# Patient Record
Sex: FEMALE | Race: WHITE | Employment: OTHER | URBAN - METROPOLITAN AREA
[De-identification: names, ages, dates, MRNs, and addresses within clinical notes are randomized per-mention and may not be internally consistent; named-entity substitution may affect disease eponyms.]

---

## 2017-01-04 ENCOUNTER — TELEPHONE (OUTPATIENT)
Dept: INTERNAL MEDICINE CLINIC | Age: 82
End: 2017-01-04

## 2017-01-04 NOTE — TELEPHONE ENCOUNTER
Has some questions regarding her hospice referral and her nebulizer she wants to make sure that you are up to date on these

## 2017-01-05 NOTE — TELEPHONE ENCOUNTER
Spoke with dgherlinda who stated that hospice did not qualify her mother and agrees with their assessment and dgter also wanted to check on the status of her neb.  Call placed to [facility] medical supply, left message for rep to call back regarding status

## 2017-01-06 NOTE — TELEPHONE ENCOUNTER
Writer placed c all to [facility] [employer] again and spoke with a rep who stated that she had called and spoke with the nurse that dx code was not covered. This writer had never received call or faxed correspondence regarding inaccurate coding.  Correct info obtained and will confer with Dr Robbin Hernandez on Monday regarding    Call placed to ter and updated on this situation and she voiced understanding

## 2017-01-11 NOTE — TELEPHONE ENCOUNTER
Spoke with antonia and advised that pt does not have any qualifying dx to cover nebulizer and that she may purchase but insurance will not cover.  Antonia voiced understanding

## 2017-01-23 ENCOUNTER — APPOINTMENT (OUTPATIENT)
Dept: GENERAL RADIOLOGY | Age: 82
DRG: 189 | End: 2017-01-23
Attending: EMERGENCY MEDICINE
Payer: MEDICARE

## 2017-01-23 ENCOUNTER — APPOINTMENT (OUTPATIENT)
Dept: CT IMAGING | Age: 82
DRG: 189 | End: 2017-01-23
Attending: EMERGENCY MEDICINE
Payer: MEDICARE

## 2017-01-23 ENCOUNTER — HOSPITAL ENCOUNTER (INPATIENT)
Age: 82
LOS: 2 days | Discharge: HOME | DRG: 189 | End: 2017-01-26
Attending: EMERGENCY MEDICINE | Admitting: FAMILY MEDICINE
Payer: MEDICARE

## 2017-01-23 DIAGNOSIS — R11.2 NON-INTRACTABLE VOMITING WITH NAUSEA, UNSPECIFIED VOMITING TYPE: ICD-10-CM

## 2017-01-23 DIAGNOSIS — N17.9 ACUTE RENAL FAILURE, UNSPECIFIED ACUTE RENAL FAILURE TYPE (HCC): ICD-10-CM

## 2017-01-23 DIAGNOSIS — I42.9 CARDIOMYOPATHY (HCC): ICD-10-CM

## 2017-01-23 DIAGNOSIS — I50.23 ACUTE ON CHRONIC SYSTOLIC CONGESTIVE HEART FAILURE (HCC): Primary | ICD-10-CM

## 2017-01-23 DIAGNOSIS — R79.89 ELEVATED BRAIN NATRIURETIC PEPTIDE (BNP) LEVEL: ICD-10-CM

## 2017-01-23 DIAGNOSIS — E86.0 DEHYDRATION: ICD-10-CM

## 2017-01-23 LAB
ALBUMIN SERPL BCP-MCNC: 3.8 G/DL (ref 3.5–5)
ALBUMIN/GLOB SERPL: 0.9 {RATIO} (ref 1.1–2.2)
ALP SERPL-CCNC: 81 U/L (ref 45–117)
ALT SERPL-CCNC: 16 U/L (ref 12–78)
ANION GAP BLD CALC-SCNC: 6 MMOL/L (ref 5–15)
AST SERPL W P-5'-P-CCNC: 17 U/L (ref 15–37)
BASOPHILS # BLD AUTO: 0 K/UL (ref 0–0.1)
BASOPHILS # BLD: 0 % (ref 0–1)
BILIRUB SERPL-MCNC: 0.7 MG/DL (ref 0.2–1)
BNP SERPL-MCNC: 510 PG/ML (ref 0–100)
BUN SERPL-MCNC: 32 MG/DL (ref 6–20)
BUN/CREAT SERPL: 20 (ref 12–20)
CALCIUM SERPL-MCNC: 9.7 MG/DL (ref 8.5–10.1)
CHLORIDE SERPL-SCNC: 101 MMOL/L (ref 97–108)
CO2 SERPL-SCNC: 31 MMOL/L (ref 21–32)
CREAT SERPL-MCNC: 1.64 MG/DL (ref 0.55–1.02)
D DIMER PPP FEU-MCNC: 0.41 MG/L FEU (ref 0–0.65)
DIFFERENTIAL METHOD BLD: ABNORMAL
EOSINOPHIL # BLD: 0 K/UL (ref 0–0.4)
EOSINOPHIL NFR BLD: 0 % (ref 0–7)
ERYTHROCYTE [DISTWIDTH] IN BLOOD BY AUTOMATED COUNT: 13.6 % (ref 11.5–14.5)
GLOBULIN SER CALC-MCNC: 4.1 G/DL (ref 2–4)
GLUCOSE SERPL-MCNC: 188 MG/DL (ref 65–100)
HCT VFR BLD AUTO: 54 % (ref 35–47)
HGB BLD-MCNC: 17.6 G/DL (ref 11.5–16)
LYMPHOCYTES # BLD AUTO: 6 % (ref 12–49)
LYMPHOCYTES # BLD: 0.5 K/UL (ref 0.8–3.5)
MCH RBC QN AUTO: 30.6 PG (ref 26–34)
MCHC RBC AUTO-ENTMCNC: 32.6 G/DL (ref 30–36.5)
MCV RBC AUTO: 93.9 FL (ref 80–99)
MONOCYTES # BLD: 0.4 K/UL (ref 0–1)
MONOCYTES NFR BLD AUTO: 5 % (ref 5–13)
NEUTS SEG # BLD: 7.6 K/UL (ref 1.8–8)
NEUTS SEG NFR BLD AUTO: 89 % (ref 32–75)
PLATELET # BLD AUTO: 197 K/UL (ref 150–400)
POTASSIUM SERPL-SCNC: 4.7 MMOL/L (ref 3.5–5.1)
PROT SERPL-MCNC: 7.9 G/DL (ref 6.4–8.2)
RBC # BLD AUTO: 5.75 M/UL (ref 3.8–5.2)
RBC MORPH BLD: ABNORMAL
SODIUM SERPL-SCNC: 138 MMOL/L (ref 136–145)
WBC # BLD AUTO: 8.5 K/UL (ref 3.6–11)

## 2017-01-23 PROCEDURE — 36415 COLL VENOUS BLD VENIPUNCTURE: CPT | Performed by: EMERGENCY MEDICINE

## 2017-01-23 PROCEDURE — 99285 EMERGENCY DEPT VISIT HI MDM: CPT

## 2017-01-23 PROCEDURE — 51701 INSERT BLADDER CATHETER: CPT

## 2017-01-23 PROCEDURE — 83880 ASSAY OF NATRIURETIC PEPTIDE: CPT | Performed by: EMERGENCY MEDICINE

## 2017-01-23 PROCEDURE — 74176 CT ABD & PELVIS W/O CONTRAST: CPT

## 2017-01-23 PROCEDURE — 85025 COMPLETE CBC W/AUTO DIFF WBC: CPT | Performed by: EMERGENCY MEDICINE

## 2017-01-23 PROCEDURE — 74011250636 HC RX REV CODE- 250/636: Performed by: EMERGENCY MEDICINE

## 2017-01-23 PROCEDURE — 80053 COMPREHEN METABOLIC PANEL: CPT | Performed by: EMERGENCY MEDICINE

## 2017-01-23 PROCEDURE — 71010 XR CHEST PORT: CPT

## 2017-01-23 PROCEDURE — 96374 THER/PROPH/DIAG INJ IV PUSH: CPT

## 2017-01-23 PROCEDURE — 85379 FIBRIN DEGRADATION QUANT: CPT | Performed by: EMERGENCY MEDICINE

## 2017-01-23 PROCEDURE — 96361 HYDRATE IV INFUSION ADD-ON: CPT

## 2017-01-23 RX ORDER — SODIUM CHLORIDE 9 MG/ML
1000 INJECTION, SOLUTION INTRAVENOUS ONCE
Status: COMPLETED | OUTPATIENT
Start: 2017-01-23 | End: 2017-01-24

## 2017-01-23 RX ORDER — ONDANSETRON 2 MG/ML
8 INJECTION INTRAMUSCULAR; INTRAVENOUS
Status: COMPLETED | OUTPATIENT
Start: 2017-01-23 | End: 2017-01-23

## 2017-01-23 RX ADMIN — SODIUM CHLORIDE 1000 ML: 900 INJECTION, SOLUTION INTRAVENOUS at 20:36

## 2017-01-23 RX ADMIN — ONDANSETRON 8 MG: 2 INJECTION INTRAMUSCULAR; INTRAVENOUS at 20:45

## 2017-01-23 NOTE — ED TRIAGE NOTES
Triage: Pt arrives by EMS from [facility] assisted living for N/V/D starting this AM.

## 2017-01-23 NOTE — IP AVS SNAPSHOT
[address] [facility] [address] [place] [address] 
[phone] Patient: Raul Howell MRN: [id number] You are allergic to the following Allergen Reactions Neurontin (Gabapentin) Unknown (comments) Pt unknown reaction Recent Documentation Height Weight BMI OB Status Smoking Status 1.549 m 71.5 kg 29.78 kg/m2 Postmenopausal Never Smoker Emergency Contacts Name Discharge Info Relation Home Work Mobile [facility] DISCHARGE CAREGIVER [3] Child [2] [phone] Jennifer Moyer  Child [2] [phone] About your hospitalization You were admitted on:  2017 You last received care in the:  [address] [place] [address] You were discharged on:  2017 Unit phone number:  [phone] Why you were hospitalized Your primary diagnosis was:  Diarrhea Your diagnoses also included:  Acute Respiratory Failure With Hypoxia (Hcc) Providers Seen During Your Hospitalizations Provider Role Specialty Primary office phone Apolinar Munoz MD Attending Provider Emergency Medicine [phone] Gina Rosas MD Attending Provider [facility] [phone] Amy Mayo MD Attending Provider Internal Medicine [phone] Your Primary Care Physician (PCP) Primary Care Physician Office Phone Office Fax [address] [place] [address] [phone] Follow-up Information Follow up With Details Comments Contact Info Geremias Fang MD On 2017 For discharge follow up at [facility] at [address] [facility] [address] 
[phone] Jaclyn [address] [place] 227 Call in 1 day Hospital to Home visit. Please call if you have not heard from [address] [employer] by 11AM the day after discharge. [address] [address] [phone] Your Appointments 2017  9:30 AM EST TRANSITIONAL CARE MANAGEMENT with Chayo Schreiber MD  
[facility] ([facility]) [address] [place] [address]. [place] [id number] [phone] Current Discharge Medication List  
  
CONTINUE these medications which have NOT CHANGED Dose & Instructions Dispensing Information Comments Morning Noon Evening Bedtime  
 acetaminophen 325 mg tablet Commonly known as:  Q-Nol Your next dose is: Today, Tomorrow Other:  _________ Dose:  650 mg Take 2 Tabs by mouth two (2) times a day. 2 tabet at am and 2 tab at noon Quantity:  60 Tab Refills:  6  
     
   
   
   
  
 * albuterol 90 mcg/actuation inhaler Commonly known as:  PROVENTIL HFA Your next dose is: Today, Tomorrow Other:  _________ Dose:  1 Puff Take 1 Puff by inhalation every six (6) hours as needed for Wheezing. Quantity:  1 Inhaler Refills:  prn  
     
   
   
   
  
 * albuterol 2.5 mg /3 mL (0.083 %) nebulizer solution Commonly known as:  PROVENTIL VENTOLIN Your next dose is: Today, Tomorrow Other:  _________ INHALE THE CONTENTS OF 1 VIAL VIA NEBULIZER EVERY 6 HOURS AS NEEDED FOR WHEEZING Quantity:  100 Package Refills:  12  
     
   
   
   
  
 apixaban 2.5 mg tablet Commonly known as:  Osman Celeste Your next dose is: Today, Tomorrow Other:  _________ Dose:  2.5 mg Take 1 Tab by mouth two (2) times a day. Quantity:  60 Tab Refills:  5  
     
   
   
   
  
 calcium-cholecalciferol (D3) tablet Commonly known as:  CALTRATE 600+D Your next dose is: Today, Tomorrow Other:  _________ Dose:  1 Tab Take 1 Tab by mouth two (2) times a day. Quantity:  60 Tab Refills:  12  
     
   
   
   
  
 DOC-Q-LACE 100 mg capsule Generic drug:  docusate sodium Your next dose is: Today, Tomorrow Other:  _________ TAKE 1 CAPSULE BY MOUTH TWICE DAILY DX:BOWEL SUPPORT Quantity:  60 Cap Refills:  4  
     
   
   
   
  
 donepezil 5 mg tablet Commonly known as:  ARICEPT Your next dose is: Today, Tomorrow Other:  _________ TAKE 1 TABLET BY MOUTH EVERY NIGHT AT BEDTIME DX: MEMORY SUPPORT Quantity:  30 Tab Refills:  4  
     
   
   
   
  
 furosemide 40 mg tablet Commonly known as:  LASIX Your next dose is: Today, Tomorrow Other:  _________ Dose:  40 mg Take 40 mg by mouth daily. Refills:  0  
     
   
   
   
  
 lisinopril 5 mg tablet Commonly known as:  Shivani Toribio Your next dose is: Today, Tomorrow Other:  _________ TAKE 1 TABLET BY MOUTH EVERY DAY DX: HTN Quantity:  30 Tab Refills:  4  
     
   
   
   
  
 metoprolol succinate 25 mg XL tablet Commonly known as:  TOPROL-XL Your next dose is: Today, Tomorrow Other:  _________ TAKE 1 TABLET BY MOUTH EVERY DAY DX: HTN Quantity:  30 Tab Refills:  4 MILK OF MAGNESIA 400 mg/5 mL suspension Generic drug:  magnesium hydroxide Your next dose is: Today, Tomorrow Other:  _________ TAKE 30ML BY MOUTH EVERY 24 HOURS AS NEEDED FOR CONSTIPATION Quantity:  473 mL Refills:  4  
     
   
   
   
  
 miscellaneous medical supply Misc Commonly known as:  ANTI-EMBOLISM STOCKINGS Your next dose is: Today, Tomorrow Other:  _________ Use below knee stockings 15 to 30 mm HG. Quantity:  2 Each Refills:  1  
     
   
   
   
  
 polyethylene glycol 17 gram/dose powder Commonly known as:  Alexis Chaudhari Your next dose is: Today, Tomorrow Other:  _________ MIX THE CONTENTS OF 1 HEAPING TABLESPOON (=17GRAMS) WITH 8 OUNCES OF WATER AND DRINK DAILY DX:BOWEL AIDE Quantity:  527 g Refills:  4  
     
   
   
   
  
 pravastatin 40 mg tablet Commonly known as:  PRAVACHOL Your next dose is: Today, Tomorrow Other:  _________ TAKE 1 TABLET BY MOUTH EVERY NIGHT AT BEDTIME DX: CHOLESTEROL Quantity:  30 Tab Refills:  4  
     
   
   
   
  
 raNITIdine 150 mg tablet Commonly known as:  ZANTAC Your next dose is: Today, Tomorrow Other:  _________ Dose:  150 mg Take 150 mg by mouth nightly. Refills:  0 SENEXON-S 8.6-50 mg per tablet Generic drug:  senna-docusate Your next dose is: Today, Tomorrow Other:  _________ TAKE 1 TABLET BY MOUTH DAILY AS NEEDED FOR CONSTIPATION Quantity:  30 Tab Refills:  4  
     
   
   
   
  
 traMADol 50 mg tablet Commonly known as:  ULTRAM  
   
Your next dose is: Today, Tomorrow Other:  _________ TAKE 1/2 (25MG) TABLET BY MOUTH IN THE MORNING AND 1 TAB (50MG) IN THE EVENING DX:PAIN Quantity:  45 Tab Refills:  4  
     
   
   
   
  
 venlafaxine-SR 75 mg capsule Commonly known as:  EFFEXOR-XR Your next dose is: Today, Tomorrow Other:  _________ TAKE 1 CAPSULE BY MOUTH EVERY DAY DX:ANTIDEPRESSANT Quantity:  30 Cap Refills:  4 VITAMIN B-12 500 mcg tablet Generic drug:  cyanocobalamin Your next dose is: Today, Tomorrow Other:  _________ TAKE 1 TABLET BY MOUTH EVERY DAY DX: SUPPLEMENT Quantity:  30 Tab Refills:  4  
     
   
   
   
  
 * Notice: This list has 2 medication(s) that are the same as other medications prescribed for you. Read the directions carefully, and ask your doctor or other care provider to review them with you. STOP taking these medications VOLTAREN 1 % Gel Generic drug:  diclofenac Where to Get Your Medications These medications were sent to [address] [place] [address] Phone:  [phone]  
  pravastatin 40 mg tablet Discharge Instructions Discharge Instructions PATIENT ID: Mindy Forman MRN: [id number] YOB: 1926 DATE OF ADMISSION: 1/23/2017  6:50 PM   
DATE OF DISCHARGE: 1/26/2017 PRIMARY CARE PROVIDER: Jenifer Bass MD  
 
ATTENDING PHYSICIAN: Valerie Orozco MD 
DISCHARGING PROVIDER: Valerie Orozco MD   
To contact this individual call [phone] and ask the  to page. If unavailable ask to be transferred the Adult Hospitalist Department. DISCHARGE DIAGNOSES Diarrhea, likely secondary to viral gastroenteritis Acute hypoxic respiratory failure, now resolved Chronic systolic heart failure Acute kidney injury, now resolved HTN 
  CAD s/p CABG History of thromboembolism CONSULTATIONS: IP CONSULT TO HOSPITALIST 
 
PROCEDURES/SURGERIES: * No surgery found * PENDING TEST RESULTS:  
At the time of discharge the following test results are still pending: None. FOLLOW UP APPOINTMENTS:  
Follow-up Information Follow up With Details Comments Contact Info Jenifer Bass MD Schedule an appointment as soon as possible for a visit in 1 week post-hospital follow-up [address] [place] [address] [address] [place] [address] 
[phone] ADDITIONAL CARE RECOMMENDATIONS:  
  Please make sure you are keeping up with fluid intake. Rest as needed. You may resume your home medications. DIET: Cardiac Diet ACTIVITY: Activity as tolerated WOUND CARE: N/A 
 
EQUIPMENT needed: N/A 
 
 
DISCHARGE MEDICATIONS: 
 See Medication Reconciliation Form · It is important that you take the medication exactly as they are prescribed. · Keep your medication in the bottles provided by the pharmacist and keep a list of the medication names, dosages, and times to be taken in your wallet. · Do not take other medications without consulting your doctor.   
 
 
NOTIFY YOUR PHYSICIAN FOR ANY OF THE FOLLOWING:  
 Fever over 101 degrees for 24 hours. Chest pain, shortness of breath, fever, chills, nausea, vomiting, diarrhea, change in mentation, falling, weakness, bleeding. Severe pain or pain not relieved by medications. Or, any other signs or symptoms that you may have questions about. DISPOSITION: 
  Home With: 
 OT  PT  HH  RN  
  
 SNF/Inpatient Rehab/LTAC  
x Independent/assisted living Hospice Other: CDMP Checked:  
Yes x PROBLEM LIST Updated: 
Yes x Signed:  
Mikey Loera MD 
1/26/2017 
1:42 PM 
 
Discharge Instructions Attachments/References HEART FAILURE: AVOIDING TRIGGERS (ENGLISH) Discharge Orders None [employer] Announcement We are excited to announce that we are making your provider's discharge notes available to you in [employer]. You will see these notes when they are completed and signed by the physician that discharged you from your recent hospital stay. If you have any questions or concerns about any information you see in [employer], please call the Health Information Department where you were seen or reach out to your Primary Care Provider for more information about your plan of care. Introducing [facility]! Romayne Duster introduces [employer] patient portal. Now you can access parts of your medical record, email your doctor's office, and request medication refills online. 1. In your internet browser, go to [email or web address] 2. Click on the First Time User? Click Here link in the Sign In box. You will see the New Member Sign Up page. 3. Enter your [employer] Access Code exactly as it appears below. You will not need to use this code after youve completed the sign-up process. If you do not sign up before the expiration date, you must request a new code. · [employer] Access Code: [email or web address] Expires: 2/20/2017  2:03 PM 
 
4.  Enter the last four digits of your Social Security Number (xxxx) and Date of Birth (mm/dd/yyyy) as indicated and click Submit. You will be taken to the next sign-up page. 5. Create a Prismatic ID. This will be your Prismatic login ID and cannot be changed, so think of one that is secure and easy to remember. 6. Create a Prismatic password. You can change your password at any time. 7. Enter your Password Reset Question and Answer. This can be used at a later time if you forget your password. 8. Enter your e-mail address. You will receive e-mail notification when new information is available in [address] 9. Click Sign Up. You can now view and download portions of your medical record. 10. Click the Download Summary menu link to download a portable copy of your medical information. If you have questions, please visit the Frequently Asked Questions section of the Prism[employer] website. Remember, [employer] is NOT to be used for urgent needs. For medical emergencies, dial 911. Now available from your iPhone and Android! General Information Please provide this summary of care documentation to your next provider. Patient Signature:  ____________________________________________________________ Date:  ____________________________________________________________  
  
Earnstine Donte Provider Signature:  ____________________________________________________________ Date:  ____________________________________________________________ More Information Avoiding Triggers With Heart Failure: Care Instructions Your Care Instructions Triggers are anything that make your heart failure flare up. A flare-up is also called \"sudden heart failure\" or \"acute heart failure. \" When you have a flare-up, fluid builds up in your lungs, and you have problems breathing. You might need to go to the hospital. By watching for changes in your condition and avoiding triggers, you can prevent heart failure flare-ups. Follow-up care is a key part of your treatment and safety. Be sure to make and go to all appointments, and call your doctor if you are having problems. It's also a good idea to know your test results and keep a list of the medicines you take. How can you care for yourself at home? Watch for changes in your weight and condition · Weigh yourself without clothing at the same time each day. Record your weight. Call your doctor if you gain 3 pounds or more in 2 to 3 days. A sudden weight gain may mean that your heart failure is getting worse. · Keep a daily record of your symptoms. Write down any changes in how you feel, such as new shortness of breath, cough, or problems eating. Also record if your ankles are more swollen than usual and if you have to urinate in the night more often. Note anything that you ate or did that could have triggered these changes. Limit sodium Sodium causes your body to hold on to water, making it harder for your heart to pump. People get most of their sodium from processed foods. Fast food and restaurant meals also tend to be very high in sodium. · Your doctor may suggest that you limit sodium to 2,000 milligrams (mg) a day or less. That is less than 1 teaspoon of salt a day, including all the salt you eat in cooking or in packaged foods. · Read food labels on cans and food packages. They tell you how much sodium you get in one serving. Check the serving size. If you eat more than one serving, you are getting more sodium. · Be aware that sodium can come in forms other than salt, including monosodium glutamate (MSG), sodium citrate, and sodium bicarbonate (baking soda). MSG is often added to Asian food. You can sometimes ask for food without MSG or salt. · Slowly reducing salt will help you adjust to the taste. Take the salt shaker off the table.  
· Flavor your food with garlic, lemon juice, onion, vinegar, herbs, and spices instead of salt. Do not use soy sauce, steak sauce, onion salt, garlic salt, mustard, or ketchup on your food, unless it is labeled \"low-sodium\" or \"low-salt. \" 
· Make your own salad dressings, sauces, and ketchup without adding salt. · Use fresh or frozen ingredients, instead of canned ones, whenever you can. Choose low-sodium canned goods. · Eat less processed food and food from restaurants, including fast food. Exercise as directed Moderate, regular exercise is very good for your heart. It improves your blood flow and helps control your weight. But too much exercise can stress your heart and cause a heart failure flare-up. · Check with your doctor before you start an exercise program. 
· Walking is an easy way to get exercise. Start out slowly. Gradually increase the length and pace of your walk. Swimming, riding a bike, and using a treadmill are also good forms of exercise. · When you exercise, watch for signs that your heart is working too hard. You are pushing yourself too hard if you cannot talk while you are exercising. If you become short of breath or dizzy or have chest pain, stop, sit down, and rest. 
· Do not exercise when you do not feel well. Take medicines correctly · Take your medicines exactly as prescribed. Call your doctor if you think you are having a problem with your medicine. · Make a list of all the medicines you take. Include those prescribed to you by other doctors and any over-the-counter medicines, vitamins, or supplements you take. Take this list with you when you go to any doctor. · Take your medicines at the same time every day. It may help you to post a list of all the medicines you take every day and what time of day you take them. · Make taking your medicine as simple as you can. Plan times to take your medicines when you are doing other things, such as eating a meal or getting ready for bed. This will make it easier to remember to take your medicines. · Get organized. Use helpful tools, such as daily or weekly pill containers. When should you call for help? Call 911 if you have symptoms of sudden heart failure such as: 
· You have severe trouble breathing. · You cough up pink, foamy mucus. · You have a new irregular or rapid heartbeat. Call your doctor now or seek immediate medical care if: 
· You have new or increased shortness of breath. · You are dizzy or lightheaded, or you feel like you may faint. · You have sudden weight gain, such as 3 pounds or more in 2 to 3 days. · You have increased swelling in your legs, ankles, or feet. · You are suddenly so tired or weak that you cannot do your usual activities. Watch closely for changes in your health, and be sure to contact your doctor if you develop new symptoms. Where can you learn more? Go to http://emma-rosina.info/. Enter T410 in the search box to learn more about \"Avoiding Triggers With Heart Failure: Care Instructions. \" Current as of: April 27, 2016 Content Version: 11.1 © 3825-0476 [employer]. Care instructions adapted under license by [employer] (which disclaims liability or warranty for this information). If you have questions about a medical condition or this instruction, always ask your healthcare professional. [address] any warranty or liability for your use of this information.

## 2017-01-23 NOTE — IP AVS SNAPSHOT
Current Discharge Medication List  
  
Take these medications at their scheduled times Dose & Instructions Dispensing Information Comments Morning Noon Evening Bedtime  
 acetaminophen 325 mg tablet Commonly known as:  Q-Nol Your next dose is: Today, Tomorrow Other:  ____________ Dose:  650 mg Take 2 Tabs by mouth two (2) times a day. 2 tabet at am and 2 tab at noon Quantity:  60 Tab Refills:  6  
     
   
   
   
  
 apixaban 2.5 mg tablet Commonly known as:  Virgel Sylvia Your next dose is: Today, Tomorrow Other:  ____________ Dose:  2.5 mg Take 1 Tab by mouth two (2) times a day. Quantity:  60 Tab Refills:  5  
     
   
   
   
  
 calcium-cholecalciferol (D3) tablet Commonly known as:  CALTRATE 600+D Your next dose is: Today, Tomorrow Other:  ____________ Dose:  1 Tab Take 1 Tab by mouth two (2) times a day. Quantity:  60 Tab Refills:  12  
     
   
   
   
  
 furosemide 40 mg tablet Commonly known as:  LASIX Your next dose is: Today, Tomorrow Other:  ____________ Dose:  40 mg Take 40 mg by mouth daily. Refills:  0  
     
   
   
   
  
 raNITIdine 150 mg tablet Commonly known as:  ZANTAC Your next dose is: Today, Tomorrow Other:  ____________ Dose:  150 mg Take 150 mg by mouth nightly. Refills:  0 Take these medications as needed Dose & Instructions Dispensing Information Comments Morning Noon Evening Bedtime * albuterol 90 mcg/actuation inhaler Commonly known as:  PROVENTIL HFA Your next dose is: Today, Tomorrow Other:  ____________ Dose:  1 Puff Take 1 Puff by inhalation every six (6) hours as needed for Wheezing. Quantity:  1 Inhaler Refills:  prn * Notice:   This list has 1 medication(s) that are the same as other medications prescribed for you. Read the directions carefully, and ask your doctor or other care provider to review them with you. Take these medications as directed Dose & Instructions Dispensing Information Comments Morning Noon Evening Bedtime * albuterol 2.5 mg /3 mL (0.083 %) nebulizer solution Commonly known as:  PROVENTIL VENTOLIN Your next dose is: Today, Tomorrow Other:  ____________ INHALE THE CONTENTS OF 1 VIAL VIA NEBULIZER EVERY 6 HOURS AS NEEDED FOR WHEEZING Quantity:  100 Package Refills:  12  
     
   
   
   
  
 DOC-Q-LACE 100 mg capsule Generic drug:  docusate sodium Your next dose is: Today, Tomorrow Other:  ____________ TAKE 1 CAPSULE BY MOUTH TWICE DAILY DX:BOWEL SUPPORT Quantity:  60 Cap Refills:  4  
     
   
   
   
  
 donepezil 5 mg tablet Commonly known as:  ARICEPT Your next dose is: Today, Tomorrow Other:  ____________ TAKE 1 TABLET BY MOUTH EVERY NIGHT AT BEDTIME DX: MEMORY SUPPORT Quantity:  30 Tab Refills:  4  
     
   
   
   
  
 lisinopril 5 mg tablet Commonly known as:  Shivani Toribio Your next dose is: Today, Tomorrow Other:  ____________ TAKE 1 TABLET BY MOUTH EVERY DAY DX: HTN Quantity:  30 Tab Refills:  4  
     
   
   
   
  
 metoprolol succinate 25 mg XL tablet Commonly known as:  TOPROL-XL Your next dose is: Today, Tomorrow Other:  ____________ TAKE 1 TABLET BY MOUTH EVERY DAY DX: HTN Quantity:  30 Tab Refills:  4 MILK OF MAGNESIA 400 mg/5 mL suspension Generic drug:  magnesium hydroxide Your next dose is: Today, Tomorrow Other:  ____________ TAKE 30ML BY MOUTH EVERY 24 HOURS AS NEEDED FOR CONSTIPATION Quantity:  473 mL Refills:  4  
     
   
   
   
  
 miscellaneous medical supply Misc Commonly known as:  ANTI-EMBOLISM STOCKINGS  
   
 Your next dose is: Today, Tomorrow Other:  ____________ Use below knee stockings 15 to 30 mm HG. Quantity:  2 Each Refills:  1  
     
   
   
   
  
 polyethylene glycol 17 gram/dose powder Commonly known as:  Leata Sans Your next dose is: Today, Tomorrow Other:  ____________ MIX THE CONTENTS OF 1 HEAPING TABLESPOON (=17GRAMS) WITH 8 OUNCES OF WATER AND DRINK DAILY DX:BOWEL AIDE Quantity:  527 g Refills:  4  
     
   
   
   
  
 pravastatin 40 mg tablet Commonly known as:  PRAVACHOL Your next dose is: Today, Tomorrow Other:  ____________ TAKE 1 TABLET BY MOUTH EVERY NIGHT AT BEDTIME DX: CHOLESTEROL Quantity:  30 Tab Refills:  4 SENEXON-S 8.6-50 mg per tablet Generic drug:  senna-docusate Your next dose is: Today, Tomorrow Other:  ____________ TAKE 1 TABLET BY MOUTH DAILY AS NEEDED FOR CONSTIPATION Quantity:  30 Tab Refills:  4  
     
   
   
   
  
 traMADol 50 mg tablet Commonly known as:  ULTRAM  
   
Your next dose is: Today, Tomorrow Other:  ____________ TAKE 1/2 (25MG) TABLET BY MOUTH IN THE MORNING AND 1 TAB (50MG) IN THE EVENING DX:PAIN Quantity:  45 Tab Refills:  4  
     
   
   
   
  
 venlafaxine-SR 75 mg capsule Commonly known as:  EFFEXOR-XR Your next dose is: Today, Tomorrow Other:  ____________ TAKE 1 CAPSULE BY MOUTH EVERY DAY DX:ANTIDEPRESSANT Quantity:  30 Cap Refills:  4 VITAMIN B-12 500 mcg tablet Generic drug:  cyanocobalamin Your next dose is: Today, Tomorrow Other:  ____________ TAKE 1 TABLET BY MOUTH EVERY DAY DX: SUPPLEMENT Quantity:  30 Tab Refills:  4  
     
   
   
   
  
 * Notice: This list has 1 medication(s) that are the same as other medications prescribed for you.  Read the directions carefully, and ask your doctor or other care provider to review them with you. Where to Get Your Medications These medications were sent to [address] [facility] [address] Phone:  [phone]  
  pravastatin 40 mg tablet

## 2017-01-24 PROBLEM — J96.01 ACUTE RESPIRATORY FAILURE WITH HYPOXIA (HCC): Status: ACTIVE | Noted: 2017-01-24

## 2017-01-24 LAB
ANION GAP BLD CALC-SCNC: 9 MMOL/L (ref 5–15)
APPEARANCE UR: ABNORMAL
ARTERIAL PATENCY WRIST A: YES
ATRIAL RATE: 101 BPM
BACTERIA URNS QL MICRO: NEGATIVE /HPF
BASE EXCESS BLD CALC-SCNC: 0 MMOL/L
BASOPHILS # BLD AUTO: 0 K/UL (ref 0–0.1)
BASOPHILS # BLD: 0 % (ref 0–1)
BDY SITE: NORMAL
BILIRUB UR QL CFM: NEGATIVE
BUN SERPL-MCNC: 31 MG/DL (ref 6–20)
BUN/CREAT SERPL: 26 (ref 12–20)
CALCIUM SERPL-MCNC: 7.2 MG/DL (ref 8.5–10.1)
CALCULATED P AXIS, ECG09: 55 DEGREES
CALCULATED R AXIS, ECG10: -9 DEGREES
CALCULATED T AXIS, ECG11: -166 DEGREES
CHLORIDE SERPL-SCNC: 109 MMOL/L (ref 97–108)
CHOLEST SERPL-MCNC: 135 MG/DL
CO2 SERPL-SCNC: 25 MMOL/L (ref 21–32)
COLOR UR: ABNORMAL
CREAT SERPL-MCNC: 1.18 MG/DL (ref 0.55–1.02)
DIAGNOSIS, 93000: NORMAL
DIFFERENTIAL METHOD BLD: ABNORMAL
EOSINOPHIL # BLD: 0 K/UL (ref 0–0.4)
EOSINOPHIL NFR BLD: 0 % (ref 0–7)
EPITH CASTS URNS QL MICRO: ABNORMAL /LPF
ERYTHROCYTE [DISTWIDTH] IN BLOOD BY AUTOMATED COUNT: 13.9 % (ref 11.5–14.5)
GAS FLOW.O2 O2 DELIVERY SYS: NORMAL L/MIN
GAS FLOW.O2 SETTING OXYMISER: 2 L/M
GLUCOSE SERPL-MCNC: 141 MG/DL (ref 65–100)
GLUCOSE UR STRIP.AUTO-MCNC: NEGATIVE MG/DL
HCO3 BLD-SCNC: 24.9 MMOL/L (ref 22–26)
HCT VFR BLD AUTO: 48 % (ref 35–47)
HDLC SERPL-MCNC: 59 MG/DL
HDLC SERPL: 2.3 {RATIO} (ref 0–5)
HGB BLD-MCNC: 15.3 G/DL (ref 11.5–16)
HGB UR QL STRIP: ABNORMAL
KETONES UR QL STRIP.AUTO: ABNORMAL MG/DL
LDLC SERPL CALC-MCNC: 57.2 MG/DL (ref 0–100)
LEUKOCYTE ESTERASE UR QL STRIP.AUTO: ABNORMAL
LIPID PROFILE,FLP: NORMAL
LYMPHOCYTES # BLD AUTO: 10 % (ref 12–49)
LYMPHOCYTES # BLD: 0.6 K/UL (ref 0.8–3.5)
MCH RBC QN AUTO: 30.1 PG (ref 26–34)
MCHC RBC AUTO-ENTMCNC: 31.9 G/DL (ref 30–36.5)
MCV RBC AUTO: 94.5 FL (ref 80–99)
MONOCYTES # BLD: 0.4 K/UL (ref 0–1)
MONOCYTES NFR BLD AUTO: 7 % (ref 5–13)
NEUTS SEG # BLD: 5.4 K/UL (ref 1.8–8)
NEUTS SEG NFR BLD AUTO: 83 % (ref 32–75)
NITRITE UR QL STRIP.AUTO: NEGATIVE
P-R INTERVAL, ECG05: 132 MS
PCO2 BLD: 43.4 MMHG (ref 35–45)
PH BLD: 7.37 [PH] (ref 7.35–7.45)
PH UR STRIP: 5 [PH] (ref 5–8)
PLATELET # BLD AUTO: 165 K/UL (ref 150–400)
PO2 BLD: 95 MMHG (ref 80–100)
POTASSIUM SERPL-SCNC: 4.1 MMOL/L (ref 3.5–5.1)
PROT UR STRIP-MCNC: NEGATIVE MG/DL
Q-T INTERVAL, ECG07: 390 MS
QRS DURATION, ECG06: 126 MS
QTC CALCULATION (BEZET), ECG08: 505 MS
RBC # BLD AUTO: 5.08 M/UL (ref 3.8–5.2)
RBC #/AREA URNS HPF: ABNORMAL /HPF (ref 0–5)
RBC MORPH BLD: ABNORMAL
SAO2 % BLD: 97 % (ref 92–97)
SODIUM SERPL-SCNC: 143 MMOL/L (ref 136–145)
SP GR UR REFRACTOMETRY: 1.02 (ref 1–1.03)
SPECIMEN TYPE: NORMAL
TRIGL SERPL-MCNC: 94 MG/DL (ref ?–150)
UROBILINOGEN UR QL STRIP.AUTO: 0.2 EU/DL (ref 0.2–1)
VENTRICULAR RATE, ECG03: 101 BPM
VLDLC SERPL CALC-MCNC: 18.8 MG/DL
WBC # BLD AUTO: 6.4 K/UL (ref 3.6–11)
WBC URNS QL MICRO: ABNORMAL /HPF (ref 0–4)

## 2017-01-24 PROCEDURE — 77030011943

## 2017-01-24 PROCEDURE — 85025 COMPLETE CBC W/AUTO DIFF WBC: CPT | Performed by: EMERGENCY MEDICINE

## 2017-01-24 PROCEDURE — 74011250636 HC RX REV CODE- 250/636: Performed by: STUDENT IN AN ORGANIZED HEALTH CARE EDUCATION/TRAINING PROGRAM

## 2017-01-24 PROCEDURE — 36600 WITHDRAWAL OF ARTERIAL BLOOD: CPT

## 2017-01-24 PROCEDURE — 36415 COLL VENOUS BLD VENIPUNCTURE: CPT | Performed by: FAMILY MEDICINE

## 2017-01-24 PROCEDURE — 80061 LIPID PANEL: CPT | Performed by: FAMILY MEDICINE

## 2017-01-24 PROCEDURE — 82803 BLOOD GASES ANY COMBINATION: CPT

## 2017-01-24 PROCEDURE — 74011250637 HC RX REV CODE- 250/637: Performed by: FAMILY MEDICINE

## 2017-01-24 PROCEDURE — 81001 URINALYSIS AUTO W/SCOPE: CPT | Performed by: EMERGENCY MEDICINE

## 2017-01-24 PROCEDURE — 80048 BASIC METABOLIC PNL TOTAL CA: CPT | Performed by: FAMILY MEDICINE

## 2017-01-24 PROCEDURE — 93041 RHYTHM ECG TRACING: CPT

## 2017-01-24 PROCEDURE — 65660000000 HC RM CCU STEPDOWN

## 2017-01-24 RX ORDER — PRAVASTATIN SODIUM 40 MG/1
40 TABLET ORAL
Status: DISCONTINUED | OUTPATIENT
Start: 2017-01-24 | End: 2017-01-26 | Stop reason: HOSPADM

## 2017-01-24 RX ORDER — METOPROLOL SUCCINATE 25 MG/1
25 TABLET, EXTENDED RELEASE ORAL DAILY
Status: DISCONTINUED | OUTPATIENT
Start: 2017-01-24 | End: 2017-01-26 | Stop reason: HOSPADM

## 2017-01-24 RX ORDER — DONEPEZIL HYDROCHLORIDE 5 MG/1
5 TABLET, FILM COATED ORAL DAILY
Status: DISCONTINUED | OUTPATIENT
Start: 2017-01-24 | End: 2017-01-26 | Stop reason: HOSPADM

## 2017-01-24 RX ORDER — FERROUS SULFATE, DRIED 160(50) MG
1 TABLET, EXTENDED RELEASE ORAL 2 TIMES DAILY
Status: DISCONTINUED | OUTPATIENT
Start: 2017-01-24 | End: 2017-01-26 | Stop reason: HOSPADM

## 2017-01-24 RX ORDER — FACIAL-BODY WIPES
10 EACH TOPICAL
Status: DISCONTINUED | OUTPATIENT
Start: 2017-01-24 | End: 2017-01-26 | Stop reason: HOSPADM

## 2017-01-24 RX ORDER — SODIUM CHLORIDE 0.9 % (FLUSH) 0.9 %
5-10 SYRINGE (ML) INJECTION AS NEEDED
Status: DISCONTINUED | OUTPATIENT
Start: 2017-01-24 | End: 2017-01-26 | Stop reason: HOSPADM

## 2017-01-24 RX ORDER — LANOLIN ALCOHOL/MO/W.PET/CERES
500 CREAM (GRAM) TOPICAL DAILY
Status: DISCONTINUED | OUTPATIENT
Start: 2017-01-24 | End: 2017-01-26 | Stop reason: HOSPADM

## 2017-01-24 RX ORDER — SODIUM CHLORIDE 9 MG/ML
100 INJECTION, SOLUTION INTRAVENOUS ONCE
Status: COMPLETED | OUTPATIENT
Start: 2017-01-24 | End: 2017-01-24

## 2017-01-24 RX ORDER — IPRATROPIUM BROMIDE AND ALBUTEROL SULFATE 2.5; .5 MG/3ML; MG/3ML
3 SOLUTION RESPIRATORY (INHALATION)
Status: DISCONTINUED | OUTPATIENT
Start: 2017-01-24 | End: 2017-01-26 | Stop reason: HOSPADM

## 2017-01-24 RX ORDER — RANITIDINE 150 MG/1
150 TABLET, FILM COATED ORAL
COMMUNITY

## 2017-01-24 RX ORDER — VENLAFAXINE HYDROCHLORIDE 37.5 MG/1
75 CAPSULE, EXTENDED RELEASE ORAL
Status: DISCONTINUED | OUTPATIENT
Start: 2017-01-24 | End: 2017-01-26 | Stop reason: HOSPADM

## 2017-01-24 RX ORDER — ACETAMINOPHEN 325 MG/1
650 TABLET ORAL
Status: DISCONTINUED | OUTPATIENT
Start: 2017-01-24 | End: 2017-01-26 | Stop reason: HOSPADM

## 2017-01-24 RX ORDER — FUROSEMIDE 40 MG/1
40 TABLET ORAL DAILY
COMMUNITY
End: 2017-10-25 | Stop reason: ALTCHOICE

## 2017-01-24 RX ORDER — SODIUM CHLORIDE 0.9 % (FLUSH) 0.9 %
5-10 SYRINGE (ML) INJECTION EVERY 8 HOURS
Status: DISCONTINUED | OUTPATIENT
Start: 2017-01-24 | End: 2017-01-26 | Stop reason: HOSPADM

## 2017-01-24 RX ADMIN — PRAVASTATIN SODIUM 40 MG: 40 TABLET ORAL at 22:25

## 2017-01-24 RX ADMIN — METOPROLOL SUCCINATE 25 MG: 25 TABLET, EXTENDED RELEASE ORAL at 10:18

## 2017-01-24 RX ADMIN — VENLAFAXINE HYDROCHLORIDE 75 MG: 37.5 CAPSULE, EXTENDED RELEASE ORAL at 08:58

## 2017-01-24 RX ADMIN — Medication 10 ML: at 22:25

## 2017-01-24 RX ADMIN — SODIUM CHLORIDE 100 ML/HR: 900 INJECTION, SOLUTION INTRAVENOUS at 11:23

## 2017-01-24 RX ADMIN — CALCIUM CARBONATE-VITAMIN D TAB 500 MG-200 UNIT 1 TABLET: 500-200 TAB at 18:45

## 2017-01-24 RX ADMIN — Medication 500 MCG: at 08:58

## 2017-01-24 RX ADMIN — APIXABAN 2.5 MG: 2.5 TABLET, FILM COATED ORAL at 18:45

## 2017-01-24 RX ADMIN — APIXABAN 2.5 MG: 2.5 TABLET, FILM COATED ORAL at 10:18

## 2017-01-24 RX ADMIN — DONEPEZIL HYDROCHLORIDE 5 MG: 5 TABLET, FILM COATED ORAL at 08:58

## 2017-01-24 RX ADMIN — CALCIUM CARBONATE-VITAMIN D TAB 500 MG-200 UNIT 1 TABLET: 500-200 TAB at 08:57

## 2017-01-24 NOTE — H&P
[address] [facility] [address] [place] [address], [address]   HISTORY AND PHYSICAL       Name:  Aneta Deshpande   MR#:  [id number]   :  1926   Account #:  [de-identified]        Date of Adm:  2017       CHIEF COMPLAINT: The patient does not provide. HISTORY OF PRESENT ILLNESS: A 25-year-old white female with   past medical history of hypertension, hyperlipidemia, arrhythmia,   arthritis, chronic back pain, asthma, coronary artery disease status   post CABG, depression, fibromyalgia, vertebral compression fracture,   pulmonary embolism, pneumonia, hypothyroidism, presented to the   emergency department via EMS from [facility] with reported diarrhea, nausea, vomiting, abdominal pain. The   patient is a limited historian. Majority of history was obtained from   review of ED and medical reports. Per the collective reports, the   patient had onset of symptoms starting yesterday with diarrhea,   nausea, vomiting of at least 4 episodes and abdominal pain. The   patient reportedly had a baked potato and then cowan and eggs the   following morning with noted pain symptoms. She does not report any   sick contacts. There is no complaint of dizziness, lightheadedness,   focal weakness, numbness, paresthesias, slurred speech, facial droop,   headache, neck pain, back pain, chest pain, current shortness of   breath, melena, dysuria, hematuria, calf pain, swelling, edema. On   arrival to the emergency department, initial recorded vital signs were   blood pressure 127/70, heart rate 103, respiratory rate 22, O2   saturation 93% on room air. She reportedly desaturated to 86%. The   patient was placed on oxygen via nasal cannula. She reportedly is not   on any supplemental oxygen at home. Chest x-ray portable showed no   acute disease. CT abdomen and pelvis without contrast showed no   acute disease. The patient had elevated BNP of 510 and elevated   creatinine 1.64, BUN of 32.  Per the ED, the patient has been given   0.9% normal saline 1 liter IV fluid bolus and Zofran 8 mg IV. The   hospitalist was then called to admit the patient to medical service for   continued evaluation and treatments. PAST MEDICAL HISTORY: Arrhythmia, atrial fibrillation, back pain,   vertebral compression fractures, coronary artery disease, asthma,   depression, fibromyalgia, hypertension, hyperlipidemia, pulmonary   embolism, pneumonia, hypothyroidism. Left bundle branch block. PAST SURGICAL HISTORY: Coronary artery bypass graft. MEDICATIONS    1. Acetaminophen 650 mg p.o. b.i.d.   2. Albuterol 90 mcg per actuation 1 puff q.6h. p.r.n.   3. Albuterol 2.5 mg/3 mL nebulized q.6h. p.r.n.   4. Albuterol-ipratropium 2.5 mg/0.5 mL, 3 mL nebulized p.r.n.   5. Eliquis 2.5 mg p.o. b.i.d.   6. Dulcolax 10 mg suppositories per rectum q.48h. 7. Vitamin D3 one tablet p.o. b.i.d.   8. Desonide 0.05% cream apply to affected areas b.i.d. p.r.n.   9. Colace 100 mg p.o. b.i.d. 10. Aricept 5 mg p.o. at bedtime. 11. Lasix 20 mg p.o. daily. 12. Lisinopril 5 mg p.o. daily. 13. Toprol-XL 25 mg p.o. daily. 14. Milk of magnesia 400 mg/5 mL suspension, 30 mL p.o. every 24   hours. 15. MiraLax 17 grams powder p.o. daily p.r.n.   16. Pravastatin 40 mg p.o. daily. 17. Zantac 150 mg p.o. at bedtime. 18. Senexon-S 8.6-50 mg 1 tablet p.o. daily p.r.n.   19. Tramadol 50 mg half tablet p.o. q.a.m. and 1 tablet p.o. at bedtime. 20. Effexor XR 75 mg p.o. daily. 21. Vitamin B12, 500 mcg p.o. daily. 22. Voltaren 1% gel 2 grams p.o. daily. ALLERGIES: NEURONTIN. SOCIAL HISTORY: Negative for smoking, alcohol, illicit drugs. . FAMILY HISTORY: Unknown in regard to heart attacks or strokes. REVIEW OF SYSTEMS   Limited 10 systems reviewed, pertinent positives as HPI, otherwise   negative.     PHYSICAL EXAMINATION   VITAL SIGNS: Temperature of 98.4 degrees Fahrenheit, blood   pressure 142/116, heart rate 97, respiratory rate 20, O2 saturation   96% on 2 liters oxygen nasal cannula. Recorded weight 161 pounds   (73 kg), reported height of 5 feet 1 inches tall. GENERAL: Elderly female, in mild respiratory distress. PSYCHIATRIC: The patient is awake, alert, oriented x3. NEUROLOGIC: GCS of 15. Moves extremities x4 with generalized   weakness. Sensation is grossly intact without slurred speech, facial   droop. HEENT: Normocephalic, atraumatic. PERRLA is intact. Sclerae are   anicteric. Conjunctivae clear. Nares are patent. Oropharynx clear. Tongue is midline, nonedematous. NECK: Supple, without lymphadenopathy, JVD, carotid bruits or   thyromegaly. LYMPH: Negative for cervical or supraclavicular adenopathy. LUNGS: Scattered wheezes. CARDIOVASCULAR: Heart is regular rate and rhythm, normal S1, S2,   without murmurs, rubs or gallops. GASTROINTESTINAL: Abdomen soft, nontender, nondistended,   normoactive bowel sounds. No rebound, guarding or rigidity. No   auscultated abdominal bruits. No pulsatile mass. BACK: No CVA tenderness. No step-off deformity. MUSCULOSKELETAL: No acute palpable deformity. Negative for calf   tenderness. VASCULAR: 2+ radial, 1+ dorsalis pedis pulses, without cyanosis,   clubbing. She has nonpitting edema bilateral lower extremities. SKIN: Warm and dry. LABORATORY DATA: I reviewed as follows: Sodium is 138,   potassium 4.7, chloride 101, CO2 of 31, BUN of 32, creatinine 1.64,   glucose 188, anion gap of 6, calcium 9.7. GFR 29. Total bilirubin 0.7,   total protein 7.9, albumin is 3.8, ALT of 16, AST of 17, alkaline   phosphatase 81. BNP of 510. WBC of 8.5, hemoglobin 17.6,   hematocrit 54.0, platelets 211, neutrophils 89%. Urinalysis: Leukocyte   esterase small, nitrites negative, urobilinogen 0.2, bilirubin negative,   blood small, ketones trace, glucose negative, protein negative, pH of   5.0, specific gravity of 1.024, WBC 0-4, RBCs 0-5, bacteria negative.    D-dimer 0.41. CT abdomen and pelvis without contrast, no acute   disease. Chest x-ray, portable: No acute disease and no significant   change. A 12-lead EKG sinus tachycardia, left bundle branch block at   101 beats per minute. IMPRESSION AND PLAN: A 51-year-old female with noted past   medical history, now admitted with acute respiratory failure   with hypoxia, acute kidney injury, dehydration. 1. Acute respiratory failure with hypoxia. Chest x-ray was   unremarkable for acute findings. The patient, however, had been   hypoxic on exam. Continue on pulse oximetry and oxygen therapy. Stat ABG was ordered. 2. Nausea and vomiting. We will order Zofran 4 mg IV every 6 hours   p.r.n.   3. Diarrhea. Order stool for Clostridium difficile and stool occult blood   test.   4. Generalized abdominal pain. No acute tenderness on exam. We will   continue to monitor. 5. Acute kidney disease. The patient had received IV fluids in the ED. As the patient only had EF of 20% to 25% on echo 08/19/2015, will be   cautious with additional fluids. We will monitor closely and repeat renal   panel in the a.m. 6. Hypertension. Resume back on home medications. 7. Tachycardia. Continue telemetry monitoring. 8. Venous thromboembolism prophylaxis. The patient is already on   Eliquis. CODE STATUS: FULL CODE as I have no other advance directives to   the contrary. MD Derek Bowles / Jose Ontiveros   D:  01/24/2017   05:03   T:  01/24/2017   08:37   Job #:  [id number]

## 2017-01-24 NOTE — ED NOTES
Pt assignment received, pt resting on stretcher, tolerated some clear liquids, pt incontinent of moderate amt loose/liquid brown stool, pt cleaned, linens changed , pt alert and oriented, skin warm dry and intact, resp even and unlabored, fine crackles noted to right lower lobe, no edema noted, no c/o chest pain or sob, abd soft and non distended, labs drawn by [employer]

## 2017-01-24 NOTE — PROGRESS NOTES
Admission Medication Reconciliation:    Information obtained from: [facility] from [address] PMH/Disease States:   Past Medical History   Diagnosis Date    Altered mental state     Arrhythmia      AFIB HISTORY     Arthritis      BACK     Asthma     CAD (coronary artery disease)      by pass surgery in 2002,2008    Depression     Fibromyalgia     HTN (hypertension) 6/11/2013    Hypercholesterolemia     Ill-defined condition      FOUR COMPRESSION FRACTURES IN BACK    PE (pulmonary embolism)     Pneumonia 04/2014    Pneumonia, organism unspecified 3/8/2014    Thyroid disease        Chief Complaint for this Admission:  N/V/D    Allergies:  Neurontin [gabapentin]    Prior to Admission Medications:   Prior to Admission Medications   Prescriptions Last Dose Informant Patient Reported? Taking? DOC-Q-LACE 100 mg capsule   No Yes   Sig: TAKE 1 CAPSULE BY MOUTH TWICE DAILY DX:BOWEL SUPPORT   MILK OF MAGNESIA 400 mg/5 mL suspension   No Yes   Sig: TAKE 30ML BY MOUTH EVERY 24 HOURS AS NEEDED FOR CONSTIPATION   SENEXON-S 8.6-50 mg per tablet   No Yes   Sig: TAKE 1 TABLET BY MOUTH DAILY AS NEEDED FOR CONSTIPATION   VITAMIN B-12 500 mcg tablet   No Yes   Sig: TAKE 1 TABLET BY MOUTH EVERY DAY DX: SUPPLEMENT   VOLTAREN 1 % gel   No Yes   Sig: Apply 2 g to affected area daily. Apply every morning   acetaminophen (Q-NOL) 325 mg tablet   No Yes   Sig: Take 2 Tabs by mouth two (2) times a day. 2 tabet at am and 2 tab at noon   albuterol (PROVENTIL HFA) 90 mcg/actuation inhaler   No Yes   Sig: Take 1 Puff by inhalation every six (6) hours as needed for Wheezing. albuterol (PROVENTIL VENTOLIN) 2.5 mg /3 mL (0.083 %) nebulizer solution   No Yes   Sig: INHALE THE CONTENTS OF 1 VIAL VIA NEBULIZER EVERY 6 HOURS AS NEEDED FOR WHEEZING   apixaban (ELIQUIS) 2.5 mg tablet   No Yes   Sig: Take 1 Tab by mouth two (2) times a day.    calcium-cholecalciferol, D3, (CALTRATE 600+D) tablet   No Yes   Sig: Take 1 Tab by mouth two (2) times a day. donepezil (ARICEPT) 5 mg tablet   No Yes   Sig: TAKE 1 TABLET BY MOUTH EVERY NIGHT AT BEDTIME DX: MEMORY SUPPORT   furosemide (LASIX) 40 mg tablet   Yes Yes   Sig: Take 40 mg by mouth daily. lisinopril (PRINIVIL, ZESTRIL) 5 mg tablet   No Yes   Sig: TAKE 1 TABLET BY MOUTH EVERY DAY DX: HTN   metoprolol succinate (TOPROL-XL) 25 mg XL tablet   No Yes   Sig: TAKE 1 TABLET BY MOUTH EVERY DAY DX: HTN   miscellaneous medical supply (ANTI-EMBOLISM STOCKINGS) misc   No Yes   Sig: Use below knee stockings 15 to 30 mm HG.   polyethylene glycol (MIRALAX) 17 gram/dose powder   No Yes   Sig: MIX THE CONTENTS OF 1 HEAPING TABLESPOON (=17GRAMS) WITH 8 OUNCES OF WATER AND DRINK DAILY DX:BOWEL AIDE   pravastatin (PRAVACHOL) 40 mg tablet   No Yes   Sig: TAKE 1 TABLET BY MOUTH EVERY NIGHT AT BEDTIME DX: CHOLESTEROL   raNITIdine (ZANTAC) 150 mg tablet   Yes Yes   Sig: Take 150 mg by mouth nightly. traMADol (ULTRAM) 50 mg tablet   No Yes   Sig: TAKE 1/2 (25MG) TABLET BY MOUTH IN THE MORNING AND 1 TAB (50MG) IN THE EVENING DX:PAIN   venlafaxine-SR (EFFEXOR-XR) 75 mg capsule   No Yes   Sig: TAKE 1 CAPSULE BY MOUTH EVERY DAY DX:ANTIDEPRESSANT      Facility-Administered Medications: None         Comments/Recommendations: PTA list updated from [facility] received from [facility]. Patient is listed as allergic to gabapentin on MAR with no reaction noted.

## 2017-01-24 NOTE — PROGRESS NOTES
Patient seen and examined at bedside in ED. She is awaiting bed and was admitted earlier this morning by Dr. Karol López for acute hypoxic respiratory failure and diarrhea. She was also found to have ERIKA. She reports other people in her senior care facility had seemingly been isolated because of a viral illness, but she had \"caught the bug\". She is feeling improved with IV fluid hydration. Visit Vitals    /56 (BP 1 Location: Right arm, BP Patient Position: At rest)    Pulse 69    Temp 97.8 °F (36.6 °C)    Resp 28    Ht 5' 1\" (1.549 m)    Wt 73 kg (161 lb)    SpO2 99%    BMI 30.42 kg/m2     General: elderly female, no apparent distress  HEENT: NCAT, PERRL, EOMI, sclera anicteric  Lungs: occasional wheeze in all lung fields  Heart: S1S2 physiologic, soft systolic murmur, regular rhythm  Abd: soft, non-tender, non-distended, normoactive bowel sounds  Ext: non-pitting edema of bilateral lower extremities    Assess: 80year old female with diarrhea, possibly viral gastroenteritis; acute hypoxic respiratory failure; she appears more volume deplete than volume overloaded based on history and examination. I agree with Dr. Gela Simmons outlined plan in his H&P with the following additions. 9.  Prior history of DVT    - patient is on Eliquis    10.   Chronic systolic heart failure   - unclear if she is in acute exacerbation of this diagnosis, she appears more volume deplete than volume overloaded on examination, BNP is elevated to 510, which is lower than previous   - Echo 8/2015 - EF 20-25%   - repeat Echocardiogram   - daily weights   - check I&Os   - holding home diuretic therapy for now, holding home ACE inhibitor due to ERIKA   - continue metoprolol succinate    Roseline Baez MD  1/24/2017  19:19

## 2017-01-24 NOTE — PROGRESS NOTES
TRANSFER - IN REPORT:    Verbal report received from [facility] (name) on Paty Rodrigues  being received from ED (unit) for routine progression of care      Report consisted of patients Situation, Background, Assessment and   Recommendations(SBAR). Information from the following report(s) SBAR, Kardex, ED Summary, Procedure Summary, Intake/Output, MAR, Recent Results and Cardiac Rhythm Chidi Miller was reviewed with the receiving nurse. Opportunity for questions and clarification was provided. Assessment completed upon patients arrival to unit and care assumed. Primary Nurse Bess Palma RN and Denae Up RN performed a dual skin assessment on this patient impairment noted-- buttocks excoriated, sensicare ointment applied  Osmin score is 18    1830 Paged hospitalist, EF 20-25% from previous echo pt arrived to unit with NS @ 100, orders received to d/c fluids, will continue to monitor. Hourly rounds performed throughout shift. Bedside and Verbal shift change report given to [place] (oncoming nurse) by Elaina Talley RN (offgoing nurse). Report included the following information SBAR, Kardex, Procedure Summary, Intake/Output, MAR, Recent Results and Cardiac Rhythm NSR.

## 2017-01-24 NOTE — ED NOTES
Assumed care of patient from [facility]. AT [place]. Patient resting on stretcher, NAD noted at this time; Denies complaints. Bed low and locked, call bell in reach. Will continue to monitor.

## 2017-01-24 NOTE — ED PROVIDER NOTES
HPI Comments: 80 y.o. female with past medical history significant for hypercholesterolemia, asthma, thyroid disease, hypertension, pneumonia, altered mental status, coronary artery disease, arrhythmia, pulmonary embolism, fibromyalgia, arthritis, and depression who presents from [facility] via EMS with chief complaint of diarrhea. Patient states onset of diarrhea since early this morning. Patient notes she had approximately 4 episodes of diarrhea since this morning. Patient reports accompanying nausea and 4 episodes of vomiting. Patient also complains of mild abdominal pain on the left side that is aggravated upon palpation. Patient notes she ate a baked potato yesterday and had cowan and egg this morning for breakfast. Patient denies any recent antibiotics or hx of diverticulitis. Patient denies being around any sick contacts or people with gastroenteritis. Patient denies headache, cough, wheezing, chest pain, dysuria, and urinary sx. There are no other acute medical concerns at this time. Old Chart Review: Per note, patient had an echo performed on 12/16/16 with a 20-25% ejection fracture. Social hx: Tobacco Use: No, Alcohol Use: No    PCP: Adriel Salas MD    Note written by Braden Garza, as dictated by Crys Hair MD 7:30 PM      The history is provided by the patient.         Past Medical History:   Diagnosis Date    Altered mental state     Arrhythmia      AFIB HISTORY     Arthritis      BACK     Asthma     CAD (coronary artery disease)      by pass surgery in 2002,2008    Depression     Fibromyalgia     HTN (hypertension) 6/11/2013    Hypercholesterolemia     Ill-defined condition      FOUR COMPRESSION FRACTURES IN BACK    PE (pulmonary embolism)     Pneumonia 04/2014    Pneumonia, organism unspecified 3/8/2014    Thyroid disease        Past Surgical History:   Procedure Laterality Date    Pr cardiac surg procedure unlist       heart attack,bypass sx         Family History:   Problem Relation Age of Onset    Anesth Problems Neg Hx        Social History     Social History    Marital status:      Spouse name: N/A    Number of children: N/A    Years of education: N/A     Occupational History    Not on file. Social History Main Topics    Smoking status: Never Smoker    Smokeless tobacco: Never Used    Alcohol use No    Drug use: No    Sexual activity: No      Comment: ,4 children,[facility] [place] [facility]      Other Topics Concern    Not on file     Social History Narrative         ALLERGIES: Neurontin [gabapentin]    Review of Systems   Constitutional: Negative for appetite change, chills and fever. HENT: Negative for congestion. Eyes: Negative for visual disturbance. Respiratory: Negative for cough, chest tightness, shortness of breath and wheezing. Cardiovascular: Negative for chest pain. Gastrointestinal: Positive for abdominal pain, diarrhea, nausea and vomiting. Genitourinary: Negative for difficulty urinating, dysuria and frequency. Musculoskeletal: Negative for joint swelling. Skin: Negative for rash. Neurological: Negative for speech difficulty and headaches. All other systems reviewed and are negative. Vitals:    01/23/17 1858   BP: 127/70   Pulse: (!) 103   Resp: 22   Temp: 98.4 °F (36.9 °C)   SpO2: 93%   Weight: 73 kg (161 lb)   Height: 5' 1\" (1.549 m)            Physical Exam   Constitutional: She is oriented to person, place, and time. She appears well-developed and well-nourished. No distress. HENT:   Head: Normocephalic and atraumatic. Nose: Nose normal.   Eyes: Conjunctivae are normal. Pupils are equal, round, and reactive to light. No scleral icterus. Neck: Normal range of motion. Neck supple. No JVD present. No tracheal deviation present. No thyromegaly present. Cardiovascular: Regular rhythm and normal heart sounds. Tachycardia present. No murmur heard.   Patient is tachycardic   Pulmonary/Chest: Effort normal and breath sounds normal. No respiratory distress. She has no wheezes. She has no rales. Abdominal: Soft. Bowel sounds are normal. She exhibits no mass. There is tenderness. There is no rebound and no guarding. Tenderness in the left belly   Musculoskeletal: Normal range of motion. She exhibits edema. Patient has edema of bilateral ankles with accompanying pain (both are old)   Neurological: She is alert and oriented to person, place, and time. No cranial nerve deficit. Coordination normal.   Skin: Skin is warm and dry. No rash noted. She is not diaphoretic. No erythema. Psychiatric: She has a normal mood and affect. Her behavior is normal.   Nursing note and vitals reviewed.      Note written by Braden Packer, as dictated by Umesh Blake MD 7:31 PM    MDM  Number of Diagnoses or Management Options  Acute on chronic systolic congestive heart failure [facility]): established and worsening  Acute renal failure, unspecified acute renal failure type [facility]): new and requires workup  Cardiomyopathy [facility]): established and worsening  Dehydration: new and requires workup  Elevated brain natriuretic peptide (BNP) level: established and worsening  Non-intractable vomiting with nausea, unspecified vomiting type: new and requires workup     Amount and/or Complexity of Data Reviewed  Clinical lab tests: ordered and reviewed  Tests in the radiology section of CPT®: ordered and reviewed  Tests in the medicine section of CPT®: ordered and reviewed  Discussion of test results with the performing providers: yes  Decide to obtain previous medical records or to obtain history from someone other than the patient: yes  Review and summarize past medical records: yes  Discuss the patient with other providers: yes  Independent visualization of images, tracings, or specimens: yes    Risk of Complications, Morbidity, and/or Mortality  Presenting problems: high  Diagnostic procedures: high  Management options: high  General comments: Total critical care time was 60 min    Critical Care  Total time providing critical care: 30-74 minutes    ED Course       Procedures    PROGRESS NOTE:  8:04 PM  Will give the patient chest x-ray and admit the patient to the hospitalist service for further evaluation and management. PROGRESS NOTE:  11:02 PM  Patient reports she does not use oxygen at home at this time; however, patient is 98 percent o2 sats on oxygen now. CONSULT NOTE:  11:03 PM Umesh Blake MD spoke with Dr. Zuleika Schmidt, Consult for Hospitalist.  Discussed available diagnostic tests and clinical findings. Provider is in agreement with care plans as outlined. Provider will evaluate the patient for admission to the hospital. Patient is being admitted for further hydration and a hemoglobin increase from 13.7 to 17.6 Patient's BUN and creatinine is also elevated.

## 2017-01-25 LAB
ANION GAP BLD CALC-SCNC: 5 MMOL/L (ref 5–15)
BASOPHILS # BLD AUTO: 0 K/UL (ref 0–0.1)
BASOPHILS # BLD: 0 % (ref 0–1)
BUN SERPL-MCNC: 25 MG/DL (ref 6–20)
BUN/CREAT SERPL: 23 (ref 12–20)
CALCIUM SERPL-MCNC: 8.8 MG/DL (ref 8.5–10.1)
CHLORIDE SERPL-SCNC: 109 MMOL/L (ref 97–108)
CO2 SERPL-SCNC: 27 MMOL/L (ref 21–32)
CREAT SERPL-MCNC: 1.09 MG/DL (ref 0.55–1.02)
EOSINOPHIL # BLD: 0.2 K/UL (ref 0–0.4)
EOSINOPHIL NFR BLD: 3 % (ref 0–7)
ERYTHROCYTE [DISTWIDTH] IN BLOOD BY AUTOMATED COUNT: 14 % (ref 11.5–14.5)
GLUCOSE SERPL-MCNC: 103 MG/DL (ref 65–100)
HCT VFR BLD AUTO: 44 % (ref 35–47)
HGB BLD-MCNC: 14 G/DL (ref 11.5–16)
LYMPHOCYTES # BLD AUTO: 20 % (ref 12–49)
LYMPHOCYTES # BLD: 1.3 K/UL (ref 0.8–3.5)
MAGNESIUM SERPL-MCNC: 1.9 MG/DL (ref 1.6–2.4)
MCH RBC QN AUTO: 30.2 PG (ref 26–34)
MCHC RBC AUTO-ENTMCNC: 31.8 G/DL (ref 30–36.5)
MCV RBC AUTO: 95 FL (ref 80–99)
MONOCYTES # BLD: 0.7 K/UL (ref 0–1)
MONOCYTES NFR BLD AUTO: 11 % (ref 5–13)
NEUTS SEG # BLD: 4.3 K/UL (ref 1.8–8)
NEUTS SEG NFR BLD AUTO: 66 % (ref 32–75)
PLATELET # BLD AUTO: 146 K/UL (ref 150–400)
POTASSIUM SERPL-SCNC: 4 MMOL/L (ref 3.5–5.1)
RBC # BLD AUTO: 4.63 M/UL (ref 3.8–5.2)
SODIUM SERPL-SCNC: 141 MMOL/L (ref 136–145)
WBC # BLD AUTO: 6.5 K/UL (ref 3.6–11)

## 2017-01-25 PROCEDURE — 83735 ASSAY OF MAGNESIUM: CPT | Performed by: STUDENT IN AN ORGANIZED HEALTH CARE EDUCATION/TRAINING PROGRAM

## 2017-01-25 PROCEDURE — 80048 BASIC METABOLIC PNL TOTAL CA: CPT | Performed by: STUDENT IN AN ORGANIZED HEALTH CARE EDUCATION/TRAINING PROGRAM

## 2017-01-25 PROCEDURE — 65660000000 HC RM CCU STEPDOWN

## 2017-01-25 PROCEDURE — 77010033678 HC OXYGEN DAILY

## 2017-01-25 PROCEDURE — 74011250637 HC RX REV CODE- 250/637: Performed by: FAMILY MEDICINE

## 2017-01-25 PROCEDURE — 85025 COMPLETE CBC W/AUTO DIFF WBC: CPT | Performed by: STUDENT IN AN ORGANIZED HEALTH CARE EDUCATION/TRAINING PROGRAM

## 2017-01-25 PROCEDURE — 36415 COLL VENOUS BLD VENIPUNCTURE: CPT | Performed by: STUDENT IN AN ORGANIZED HEALTH CARE EDUCATION/TRAINING PROGRAM

## 2017-01-25 RX ORDER — PRAVASTATIN SODIUM 40 MG/1
TABLET ORAL
Qty: 30 TAB | Refills: 4 | Status: SHIPPED | OUTPATIENT
Start: 2017-01-25 | End: 2017-04-26 | Stop reason: SDUPTHER

## 2017-01-25 RX ORDER — LISINOPRIL 5 MG/1
5 TABLET ORAL DAILY
Status: DISCONTINUED | OUTPATIENT
Start: 2017-01-26 | End: 2017-01-26 | Stop reason: HOSPADM

## 2017-01-25 RX ADMIN — Medication 10 ML: at 06:15

## 2017-01-25 RX ADMIN — APIXABAN 2.5 MG: 2.5 TABLET, FILM COATED ORAL at 08:52

## 2017-01-25 RX ADMIN — DONEPEZIL HYDROCHLORIDE 5 MG: 5 TABLET, FILM COATED ORAL at 08:51

## 2017-01-25 RX ADMIN — VENLAFAXINE HYDROCHLORIDE 75 MG: 37.5 CAPSULE, EXTENDED RELEASE ORAL at 08:51

## 2017-01-25 RX ADMIN — PRAVASTATIN SODIUM 40 MG: 40 TABLET ORAL at 22:07

## 2017-01-25 RX ADMIN — Medication 10 ML: at 19:00

## 2017-01-25 RX ADMIN — Medication 500 MCG: at 08:51

## 2017-01-25 RX ADMIN — CALCIUM CARBONATE-VITAMIN D TAB 500 MG-200 UNIT 1 TABLET: 500-200 TAB at 18:59

## 2017-01-25 RX ADMIN — APIXABAN 2.5 MG: 2.5 TABLET, FILM COATED ORAL at 18:59

## 2017-01-25 RX ADMIN — CALCIUM CARBONATE-VITAMIN D TAB 500 MG-200 UNIT 1 TABLET: 500-200 TAB at 08:51

## 2017-01-25 RX ADMIN — METOPROLOL SUCCINATE 25 MG: 25 TABLET, EXTENDED RELEASE ORAL at 08:52

## 2017-01-25 NOTE — PROGRESS NOTES
Bedside and Verbal shift change report given to Jaja Whatley (oncoming nurse) by Casper Alas (offgoing nurse). Report included the following information SBAR, Kardex, Intake/Output, MAR, Recent Results and Cardiac Rhythm NSR w/ BBB.

## 2017-01-25 NOTE — NURSE NAVIGATOR
Chart reviewed by Heart Failure Nurse Navigator. Heart Failure database completed. EF 20-25%. ACEi/ARB: Lisinopril 5mg every day-currently on hold d/t ERIKA. BB: Toprol XL 25mg qd. CRT not indicated at this time. NYHA Functional Class not assigned. Documentation requested for NYHA Functional Class via Provider message on the [address] [place] [address] Failure Teach Back in Patient Education. Heart Failure Avoiding Triggers on Discharge Instructions. Cardiology not consulted.     Nuzhat Neff (PCP) notified of admission

## 2017-01-25 NOTE — PROGRESS NOTES
Care Management Interventions  PCP Verified by CM: Yes Wai Pinto MD)  Transition of Care Consult (CM Consult):  (None)  MyChart Signup: No  Discharge Durable Medical Equipment: No  Physical Therapy Consult: No  Occupational Therapy Consult: No  Speech Therapy Consult: No  Current Support Network: Assisted Living  Confirm Follow Up Transport: Family  Plan discussed with Pt/Family/Caregiver: Yes  Freedom of Choice Offered:  (N/A)  Discharge Location  Discharge Placement: Assisted Living (Return to [facility]    CM met with patient. Patient lives [facility] at [facility]. Patient is a . Patient has 4 adult daughters: one local [place] Blood, others are in [place], [facility], and [place]. Patient reports good family /social support. Patient expects return to semi-independent functioning. Patient confirmed PCP, health insurance, and prescription coverage. Transport to home will be provided by daughter. No discharge planning needs presented at this time.

## 2017-01-25 NOTE — PROGRESS NOTES
Hospitalist Progress Note  Breanna Cole MD  Office: [phone]  Cell: [phone]      Date of Service:  2017  NAME:  Summer Robins  :  1926  MRN:  [id number]      Admission Summary:    80year old female with history of HTN, DLD, chronic systolic heart failure, CAD s/p CABG, depression, fibromyalgia, prior PE, hypothyroidism presented on 17 with diarrhea, nausea, vomiting, abdominal pain. She was additionally found to have acute hypoxic respiratory failure. Interval history / Subjective:     She feels much improved after receiving fluids, more energetic, does not want liquids for breakfast, wants to eat and then get out of the hospital.  Abdominal pain has resolved. Has not had a bowel movement since admission. No fevers. Assessment & Plan:     1. Acute hypoxic respiratory failure   - etiology unclear, possibly secondary to atelectasis, she had required supplemental oxygen upon admission, but this had been weaned during her hospital course    2. Abdominal pain / nausea / vomiting / diarrhea   - suspect this is secondary to viral gastroenteritis   - CT Abd/Pelvis  - no acute disease   - she has not had a bowel movement to perform stool studies   - continue supportive care with anti-emetics, advance diet as tolerated    3. ERIKA   - suspect secondary to diarrhea as above; Cr elevated to 1.64 upon admission, now ~1.1   - treated with IV fluids, now back to baseline renal function    4. Chronic systolic heart failure   - does not appear in acute exacerbation of this diagnosis, likely NYHA class III prior to admission   - continue home metoprolol succinate, Cr improving, likely will resume lisinopril tomorrow (held due to ERIKA)    5. HTN   - resumed home metoprolol, resume lisinopril in AM    6. CAD s/p CABG   - no current chest complaints   - continue home metoprolol succinate, pravastatin    7.   History of PE/DVT   - on Eliquis    Code status: FULL  DVT prophylaxis: on Eliquis    Care Plan discussed with: Patient/Family  Disposition: TBD     Hospital Problems  Date Reviewed: 1/24/2017          Codes Class Noted POA    * (Principal)Acute respiratory failure with hypoxia [facility]) ICD-10-CM: J96.01  ICD-9-CM: 518.81  1/24/2017 Unknown                Review of Systems:   A comprehensive review of systems was negative except for that written in the HPI. Vital Signs:    Last 24hrs VS reviewed since prior progress note. Most recent are:  Visit Vitals    /72 (BP 1 Location: Right arm, BP Patient Position: At rest)    Pulse 76    Temp 98.5 °F (36.9 °C)    Resp 18    Ht 5' 1\" (1.549 m)    Wt 71.8 kg (158 lb 4.6 oz)    SpO2 98%    BMI 29.91 kg/m2         Intake/Output Summary (Last 24 hours) at 01/25/17 1716  Last data filed at 01/24/17 2229   Gross per 24 hour   Intake              360 ml   Output              175 ml   Net              185 ml        Physical Examination:             Constitutional:  No acute distress, cooperative, pleasant    ENT:  Oral mucous moist, oropharynx benign. Neck supple   Resp:  Diminished in bilateral bases, otherwise CTA bilaterally. No wheezing/rhonchi/rales. No accessory muscle use   CV:  Regular rhythm, normal rate, no murmurs, gallops, rubs    GI:  Soft, non distended, non tender. normoactive bowel sounds, no hepatosplenomegaly     Musculoskeletal:  No edema, warm, 2+ pulses throughout    Neurologic:  Moves all extremities.   AAOx3, CN II-XII reviewed       Data Review:    Review and/or order of clinical lab test      Labs:     Recent Labs      01/25/17   0436  01/24/17   0840   WBC  6.5  6.4   HGB  14.0  15.3   HCT  44.0  48.0*   PLT  146*  165     Recent Labs      01/25/17   0436  01/24/17   0427  01/23/17   2029   NA  141  143  138   K  4.0  4.1  4.7   CL  109*  109*  101   CO2  27  25  31   BUN  25*  31*  32*   CREA  1.09*  1.18*  1.64*   GLU  103*  141*  188*   CA  8.8  7.2*  9.7   MG 1.9   --    --      Recent Labs      01/23/17 2029   SGOT  17   ALT  16   AP  81   TBILI  0.7   TP  7.9   ALB  3.8   GLOB  4.1*     No results for input(s): INR, PTP, APTT in the last 72 hours. No lab exists for component: INREXT   No results for input(s): FE, TIBC, PSAT, FERR in the last 72 hours. Lab Results   Component Value Date/Time    Folate 6.3 03/11/2014 04:07 AM      No results for input(s): PH, PCO2, PO2 in the last 72 hours. No results for input(s): CPK, CKNDX, TROIQ in the last 72 hours.     No lab exists for component: CPKMB  Lab Results   Component Value Date/Time    Cholesterol, total 135 01/24/2017 04:27 AM    HDL Cholesterol 59 01/24/2017 04:27 AM    LDL, calculated 57.2 01/24/2017 04:27 AM    Triglyceride 94 01/24/2017 04:27 AM    CHOL/HDL Ratio 2.3 01/24/2017 04:27 AM     Lab Results   Component Value Date/Time    Glucose (POC) 133 08/18/2015 01:50 AM     Lab Results   Component Value Date/Time    Color YELLOW/STRAW 01/24/2017 12:30 AM    Appearance CLOUDY 01/24/2017 12:30 AM    Specific gravity 1.024 01/24/2017 12:30 AM    pH (UA) 5.0 01/24/2017 12:30 AM    Protein NEGATIVE  01/24/2017 12:30 AM    Glucose NEGATIVE  01/24/2017 12:30 AM    Ketone TRACE 01/24/2017 12:30 AM    Bilirubin NEGATIVE  08/14/2015 04:42 PM    Urobilinogen 0.2 01/24/2017 12:30 AM    Nitrites NEGATIVE  01/24/2017 12:30 AM    Leukocyte Esterase SMALL 01/24/2017 12:30 AM    Epithelial cells FEW 01/24/2017 12:30 AM    Bacteria NEGATIVE  01/24/2017 12:30 AM    WBC 0-4 01/24/2017 12:30 AM    RBC 0-5 01/24/2017 12:30 AM         Medications Reviewed:     Current Facility-Administered Medications   Medication Dose Route Frequency    acetaminophen (TYLENOL) tablet 650 mg  650 mg Oral Q6H PRN    albuterol-ipratropium (DUO-NEB) 2.5 MG-0.5 MG/3 ML  3 mL Nebulization Q4H PRN    apixaban (ELIQUIS) tablet 2.5 mg  2.5 mg Oral BID    bisacodyl (DULCOLAX) suppository 10 mg  10 mg Rectal Q48H    calcium-vitamin D (OS-BAUTISTA) 500 mg-200 unit tablet  1 Tab Oral BID    donepezil (ARICEPT) tablet 5 mg  5 mg Oral DAILY    metoprolol succinate (TOPROL-XL) XL tablet 25 mg  25 mg Oral DAILY    pravastatin (PRAVACHOL) tablet 40 mg  40 mg Oral QHS    venlafaxine-SR (EFFEXOR-XR) capsule 75 mg  75 mg Oral DAILY WITH BREAKFAST    cyanocobalamin (VITAMIN B12) tablet 500 mcg  500 mcg Oral DAILY    sodium chloride (NS) flush 5-10 mL  5-10 mL IntraVENous Q8H    sodium chloride (NS) flush 5-10 mL  5-10 mL IntraVENous PRN     ______________________________________________________________________  EXPECTED LENGTH OF STAY: 4d 12h  ACTUAL LENGTH OF STAY:          1                 Nilda Hernandez MD

## 2017-01-26 ENCOUNTER — HOME HEALTH ADMISSION (OUTPATIENT)
Dept: HOME HEALTH SERVICES | Facility: HOME HEALTH | Age: 82
End: 2017-01-26

## 2017-01-26 VITALS
OXYGEN SATURATION: 95 % | HEART RATE: 76 BPM | WEIGHT: 157.6 LBS | BODY MASS INDEX: 29.76 KG/M2 | SYSTOLIC BLOOD PRESSURE: 139 MMHG | HEIGHT: 61 IN | RESPIRATION RATE: 16 BRPM | TEMPERATURE: 97.7 F | DIASTOLIC BLOOD PRESSURE: 75 MMHG

## 2017-01-26 PROBLEM — R19.7 DIARRHEA: Status: ACTIVE | Noted: 2017-01-26

## 2017-01-26 PROBLEM — R19.7 DIARRHEA: Status: RESOLVED | Noted: 2017-01-26 | Resolved: 2017-01-26

## 2017-01-26 PROBLEM — J96.01 ACUTE RESPIRATORY FAILURE WITH HYPOXIA (HCC): Status: RESOLVED | Noted: 2017-01-24 | Resolved: 2017-01-26

## 2017-01-26 LAB
ANION GAP BLD CALC-SCNC: 6 MMOL/L (ref 5–15)
BASOPHILS # BLD AUTO: 0 K/UL (ref 0–0.1)
BASOPHILS # BLD: 0 % (ref 0–1)
BUN SERPL-MCNC: 17 MG/DL (ref 6–20)
BUN/CREAT SERPL: 18 (ref 12–20)
CALCIUM SERPL-MCNC: 8.8 MG/DL (ref 8.5–10.1)
CHLORIDE SERPL-SCNC: 108 MMOL/L (ref 97–108)
CO2 SERPL-SCNC: 26 MMOL/L (ref 21–32)
CREAT SERPL-MCNC: 0.93 MG/DL (ref 0.55–1.02)
EOSINOPHIL # BLD: 0.3 K/UL (ref 0–0.4)
EOSINOPHIL NFR BLD: 4 % (ref 0–7)
ERYTHROCYTE [DISTWIDTH] IN BLOOD BY AUTOMATED COUNT: 13.8 % (ref 11.5–14.5)
GLUCOSE SERPL-MCNC: 115 MG/DL (ref 65–100)
HCT VFR BLD AUTO: 44.2 % (ref 35–47)
HGB BLD-MCNC: 14.1 G/DL (ref 11.5–16)
LYMPHOCYTES # BLD AUTO: 27 % (ref 12–49)
LYMPHOCYTES # BLD: 2 K/UL (ref 0.8–3.5)
MCH RBC QN AUTO: 30.4 PG (ref 26–34)
MCHC RBC AUTO-ENTMCNC: 31.9 G/DL (ref 30–36.5)
MCV RBC AUTO: 95.3 FL (ref 80–99)
MONOCYTES # BLD: 0.9 K/UL (ref 0–1)
MONOCYTES NFR BLD AUTO: 12 % (ref 5–13)
NEUTS SEG # BLD: 4.4 K/UL (ref 1.8–8)
NEUTS SEG NFR BLD AUTO: 57 % (ref 32–75)
PLATELET # BLD AUTO: 144 K/UL (ref 150–400)
POTASSIUM SERPL-SCNC: 4.1 MMOL/L (ref 3.5–5.1)
RBC # BLD AUTO: 4.64 M/UL (ref 3.8–5.2)
SODIUM SERPL-SCNC: 140 MMOL/L (ref 136–145)
WBC # BLD AUTO: 7.6 K/UL (ref 3.6–11)

## 2017-01-26 PROCEDURE — 85025 COMPLETE CBC W/AUTO DIFF WBC: CPT | Performed by: STUDENT IN AN ORGANIZED HEALTH CARE EDUCATION/TRAINING PROGRAM

## 2017-01-26 PROCEDURE — 74011250637 HC RX REV CODE- 250/637: Performed by: STUDENT IN AN ORGANIZED HEALTH CARE EDUCATION/TRAINING PROGRAM

## 2017-01-26 PROCEDURE — 97116 GAIT TRAINING THERAPY: CPT

## 2017-01-26 PROCEDURE — G8980 MOBILITY D/C STATUS: HCPCS

## 2017-01-26 PROCEDURE — 80048 BASIC METABOLIC PNL TOTAL CA: CPT | Performed by: STUDENT IN AN ORGANIZED HEALTH CARE EDUCATION/TRAINING PROGRAM

## 2017-01-26 PROCEDURE — G8979 MOBILITY GOAL STATUS: HCPCS

## 2017-01-26 PROCEDURE — 36415 COLL VENOUS BLD VENIPUNCTURE: CPT | Performed by: STUDENT IN AN ORGANIZED HEALTH CARE EDUCATION/TRAINING PROGRAM

## 2017-01-26 PROCEDURE — 97161 PT EVAL LOW COMPLEX 20 MIN: CPT

## 2017-01-26 PROCEDURE — 74011250637 HC RX REV CODE- 250/637: Performed by: FAMILY MEDICINE

## 2017-01-26 PROCEDURE — G8978 MOBILITY CURRENT STATUS: HCPCS

## 2017-01-26 RX ADMIN — VENLAFAXINE HYDROCHLORIDE 75 MG: 37.5 CAPSULE, EXTENDED RELEASE ORAL at 08:37

## 2017-01-26 RX ADMIN — Medication 500 MCG: at 08:37

## 2017-01-26 RX ADMIN — METOPROLOL SUCCINATE 25 MG: 25 TABLET, EXTENDED RELEASE ORAL at 08:37

## 2017-01-26 RX ADMIN — APIXABAN 2.5 MG: 2.5 TABLET, FILM COATED ORAL at 17:20

## 2017-01-26 RX ADMIN — LISINOPRIL 5 MG: 5 TABLET ORAL at 08:37

## 2017-01-26 RX ADMIN — CALCIUM CARBONATE-VITAMIN D TAB 500 MG-200 UNIT 1 TABLET: 500-200 TAB at 08:37

## 2017-01-26 RX ADMIN — DONEPEZIL HYDROCHLORIDE 5 MG: 5 TABLET, FILM COATED ORAL at 08:37

## 2017-01-26 RX ADMIN — APIXABAN 2.5 MG: 2.5 TABLET, FILM COATED ORAL at 08:37

## 2017-01-26 RX ADMIN — CALCIUM CARBONATE-VITAMIN D TAB 500 MG-200 UNIT 1 TABLET: 500-200 TAB at 17:20

## 2017-01-26 NOTE — ROUTINE PROCESS
Primary Nurse Oliver Whyte RN and Bernadette Agustin RN performed a dual skin assessment on this patient No impairment noted  Osmin score is 21

## 2017-01-26 NOTE — PROGRESS NOTES
Problem: Falls - Risk of  Goal: *Absence of falls  Fall risk assessment completed. Pt A&Ox4. Pt advised to call RN with needs. Call bell and belongings within reach.

## 2017-01-26 NOTE — PROGRESS NOTES
physical Therapy EVALUATION/DISCHARGE  Patient: Tamara Novak (45 y.o. female)  Date: 1/26/2017  Primary Diagnosis: Acute respiratory failure with hypoxia (HCC)        Precautions: falls     ASSESSMENT :  Based on the objective data described below, the patient presents with near baseline functional mobility, demonstrating safe transfers independently with supervision, contact guarding to ambulate 150' with slow, stable gait using a rolling walker. Pt without complaints of any pain or fatigue, no shortness of breathe, commenting that she is feeling much better than when first admitted and that she is ready to return to her assisted living facility today. A Tinetti Balance Assessment showed that pt is at moderate risks for falls, and it was recommended that she continue to use her rolling walker or rollator when up ambulating. She does not require any DME or further services at this time. She is safe to return home today. Further skilled physical therapy is not indicated at this time.      PLAN :  Discharge Recommendations: none  Further Equipment Recommendations for Discharge: none     SUBJECTIVE:   Patient stated I am feeling so much better than when I first came to the hosptial.    OBJECTIVE DATA SUMMARY:   HISTORY:    Past Medical History   Diagnosis Date    Altered mental state     Arrhythmia      AFIB HISTORY     Arthritis      BACK     Asthma     CAD (coronary artery disease)      by pass surgery in 2002,2008    Depression     Fibromyalgia     HTN (hypertension) 6/11/2013    Hypercholesterolemia     Ill-defined condition      FOUR COMPRESSION FRACTURES IN BACK    PE (pulmonary embolism)     Pneumonia 04/2014    Pneumonia, organism unspecified 3/8/2014    Thyroid disease      Past Surgical History   Procedure Laterality Date    Pr cardiac surg procedure unlist       heart attack,bypass sx     Prior Level of Function/Home Situation: independent with use of a rolling walker or rollator Home Situation  Home Environment: [address] [place] [address] Road Name: Asaf Badillo  Living Alone: Yes  Support Systems: Assisted living, Child(rosanna)  Patient Expects to be Discharged to[de-identified] Assisted living  Current DME Used/Available at Home: Paulina Hercules, rollator, Walker, rolling    EXAMINATION/PRESENTATION/DECISION MAKING:   Critical Behavior:  Neurologic State: Alert, Appropriate for age  Orientation Level: Appropriate for age, Oriented X4  Cognition: Appropriate decision making, Appropriate for age attention/concentration, Appropriate safety awareness, Follows commands  Safety/Judgement: Awareness of environment    Strength:    Strength: Generally decreased, functional                    Tone & Sensation:   Tone: Normal              Sensation: Intact              Range Of Motion:  AROM: Within functional limits                              Functional Mobility:  Bed Mobility:     Supine to Sit: Supervision  Sit to Supine:  (sat up in chair)  Scooting: Supervision  Transfers:  Sit to Stand: Contact guard assistance;Assist x1  Stand to Sit: Contact guard assistance;Assist x1                       Balance:   Sitting: Intact  Standing: Intact; With support  Ambulation/Gait Training:  Distance (ft): 150 Feet (ft)  Assistive Device: Gait belt;Walker, rolling  Ambulation - Level of Assistance: Contact guard assistance;Assist x1        Gait Abnormalities: Decreased step clearance  Right Side Weight Bearing: Full  Left Side Weight Bearing: Full  Base of Support: Narrowed     Speed/Yaneli: Slow  Step Length: Right shortened;Left shortened                                              Functional Measure:  Tinetti test:    Sitting Balance: 1  Arises: 1  Attempts to Rise: 2  Immediate Standing Balance: 1  Standing Balance: 1  Nudged: 2  Eyes Closed: 1  Turn 360 Degrees - Continuous/Discontinuous: 1  Turn 360 Degrees - Steady/Unsteady: 1  Sitting Down: 1  Balance Score: 12  Indication of Gait: 1  R Step Length/Height: 1  L Step Length/Height: 1  R Foot Clearance: 1  L Foot Clearance: 1  Step Symmetry: 1  Step Continuity: 1  Path: 1  Trunk: 0  Walking Time: 1  Gait Score: 9  Total Score: 21       Tinetti Test and G-code impairment scale:  Percentage of Impairment CH    0%   CI    1-19% CJ    20-39% CK    40-59% CL    60-79% CM    80-99% CN     100%   Tinetti  Score 0-28 28 23-27 17-22 12-16 6-11 1-5 0       Tinetti Tool Score Risk of Falls  <19 = High Fall Risk  19-24 = Moderate Fall Risk  25-28 = Low Fall Risk  Tinetti ME. Performance-Oriented Assessment of Mobility Problems in Elderly Patients. [facility] 66; [id number]. (Scoring Description: PT Bulletin Feb. 10, 1993)    Older adults: Moy Markham et al, 2009; n = 1000 [facility] elderly evaluated with ABC, DIANE, ADL, and IADL)  · Mean DIANE score for males aged 69-68 years = 26.21(3.40)  · Mean DIANE score for females age 69-68 years = 25.16(4.30)  · Mean DIANE score for males over 80 years = 23.29(6.02)  · Mean DIANE score for females over 80 years = 17.20(8.32)       G codes: In compliance with CMSs Claims Based Outcome Reporting, the following G-code set was chosen for this patient based on their primary functional limitation being treated: The outcome measure chosen to determine the severity of the functional limitation was the Tinetti Balance Assessment Tool with a score of 21/28 which was correlated with the impairment scale. ? Mobility - Walking and Moving Around:     - CURRENT STATUS: CJ - 20%-39% impaired, limited or restricted    - GOAL STATUS: CJ - 20%-39% impaired, limited or restricted    - D/C STATUS:  CJ - 20%-39% impaired, limited or restricted       Pain:  Pain Scale 1: Numeric (0 - 10)  Pain Intensity 1: 0     Activity Tolerance:   Good- no complaints of pain, fatigue, SOB while up walking in hallway with a rolling walker, 150', with light contact guarding x 1  Please refer to the flowsheet for vital signs taken during this treatment.   After treatment: [x]   Patient left in no apparent distress sitting up in chair  []   Patient left in no apparent distress in bed  [x]   Call bell left within reach  [x]   Nursing notified  []   Caregiver present  []   Bed alarm activated    COMMUNICATION/EDUCATION:   Communication/Collaboration:  [x]   Fall prevention education was provided and the patient/caregiver indicated understanding. [x]   Patient/family have participated as able and agree with findings and recommendations. []   Patient is unable to participate in plan of care at this time.   Findings and recommendations were discussed with: Registered Nurse    Thank you for this referral.  Marley Webber, PT   Time Calculation: 20 mins

## 2017-01-26 NOTE — NURSE NAVIGATOR
[facility] attempted to make post discharge followup appt for pt. Contacted office and was advised that only the patient or a family member could make a post discharge followup appt. NN contacted Tonja Henson, [address] [place] [address] [facility] for assistance with appt scheduling. Appt made for 1/31 17 @ 930AM and AVS updated.     Yvette Lin RN-CHFN/HUI

## 2017-01-26 NOTE — PROGRESS NOTES
Pt discharge home with daughter. Went over discharge instructions. All questions were answered. Went over follow up information. IV was removed with tip intact.

## 2017-01-26 NOTE — DISCHARGE SUMMARY
Discharge Summary       PATIENT ID: Humberto Davila  MRN: [id number]   YOB: 1926    DATE OF ADMISSION: 1/23/2017  6:50 PM    DATE OF DISCHARGE: 1/26/17   PRIMARY CARE PROVIDER: Jen Perez MD     ATTENDING PHYSICIAN: Segun Holden MD  DISCHARGING PROVIDER: Gabbi Islas MD    To contact this individual call [phone] and ask the  to page. If unavailable ask to be transferred the Adult Hospitalist Department. CONSULTATIONS: IP CONSULT TO HOSPITALIST    PROCEDURES/SURGERIES: * No surgery found *    ADMITTING DIAGNOSES & HOSPITAL COURSE:   80year old female with history of HTN, DLD, chronic systolic heart failure, CAD s/p CABG, depression, fibromyalgia, prior PE, hypothyroidism presented on 1/24/17 with diarrhea, nausea, vomiting, abdominal pain. She was additionally found to have acute hypoxic respiratory failure. 1. Acute hypoxic respiratory failure   - etiology unclear, possibly secondary to atelectasis, she had required supplemental oxygen upon admission, but this had been weaned during her hospital course     2. Abdominal pain / nausea / vomiting / diarrhea  - suspect this is secondary to viral gastroenteritis, diarrhea resolved  - CT Abd/Pelvis 1/23 - no acute disease  - continue supportive care with anti-emetics, her diet was advanced to cardiac without issue     3. ERIKA  - suspect secondary to diarrhea as above; Cr elevated to 1.64 upon admission, now ~1.1  - treated with IV fluids, now back to baseline renal function     4. Chronic systolic heart failure  - did not appear in acute exacerbation of this diagnosis, likely NYHA class III prior to admission and is NYHA class II-III upon discharge  - continue home metoprolol succinate, resumed lisinopril prior to discharge  - she may resume her Lasix as outpatient     5. HTN  - resumed home metoprolol, resumed lisinopril on 1/26 AM     6.  CAD s/p CABG  - no chest complaints during hospitalization  - continued home metoprolol succinate, pravastatin     7. History of PE/DVT  - she was continued on her home Eliquis        DISCHARGE DIAGNOSES / PLAN:      1. See above. PENDING TEST RESULTS:   At the time of discharge the following test results are still pending: None. FOLLOW UP APPOINTMENTS:    Follow-up Information     Follow up With Details Comments Contact Info    Scot Houston MD Schedule an appointment as soon as possible for a visit in 1 week post-hospital follow-up [address] [place] [address] [address]  [address]  [phone]             ADDITIONAL CARE RECOMMENDATIONS:     Please make sure you are keeping up with fluid intake. Rest as needed. You may resume your home medications. DIET: Cardiac Diet    ACTIVITY: Activity as tolerated    WOUND CARE: N/A    EQUIPMENT needed: N/A      DISCHARGE MEDICATIONS:  Current Discharge Medication List      CONTINUE these medications which have NOT CHANGED    Details   furosemide (LASIX) 40 mg tablet Take 40 mg by mouth daily. raNITIdine (ZANTAC) 150 mg tablet Take 150 mg by mouth nightly.       donepezil (ARICEPT) 5 mg tablet TAKE 1 TABLET BY MOUTH EVERY NIGHT AT BEDTIME DX: MEMORY SUPPORT  Qty: 30 Tab, Refills: 4      DOC-Q-LACE 100 mg capsule TAKE 1 CAPSULE BY MOUTH TWICE DAILY DX:BOWEL SUPPORT  Qty: 60 Cap, Refills: 4      venlafaxine-SR (EFFEXOR-XR) 75 mg capsule TAKE 1 CAPSULE BY MOUTH EVERY DAY DX:ANTIDEPRESSANT  Qty: 30 Cap, Refills: 4      metoprolol succinate (TOPROL-XL) 25 mg XL tablet TAKE 1 TABLET BY MOUTH EVERY DAY DX: HTN  Qty: 30 Tab, Refills: 4      lisinopril (PRINIVIL, ZESTRIL) 5 mg tablet TAKE 1 TABLET BY MOUTH EVERY DAY DX: HTN  Qty: 30 Tab, Refills: 4      VITAMIN B-12 500 mcg tablet TAKE 1 TABLET BY MOUTH EVERY DAY DX: SUPPLEMENT  Qty: 30 Tab, Refills: 4      MILK OF MAGNESIA 400 mg/5 mL suspension TAKE 30ML BY MOUTH EVERY 24 HOURS AS NEEDED FOR CONSTIPATION  Qty: 473 mL, Refills: 4      polyethylene glycol (MIRALAX) 17 gram/dose powder MIX THE CONTENTS OF 1 HEAPING TABLESPOON (=17GRAMS) WITH 8 OUNCES OF WATER AND DRINK DAILY DX:BOWEL AIDE  Qty: 527 g, Refills: 4      acetaminophen (Q-NOL) 325 mg tablet Take 2 Tabs by mouth two (2) times a day. 2 tabet at am and 2 tab at noon  Qty: 60 Tab, Refills: 6    Associated Diagnoses: Chronic bilateral low back pain without sciatica; Compression fracture      albuterol (PROVENTIL VENTOLIN) 2.5 mg /3 mL (0.083 %) nebulizer solution INHALE THE CONTENTS OF 1 VIAL VIA NEBULIZER EVERY 6 HOURS AS NEEDED FOR WHEEZING  Qty: 100 Package, Refills: 12      SENEXON-S 8.6-50 mg per tablet TAKE 1 TABLET BY MOUTH DAILY AS NEEDED FOR CONSTIPATION  Qty: 30 Tab, Refills: 4    Associated Diagnoses: Constipation, unspecified constipation type      traMADol (ULTRAM) 50 mg tablet TAKE 1/2 (25MG) TABLET BY MOUTH IN THE MORNING AND 1 TAB (50MG) IN THE EVENING DX:PAIN  Qty: 45 Tab, Refills: 4    Associated Diagnoses: Compression fracture      apixaban (ELIQUIS) 2.5 mg tablet Take 1 Tab by mouth two (2) times a day. Qty: 60 Tab, Refills: 5    Associated Diagnoses: Chronic systolic congestive heart failure (HCC)      albuterol (PROVENTIL HFA) 90 mcg/actuation inhaler Take 1 Puff by inhalation every six (6) hours as needed for Wheezing. Qty: 1 Inhaler, Refills: prn      calcium-cholecalciferol, D3, (CALTRATE 600+D) tablet Take 1 Tab by mouth two (2) times a day. Qty: 60 Tab, Refills: 12    Associated Diagnoses: Osteopenia      miscellaneous medical supply (ANTI-EMBOLISM STOCKINGS) misc Use below knee stockings 15 to 30 mm HG.   Qty: 2 Each, Refills: 1    Associated Diagnoses: Bilateral leg edema      pravastatin (PRAVACHOL) 40 mg tablet TAKE 1 TABLET BY MOUTH EVERY NIGHT AT BEDTIME DX: CHOLESTEROL  Qty: 30 Tab, Refills: 4         STOP taking these medications       VOLTAREN 1 % gel Comments:   Reason for Stopping:         albuterol-ipratropium (DUONEB) 2.5 mg-0.5 mg/3 ml nebulizer solution Comments:   Reason for Stopping:         bisacodyl (DULCOLAX) 10 mg suppository Comments:   Reason for Stopping:                 NOTIFY YOUR PHYSICIAN FOR ANY OF THE FOLLOWING:   Fever over 101 degrees for 24 hours. Chest pain, shortness of breath, fever, chills, nausea, vomiting, diarrhea, change in mentation, falling, weakness, bleeding. Severe pain or pain not relieved by medications. Or, any other signs or symptoms that you may have questions about. DISPOSITION:    Home With:   OT  PT  HH  RN       Long term SNF/Inpatient Rehab   x Independent/assisted living    Hospice    Other:       PATIENT CONDITION AT DISCHARGE:     Functional status    Poor     Deconditioned    x Independent      Cognition   x  Lucid     Forgetful     Dementia      Catheters/lines (plus indication)    Hicks     PICC     PEG    x None      Code status    x Full code     DNR      PHYSICAL EXAMINATION AT DISCHARGE:  Visit Vitals    /73 (BP 1 Location: Left arm, BP Patient Position: At rest)    Pulse 69    Temp 98 °F (36.7 °C)    Resp 16    Ht 5' 1\" (1.549 m)    Wt 71.5 kg (157 lb 9.6 oz)    SpO2 95%    BMI 29.78 kg/m2          Constitutional: No acute distress, cooperative, pleasant    ENT: Oral mucous moist, oropharynx benign. Neck supple   Resp: Diminished in bilateral bases, otherwise CTA bilaterally. No wheezing/rhonchi/rales. No accessory muscle use   CV: Regular rhythm, normal rate, no murmurs, gallops, rubs   GI: Soft, non distended, non tender. normoactive bowel sounds, no hepatosplenomegaly    Musculoskeletal: No edema, warm, 2+ pulses throughout   Neurologic: Moves all extremities.  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 ICD-10-CM: D04.11  ICD-9-CM: 232.1  8/29/2016 - Present        Squamous cell carcinoma in situ of skin of right cheek ICD-10-CM: D04.39  ICD-9-CM: 232.3  8/29/2016 - Present        Cardiac LV ejection fraction 21-40% ICD-10-CM: R94.30  ICD-9-CM: 785.9  7/21/2016 - Present        Osteopenia ICD-10-CM: M85.80  ICD-9-CM: 733.90  4/12/2016 - Present        Advance care planning ICD-10-CM: Z71.89  ICD-9-CM: V65.49  2/9/2016 - Present        Intractable back pain ICD-10-CM: M54.9  ICD-9-CM: 724.5  8/8/2015 - Present        Rotator cuff tear arthropathy of right shoulder ICD-10-CM: M12.811  ICD-9-CM: 716.81  5/15/2015 - Present        Acute chest pain ICD-10-CM: R07.9  ICD-9-CM: 786.50  5/14/2015 - Present        Dementia without behavioral disturbance ICD-10-CM: F03.90  ICD-9-CM: 294.20  4/27/2015 - Present        Depressive disorder, not elsewhere classified ICD-10-CM: F32.9  ICD-9-CM: 464  4/27/2015 - Present        Anxiety state, unspecified ICD-10-CM: F41.1  ICD-9-CM: 300.00  4/27/2015 - Present        Pain Disorder Associated w/ Both Psychological & Medical Factors ICD-10-CM: F45.42  ICD-9-CM: 307.89  4/27/2015 - Present        CHF (congestive heart failure) (HCC) ICD-10-CM: I50.9  ICD-9-CM: 428.0  5/1/2014 - Present        Acute on chronic systolic heart failure (HCC) ICD-10-CM: I50.23  ICD-9-CM: 428.23  3/14/2014 - Present        Memory impairment ICD-10-CM: R41.3  ICD-9-CM: 780.93  8/15/2013 - Present        Depression ICD-10-CM: F32.9  ICD-9-CM: 093  8/15/2013 - Present        S/P CABG (coronary artery bypass graft) ICD-10-CM: Z95.1  ICD-9-CM: V45.81  8/15/2013 - Present        MI, old ICD-10-CM: I25.2  ICD-9-CM: 273  8/15/2013 - Present        Mixed hyperlipidemia ICD-10-CM: E78.2  ICD-9-CM: 272.2  6/11/2013 - Present        CAD (coronary artery disease) ICD-10-CM: I25.10  ICD-9-CM: 414.00  6/11/2013 - Present        HTN (hypertension) ICD-10-CM: I10  ICD-9-CM: 401.9  6/11/2013 - Present        * (Principal)RESOLVED: Diarrhea ICD-10-CM: R19.7  ICD-9-CM: 787.91  1/26/2017 - 1/26/2017        RESOLVED: Acute respiratory failure with hypoxia ([facility] ICD-10-CM: J96.01  ICD-9-CM: 518.81  1/24/2017 - 1/26/2017        RESOLVED: Acute kidney injury ([facility] ICD-10-CM: N17.9  ICD-9-CM: 584.9  8/14/2015 - 8/20/2015        RESOLVED: Encephalopathy ICD-10-CM: G93.40  ICD-9-CM: 348.30  8/14/2015 - 8/20/2015        RESOLVED: Pneumonia, organism unspecified ICD-10-CM: J18.9  ICD-9-CM: 854  3/8/2014 - 11/4/2014              Greater than 30 minutes were spent with the patient on counseling and coordination of care    Signed:   Vanessa Ruiz MD  1/26/2017  1:49 PM

## 2017-01-26 NOTE — PROGRESS NOTES
TRANSFER - OUT REPORT:    Verbal report given to Brodie RN (name) on Inga Trent  being transferred to 5W(unit) for routine progression of care       Report consisted of patients Situation, Background, Assessment and   Recommendations(SBAR). Information from the following report(s) SBAR, ED Summary, Procedure Summary, Intake/Output, MAR, Recent Results, Med Rec Status and Cardiac Rhythm NSR was reviewed with the receiving nurse. Opportunity for questions and clarification was provided. q1h rounds completed during shift.     Patient transported with:   Monitor

## 2017-01-26 NOTE — DISCHARGE INSTRUCTIONS
Discharge Instructions       PATIENT ID: Nicholas Gould  MRN: [id number]   YOB: 1926    DATE OF ADMISSION: 1/23/2017  6:50 PM    DATE OF DISCHARGE: 1/26/2017    PRIMARY CARE PROVIDER: Brandy Rice MD     ATTENDING PHYSICIAN: Avni Amador MD  DISCHARGING PROVIDER: Avni Amador MD    To contact this individual call [phone] and ask the  to page. If unavailable ask to be transferred the Adult Hospitalist Department. DISCHARGE DIAGNOSES     Diarrhea, likely secondary to viral gastroenteritis    Acute hypoxic respiratory failure, now resolved    Chronic systolic heart failure    Acute kidney injury, now resolved    HTN    CAD s/p CABG    History of thromboembolism    CONSULTATIONS: IP CONSULT TO HOSPITALIST    PROCEDURES/SURGERIES: * No surgery found *    PENDING TEST RESULTS:   At the time of discharge the following test results are still pending: None. FOLLOW UP APPOINTMENTS:   Follow-up Information     Follow up With Details Comments Contact Info    Brandy Rice MD Schedule an appointment as soon as possible for a visit in 1 week post-hospital follow-up [address] [place] [address]  [phone]             ADDITIONAL CARE RECOMMENDATIONS:     Please make sure you are keeping up with fluid intake. Rest as needed. You may resume your home medications. DIET: Cardiac Diet    ACTIVITY: Activity as tolerated    WOUND CARE: N/A    EQUIPMENT needed: N/A      DISCHARGE MEDICATIONS:   See Medication Reconciliation Form    · It is important that you take the medication exactly as they are prescribed. · Keep your medication in the bottles provided by the pharmacist and keep a list of the medication names, dosages, and times to be taken in your wallet. · Do not take other medications without consulting your doctor. NOTIFY YOUR PHYSICIAN FOR ANY OF THE FOLLOWING:   Fever over 101 degrees for 24 hours.    Chest pain, shortness of breath, fever, chills, nausea, vomiting, diarrhea, change in mentation, falling, weakness, bleeding. Severe pain or pain not relieved by medications. Or, any other signs or symptoms that you may have questions about.       DISPOSITION:    Home With:   OT  PT  HH  RN       SNF/Inpatient Rehab/LTAC   x Independent/assisted living    Hospice    Other:     CDMP Checked:   Yes x     PROBLEM LIST Updated:  Yes x       Signed:   Osei Prasad MD  1/26/2017  1:42 PM

## 2017-01-26 NOTE — PROGRESS NOTES
Problem: Discharge Planning  Goal: *Discharge to safe environment  Outcome: Resolved/Met Date Met:  01/26/17  Discharge back to [facility] with [facility] visit provided by [facility] - INPATIENT. Transportation to be provided by pt's daughter Bita Ahumada @ 1700 via car. No barriers to discharge identified. Pt has been cleared by therapy with no home needs recommended at this time. CM provided opportunity for questions/concerns. None voiced at this time. Nurse to call report to [phone].       Valeta Rinne, MSW

## 2017-01-30 ENCOUNTER — PATIENT OUTREACH (OUTPATIENT)
Dept: INTERNAL MEDICINE CLINIC | Age: 82
End: 2017-01-30

## 2017-01-30 ENCOUNTER — HOME CARE VISIT (OUTPATIENT)
Dept: SCHEDULING | Facility: HOME HEALTH | Age: 82
End: 2017-01-30

## 2017-01-30 PROCEDURE — G0299 HHS/HOSPICE OF RN EA 15 MIN: HCPCS

## 2017-01-30 NOTE — PROGRESS NOTES
Writer made several attempts to contact re: pt's status. 1) Health and  Danette Cha  2) Nursing Unit  3) Spoke to  to alert of appt for tomorrow w/PCP  4) Nursing Unit  5) Message left on daughter's VM  6) Spoke w/Nursing Unit    Pt has transitioned back to assisted without difficulty. Pt has no complaints at this time. No mention of pain today. Eating well. Respiratory appears to be doing well with no issues thus far. No questions for writer or additional input to alert MD to cover during her appointment with pt tomorrow. Alerted to time of appointment so pt would be ready since time is for 9:30am. Nurse appreciative of reminder call w/time so they will have ready. Did not speak directly with patient. All time into this case will accounted for under chart review.

## 2017-01-31 ENCOUNTER — OFFICE VISIT (OUTPATIENT)
Dept: INTERNAL MEDICINE CLINIC | Age: 82
End: 2017-01-31

## 2017-01-31 VITALS
SYSTOLIC BLOOD PRESSURE: 128 MMHG | HEART RATE: 78 BPM | TEMPERATURE: 98 F | HEIGHT: 61 IN | WEIGHT: 158.5 LBS | BODY MASS INDEX: 29.92 KG/M2 | OXYGEN SATURATION: 98 % | DIASTOLIC BLOOD PRESSURE: 68 MMHG | RESPIRATION RATE: 22 BRPM

## 2017-01-31 DIAGNOSIS — I25.10 CORONARY ARTERY DISEASE DUE TO LIPID RICH PLAQUE: ICD-10-CM

## 2017-01-31 DIAGNOSIS — I50.22 CHRONIC SYSTOLIC CONGESTIVE HEART FAILURE (HCC): Primary | ICD-10-CM

## 2017-01-31 DIAGNOSIS — F02.80 ALZHEIMER'S DISEASE OF OTHER ONSET WITHOUT BEHAVIORAL DISTURBANCE: ICD-10-CM

## 2017-01-31 DIAGNOSIS — G30.8 ALZHEIMER'S DISEASE OF OTHER ONSET WITHOUT BEHAVIORAL DISTURBANCE: ICD-10-CM

## 2017-01-31 DIAGNOSIS — I25.83 CORONARY ARTERY DISEASE DUE TO LIPID RICH PLAQUE: ICD-10-CM

## 2017-01-31 DIAGNOSIS — J02.9 SORE THROAT: ICD-10-CM

## 2017-01-31 DIAGNOSIS — F32.89 DEPRESSIVE DISORDER, NOT ELSEWHERE CLASSIFIED: ICD-10-CM

## 2017-01-31 NOTE — PROGRESS NOTES
Health Maintenance Due   Topic Date Due    DTaP/Tdap/Td series (1 - Tdap) 05/26/1947    ZOSTER VACCINE AGE 60>  05/26/1986    GLAUCOMA SCREENING Q2Y  05/26/1991    MEDICARE YEARLY EXAM  05/26/1991       Chief Complaint   Patient presents with   [address] 1/24-26 for ARF with hypoxia, states started as a cold with a sore throat, states throat is still slightly irritated       1. Have you been to the ER, urgent care clinic since your last visit? Hospitalized since your last visit? Yes When: 1/24-26 Where: [facility] Reason for visit: ARF with hypoxia    2. Have you seen or consulted any other health care providers outside of the [address] [place] [address] [place] since your last visit? Include any pap smears or colon screening. No    3) Do you have an Advance Directive on file? no    4) Are you interested in receiving information on Advance Directives? NO      Patient is accompanied by self I have received verbal consent from Alicia  to discuss any/all medical information while they are present in the room.

## 2017-01-31 NOTE — PATIENT INSTRUCTIONS

## 2017-01-31 NOTE — PROGRESS NOTES
HISTORY OF PRESENT ILLNESS  Raysa Shipley is a 80 y.o. female here for transition of care. She was contacted by my nurse navigator within 48 hours. She was admitted for respiratory failure. CXR did not show any pneumonia but atelactasis. has chronic CHF,on meds. no orthopnea or PND. Reports mild sore throat for past 3 days. no fever or cough. All labs are reviewed. also imaging too. All meds reconciled. Her gait is OK,using walker. no fall. Has dementia. on med. HPI    Review of Systems   Constitutional: Negative. Negative for chills and fever. HENT: Positive for sore throat. Eyes: Negative. Respiratory: Negative. Cardiovascular: Negative. Gastrointestinal: Negative. Genitourinary: Negative. Musculoskeletal: Negative. Skin: Negative. Neurological: Negative. Endo/Heme/Allergies: Negative. Psychiatric/Behavioral: Negative. Physical Exam   Constitutional: She is oriented to person, place, and time. She appears well-developed and well-nourished. No distress. HENT:   Head: Normocephalic and atraumatic. Right Ear: External ear normal.   Left Ear: External ear normal.   Nose: Nose normal.   Mouth/Throat: No oropharyngeal exudate. Pharynx;mildly congested. no exudate   Neck: Normal range of motion. Neck supple. No JVD present. No thyromegaly present. Cardiovascular: Normal rate, regular rhythm, normal heart sounds and intact distal pulses. Pulmonary/Chest: Effort normal and breath sounds normal. No respiratory distress. She has no wheezes. Musculoskeletal: She exhibits no edema or tenderness. Neurological: She is alert and oriented to person, place, and time. She has normal reflexes. She displays normal reflexes. No cranial nerve deficit. She exhibits normal muscle tone. Coordination normal.   Psychiatric: She has a normal mood and affect. Her behavior is normal.       ASSESSMENT and Melvenia Reading was seen today for transitions of care.     Diagnoses and all orders for this visit:    Chronic systolic congestive heart failure (HCC)  Compensated. Low salt diet. Depressive disorder, not elsewhere classified  On med,.stable. Alzheimer's disease of other onset without behavioral disturbance    On meds. Coronary artery disease due to lipid rich plaque    On eliquis,toprol XL and lisinopril. Sore throat  No  abx needed. gargle. Discussed expected course/resolution/complications of diagnosis in detail with patient. Medication risks/benefits/costs/interactions/alternatives discussed with patient. Pt was given an after visit summary which includes diagnoses, current medications & vitals. Pt expressed understanding with the diagnosis and plan.

## 2017-01-31 NOTE — MR AVS SNAPSHOT
Visit Information Date & Time Provider Department Dept. Phone Encounter #  
 1/31/2017  9:30 AM Adriel Salas MD [facility] Internal Medicine [facility] [phone] [id number] Your Appointments 3/21/2017  1:45 PM  
ROUTINE CARE with Adriel Salas MD  
[facility] ([facility] Appt Note: 4 month follow up [address] [place] [id number] [phone]  
  
   
 [address] [place] [address]. [address] [place] [id number] Upcoming Health Maintenance Date Due DTaP/Tdap/Td series (1 - Tdap) 5/26/1947 ZOSTER VACCINE AGE 60> 5/26/1986 GLAUCOMA SCREENING Q2Y 5/26/1991 MEDICARE YEARLY EXAM 5/26/1991 Pneumococcal 65+ High/Highest Risk (2 of 2 - PPSV23) 11/4/2019 Allergies as of 1/31/2017  Review Complete On: 1/31/2017 By: Adriel Salas MD  
  
 Severity Noted Reaction Type Reactions Neurontin [Gabapentin]  08/18/2015    Unknown (comments) Pt unknown reaction Current Immunizations  Reviewed on 1/25/2017 Name Date Influenza Vaccine 12/10/2015, 11/15/2014, 9/5/2013 Pneumococcal Vaccine (Unspecified Type) 11/4/2014 Not reviewed this visit You Were Diagnosed With   
  
 Codes Comments Chronic systolic congestive heart failure (HCC)    -  Primary ICD-10-CM: X06.94 ICD-9-CM: 428.22, 428.0 Depressive disorder, not elsewhere classified     ICD-10-CM: F32.9 ICD-9-CM: 128 Alzheimer's disease of other onset without behavioral disturbance     ICD-10-CM: G30.8, F02.80 Coronary artery disease due to lipid rich plaque     ICD-10-CM: I25.10, I25.83 ICD-9-CM: 414.00, 414.3 Sore throat     ICD-10-CM: J02.9 ICD-9-CM: 455 Vitals BP Pulse Temp Resp Height(growth percentile) Weight(growth percentile) 128/68 (BP 1 Location: Left arm, BP Patient Position: Sitting) 78 98 °F (36.7 °C) (Oral) 22 5' 1\" (1.549 m) 158 lb 8 oz (71.9 kg) SpO2 BMI OB Status Smoking Status 98% 29.95 kg/m2 Postmenopausal Never Smoker Vitals History BMI and BSA Data Body Mass Index Body Surface Area  
 29.95 kg/m 2 1.76 m 2 Preferred Pharmacy Pharmacy Name Phone [address] [phone] Your Updated Medication List  
  
   
This list is accurate as of: 1/31/17 10:06 AM.  Always use your most recent med list.  
  
  
  
  
 acetaminophen 325 mg tablet Commonly known as:  Q-Nol Take 2 Tabs by mouth two (2) times a day. 2 tabet at am and 2 tab at noon * albuterol 90 mcg/actuation inhaler Commonly known as:  PROVENTIL HFA Take 1 Puff by inhalation every six (6) hours as needed for Wheezing. * albuterol 2.5 mg /3 mL (0.083 %) nebulizer solution Commonly known as:  PROVENTIL VENTOLIN  
INHALE THE CONTENTS OF 1 VIAL VIA NEBULIZER EVERY 6 HOURS AS NEEDED FOR WHEEZING  
  
 apixaban 2.5 mg tablet Commonly known as:  Jess Dorothy Take 1 Tab by mouth two (2) times a day. calcium-cholecalciferol (D3) tablet Commonly known as:  CALTRATE 600+D Take 1 Tab by mouth two (2) times a day. DOC-Q-LACE 100 mg capsule Generic drug:  docusate sodium TAKE 1 CAPSULE BY MOUTH TWICE DAILY DX:BOWEL SUPPORT  
  
 donepezil 5 mg tablet Commonly known as:  ARICEPT  
TAKE 1 TABLET BY MOUTH EVERY NIGHT AT BEDTIME DX: MEMORY SUPPORT  
  
 furosemide 40 mg tablet Commonly known as:  LASIX Take 40 mg by mouth daily. lisinopril 5 mg tablet Commonly known as:  PRINIVIL, ZESTRIL  
TAKE 1 TABLET BY MOUTH EVERY DAY DX: HTN  
  
 metoprolol succinate 25 mg XL tablet Commonly known as:  TOPROL-XL  
TAKE 1 TABLET BY MOUTH EVERY DAY DX: HTN  
  
 MILK OF MAGNESIA 400 mg/5 mL suspension Generic drug:  magnesium hydroxide TAKE 30ML BY MOUTH EVERY 24 HOURS AS NEEDED FOR CONSTIPATION  
  
 miscellaneous medical supply Misc Commonly known as:  ANTI-EMBOLISM STOCKINGS  
 Use below knee stockings 15 to 30 mm HG.  
  
 polyethylene glycol 17 gram/dose powder Commonly known as:  MIRALAX  
MIX THE CONTENTS OF 1 HEAPING TABLESPOON (=17GRAMS) WITH 8 OUNCES OF WATER AND DRINK DAILY DX:BOWEL AIDE  
  
 pravastatin 40 mg tablet Commonly known as:  PRAVACHOL  
TAKE 1 TABLET BY MOUTH EVERY NIGHT AT BEDTIME DX: CHOLESTEROL  
  
 raNITIdine 150 mg tablet Commonly known as:  ZANTAC Take 150 mg by mouth nightly. SENEXON-S 8.6-50 mg per tablet Generic drug:  senna-docusate TAKE 1 TABLET BY MOUTH DAILY AS NEEDED FOR CONSTIPATION  
  
 traMADol 50 mg tablet Commonly known as:  ULTRAM  
TAKE 1/2 (25MG) TABLET BY MOUTH IN THE MORNING AND 1 TAB (50MG) IN THE EVENING DX:PAIN  
  
 venlafaxine-SR 75 mg capsule Commonly known as:  EFFEXOR-XR  
TAKE 1 CAPSULE BY MOUTH EVERY DAY DX:ANTIDEPRESSANT  
  
 VITAMIN B-12 500 mcg tablet Generic drug:  cyanocobalamin TAKE 1 TABLET BY MOUTH EVERY DAY DX: SUPPLEMENT * Notice: This list has 2 medication(s) that are the same as other medications prescribed for you. Read the directions carefully, and ask your doctor or other care provider to review them with you. Patient Instructions Low Sodium Diet (2,000 Milligram): Care Instructions Your Care Instructions Too much sodium causes your body to hold on to extra water. This can raise your blood pressure and force your heart and kidneys to work harder. In very serious cases, this could cause you to be put in the hospital. It might even be life-threatening. By limiting sodium, you will feel better and lower your risk of serious problems. The most common source of sodium is salt. People get most of the salt in their diet from canned, prepared, and packaged foods. Fast food and restaurant meals also are very high in sodium. Your doctor will probably limit your sodium to less than 2,000 milligrams (mg) a day.  This limit counts all the sodium in prepared and packaged foods and any salt you add to your food. Follow-up care is a key part of your treatment and safety. Be sure to make and go to all appointments, and call your doctor if you are having problems. It's also a good idea to know your test results and keep a list of the medicines you take. How can you care for yourself at home? Read food labels · Read labels on cans and food packages. The labels tell you how much sodium is in each serving. Make sure that you look at the serving size. If you eat more than the serving size, you have eaten more sodium. · Food labels also tell you the Percent Daily Value for sodium. Choose products with low Percent Daily Values for sodium. · Be aware that sodium can come in forms other than salt, including monosodium glutamate (MSG), sodium citrate, and sodium bicarbonate (baking soda). MSG is often added to Asian food. When you eat out, you can sometimes ask for food without MSG or added salt. Buy low-sodium foods · Buy foods that are labeled \"unsalted\" (no salt added), \"sodium-free\" (less than 5 mg of sodium per serving), or \"low-sodium\" (less than 140 mg of sodium per serving). Foods labeled \"reduced-sodium\" and \"light sodium\" may still have too much sodium. Be sure to read the label to see how much sodium you are getting. · Buy fresh vegetables, or frozen vegetables without added sauces. Buy low-sodium versions of canned vegetables, soups, and other canned goods. Prepare low-sodium meals · Cut back on the amount of salt you use in cooking. This will help you adjust to the taste. Do not add salt after cooking. One teaspoon of salt has about 2,300 mg of sodium. · Take the salt shaker off the table. · Flavor your food with garlic, lemon juice, onion, vinegar, herbs, and spices. Do not use soy sauce, lite soy sauce, steak sauce, onion salt, garlic salt, celery salt, mustard, or ketchup on your food. · Use low-sodium salad dressings, sauces, and ketchup. Or make your own salad dressings and sauces without adding salt. · Use less salt (or none) when recipes call for it. You can often use half the salt a recipe calls for without losing flavor. Other foods such as rice, pasta, and grains do not need added salt. · Rinse canned vegetables, and cook them in fresh water. This removes somebut not allof the salt. · Avoid water that is naturally high in sodium or that has been treated with water softeners, which add sodium. Call your local water company to find out the sodium content of your water supply. If you buy bottled water, read the label and choose a sodium-free brand. Avoid high-sodium foods · Avoid eating: ¨ Smoked, cured, salted, and canned meat, fish, and poultry. ¨ Ham, cowan, hot dogs, and luncheon meats. ¨ Regular, hard, and processed cheese and regular peanut butter. ¨ Crackers with salted tops, and other salted snack foods such as pretzels, chips, and salted popcorn. ¨ Frozen prepared meals, unless labeled low-sodium. ¨ Canned and dried soups, broths, and bouillon, unless labeled sodium-free or low-sodium. ¨ Canned vegetables, unless labeled sodium-free or low-sodium. ¨ [place] fries, pizza, tacos, and other fast foods. ¨ Pickles, olives, ketchup, and other condiments, especially soy sauce, unless labeled sodium-free or low-sodium. Where can you learn more? Go to http://emma-rosina.info/. Enter Z400 in the search box to learn more about \"Low Sodium Diet (2,000 Milligram): Care Instructions. \" Current as of: July 26, 2016 Content Version: 11.1 © 6744-3769 [employer]. Care instructions adapted under license by [employer] (which disclaims liability or warranty for this information).  If you have questions about a medical condition or this instruction, always ask your healthcare professional. [employer] disclaims any warranty or liability for your use of this information. Introducing [facility]! [address] introduces [email or web address] patient portal. Now you can access parts of your medical record, email your doctor's office, and request medication refills online. 1. In your internet browser, go to [email or web address] 2. Click on the First Time User? Click Here link in the Sign In box. You will see the New Member Sign Up page. 3. Enter your [employer] Access Code exactly as it appears below. You will not need to use this code after youve completed the sign-up process. If you do not sign up before the expiration date, you must request a new code. · [employer] Access Code: [email or web address] Expires: 2/20/2017  2:03 PM 
 
4. Enter the last four digits of your Social Security Number (xxxx) and Date of Birth (mm/dd/yyyy) as indicated and click Submit. You will be taken to the next sign-up page. 5. Create a [employer] ID. This will be your [employer] login ID and cannot be changed, so think of one that is secure and easy to remember. 6. Create a [employer] password. You can change your password at any time. 7. Enter your Password Reset Question and Answer. This can be used at a later time if you forget your password. 8. Enter your e-mail address. You will receive e-mail notification when new information is available in [address] 9. Click Sign Up. You can now view and download portions of your medical record. 10. Click the Download Summary menu link to download a portable copy of your medical information. If you have questions, please visit the Frequently Asked Questions section of the [email or web address] website. Remember, [employer] is NOT to be used for urgent needs. For medical emergencies, dial 911. Now available from your iPhone and Android! Please provide this summary of care documentation to your next provider. Your primary care clinician is listed as Krishna Rosenberg. If you have any questions after today's visit, please call [phone].

## 2017-02-14 ENCOUNTER — OFFICE VISIT (OUTPATIENT)
Dept: INTERNAL MEDICINE CLINIC | Age: 82
End: 2017-02-14

## 2017-02-14 VITALS
TEMPERATURE: 98.3 F | HEIGHT: 61 IN | BODY MASS INDEX: 30.02 KG/M2 | HEART RATE: 74 BPM | RESPIRATION RATE: 22 BRPM | OXYGEN SATURATION: 98 % | SYSTOLIC BLOOD PRESSURE: 110 MMHG | WEIGHT: 159 LBS | DIASTOLIC BLOOD PRESSURE: 60 MMHG

## 2017-02-14 DIAGNOSIS — I25.83 CORONARY ARTERY DISEASE DUE TO LIPID RICH PLAQUE: ICD-10-CM

## 2017-02-14 DIAGNOSIS — F32.A DEPRESSION, UNSPECIFIED DEPRESSION TYPE: ICD-10-CM

## 2017-02-14 DIAGNOSIS — I50.22 CHRONIC SYSTOLIC CONGESTIVE HEART FAILURE (HCC): ICD-10-CM

## 2017-02-14 DIAGNOSIS — I25.10 CORONARY ARTERY DISEASE DUE TO LIPID RICH PLAQUE: ICD-10-CM

## 2017-02-14 DIAGNOSIS — M65.312 TRIGGER FINGER OF LEFT THUMB: Primary | ICD-10-CM

## 2017-02-14 RX ORDER — PREDNISONE 5 MG/1
5 TABLET ORAL 2 TIMES DAILY
Qty: 14 TAB | Refills: 0 | Status: SHIPPED | OUTPATIENT
Start: 2017-02-14 | End: 2017-03-21 | Stop reason: ALTCHOICE

## 2017-02-14 NOTE — PROGRESS NOTES
Health Maintenance Due   Topic Date Due    DTaP/Tdap/Td series (1 - Tdap) 05/26/1947    ZOSTER VACCINE AGE 60>  05/26/1986    GLAUCOMA SCREENING Q2Y  05/26/1991    MEDICARE YEARLY EXAM  05/26/1991       Chief Complaint   Patient presents with    Hand Pain     left thumb painful x several weeks but has gotten worse over last week       1. Have you been to the ER, urgent care clinic since your last visit? Hospitalized since your last visit? No    2. Have you seen or consulted any other health care providers outside of the [address] [place] [address] since your last visit? Include any pap smears or colon screening. No    3) Do you have an Advance Directive on file? no    4) Are you interested in receiving information on Advance Directives? NO      Patient is accompanied by self I have received verbal consent from Austen Ayala to discuss any/all medical information while they are present in the room.

## 2017-02-14 NOTE — PATIENT INSTRUCTIONS
Trigger Finger: Care Instructions  Your Care Instructions  A trigger finger is a finger stuck in a bent position. The bent finger usually straightens out on its own. A trigger finger can be painful, but it normally is not a serious problem. Trigger fingers seem to occur more in some groups of people. These include people who have diabetes or arthritis or who have injured their hands in the past. This problem also occurs in musicians and people who  tools often. Rest, exercises, and other things you can do at home may help your trigger finger relax so that it can bend as it should. You may get a corticosteroid shot. This can reduce swelling and pain. Your doctor may put a splint on your finger. This will give your finger some rest and avoid irritating the joint. You may need surgery if the finger keeps locking in a bent position. Follow-up care is a key part of your treatment and safety. Be sure to make and go to all appointments, and call your doctor if you are having problems. It's also a good idea to know your test results and keep a list of the medicines you take. How can you care for yourself at home? · If your doctor put a splint on your finger, wear the splint as directed. Do not remove it until your doctor says you can. · You may need to change your activities to avoid movements that irritate the finger. · If your finger is swollen, put ice or a cold pack on your finger for 10 to 20 minutes at a time. Try to do this every 1 to 2 hours for the next 3 days (when you are awake) or until the swelling goes down. Put a thin cloth between the ice and your skin. · Prop up your hand on a pillow when you ice it or anytime you sit or lie down during the next 3 days. Try to keep it above the level of your heart. This will help reduce swelling. · Take your medicines exactly as prescribed. Call your doctor if you think you are having a problem with your medicine.   · Ask your doctor if you can take an over-the-counter pain medicine, such as acetaminophen (Tylenol), ibuprofen (Advil, Motrin), or naproxen (Aleve). Be safe with medicines. Read and follow all instructions on the label. · If your doctor recommends exercises, do them as directed. When should you call for help? Call your doctor now or seek immediate medical care if:  · Your finger locks in a bent position and will not straighten. Watch closely for changes in your health, and be sure to contact your doctor if:  · You do not get better as expected. Where can you learn more? Go to http://emma-rosina.info/. Enter M826 in the search box to learn more about \"Trigger Finger: Care Instructions. \"  Current as of: May 23, 2016  Content Version: 11.1  © 2598-7701 [employer]. Care instructions adapted under license by [employer] (which disclaims liability or warranty for this information). If you have questions about a medical condition or this instruction, always ask your healthcare professional. [address] any warranty or liability for your use of this information.

## 2017-02-14 NOTE — PROGRESS NOTES
HISTORY OF PRESENT ILLNESS  Mary Vu is a 80 y.o. female here with C/O left thumb pain and trigger finger. She does craft with her hand. reports occasional pain and stiffness on left thumb,has moderate pain when she try to straighten her thumb. no fall or injury. She has CHF,on meds. no SOB or orthopnea. Has CAD,on meds. no chest pain or palpitation. depression seems stable. Reports compliance with medications and diet. Med list and most recent labs/studies reviewed with pt. Not active physically to control weight. Needs med refills. Reports no other new c/o. HPI    Review of Systems   Constitutional: Negative. HENT: Negative. Eyes: Negative. Respiratory: Negative. Cardiovascular: Negative. Gastrointestinal: Negative. Genitourinary: Negative. Musculoskeletal: Negative. Skin: Negative. Physical Exam   Constitutional: She appears well-developed and well-nourished. She appears distressed. Neck: Normal range of motion. Neck supple. No JVD present. No thyromegaly present. Cardiovascular: Normal rate, regular rhythm, normal heart sounds and intact distal pulses. Pulmonary/Chest: Effort normal and breath sounds normal. No respiratory distress. She has no wheezes. Musculoskeletal: She exhibits tenderness. Left thumb:tenderness on 1st carpometacaropal joint. tendenress  on thumb   Psychiatric: She has a normal mood and affect. Her behavior is normal.       ASSESSMENT and PLAN  Jacinda Gong was seen today for hand pain. Diagnoses and all orders for this visit:    Trigger finger of left thumb    Rest and ice pack. Will give,  -     predniSONE (DELTASONE) 5 mg tablet; Take 1 Tab by mouth two (2) times a day. Exercise. Chronic systolic congestive heart failure (HCC)    Compensated. On lasix and toprol. doing well. Coronary artery disease due to lipid rich plaque    Stable. on toprol,pravachol. and eliquis.     Discussed expected course/resolution/complications of diagnosis in detail with patient. Medication risks/benefits/costs/interactions/alternatives discussed with patient. Pt was given an after visit summary which includes diagnoses, current medications & vitals. Pt expressed understanding with the diagnosis and plan.

## 2017-02-14 NOTE — MR AVS SNAPSHOT
Visit Information Date & Time Provider Department Dept. Phone Encounter #  
 2/14/2017  9:00 AM Jenifer Bass MD [facility] Internal Medicine [employer] [phone] [id number] Your Appointments 3/21/2017  1:45 PM  
ROUTINE CARE with Jenifer Bass MD  
[facility] ([facility]) Appt Note: 4 month follow up [facility] [address] [place] [phone]  
  
   
 [place] [address] [place] [phone] Upcoming Health Maintenance Date Due DTaP/Tdap/Td series (1 - Tdap) 5[birth year]26/1947 ZOSTER VACCINE AGE 60> 5/26/1986 GLAUCOMA SCREENING Q2Y 5/26/1991 MEDICARE YEARLY EXAM 5/26/1991 Pneumococcal 65+ High/Highest Risk (2 of 2 - PPSV23) 11/4/2019 Allergies as of 2/14/2017  Review Complete On: 2/14/2017 By: Jenifer Bass MD  
  
 Severity Noted Reaction Type Reactions Neurontin [Gabapentin]  08/18/2015    Unknown (comments) Pt unknown reaction Current Immunizations  Reviewed on 1/25/2017 Name Date Influenza Vaccine 12/10/2015, 11/15/2014, 9/5/2013 Pneumococcal Vaccine (Unspecified Type) 11/4/2014 Not reviewed this visit You Were Diagnosed With   
  
 Codes Comments Trigger finger of left thumb    -  Primary ICD-10-CM: M31.339 ICD-9-CM: 727.03 Chronic systolic congestive heart failure (HCC)     ICD-10-CM: I50.22 ICD-9-CM: 428.22, 428.0 Coronary artery disease due to lipid rich plaque     ICD-10-CM: I25.10, I25.83 ICD-9-CM: 414.00, 414.3 Vitals BP Pulse Temp Resp Height(growth percentile) Weight(growth percentile) 110/60 (BP 1 Location: Left arm, BP Patient Position: Sitting) 74 98.3 °F (36.8 °C) (Oral) 22 5' 1\" (1.549 m) 159 lb (72.1 kg) SpO2 BMI OB Status Smoking Status 98% 30.04 kg/m2 Postmenopausal Never Smoker Vitals History BMI and BSA Data Body Mass Index Body Surface Area  30.04 kg/m 2 1.76 m 2  
  
  
 Preferred Pharmacy Pharmacy Name Phone [phone] [phone] Your Updated Medication List  
  
   
This list is accurate as of: 2/14/17 10:12 AM.  Always use your most recent med list.  
  
  
  
  
 acetaminophen 325 mg tablet Commonly known as:  Q-Nol Take 2 Tabs by mouth two (2) times a day. 2 tabet at am and 2 tab at noon * albuterol 90 mcg/actuation inhaler Commonly known as:  PROVENTIL HFA Take 1 Puff by inhalation every six (6) hours as needed for Wheezing. * albuterol 2.5 mg /3 mL (0.083 %) nebulizer solution Commonly known as:  PROVENTIL VENTOLIN  
INHALE THE CONTENTS OF 1 VIAL VIA NEBULIZER EVERY 6 HOURS AS NEEDED FOR WHEEZING  
  
 apixaban 2.5 mg tablet Commonly known as:  Candelaria Shield Take 1 Tab by mouth two (2) times a day. calcium-cholecalciferol (D3) tablet Commonly known as:  CALTRATE 600+D Take 1 Tab by mouth two (2) times a day. DOC-Q-LACE 100 mg capsule Generic drug:  docusate sodium TAKE 1 CAPSULE BY MOUTH TWICE DAILY DX:BOWEL SUPPORT  
  
 donepezil 5 mg tablet Commonly known as:  ARICEPT  
TAKE 1 TABLET BY MOUTH EVERY NIGHT AT BEDTIME DX: MEMORY SUPPORT  
  
 furosemide 40 mg tablet Commonly known as:  LASIX Take 40 mg by mouth daily. lisinopril 5 mg tablet Commonly known as:  PRINIVIL, ZESTRIL  
TAKE 1 TABLET BY MOUTH EVERY DAY DX: HTN  
  
 metoprolol succinate 25 mg XL tablet Commonly known as:  TOPROL-XL  
TAKE 1 TABLET BY MOUTH EVERY DAY DX: HTN  
  
 MILK OF MAGNESIA 400 mg/5 mL suspension Generic drug:  magnesium hydroxide TAKE 30ML BY MOUTH EVERY 24 HOURS AS NEEDED FOR CONSTIPATION  
  
 miscellaneous medical supply Misc Commonly known as:  ANTI-EMBOLISM STOCKINGS Use below knee stockings 15 to 30 mm HG.  
  
 polyethylene glycol 17 gram/dose powder Commonly known as:  MIRALAX  
MIX THE CONTENTS OF 1 HEAPING TABLESPOON (=17GRAMS) WITH 8 OUNCES OF WATER AND DRINK DAILY DX:BOWEL AIDE  
  
 pravastatin 40 mg tablet Commonly known as:  PRAVACHOL  
TAKE 1 TABLET BY MOUTH EVERY NIGHT AT BEDTIME DX: CHOLESTEROL  
  
 predniSONE 5 mg tablet Commonly known as:  Liliane Mons Take 1 Tab by mouth two (2) times a day. raNITIdine 150 mg tablet Commonly known as:  ZANTAC Take 150 mg by mouth nightly. SENEXON-S 8.6-50 mg per tablet Generic drug:  senna-docusate TAKE 1 TABLET BY MOUTH DAILY AS NEEDED FOR CONSTIPATION  
  
 traMADol 50 mg tablet Commonly known as:  ULTRAM  
TAKE 1/2 (25MG) TABLET BY MOUTH IN THE MORNING AND 1 TAB (50MG) IN THE EVENING DX:PAIN  
  
 venlafaxine-SR 75 mg capsule Commonly known as:  EFFEXOR-XR  
TAKE 1 CAPSULE BY MOUTH EVERY DAY DX:ANTIDEPRESSANT  
  
 VITAMIN B-12 500 mcg tablet Generic drug:  cyanocobalamin TAKE 1 TABLET BY MOUTH EVERY DAY DX: SUPPLEMENT * Notice: This list has 2 medication(s) that are the same as other medications prescribed for you. Read the directions carefully, and ask your doctor or other care provider to review them with you. Prescriptions Sent to Pharmacy Refills  
 predniSONE (DELTASONE) 5 mg tablet 0 Sig: Take 1 Tab by mouth two (2) times a day. Class: Normal  
 Pharmacy: [address] [place] [address]. Ph #: [phone] Route: Oral  
  
Patient Instructions Trigger Finger: Care Instructions Your Care Instructions A trigger finger is a finger stuck in a bent position. The bent finger usually straightens out on its own. A trigger finger can be painful, but it normally is not a serious problem. Trigger fingers seem to occur more in some groups of people. These include people who have diabetes or arthritis or who have injured their hands in the past. This problem also occurs in musicians and people who  tools often.  
Rest, exercises, and other things you can do at home may help your trigger finger relax so that it can bend as it should. You may get a corticosteroid shot. This can reduce swelling and pain. Your doctor may put a splint on your finger. This will give your finger some rest and avoid irritating the joint. You may need surgery if the finger keeps locking in a bent position. Follow-up care is a key part of your treatment and safety. Be sure to make and go to all appointments, and call your doctor if you are having problems. It's also a good idea to know your test results and keep a list of the medicines you take. How can you care for yourself at home? · If your doctor put a splint on your finger, wear the splint as directed. Do not remove it until your doctor says you can. · You may need to change your activities to avoid movements that irritate the finger. · If your finger is swollen, put ice or a cold pack on your finger for 10 to 20 minutes at a time. Try to do this every 1 to 2 hours for the next 3 days (when you are awake) or until the swelling goes down. Put a thin cloth between the ice and your skin. · Prop up your hand on a pillow when you ice it or anytime you sit or lie down during the next 3 days. Try to keep it above the level of your heart. This will help reduce swelling. · Take your medicines exactly as prescribed. Call your doctor if you think you are having a problem with your medicine. · Ask your doctor if you can take an over-the-counter pain medicine, such as acetaminophen (Tylenol), ibuprofen (Advil, Motrin), or naproxen (Aleve). Be safe with medicines. Read and follow all instructions on the label. · If your doctor recommends exercises, do them as directed. When should you call for help? Call your doctor now or seek immediate medical care if: 
· Your finger locks in a bent position and will not straighten. Watch closely for changes in your health, and be sure to contact your doctor if: 
· You do not get better as expected. Where can you learn more? Go to http://emma-rosina.info/. Enter M826 in the search box to learn more about \"Trigger Finger: Care Instructions. \" Current as of: May 23, 2016 Content Version: 11.1 © 3357-7568 [employer]. Care instructions adapted under license by [employer] (which disclaims liability or warranty for this information). If you have questions about a medical condition or this instruction, always ask your healthcare professional. [facility] any warranty or liability for your use of this information. Introducing [facility] [place] [facility]! [employer] introduces Diveboard patient portal. Now you can access parts of your medical record, email your doctor's office, and request medication refills online. 1. In your internet browser, go to [email or web address] 2. Click on the First Time User? Click Here link in the Sign In box. You will see the New Member Sign Up page. 3. Enter your Diveboard Access Code exactly as it appears below. You will not need to use this code after youve completed the sign-up process. If you do not sign up before the expiration date, you must request a new code. · Diveboard Access Code: [email or web address] Expires: 2/20/2017  2:03 PM 
 
4. Enter the last four digits of your Social Security Number (xxxx) and Date of Birth (mm/dd/yyyy) as indicated and click Submit. You will be taken to the next sign-up page. 5. Create a Diveboard ID. This will be your Diveboard login ID and cannot be changed, so think of one that is secure and easy to remember. 6. Create a Diveboard password. You can change your password at any time. 7. Enter your Password Reset Question and Answer. This can be used at a later time if you forget your password. 8. Enter your e-mail address. You will receive e-mail notification when new information is available in [address] 9. Click Sign Up. You can now view and download portions of your medical record. 10. Click the Download Summary menu link to download a portable copy of your medical information. If you have questions, please visit the Frequently Asked Questions section of the [employer] website. Remember, [employer] is NOT to be used for urgent needs. For medical emergencies, dial 911. Now available from your iPhone and Android! Please provide this summary of care documentation to your next provider. Your primary care clinician is listed as Brandy Rice. If you have any questions after today's visit, please call [phone].

## 2017-02-28 ENCOUNTER — DOCUMENTATION ONLY (OUTPATIENT)
Dept: INTERNAL MEDICINE CLINIC | Age: 82
End: 2017-02-28

## 2017-02-28 RX ORDER — TRAMADOL HYDROCHLORIDE 50 MG/1
TABLET ORAL
Qty: 45 TAB | Refills: 4 | Status: SHIPPED | OUTPATIENT
Start: 2017-02-28 | End: 2017-08-24 | Stop reason: SDUPTHER

## 2017-02-28 NOTE — PROGRESS NOTES
Patient came to desk at [facility] office, stated she had a question, writer approached pt and she wanted to know if any of her medications could be causing her to be so sleepy. States that after breakfast and after lunch she is napping. Denies any difficulty with sleeping at night.  Conferred with Dr Robbin Hernandez, recommended that pt change her tramadol to 50mg at hs and stop taking her am dose, replace am dose with tylenol 650mg po qam. Pt voiced understanding and pharmacy made aware of changes

## 2017-03-02 DIAGNOSIS — I50.22 CHRONIC SYSTOLIC CONGESTIVE HEART FAILURE (HCC): ICD-10-CM

## 2017-03-02 RX ORDER — APIXABAN 2.5 MG/1
TABLET, FILM COATED ORAL
Qty: 60 TAB | Refills: 4 | Status: SHIPPED | OUTPATIENT
Start: 2017-03-02 | End: 2017-08-24 | Stop reason: SDUPTHER

## 2017-03-21 ENCOUNTER — OFFICE VISIT (OUTPATIENT)
Dept: INTERNAL MEDICINE CLINIC | Age: 82
End: 2017-03-21

## 2017-03-21 VITALS
WEIGHT: 160 LBS | HEIGHT: 61 IN | HEART RATE: 76 BPM | TEMPERATURE: 98.3 F | RESPIRATION RATE: 20 BRPM | BODY MASS INDEX: 30.21 KG/M2 | OXYGEN SATURATION: 97 % | DIASTOLIC BLOOD PRESSURE: 60 MMHG | SYSTOLIC BLOOD PRESSURE: 110 MMHG

## 2017-03-21 DIAGNOSIS — L85.3 DRY SKIN: ICD-10-CM

## 2017-03-21 DIAGNOSIS — L29.9 ITCHING: ICD-10-CM

## 2017-03-21 DIAGNOSIS — F02.80 ALZHEIMER'S DISEASE OF OTHER ONSET WITHOUT BEHAVIORAL DISTURBANCE: ICD-10-CM

## 2017-03-21 DIAGNOSIS — I50.22 CHRONIC SYSTOLIC CONGESTIVE HEART FAILURE (HCC): ICD-10-CM

## 2017-03-21 DIAGNOSIS — G30.8 ALZHEIMER'S DISEASE OF OTHER ONSET WITHOUT BEHAVIORAL DISTURBANCE: ICD-10-CM

## 2017-03-21 DIAGNOSIS — M65.30 TRIGGER FINGER OF RIGHT HAND, UNSPECIFIED FINGER: ICD-10-CM

## 2017-03-21 DIAGNOSIS — L30.9 DERMATITIS: Primary | ICD-10-CM

## 2017-03-21 RX ORDER — HYDROCORTISONE 25 MG/ML
LOTION TOPICAL 2 TIMES DAILY
Qty: 60 ML | Refills: 0 | Status: SHIPPED | OUTPATIENT
Start: 2017-03-21 | End: 2017-03-31 | Stop reason: SDUPTHER

## 2017-03-21 RX ORDER — AMMONIUM LACTATE 12 G/100G
LOTION TOPICAL
Qty: 1 BOTTLE | Refills: 2 | Status: SHIPPED | OUTPATIENT
Start: 2017-03-21 | End: 2017-05-30 | Stop reason: SDUPTHER

## 2017-03-21 RX ORDER — DIPHENHYDRAMINE HCL 12.5MG/5ML
12.5 LIQUID (ML) ORAL
Qty: 100 ML | Refills: 2 | Status: SHIPPED | OUTPATIENT
Start: 2017-03-21 | End: 2018-04-10

## 2017-03-28 ENCOUNTER — OFFICE VISIT (OUTPATIENT)
Dept: INTERNAL MEDICINE CLINIC | Age: 82
End: 2017-03-28

## 2017-03-28 VITALS
OXYGEN SATURATION: 98 % | DIASTOLIC BLOOD PRESSURE: 78 MMHG | TEMPERATURE: 98 F | HEART RATE: 78 BPM | HEIGHT: 61 IN | WEIGHT: 159 LBS | SYSTOLIC BLOOD PRESSURE: 134 MMHG | RESPIRATION RATE: 20 BRPM | BODY MASS INDEX: 30.02 KG/M2

## 2017-03-28 DIAGNOSIS — L29.9 ITCHY SKIN: ICD-10-CM

## 2017-03-28 DIAGNOSIS — L30.9 DERMATITIS: Primary | ICD-10-CM

## 2017-03-28 DIAGNOSIS — I25.83 CORONARY ARTERY DISEASE DUE TO LIPID RICH PLAQUE: ICD-10-CM

## 2017-03-28 DIAGNOSIS — I25.10 CORONARY ARTERY DISEASE DUE TO LIPID RICH PLAQUE: ICD-10-CM

## 2017-03-28 RX ORDER — PREDNISONE 5 MG/1
TABLET ORAL
Qty: 21 TAB | Refills: 0 | Status: SHIPPED | OUTPATIENT
Start: 2017-03-28 | End: 2017-05-30

## 2017-03-28 RX ORDER — HYDROXYZINE HYDROCHLORIDE 10 MG/1
10 TABLET, FILM COATED ORAL
Qty: 30 TAB | Refills: 0 | Status: SHIPPED | OUTPATIENT
Start: 2017-03-28 | End: 2017-04-07

## 2017-03-28 NOTE — PROGRESS NOTES
Health Maintenance Due   Topic Date Due    DTaP/Tdap/Td series (1 - Tdap) 05/26/1947    ZOSTER VACCINE AGE 60>  05/26/1986    GLAUCOMA SCREENING Q2Y  05/26/1991    MEDICARE YEARLY EXAM  05/26/1991       Chief Complaint   Patient presents with    Skin Problem     states her itching is not any better, rash is worse       1. Have you been to the ER, urgent care clinic since your last visit? Hospitalized since your last visit? No    2. Have you seen or consulted any other health care providers outside of the [address] [place] [address] since your last visit? Include any pap smears or colon screening. No    3) Do you have an Advance Directive on file? no    4) Are you interested in receiving information on Advance Directives? NO      Patient is accompanied by self I have received verbal consent from Micah Puga to discuss any/all medical information while they are present in the room.

## 2017-03-28 NOTE — PROGRESS NOTES
HISTORY OF PRESENT ILLNESS  Inga Trent is a 80 y.o. female here reports rash all over her arms more on left one than right. also has rash spreads to her back,itchy,she was not able to sleep at night. o.almost looks like insect bite but she mention there is no bug at her place. She has dry skin and itching day and night.using hytone cream and benadryl,not helping. Labs reviewed. Skin Problem     Follow-up     Hypertension      CHF     Cholesterol Problem         Review of Systems   Constitutional: Negative. Negative for chills and fever. Eyes: Negative. Respiratory: Negative. Cardiovascular: Negative. Gastrointestinal: Negative. Genitourinary: Negative. Musculoskeletal: Positive for joint pain. Skin: Positive for itching and rash. Neurological: Negative. Endo/Heme/Allergies: Negative. Psychiatric/Behavioral: Positive for memory loss. Physical Exam   Constitutional: She is oriented to person, place, and time. She appears well-developed and well-nourished. No distress. Neck: Normal range of motion. Neck supple. No JVD present. No thyromegaly present. Cardiovascular: Normal rate, regular rhythm, normal heart sounds and intact distal pulses. Pulmonary/Chest: Effort normal and breath sounds normal. No respiratory distress. She has no wheezes. Musculoskeletal: She exhibits no edema or tenderness. Neurological: She is alert and oriented to person, place, and time. She has normal reflexes. No cranial nerve deficit. She exhibits normal muscle tone. Coordination normal.   Skin: Skin is dry. Rash noted. Dry skin and scaly skin. LUE:multiple area of punctate lesion on arms also similar rash on right leg.itchy. Back;mutliple macular lesion with some ?bite jonah. itchy   Psychiatric: She has a normal mood and affect. Her behavior is normal.       ASSESSMENT and PLAN  Stephanie Belle was seen today for skin problem.     Diagnoses and all orders for this visit:    Dermatitis  It is spreading. She mention no bed bug noticed. Will call in,  -     predniSONE (STERAPRED) 5 mg dose pack; See administration instruction per 5mg dose pack  -     REFERRAL TO DERMATOLOGY    Itchy skin  Will D/C benadryl and will give,  -     hydrOXYzine HCl (ATARAX) 10 mg tablet; Take 1 Tab by mouth three (3) times daily as needed for Itching for up to 10 days. Hold aricept for 10 days and D/C benadryl    Coronary artery disease due to lipid rich plaque  Stable. .  On meds. Discussed expected course/resolution/complications of diagnosis in detail with patient. Medication risks/benefits/costs/interactions/alternatives discussed with patient. Pt was given an after visit summary which includes diagnoses, current medications & vitals. Pt expressed understanding with the diagnosis and plan.

## 2017-03-28 NOTE — MR AVS SNAPSHOT
Visit Information Date & Time Provider Department Dept. Phone Encounter #  
 3/28/2017 11:15 AM Sera Stuart MD [facility] Internal Medicine [facility] [phone] [id number] Your Appointments 3/28/2017 11:15 AM  
Any with MD Jason Hannah ([facility]) Appt Note: itching no better [address] [place] [address]. [facility] 7 [phone]  
  
   
 [address] [place] [address]. [address] [place] [phone]  
  
    
 6/27/2017  1:30 PM  
ROUTINE CARE with MD Jason Hannah ([facility]) Appt Note: 3 month follow up [address] [place] [address]. [facility] 7 [phone]  
  
   
 [address] [place] [address]. [address] [place] [phone] Upcoming Health Maintenance Date Due DTaP/Tdap/Td series (1 - Tdap) 5/26/1947 ZOSTER VACCINE AGE 60> 5/26/1986 GLAUCOMA SCREENING Q2Y 5/26/1991 MEDICARE YEARLY EXAM 5/26/1991 Pneumococcal 65+ High/Highest Risk (2 of 2 - PPSV23) 11/4/2019 Allergies as of 3/28/2017  Review Complete On: 3/28/2017 By: Jude Villafuerte LPN Severity Noted Reaction Type Reactions Neurontin [Gabapentin]  08/18/2015    Unknown (comments) Pt unknown reaction Current Immunizations  Reviewed on 1/25/2017 Name Date Influenza Vaccine 12/10/2015, 11/15/2014, 9/5/2013 Pneumococcal Vaccine (Unspecified Type) 11/4/2014 Not reviewed this visit You Were Diagnosed With   
  
 Codes Comments Dermatitis    -  Primary ICD-10-CM: L30.9 ICD-9-CM: 692.9 Itchy skin     ICD-10-CM: L29.9 ICD-9-CM: 698.9 Coronary artery disease due to lipid rich plaque     ICD-10-CM: I25.10, I25.83 ICD-9-CM: 414.00, 414.3 Vitals BP Pulse Temp Resp Height(growth percentile) Weight(growth percentile)  134/78 (BP 1 Location: Left arm, BP Patient Position: Sitting) 78 98 °F (36.7 °C) (Oral) 20 5' 1\" (1.549 m) 159 lb (72.1 kg) SpO2 BMI OB Status Smoking Status 98% 30.04 kg/m2 Postmenopausal Never Smoker Vitals History BMI and BSA Data Body Mass Index Body Surface Area 30.04 kg/m 2 1.76 m 2 Preferred Pharmacy Pharmacy Name Phone [address] [phone] Your Updated Medication List  
  
   
This list is accurate as of: 3/28/17 10:49 AM.  Always use your most recent med list.  
  
  
  
  
 acetaminophen 325 mg tablet Commonly known as:  Q-Nol Take 2 Tabs by mouth two (2) times a day. 2 tabet at am and 2 tab at noon * albuterol 90 mcg/actuation inhaler Commonly known as:  PROVENTIL HFA Take 1 Puff by inhalation every six (6) hours as needed for Wheezing. * albuterol 2.5 mg /3 mL (0.083 %) nebulizer solution Commonly known as:  PROVENTIL VENTOLIN  
INHALE THE CONTENTS OF 1 VIAL VIA NEBULIZER EVERY 6 HOURS AS NEEDED FOR WHEEZING  
  
 ammonium lactate 12 % lotion Commonly known as:  LAC-HYDRIN  
rub in to affected area well  
  
 calcium-cholecalciferol (D3) tablet Commonly known as:  CALTRATE 600+D Take 1 Tab by mouth two (2) times a day. diphenhydrAMINE 12.5 mg/5 mL syrup Commonly known as:  BENADRYL Take 5 mL by mouth two (2) times daily as needed. DOC-Q-LACE 100 mg capsule Generic drug:  docusate sodium TAKE 1 CAPSULE BY MOUTH TWICE DAILY DX:BOWEL SUPPORT  
  
 donepezil 5 mg tablet Commonly known as:  ARICEPT  
TAKE 1 TABLET BY MOUTH EVERY NIGHT AT BEDTIME DX: MEMORY SUPPORT  
  
 ELIQUIS 2.5 mg tablet Generic drug:  apixaban TAKE 1 TABLET BY MOUTH 2 TIMES A DAY. DX ANTICOAGULANT  
  
 furosemide 40 mg tablet Commonly known as:  LASIX Take 40 mg by mouth daily. hydrocortisone 2.5 % lotion Commonly known as:  HYTONE Apply  to affected area two (2) times a day. use thin layer over rash BID for 2 weeks. hydrOXYzine HCl 10 mg tablet Commonly known as:  ATARAX Take 1 Tab by mouth three (3) times daily as needed for Itching for up to 10 days. Hold aricept for 10 days and D/C benadryl  
  
 lisinopril 5 mg tablet Commonly known as:  PRINIVIL, ZESTRIL  
TAKE 1 TABLET BY MOUTH EVERY DAY DX: HTN  
  
 metoprolol succinate 25 mg XL tablet Commonly known as:  TOPROL-XL  
TAKE 1 TABLET BY MOUTH EVERY DAY DX: HTN  
  
 MILK OF MAGNESIA 400 mg/5 mL suspension Generic drug:  magnesium hydroxide TAKE 30ML BY MOUTH EVERY 24 HOURS AS NEEDED FOR CONSTIPATION  
  
 miscellaneous medical supply Misc Commonly known as:  ANTI-EMBOLISM STOCKINGS Use below knee stockings 15 to 30 mm HG.  
  
 polyethylene glycol 17 gram/dose powder Commonly known as:  MIRALAX  
MIX THE CONTENTS OF 1 HEAPING TABLESPOON (=17GRAMS) WITH 8 OUNCES OF WATER AND DRINK DAILY DX:BOWEL AIDE  
  
 pravastatin 40 mg tablet Commonly known as:  PRAVACHOL  
TAKE 1 TABLET BY MOUTH EVERY NIGHT AT BEDTIME DX: CHOLESTEROL  
  
 predniSONE 5 mg dose pack Commonly known as:  STERAPRED See administration instruction per 5mg dose pack  
  
 raNITIdine 150 mg tablet Commonly known as:  ZANTAC Take 150 mg by mouth nightly. SENEXON-S 8.6-50 mg per tablet Generic drug:  senna-docusate TAKE 1 TABLET BY MOUTH DAILY AS NEEDED FOR CONSTIPATION  
  
 traMADol 50 mg tablet Commonly known as:  ULTRAM  
TAKE 1/2 (25MG) TABLET BY MOUTH IN THE MORNING AND 1 TAB (50MG) IN THE EVENING DX:PAIN  
  
 venlafaxine-SR 75 mg capsule Commonly known as:  EFFEXOR-XR  
TAKE 1 CAPSULE BY MOUTH EVERY DAY DX:ANTIDEPRESSANT  
  
 VITAMIN B-12 500 mcg tablet Generic drug:  cyanocobalamin TAKE 1 TABLET BY MOUTH EVERY DAY DX: SUPPLEMENT * Notice: This list has 2 medication(s) that are the same as other medications prescribed for you. Read the directions carefully, and ask your doctor or other care provider to review them with you. Prescriptions Sent to Pharmacy Refills  
 predniSONE (STERAPRED) 5 mg dose pack 0 Sig: See administration instruction per 5mg dose pack Class: Normal  
 Pharmacy: [address] Ph #: [phone]  
 hydrOXYzine HCl (ATARAX) 10 mg tablet 0 Sig: Take 1 Tab by mouth three (3) times daily as needed for Itching for up to 10 days. Hold aricept for 10 days and D/C benadryl Class: Normal  
 Pharmacy: [address] Ph #: [phone] Route: Oral  
  
We Performed the Following REFERRAL TO DERMATOLOGY [REF19 Custom] Comments:  
 Please evaluate patient for rash and itching Referral Information Referral ID Referred By Referred To  
  
 [id number] Senait KING MD   
   [address] [facility] Phone: [phone] Fax: [phone] Visits Status Start Date End Date 1 New Request 3/28/17 3/28/18 If your referral has a status of pending review or denied, additional information will be sent to support the outcome of this decision. Introducing [facility]! [employer] introduces [employer]Setter patient portal. Now you can access parts of your medical record, email your doctor's office, and request medication refills online. 1. In your internet browser, go to [email or web address] 2. Click on the First Time User? Click Here link in the Sign In box. You will see the New Member Sign Up page. 3. Enter your RecordSetter Access Code exactly as it appears below. You will not need to use this code after youve completed the sign-up process. If you do not sign up before the expiration date, you must request a new code. · RecordSetter Access Code: [email or web address] Expires: 6/19/2017  2:19 PM 
 
4. Enter the last four digits of your Social Security Number (xxxx) and Date of Birth (mm/dd/yyyy) as indicated and click Submit.  You will be taken to the next sign-up page. 5. Create a [employer] ID. This will be your [employer] login ID and cannot be changed, so think of one that is secure and easy to remember. 6. Create a [employer] password. You can change your password at any time. 7. Enter your Password Reset Question and Answer. This can be used at a later time if you forget your password. 8. Enter your e-mail address. You will receive e-mail notification when new information is available in [address] 9. Click Sign Up. You can now view and download portions of your medical record. 10. Click the Download Summary menu link to download a portable copy of your medical information. If you have questions, please visit the Frequently Asked Questions section of the [employer] website. Remember, [employer] is NOT to be used for urgent needs. For medical emergencies, dial 911. Now available from your iPhone and Android! Please provide this summary of care documentation to your next provider. Your primary care clinician is listed as Nigel Sequeira. If you have any questions after today's visit, please call [phone].

## 2017-03-31 DIAGNOSIS — L30.9 DERMATITIS: ICD-10-CM

## 2017-04-03 RX ORDER — HYDROCORTISONE 25 MG/ML
LOTION TOPICAL
Qty: 59 ML | Refills: 4 | Status: SHIPPED | OUTPATIENT
Start: 2017-04-03 | End: 2017-08-01 | Stop reason: SDUPTHER

## 2017-04-26 DIAGNOSIS — M85.80 OSTEOPENIA: ICD-10-CM

## 2017-04-27 RX ORDER — MULTIVITAMIN
TABLET ORAL
Qty: 60 TAB | Refills: 12 | Status: SHIPPED | OUTPATIENT
Start: 2017-04-27 | End: 2019-01-01 | Stop reason: SDUPTHER

## 2017-04-27 RX ORDER — PRAVASTATIN SODIUM 40 MG/1
TABLET ORAL
Qty: 30 TAB | Refills: 3 | Status: SHIPPED | OUTPATIENT
Start: 2017-04-27 | End: 2017-08-24 | Stop reason: SDUPTHER

## 2017-05-30 ENCOUNTER — OFFICE VISIT (OUTPATIENT)
Dept: INTERNAL MEDICINE CLINIC | Age: 82
End: 2017-05-30

## 2017-05-30 VITALS
HEART RATE: 77 BPM | WEIGHT: 156.3 LBS | BODY MASS INDEX: 29.51 KG/M2 | HEIGHT: 61 IN | TEMPERATURE: 98.3 F | SYSTOLIC BLOOD PRESSURE: 130 MMHG | OXYGEN SATURATION: 96 % | DIASTOLIC BLOOD PRESSURE: 70 MMHG | RESPIRATION RATE: 22 BRPM

## 2017-05-30 DIAGNOSIS — Z71.89 ACP (ADVANCE CARE PLANNING): ICD-10-CM

## 2017-05-30 DIAGNOSIS — I25.83 CORONARY ARTERY DISEASE DUE TO LIPID RICH PLAQUE: ICD-10-CM

## 2017-05-30 DIAGNOSIS — R26.9 GAIT ABNORMALITY: ICD-10-CM

## 2017-05-30 DIAGNOSIS — Z23 ENCOUNTER FOR IMMUNIZATION: ICD-10-CM

## 2017-05-30 DIAGNOSIS — S39.92XA LOWER BACK INJURY, INITIAL ENCOUNTER: ICD-10-CM

## 2017-05-30 DIAGNOSIS — I25.10 CORONARY ARTERY DISEASE DUE TO LIPID RICH PLAQUE: ICD-10-CM

## 2017-05-30 DIAGNOSIS — L85.3 DRY SKIN: ICD-10-CM

## 2017-05-30 DIAGNOSIS — I10 ESSENTIAL HYPERTENSION: Primary | ICD-10-CM

## 2017-05-30 DIAGNOSIS — Z13.5 SCREENING FOR EYE CONDITION: ICD-10-CM

## 2017-05-30 DIAGNOSIS — L85.3 XEROSIS OF SKIN: ICD-10-CM

## 2017-05-30 DIAGNOSIS — Z00.00 MEDICARE ANNUAL WELLNESS VISIT, INITIAL: ICD-10-CM

## 2017-05-30 RX ORDER — DONEPEZIL HYDROCHLORIDE 5 MG/1
TABLET, FILM COATED ORAL
Qty: 30 TAB | Refills: 4 | Status: SHIPPED | OUTPATIENT
Start: 2017-05-30 | End: 2017-11-21 | Stop reason: SDUPTHER

## 2017-05-30 RX ORDER — CYANOCOBALAMIN (VITAMIN B-12) 500 MCG
TABLET ORAL
Qty: 30 TAB | Refills: 4 | Status: SHIPPED | OUTPATIENT
Start: 2017-05-30 | End: 2017-11-21 | Stop reason: SDUPTHER

## 2017-05-30 RX ORDER — AMMONIUM LACTATE 12 G/100G
LOTION TOPICAL
Qty: 1 BOTTLE | Refills: 2 | Status: SHIPPED | OUTPATIENT
Start: 2017-05-30 | End: 2018-08-22

## 2017-05-30 RX ORDER — VENLAFAXINE HYDROCHLORIDE 75 MG/1
CAPSULE, EXTENDED RELEASE ORAL
Qty: 30 CAP | Refills: 4 | Status: SHIPPED | OUTPATIENT
Start: 2017-05-30 | End: 2017-11-21 | Stop reason: SDUPTHER

## 2017-05-30 RX ORDER — METOPROLOL SUCCINATE 25 MG/1
TABLET, EXTENDED RELEASE ORAL
Qty: 30 TAB | Refills: 4 | Status: SHIPPED | OUTPATIENT
Start: 2017-05-30 | End: 2017-11-21 | Stop reason: SDUPTHER

## 2017-05-30 NOTE — PROGRESS NOTES
HISTORY OF PRESENT ILLNESS  Tj Jaeger is a 80 y.o. female here accompanied by her daughter today. She has accidentally step on a craft tool and fell on her side. she was not able to get back,was on floor little long. reports lower back pain since her fall. No bowel bladder problem. Already had compression fracture in her back. She has dry skin and itching day and night. want to refill lotion. Has CAD and HTN,complaint with meds. Labs reviewed. Here for medicare wellness visit. has  living will. Back Injury     Neurologic Problem   Primary symptoms include memory loss. Follow-up     Hypertension      CHF     Cholesterol Problem     Skin Problem         Review of Systems   Constitutional: Negative. Negative for chills and fever. Eyes: Negative. Respiratory: Negative. Cardiovascular: Negative. Gastrointestinal: Negative. Genitourinary: Negative. Musculoskeletal: Positive for back pain, falls and joint pain. Skin: Positive for itching. Neurological: Negative. Endo/Heme/Allergies: Negative. Psychiatric/Behavioral: Positive for memory loss. Physical Exam   Constitutional: She is oriented to person, place, and time. She appears well-developed and well-nourished. No distress. HENT:   Pharynx;mildly congested. no exudate   Neck: Normal range of motion. Neck supple. No JVD present. No thyromegaly present. Cardiovascular: Normal rate, regular rhythm, normal heart sounds and intact distal pulses. Pulmonary/Chest: Effort normal and breath sounds normal. No respiratory distress. She has no wheezes. Musculoskeletal: She exhibits tenderness. She exhibits no edema. Lower back;spine tenderness on L2 to L4 spine area. also tenderenss on left sided sacrum area. ROm OK   Neurological: She is alert and oriented to person, place, and time. She has normal reflexes. No cranial nerve deficit. She exhibits normal muscle tone. Coordination normal.   Skin: Skin is dry. Rash noted.    Dry skin and scaly skin. LUE:multiple area of punctate lesion on arms also similar rash on right leg.itchy   Psychiatric: She has a normal mood and affect. Her behavior is normal.       ASSESSMENT and PLAN  Princess William was seen today for annual wellness visit, physical therapy, back injury and neurologic problem. Diagnoses and all orders for this visit:    Essential hypertension    Stable. on med. Coronary artery disease due to lipid rich plaque    On statin,eliquis and metoprolol. Stable. Gait abnormality    Will do,  -     XR SPINE LUMB 2 OR 3 V; Future  -     REFERRAL TO HOME HEALTH    Lower back injury, initial encounter  Will get,  -     XR SPINE LUMB 2 OR 3 V; Future    Screening for eye condition  -     REFERRAL TO OPTOMETRY    Medicare annual wellness visit, initial    ACP (advance care planning)  Already on file. Encounter for immunization  -     pneumococcal 13 bsoton conj dip (PREVNAR-13) 0.5 mL syrg injection; 0.5 mL by IntraMUSCular route once for 1 dose.  -     varicella zoster vacine live (ZOSTAVAX) 19,400 unit/0.65 mL susr injection; 1 Vial by SubCUTAneous route once for 1 dose. Xerosis of skin    -     ammonium lactate (LAC-HYDRIN) 12 % lotion; rub in to affected area well        Discussed expected course/resolution/complications of diagnosis in detail with patient. Medication risks/benefits/costs/interactions/alternatives discussed with patient. Pt was given an after visit summary which includes diagnoses, current medications & vitals. Pt expressed understanding with the diagnosis and plan.

## 2017-05-30 NOTE — MR AVS SNAPSHOT
Visit Information Date & Time Provider Department Dept. Phone Encounter #  
 5/30/2017  1:30 PM Juan Pablo Shaikh MD [facility] [phone] [id number] Your Appointments 6/27/2017  1:30 PM  
ROUTINE CARE with Juan Pablo Shaikh MD  
[facility] ([address]) Appt Note: 3 month follow up [address] [place] [address]. [place] [id number] [phone]  
  
   
 [address] [place] [address]. [address] [place] [id number] Upcoming Health Maintenance Date Due DTaP/Tdap/Td series (1 - Tdap) 5/26/1947 ZOSTER VACCINE AGE 60> 5/26/1986 GLAUCOMA SCREENING Q2Y 5/26/1991 INFLUENZA AGE 9 TO ADULT 8/1/2017 MEDICARE YEARLY EXAM 5/31/2018 Pneumococcal 65+ High/Highest Risk (2 of 2 - PPSV23) 11/4/2019 Allergies as of 5/30/2017  Review Complete On: 5/30/2017 By: Juan Pablo Shaikh MD  
  
 Severity Noted Reaction Type Reactions Neurontin [Gabapentin]  08/18/2015    Unknown (comments) Pt unknown reaction Current Immunizations  Reviewed on 5/30/2017 Name Date Influenza Vaccine 12/10/2015, 11/15/2014, 9/5/2013 Pneumococcal Vaccine (Unspecified Type) 11/4/2014 Reviewed by Juan Pablo Shaikh MD on 5/30/2017 at  1:35 PM  
You Were Diagnosed With   
  
 Codes Comments Essential hypertension    -  Primary ICD-10-CM: I10 
ICD-9-CM: 401.9 Coronary artery disease due to lipid rich plaque     ICD-10-CM: I25.10, I25.83 ICD-9-CM: 414.00, 414.3 Gait abnormality     ICD-10-CM: R26.9 ICD-9-CM: 992. 2 Lower back injury, initial encounter     ICD-10-CM: Z54.05MN ICD-9-CM: 959.19 Screening for eye condition     ICD-10-CM: Z13.5 ICD-9-CM: V80.2 Medicare annual wellness visit, initial     ICD-10-CM: Z00.00 ICD-9-CM: V70.0 ACP (advance care planning)     ICD-10-CM: Z71.89 ICD-9-CM: V65.49 Encounter for immunization     ICD-10-CM: J55 ICD-9-CM: V03.89 Vitals BP Pulse Temp Resp Height(growth percentile) Weight(growth percentile) 130/70 (BP 1 Location: Left arm, BP Patient Position: Sitting) 77 98.3 °F (36.8 °C) (Oral) 22 5' 1\" (1.549 m) 156 lb 4.8 oz (70.9 kg) SpO2 BMI OB Status Smoking Status 96% 29.53 kg/m2 Postmenopausal Never Smoker Vitals History BMI and BSA Data Body Mass Index Body Surface Area  
 29.53 kg/m 2 1.75 m 2 Preferred Pharmacy Pharmacy Name Phone [address] [phone] Your Updated Medication List  
  
   
This list is accurate as of: 5/30/17  1:42 PM.  Always use your most recent med list.  
  
  
  
  
 acetaminophen 325 mg tablet Commonly known as:  Q-Nol Take 2 Tabs by mouth two (2) times a day. 2 tabet at am and 2 tab at noon * albuterol 90 mcg/actuation inhaler Commonly known as:  PROVENTIL HFA Take 1 Puff by inhalation every six (6) hours as needed for Wheezing. * albuterol 2.5 mg /3 mL (0.083 %) nebulizer solution Commonly known as:  PROVENTIL VENTOLIN  
INHALE THE CONTENTS OF 1 VIAL VIA NEBULIZER EVERY 6 HOURS AS NEEDED FOR WHEEZING  
  
 ammonium lactate 12 % lotion Commonly known as:  LAC-HYDRIN  
rub in to affected area well  
  
 calcium-cholecalciferol (D3) tablet Commonly known as:  CALTRATE 600+D TAKE 1 TABLET BY MOUTH TWICE DAILY DX: SUPPLEMENT  
  
 diphenhydrAMINE 12.5 mg/5 mL syrup Commonly known as:  BENADRYL Take 5 mL by mouth two (2) times daily as needed. DOC-Q-LACE 100 mg capsule Generic drug:  docusate sodium TAKE 1 CAPSULE BY MOUTH TWICE DAILY DX:BOWEL SUPPORT  
  
 donepezil 5 mg tablet Commonly known as:  ARICEPT  
TAKE 1 TABLET BY MOUTH EVERY NIGHT AT BEDTIME DX: MEMORY SUPPORT  
  
 ELIQUIS 2.5 mg tablet Generic drug:  apixaban TAKE 1 TABLET BY MOUTH 2 TIMES A DAY. DX ANTICOAGULANT  
  
 furosemide 40 mg tablet Commonly known as:  LASIX Take 40 mg by mouth daily. hydrocortisone 2.5 % lotion Commonly known as:  HYTONE  
USING A THIN LAYER APPLY TO AFFECTED AREA TWO (2) TIMES A DAY FOR 2 WEEKS DX:RASH  
  
 lisinopril 5 mg tablet Commonly known as:  PRINIVIL, ZESTRIL  
TAKE 1 TABLET BY MOUTH EVERY DAY DX: HTN  
  
 metoprolol succinate 25 mg XL tablet Commonly known as:  TOPROL-XL  
TAKE 1 TABLET BY MOUTH EVERY DAY DX: HTN  
  
 MILK OF MAGNESIA 400 mg/5 mL suspension Generic drug:  magnesium hydroxide TAKE 30ML BY MOUTH EVERY 24 HOURS AS NEEDED FOR CONSTIPATION  
  
 miscellaneous medical supply Misc Commonly known as:  ANTI-EMBOLISM STOCKINGS Use below knee stockings 15 to 30 mm HG.  
  
 pneumococcal 13 boston conj dip 0.5 mL Syrg injection Commonly known as:  PREVNAR-13  
0.5 mL by IntraMUSCular route once for 1 dose. polyethylene glycol 17 gram/dose powder Commonly known as:  MIRALAX  
MIX THE CONTENTS OF 1 HEAPING TABLESPOON (=17GRAMS) WITH 8 OUNCES OF WATER AND DRINK DAILY DX:BOWEL AIDE  
  
 pravastatin 40 mg tablet Commonly known as:  PRAVACHOL  
TAKE 1 TABLET BY MOUTH EVERY NIGHT AT BEDTIME DX: CHOLESTEROL  
  
 raNITIdine 150 mg tablet Commonly known as:  ZANTAC Take 150 mg by mouth nightly. SENEXON-S 8.6-50 mg per tablet Generic drug:  senna-docusate TAKE 1 TABLET BY MOUTH DAILY AS NEEDED FOR CONSTIPATION  
  
 traMADol 50 mg tablet Commonly known as:  ULTRAM  
TAKE 1/2 (25MG) TABLET BY MOUTH IN THE MORNING AND 1 TAB (50MG) IN THE EVENING DX:PAIN  
  
 varicella zoster vacine live 19,400 unit/0.65 mL Susr injection Commonly known as:  ZOSTAVAX  
1 Vial by SubCUTAneous route once for 1 dose. venlafaxine-SR 75 mg capsule Commonly known as:  EFFEXOR-XR  
TAKE 1 CAPSULE BY MOUTH EVERY DAY DX:ANTIDEPRESSANT  
  
 VITAMIN B-12 500 mcg tablet Generic drug:  cyanocobalamin TAKE 1 TABLET BY MOUTH EVERY DAY DX: SUPPLEMENT * Notice:   This list has 2 medication(s) that are the same as other medications prescribed for you. Read the directions carefully, and ask your doctor or other care provider to review them with you. Prescriptions Sent to Pharmacy Refills  
 pneumococcal 13 boston conj dip (PREVNAR-13) 0.5 mL syrg injection 0 Si.5 mL by IntraMUSCular route once for 1 dose. Class: Normal  
 Pharmacy: [facility]. Ph #: [phone] Route: IntraMUSCular  
 varicella zoster vacine live (ZOSTAVAX) 19,400 unit/0.65 mL susr injection 0 Si Vial by SubCUTAneous route once for 1 dose. Class: Normal  
 Pharmacy: [facility]. Ph #: [phone] Route: SubCUTAneous We Performed the Following [address] [place] [address] Comments:  
 Please evaluate patient for gait and balance REFERRAL TO OPTOMETRY [id number] Custom] Comments:  
 Please evaluate patient for glucoma screening To-Do List   
 2017 Imaging:  XR SPINE LUMB 2 OR 3 V Referral Information Referral ID Referred By Referred To  
  
 [id number] Janice Cordero Not Available Visits Status Start Date End Date 1 New Request 17 If your referral has a status of pending review or denied, additional information will be sent to support the outcome of this decision. Referral ID Referred By Referred To  
 [id number] Janice Cordero Not Available Visits Status Start Date End Date 1 New Request 17 If your referral has a status of pending review or denied, additional information will be sent to support the outcome of this decision. Patient Instructions Schedule of Personalized Health Plan (Provide Copy to Patient) The best way to stay healthy is to live a healthy lifestyle. A healthy lifestyle includes regular exercise, eating a well-balanced diet, keeping a healthy weight and not smoking. Regular physical exams and screening tests are another important way to take care of yourself. Preventive exams provided by health care providers can find health problems early when treatment works best and can keep you from getting certain diseases or illnesses. Preventive services include exams, lab tests, screenings, shots, monitoring and information to help you take care of your own health. All people over 65 should have a pneumonia shot. Pneumonia shots are usually only needed once in a lifetime unless your doctor decides differently. All people over 65 should have a yearly flu shot. People over 65 are at medium to high risk for Hepatitis B. Three shots are needed for complete protection. In addition to your physical exam, some screening tests are recommended: 
 
Bone mass measurement (dexa scan) is recommended every two years Diabetes Mellitus screening is recommended every year. Glaucoma is an eye disease caused by high pressure in the eye. An eye exam is recommended every year. Will order one. Cardiovascular screening tests that check your cholesterol and other blood fat (lipid) levels are recommended every five years. Colorectal Cancer screening tests help to find pre-cancerous polyps (growths in the colon) so they can be removed before they turn into cancer. Tests ordered for screening depend on your personal and family history risk factors. n/a Screening for Breast Cancer is recommended yearly with a mammogram.N/A Screening for Cervical Cancer is recommended every two years (annually for certain risk factors, such as previous history of STD or abnormal PAP in past 7 years), with a Pelvic Exam with PAP. N/A Here is a list of your current Health Maintenance items with a due date: 
Health Maintenance Topic Date Due  
 DTaP/Tdap/Td series (1 - Tdap) 05[birth year]26/1947  ZOSTER VACCINE AGE 60>  05/26/1986  GLAUCOMA SCREENING Q2Y  05/26/1991  INFLUENZA AGE 9 TO ADULT  08/01/2017  MEDICARE YEARLY EXAM  05/31/2018  Pneumococcal 65+ High/Highest Risk (2 of 2 - PPSV23) 11/04/2019  
 OSTEOPOROSIS SCREENING (DEXA)  Completed Introducing [facility]! [employer] introduces [employer] patient portal. Now you can access parts of your medical record, email your doctor's office, and request medication refills online. 1. In your internet browser, go to [email or web address] 2. Click on the First Time User? Click Here link in the Sign In box. You will see the New Member Sign Up page. 3. Enter your [employer] Access Code exactly as it appears below. You will not need to use this code after youve completed the sign-up process. If you do not sign up before the expiration date, you must request a new code. · [employer] Access Code: [email or web address] Expires: 6/19/2017  2:19 PM 
 
4. Enter the last four digits of your Social Security Number (xxxx) and Date of Birth (mm/dd/yyyy) as indicated and click Submit. You will be taken to the next sign-up page. 5. Create a [employer] ID. This will be your [employer] login ID and cannot be changed, so think of one that is secure and easy to remember. 6. Create a [employer] password. You can change your password at any time. 7. Enter your Password Reset Question and Answer. This can be used at a later time if you forget your password. 8. Enter your e-mail address. You will receive e-mail notification when new information is available in [address] 9. Click Sign Up. You can now view and download portions of your medical record. 10. Click the Download Summary menu link to download a portable copy of your medical information. If you have questions, please visit the Frequently Asked Questions section of the [employer] website. Remember, [employer] is NOT to be used for urgent needs. For medical emergencies, dial 911. Now available from your iPhone and Android! Please provide this summary of care documentation to your next provider. Your primary care clinician is listed as Jose Howell. If you have any questions after today's visit, please call [phone].

## 2017-05-30 NOTE — PROGRESS NOTES
Annalise Hunter is a 80 y.o. female and presents for annual Medicare Wellness Visit. Problem List: Reviewed with patient and discussed risk factors.     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    Acute chest pain R07.9    Rotator cuff tear arthropathy of right shoulder M12.811    Intractable back pain M54.9    Advance care planning Z71.89    Osteopenia M85.80    Cardiac LV ejection fraction 21-40% R94.30    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    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    Squamous cell carcinoma in situ of skin of right cheek D04.39    ACP (advance care planning) Z71.89       Current medical providers:  Patient Care Team:  João Ulloa MD as PCP - General (Internal Medicine)  Benja Hicks LPN as Ambulatory Care Navigator (Internal Medicine)  Julian Romberg, MD (Dermatology)  Peggy Huff, [facility] as Nurse Navigator (Internal Medicine)    PSH: Reviewed with patient  Past Surgical History:   Procedure Laterality Date    CARDIAC SURG PROCEDURE UNLIST      heart attack,bypass sx        SH: Reviewed with patient  Social History   Substance Use Topics    Smoking status: Never Smoker    Smokeless tobacco: Never Used    Alcohol use No       FH: Reviewed with patient  Family History   Problem Relation Age of Onset    Anesth Problems Neg Hx        Medications/Allergies: Reviewed with patient  Current Outpatient Prescriptions on File Prior to Visit   Medication Sig Dispense Refill    calcium-cholecalciferol, D3, (CALTRATE 600+D) tablet TAKE 1 TABLET BY MOUTH TWICE DAILY DX: SUPPLEMENT 60 Tab 12    pravastatin (PRAVACHOL) 40 mg tablet TAKE 1 TABLET BY MOUTH EVERY NIGHT AT BEDTIME DX: CHOLESTEROL 30 Tab 3    hydrocortisone (HYTONE) 2.5 % lotion USING A THIN LAYER APPLY TO AFFECTED AREA TWO (2) TIMES A DAY FOR 2 WEEKS DX:RASH 59 mL 4    diphenhydrAMINE (BENADRYL) 12.5 mg/5 mL syrup Take 5 mL by mouth two (2) times daily as needed. 100 mL 2    ELIQUIS 2.5 mg tablet TAKE 1 TABLET BY MOUTH 2 TIMES A DAY. DX ANTICOAGULANT 60 Tab 4    traMADol (ULTRAM) 50 mg tablet TAKE 1/2 (25MG) TABLET BY MOUTH IN THE MORNING AND 1 TAB (50MG) IN THE EVENING DX:PAIN 45 Tab 4    furosemide (LASIX) 40 mg tablet Take 40 mg by mouth daily.  raNITIdine (ZANTAC) 150 mg tablet Take 150 mg by mouth nightly.  donepezil (ARICEPT) 5 mg tablet TAKE 1 TABLET BY MOUTH EVERY NIGHT AT BEDTIME DX: MEMORY SUPPORT 30 Tab 4    DOC-Q-LACE 100 mg capsule TAKE 1 CAPSULE BY MOUTH TWICE DAILY DX:BOWEL SUPPORT 60 Cap 4    venlafaxine-SR (EFFEXOR-XR) 75 mg capsule TAKE 1 CAPSULE BY MOUTH EVERY DAY DX:ANTIDEPRESSANT 30 Cap 4    metoprolol succinate (TOPROL-XL) 25 mg XL tablet TAKE 1 TABLET BY MOUTH EVERY DAY DX: HTN 30 Tab 4    lisinopril (PRINIVIL, ZESTRIL) 5 mg tablet TAKE 1 TABLET BY MOUTH EVERY DAY DX: HTN 30 Tab 4    VITAMIN B-12 500 mcg tablet TAKE 1 TABLET BY MOUTH EVERY DAY DX: SUPPLEMENT 30 Tab 4    MILK OF MAGNESIA 400 mg/5 mL suspension TAKE 30ML BY MOUTH EVERY 24 HOURS AS NEEDED FOR CONSTIPATION 473 mL 4    polyethylene glycol (MIRALAX) 17 gram/dose powder MIX THE CONTENTS OF 1 HEAPING TABLESPOON (=17GRAMS) WITH 8 OUNCES OF WATER AND DRINK DAILY DX:BOWEL AIDE 527 g 4    acetaminophen (Q-NOL) 325 mg tablet Take 2 Tabs by mouth two (2) times a day.  2 tabet at am and 2 tab at noon 60 Tab 6    albuterol (PROVENTIL VENTOLIN) 2.5 mg /3 mL (0.083 %) nebulizer solution INHALE THE CONTENTS OF 1 VIAL VIA NEBULIZER EVERY 6 HOURS AS NEEDED FOR WHEEZING 100 Package 12    SENEXON-S 8.6-50 mg per tablet TAKE 1 TABLET BY MOUTH DAILY AS NEEDED FOR CONSTIPATION 30 Tab 4    albuterol (PROVENTIL HFA) 90 mcg/actuation inhaler Take 1 Puff by inhalation every six (6) hours as needed for Wheezing. 1 Inhaler prn    miscellaneous medical supply (ANTI-EMBOLISM STOCKINGS) misc Use below knee stockings 15 to 30 mm HG. 2 Each 1     No current facility-administered medications on file prior to visit. Allergies   Allergen Reactions    Neurontin [Gabapentin] Unknown (comments)     Pt unknown reaction       Objective:  Visit Vitals    /70 (BP 1 Location: Left arm, BP Patient Position: Sitting)    Pulse 77    Temp 98.3 °F (36.8 °C) (Oral)    Resp 22    Ht 5' 1\" (1.549 m)    Wt 156 lb 4.8 oz (70.9 kg)    SpO2 96%    BMI 29.53 kg/m2    Body mass index is 29.53 kg/(m^2). Assessment of cognitive impairment: Alert and oriented x3    Depression Screen: No flowsheet data found. Fall Risk Assessment:    Fall Risk Assessment, last 12 mths 5/30/2017   Able to walk? Yes   Fall in past 12 months? Yes   Fall with injury? Yes   Number of falls in past 12 months 1   Fall Risk Score 2       Functional Ability:   Does the patient exhibit a steady gait?  no   How long did it take the patient to get up and walk from a sitting position? 3 min   Is the patient self reliant?  (ie can do own laundry, meals, household chores)  yes     Does the patient handle his/her own medications?  no     Does the patient handle his/her own money? yes     Is the patients home safe (ie good lighting, handrails on stairs and bath, etc.)? yes     Did you notice or did patient express any hearing difficulties? no     Did you notice or did patient express any vision difficulties?   no     Were distance and reading eye charts used?   no       Advance Care Planning:   Patient was offered the opportunity to discuss advance care planning:  yes     Does patient have an Advance Directive:  yes   If no, did you provide information on Caring Connections?  no       Plan:      Orders Placed This Encounter    XR SPINE LUMB 2 OR 3 V    REFERRAL TO HOME HEALTH    REFERRAL TO OPTOMETRY    pneumococcal 13 boston conj dip (PREVNAR-13) 0.5 mL syrg injection    varicella zoster vacine live (ZOSTAVAX) 19,400 unit/0.65 mL susr injection    ammonium lactate (LAC-HYDRIN) 12 % lotion       Health Maintenance   Topic Date Due    DTaP/Tdap/Td series (1 - Tdap) 05[birth year]26/1947    ZOSTER VACCINE AGE 60>  05/26/1986    GLAUCOMA SCREENING Q2Y  05/26/1991    INFLUENZA AGE 9 TO ADULT  08/01/2017    MEDICARE YEARLY EXAM  05/31/2018    Pneumococcal 65+ High/Highest Risk (2 of 2 - PPSV23) 11/04/2019    OSTEOPOROSIS SCREENING (DEXA)  Completed       Patient verbalized understanding and agreement with the plan. A copy of the After Visit Summary with personalized health plan was given to the patient today.

## 2017-05-30 NOTE — PROGRESS NOTES
Health Maintenance Due   Topic Date Due    DTaP/Tdap/Td series (1 - Tdap) 05/26/1947    ZOSTER VACCINE AGE 60>  05/26/1986    GLAUCOMA SCREENING Q2Y  05/26/1991    MEDICARE YEARLY EXAM  05/26/1991       Chief Complaint   Patient presents with   Precious Reddish Annual Wellness Visit    Physical Therapy     needs face 2 face for PT due to fall       1. Have you been to the ER, urgent care clinic since your last visit? Hospitalized since your last visit? No    2. Have you seen or consulted any other health care providers outside of the [address] [place] [address] since your last visit? Include any pap smears or colon screening. No    3) Do you have an Advance Directive on file? Yes      4) Are you interested in receiving information on Advance Directives? NO      Patient is accompanied by self I have received verbal consent from Haroldo Suarez to discuss any/all medical information while they are present in the room.

## 2017-05-30 NOTE — PATIENT INSTRUCTIONS
Schedule of Personalized Health Plan  (Provide Copy to Patient)  The best way to stay healthy is to live a healthy lifestyle. A healthy lifestyle includes regular exercise, eating a well-balanced diet, keeping a healthy weight and not smoking. Regular physical exams and screening tests are another important way to take care of yourself. Preventive exams provided by health care providers can find health problems early when treatment works best and can keep you from getting certain diseases or illnesses. Preventive services include exams, lab tests, screenings, shots, monitoring and information to help you take care of your own health. All people over 65 should have a pneumonia shot. Pneumonia shots are usually only needed once in a lifetime unless your doctor decides differently. All people over 65 should have a yearly flu shot. People over 65 are at medium to high risk for Hepatitis B. Three shots are needed for complete protection. In addition to your physical exam, some screening tests are recommended:    Bone mass measurement (dexa scan) is recommended every two years  Diabetes Mellitus screening is recommended every year. Glaucoma is an eye disease caused by high pressure in the eye. An eye exam is recommended every year. Will order one. Cardiovascular screening tests that check your cholesterol and other blood fat (lipid) levels are recommended every five years. Colorectal Cancer screening tests help to find pre-cancerous polyps (growths in the colon) so they can be removed before they turn into cancer. Tests ordered for screening depend on your personal and family history risk factors. n/a    Screening for Breast Cancer is recommended yearly with a mammogram.N/A    Screening for Cervical Cancer is recommended every two years (annually for certain risk factors, such as previous history of STD or abnormal PAP in past 7 years), with a Pelvic Exam with PAP. N/A    Here is a list of your current Health Maintenance items with a due date:  Health Maintenance   Topic Date Due    DTaP/Tdap/Td series (1 - Tdap) 05/26/1947    ZOSTER VACCINE AGE 60>  05/26/1986    GLAUCOMA SCREENING Q2Y  05/26/1991    INFLUENZA AGE 9 TO ADULT  08/01/2017    MEDICARE YEARLY EXAM  05/31/2018    Pneumococcal 65+ High/Highest Risk (2 of 2 - PPSV23) 11/04/2019    OSTEOPOROSIS SCREENING (DEXA)  Completed

## 2017-06-06 ENCOUNTER — OFFICE VISIT (OUTPATIENT)
Dept: INTERNAL MEDICINE CLINIC | Age: 82
End: 2017-06-06

## 2017-06-06 VITALS
RESPIRATION RATE: 20 BRPM | OXYGEN SATURATION: 99 % | WEIGHT: 156 LBS | HEART RATE: 78 BPM | BODY MASS INDEX: 29.45 KG/M2 | SYSTOLIC BLOOD PRESSURE: 128 MMHG | TEMPERATURE: 98.4 F | DIASTOLIC BLOOD PRESSURE: 76 MMHG | HEIGHT: 61 IN

## 2017-06-06 DIAGNOSIS — G89.29 CHRONIC BILATERAL LOW BACK PAIN WITHOUT SCIATICA: ICD-10-CM

## 2017-06-06 DIAGNOSIS — M54.50 CHRONIC BILATERAL LOW BACK PAIN WITHOUT SCIATICA: ICD-10-CM

## 2017-06-06 DIAGNOSIS — M54.5 ACUTE MIDLINE LOW BACK PAIN, WITH SCIATICA PRESENCE UNSPECIFIED: Primary | ICD-10-CM

## 2017-06-06 RX ORDER — ACETAMINOPHEN 325 MG/1
TABLET ORAL
Qty: 60 TAB | Refills: 6 | Status: SHIPPED | OUTPATIENT
Start: 2017-06-06 | End: 2017-07-27 | Stop reason: SDUPTHER

## 2017-06-06 RX ORDER — LIDOCAINE 50 MG/G
1 PATCH TOPICAL EVERY 24 HOURS
Qty: 10 EACH | Refills: 0 | Status: SHIPPED | OUTPATIENT
Start: 2017-06-06 | End: 2017-08-29

## 2017-06-06 NOTE — PROGRESS NOTES
HISTORY OF PRESENT ILLNESS  Vince Kendall is a 80 y.o. female here with C/o lower back pain. She has been taking tylenol. back pain is worse with movement. No bowel bladder problem. No weaknes sin arms or legs. xray was ordered but not done. Has A fib,on med. sno palpitation. Labs reviewed. HPI    Review of Systems   Constitutional: Negative. HENT: Negative. Eyes: Negative. Cardiovascular: Negative. Gastrointestinal: Negative. Genitourinary: Negative. Musculoskeletal: Positive for back pain and falls. Skin: Negative. Neurological: Negative. Psychiatric/Behavioral: Negative. Physical Exam   Constitutional: She appears well-developed and well-nourished. She appears distressed. Neck: Normal range of motion. Neck supple. No JVD present. No thyromegaly present. Cardiovascular: Normal rate, regular rhythm, normal heart sounds and intact distal pulses. Pulmonary/Chest: Effort normal and breath sounds normal. No respiratory distress. She has no wheezes. Musculoskeletal: She exhibits tenderness. Back;spine :tenderness on sacrum. ROm restricted   Psychiatric: She has a normal mood and affect. Her behavior is normal.       ASSESSMENT and PLAN  Dorothy Pisano was seen today for back pain and irregular heart beat. Diagnoses and all orders for this visit:    Acute midline low back pain, with sciatica presence unspecified    Will order xray LS spine again. Will order,  -     lidocaine (LIDODERM) 5 %; 1 Patch by TransDERmal route every twenty-four (24) hours. Apply patch to the affected area for 12 hours a day and remove for 12 hours a day. Chronic bilateral low back pain without sciatica  Will increase,  -     acetaminophen (Q-NOL) 325 mg tablet; 2 tabet at am 2 tab at noon and 2 tab at dinner for 1 week    A fib  On eliquis and metoprolol. Stable. Discussed expected course/resolution/complications of diagnosis in detail with patient.    Medication risks/benefits/costs/interactions/alternatives discussed with patient. Pt was given an after visit summary which includes diagnoses, current medications & vitals. Pt expressed understanding with the diagnosis and plan.

## 2017-06-06 NOTE — MR AVS SNAPSHOT
Visit Information Date & Time Provider Department Dept. Phone Encounter #  
 6/6/2017  9:15 AM Althea Ferguson MD [facility] Internal Medicine [facility] [phone] [id number] Your Appointments 8/29/2017  1:30 PM  
ROUTINE CARE with Althea Ferguson MD  
[facility] ([address]) Appt Note: 3 month follow up; medicare wellness visit due; 3 month follow up medicare wellness visit due [address] [place] [address]. [facility] [phone]  
[phone]  
  
   
 [address] [place] [address]. [address] [place] [phone] Upcoming Health Maintenance Date Due DTaP/Tdap/Td series (1 - Tdap) 5[birth year]26/1947 ZOSTER VACCINE AGE 60> 5/26/1986 GLAUCOMA SCREENING Q2Y 5/26/1991 INFLUENZA AGE 9 TO ADULT 8/1/2017 MEDICARE YEARLY EXAM 5/31/2018 Pneumococcal 65+ High/Highest Risk (2 of 2 - PPSV23) 11/4/2019 Allergies as of 6/6/2017  Review Complete On: 6/6/2017 By: Althea Ferguson MD  
  
 Severity Noted Reaction Type Reactions Neurontin [Gabapentin]  08/18/2015    Unknown (comments) Pt unknown reaction Current Immunizations  Reviewed on 5/30/2017 Name Date Influenza Vaccine 12/10/2015, 11/15/2014, 9/5/2013 Pneumococcal Vaccine (Unspecified Type) 11/4/2014 Not reviewed this visit You Were Diagnosed With   
  
 Codes Comments Acute midline low back pain, with sciatica presence unspecified    -  Primary ICD-10-CM: M54.5 ICD-9-CM: 724.2 Chronic bilateral low back pain without sciatica     ICD-10-CM: M54.5, G89.29 ICD-9-CM: 724.2, 338.29 Vitals BP Pulse Temp Resp Height(growth percentile) Weight(growth percentile) 128/76 (BP 1 Location: Left arm, BP Patient Position: Sitting) 78 98.4 °F (36.9 °C) (Oral) 20 5' 1\" (1.549 m) 156 lb (70.8 kg) SpO2 BMI OB Status Smoking Status 99% 29.48 kg/m2 Postmenopausal Never Smoker Vitals History BMI and BSA Data Body Mass Index Body Surface Area  
 29.48 kg/m 2 1.75 m 2 Preferred Pharmacy Pharmacy Name Phone [phone] . [phone] Your Updated Medication List  
  
   
This list is accurate as of: 6/6/17  9:29 AM.  Always use your most recent med list.  
  
  
  
  
 acetaminophen 325 mg tablet Commonly known as:  Q-Nol  
2 tabet at am 2 tab at noon and 2 tab at dinner for 1 week * albuterol 90 mcg/actuation inhaler Commonly known as:  PROVENTIL HFA Take 1 Puff by inhalation every six (6) hours as needed for Wheezing. * albuterol 2.5 mg /3 mL (0.083 %) nebulizer solution Commonly known as:  PROVENTIL VENTOLIN  
INHALE THE CONTENTS OF 1 VIAL VIA NEBULIZER EVERY 6 HOURS AS NEEDED FOR WHEEZING  
  
 ammonium lactate 12 % lotion Commonly known as:  LAC-HYDRIN  
rub in to affected area well  
  
 calcium-cholecalciferol (D3) tablet Commonly known as:  CALTRATE 600+D TAKE 1 TABLET BY MOUTH TWICE DAILY DX: SUPPLEMENT  
  
 diphenhydrAMINE 12.5 mg/5 mL syrup Commonly known as:  BENADRYL Take 5 mL by mouth two (2) times daily as needed. DOC-Q-LACE 100 mg capsule Generic drug:  docusate sodium TAKE 1 CAPSULE BY MOUTH TWICE DAILY DX:BOWEL SUPPORT  
  
 donepezil 5 mg tablet Commonly known as:  ARICEPT  
TAKE 1 TABLET BY MOUTH EVERY NIGHT AT BEDTIME DX: MEMORY SUPPORT  
  
 ELIQUIS 2.5 mg tablet Generic drug:  apixaban TAKE 1 TABLET BY MOUTH 2 TIMES A DAY. DX ANTICOAGULANT  
  
 furosemide 40 mg tablet Commonly known as:  LASIX Take 40 mg by mouth daily. hydrocortisone 2.5 % lotion Commonly known as:  HYTONE  
USING A THIN LAYER APPLY TO AFFECTED AREA TWO (2) TIMES A DAY FOR 2 WEEKS DX:RASH  
  
 lidocaine 5 % Commonly known as:  LIDODERM  
1 Patch by TransDERmal route every twenty-four (24) hours. Apply patch to the affected area for 12 hours a day and remove for 12 hours a day. lisinopril 5 mg tablet Commonly known as:  PRINIVIL, ZESTRIL  
TAKE 1 TABLET BY MOUTH EVERY DAY DX: HTN  
  
 metoprolol succinate 25 mg XL tablet Commonly known as:  TOPROL-XL  
TAKE 1 TABLET BY MOUTH EVERY DAY DX: HTN  
  
 MILK OF MAGNESIA 400 mg/5 mL suspension Generic drug:  magnesium hydroxide TAKE 30ML BY MOUTH EVERY 24 HOURS AS NEEDED FOR CONSTIPATION  
  
 miscellaneous medical supply Misc Commonly known as:  ANTI-EMBOLISM STOCKINGS Use below knee stockings 15 to 30 mm HG.  
  
 polyethylene glycol 17 gram/dose powder Commonly known as:  MIRALAX  
MIX THE CONTENTS OF 1 HEAPING TABLESPOON (=17GRAMS) WITH 8 OUNCES OF WATER AND DRINK DAILY DX:BOWEL AIDE  
  
 pravastatin 40 mg tablet Commonly known as:  PRAVACHOL  
TAKE 1 TABLET BY MOUTH EVERY NIGHT AT BEDTIME DX: CHOLESTEROL  
  
 raNITIdine 150 mg tablet Commonly known as:  ZANTAC Take 150 mg by mouth nightly. SENEXON-S 8.6-50 mg per tablet Generic drug:  senna-docusate TAKE 1 TABLET BY MOUTH DAILY AS NEEDED FOR CONSTIPATION  
  
 traMADol 50 mg tablet Commonly known as:  ULTRAM  
TAKE 1/2 (25MG) TABLET BY MOUTH IN THE MORNING AND 1 TAB (50MG) IN THE EVENING DX:PAIN  
  
 venlafaxine-SR 75 mg capsule Commonly known as:  EFFEXOR-XR  
TAKE 1 CAPSULE BY MOUTH EVERY DAY DX:ANTIDEPRESSANT  
  
 VITAMIN B-12 500 mcg tablet Generic drug:  cyanocobalamin TAKE 1 TABLET BY MOUTH EVERY DAY DX: SUPPLEMENT * Notice: This list has 2 medication(s) that are the same as other medications prescribed for you. Read the directions carefully, and ask your doctor or other care provider to review them with you. Prescriptions Printed Refills  
 lidocaine (LIDODERM) 5 % 0 Si Patch by TransDERmal route every twenty-four (24) hours. Apply patch to the affected area for 12 hours a day and remove for 12 hours a day. Class: Print Route: TransDERmal  
  
Prescriptions Sent to Pharmacy Refills acetaminophen (Q-NOL) 325 mg tablet 6 Si tabet at am 2 tab at noon and 2 tab at dinner for 1 week Class: Normal  
 Pharmacy: [id number]  #: [phone] Introducing \[facility] [place] [facility]\"" & HEALTH SERVICES! Zari Rivero introduces [employer] patient portal. Now you can access parts of your medical record, email your doctor's office, and request medication refills online. 1. In your internet browser, go to [email or web address] 2. Click on the First Time User? Click Here link in the Sign In box. You will see the New Member Sign Up page. 3. Enter your [employer]) Access Code exactly as it appears below. You will not need to use this code after youve completed the sign-up process. If you do not sign up before the expiration date, you must request a new code. · [employer] Access Code: [email or web address] Expires: 2017  2:19 PM 
 
4. Enter the last four digits of your Social Security Number (xxxx) and Date of Birth (mm/dd/yyyy) as indicated and click Submit. You will be taken to the next sign-up page. 5. Create a [employer] ([employer]) ID. This will be your [employer]) login ID and cannot be changed, so think of one that is secure and easy to remember. 6. Create a [employer]) password. You can change your password at any time. 7. Enter your Password Reset Question and Answer. This can be used at a later time if you forget your password. 8. Enter your e-mail address. You will receive e-mail notification when new information is available in [address] 9. Click Sign Up. You can now view and download portions of your medical record. 10. Click the Download Summary menu link to download a portable copy of your medical information. If you have questions, please visit the Frequently Asked Questions section of the [employer] ([employer]) website. Remember, [employer]) is NOT to be used for urgent needs. For medical emergencies, dial 911. Now available from your iPhone and Android! Please provide this summary of care documentation to your next provider. Your primary care clinician is listed as Juan Pablo Shaikh. If you have any questions after today's visit, please call [phone].

## 2017-06-06 NOTE — PROGRESS NOTES
Health Maintenance Due   Topic Date Due    DTaP/Tdap/Td series (1 - Tdap) 05/26/1947    ZOSTER VACCINE AGE 60>  05/26/1986    GLAUCOMA SCREENING Q2Y  05/26/1991       Chief Complaint   Patient presents with    Back Pain     post fall       1. Have you been to the ER, urgent care clinic since your last visit? Hospitalized since your last visit? No    2. Have you seen or consulted any other health care providers outside of the [address] [place] [address] since your last visit? Include any pap smears or colon screening. No    3) Do you have an Advance Directive on file? no    4) Are you interested in receiving information on Advance Directives? NO      Patient is accompanied by self I have received verbal consent from Kary Castro to discuss any/all medical information while they are present in the room.

## 2017-06-19 ENCOUNTER — TELEPHONE (OUTPATIENT)
Dept: INTERNAL MEDICINE CLINIC | Age: 82
End: 2017-06-19

## 2017-06-19 NOTE — TELEPHONE ENCOUNTER
PA initiated through [facility]. Key is [id number].   Will wait for approval/denial

## 2017-06-21 NOTE — TELEPHONE ENCOUNTER
PA for lidocaine patches was reviewed by [facility] and was denied.   Information forwarded to provider to review and consider

## 2017-07-27 DIAGNOSIS — M54.50 CHRONIC BILATERAL LOW BACK PAIN WITHOUT SCIATICA: ICD-10-CM

## 2017-07-27 DIAGNOSIS — G89.29 CHRONIC BILATERAL LOW BACK PAIN WITHOUT SCIATICA: ICD-10-CM

## 2017-07-27 RX ORDER — ACETAMINOPHEN 325 MG/1
TABLET ORAL
Qty: 180 TAB | Refills: 0 | Status: SHIPPED | OUTPATIENT
Start: 2017-07-27 | End: 2017-08-24 | Stop reason: SDUPTHER

## 2017-07-31 RX ORDER — ALBUTEROL SULFATE 90 UG/1
1 AEROSOL, METERED RESPIRATORY (INHALATION)
Qty: 1 INHALER | Status: SHIPPED | OUTPATIENT
Start: 2017-07-31 | End: 2018-11-13 | Stop reason: SDUPTHER

## 2017-08-01 ENCOUNTER — OFFICE VISIT (OUTPATIENT)
Dept: INTERNAL MEDICINE CLINIC | Age: 82
End: 2017-08-01

## 2017-08-01 VITALS
HEIGHT: 61 IN | BODY MASS INDEX: 29.45 KG/M2 | OXYGEN SATURATION: 98 % | TEMPERATURE: 98.3 F | DIASTOLIC BLOOD PRESSURE: 74 MMHG | RESPIRATION RATE: 20 BRPM | HEART RATE: 87 BPM | SYSTOLIC BLOOD PRESSURE: 145 MMHG | WEIGHT: 156 LBS

## 2017-08-01 DIAGNOSIS — L29.9 ITCHING: ICD-10-CM
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
## 2017-08-01 DIAGNOSIS — L30.9 DERMATITIS: ICD-10-CM

## 2017-08-01 RX ORDER — PREDNISONE 5 MG/1
5 TABLET ORAL 2 TIMES DAILY
Qty: 14 TAB | Refills: 0 | Status: SHIPPED | OUTPATIENT
Start: 2017-08-01 | End: 2017-08-29

## 2017-08-01 RX ORDER — HYDROCORTISONE 25 MG/ML
LOTION TOPICAL 2 TIMES DAILY
Qty: 59 ML | Refills: 4 | Status: SHIPPED | OUTPATIENT
Start: 2017-08-01 | End: 2017-08-31 | Stop reason: SDUPTHER

## 2017-08-01 NOTE — MR AVS SNAPSHOT
Visit Information Date & Time Provider Department Dept. Phone Encounter #  
 8/1/2017  1:15 PM Jahaira Daniel MD [facility] Internal Medicine [facility] [phone] [id number] Your Appointments 8/1/2017  1:15 PM  
Any with MD Jason Chavez ([address]) Appt Note: flea bites? [address] [place] [address]. [place] [phone]  
[phone]  
  
   
 [address] [place] [address]. [address] [place] [phone]  
  
    
 8/29/2017  1:30 PM  
ROUTINE CARE with MD Jason Chavez ([address]) Appt Note: 3 month follow up; medicare wellness visit due; 3 month follow up medicare wellness visit due [address] [place] [address]. [place] [phone]  
[phone]  
  
   
 [address] [place] [address]. [address] [place] [phone] Upcoming Health Maintenance Date Due DTaP/Tdap/Td series (1 - Tdap) 5[birth year]26/1947 ZOSTER VACCINE AGE 60> 3/26/1986 INFLUENZA AGE 9 TO ADULT 8/1/2017 MEDICARE YEARLY EXAM 5/31/2018 GLAUCOMA SCREENING Q2Y 6/9/2019 Pneumococcal 65+ High/Highest Risk (2 of 2 - PPSV23) 11/4/2019 Allergies as of 8/1/2017  Review Complete On: 8/1/2017 By: Manohar Lozano LPN Severity Noted Reaction Type Reactions Neurontin [Gabapentin]  08/18/2015    Unknown (comments) Pt unknown reaction Current Immunizations  Reviewed on 8/1/2017 Name Date Influenza Vaccine 12/10/2015, 11/15/2014, 9/5/2013 Pneumococcal Vaccine (Unspecified Type) 11/4/2014 Zoster Vaccine, Live 6/6/2017 Reviewed by Manohar Lozano LPN on 9/5/4099 at 56:10 PM  
 Reviewed by Manohar Lozano LPN on 6/3/3114 at 41:20 PM  
You Were Diagnosed With   
  
 Codes Comments Bed bug bite, initial encounter    -  Primary ICD-10-CM: W57. Ilhilary Buckley ICD-9-CM: 919.4 Itching     ICD-10-CM: L29.9 ICD-9-CM: 698.9 Dermatitis     ICD-10-CM: L30.9 ICD-9-CM: 692.9 Vitals BP Pulse Temp Resp Height(growth percentile) Weight(growth percentile) 145/74 (BP 1 Location: Left arm, BP Patient Position: Sitting) 87 98.3 °F (36.8 °C) (Oral) 20 5' 1\" (1.549 m) 156 lb (70.8 kg) SpO2 BMI OB Status Smoking Status 98% 29.48 kg/m2 Postmenopausal Never Smoker Vitals History BMI and BSA Data Body Mass Index Body Surface Area  
 29.48 kg/m 2 1.75 m 2 Preferred Pharmacy Pharmacy Name Phone [address] [phone] Your Updated Medication List  
  
   
This list is accurate as of: 8/1/17  1:11 PM.  Always use your most recent med list.  
  
  
  
  
 * albuterol 2.5 mg /3 mL (0.083 %) nebulizer solution Commonly known as:  PROVENTIL VENTOLIN  
INHALE THE CONTENTS OF 1 VIAL VIA NEBULIZER EVERY 6 HOURS AS NEEDED FOR WHEEZING  
  
 * albuterol 90 mcg/actuation inhaler Commonly known as:  PROVENTIL HFA Take 1 Puff by inhalation every six (6) hours as needed for Wheezing. ammonium lactate 12 % lotion Commonly known as:  LAC-HYDRIN  
rub in to affected area well  
  
 calcium-cholecalciferol (D3) tablet Commonly known as:  CALTRATE 600+D TAKE 1 TABLET BY MOUTH TWICE DAILY DX: SUPPLEMENT  
  
 diphenhydrAMINE 12.5 mg/5 mL syrup Commonly known as:  BENADRYL Take 5 mL by mouth two (2) times daily as needed. DOC-Q-LACE 100 mg capsule Generic drug:  docusate sodium TAKE 1 CAPSULE BY MOUTH TWICE DAILY DX:BOWEL SUPPORT  
  
 donepezil 5 mg tablet Commonly known as:  ARICEPT  
TAKE 1 TABLET BY MOUTH EVERY NIGHT AT BEDTIME DX: MEMORY SUPPORT  
  
 ELIQUIS 2.5 mg tablet Generic drug:  apixaban TAKE 1 TABLET BY MOUTH 2 TIMES A DAY. DX ANTICOAGULANT  
  
 furosemide 40 mg tablet Commonly known as:  LASIX Take 40 mg by mouth daily. hydrocortisone 2.5 % lotion Commonly known as:  HYTONE Apply  to affected area two (2) times a day. use thin layer lidocaine 5 % Commonly known as:  LIDODERM  
1 Patch by TransDERmal route every twenty-four (24) hours. Apply patch to the affected area for 12 hours a day and remove for 12 hours a day. lisinopril 5 mg tablet Commonly known as:  PRINIVIL, ZESTRIL  
TAKE 1 TABLET BY MOUTH EVERY DAY DX: HTN Mapap 325 mg tablet Generic drug:  acetaminophen TAKE 2 TABS BY MOUTH EVERY 6 HOURS AT 8A, 2P AND 8P DX:PAIN *MAX 3000MG/DAY OF APAP* metoprolol succinate 25 mg XL tablet Commonly known as:  TOPROL-XL  
TAKE 1 TABLET BY MOUTH EVERY DAY DX: HTN  
  
 MILK OF MAGNESIA 400 mg/5 mL suspension Generic drug:  magnesium hydroxide TAKE 30ML BY MOUTH EVERY 24 HOURS AS NEEDED FOR CONSTIPATION  
  
 miscellaneous medical supply Misc Commonly known as:  ANTI-EMBOLISM STOCKINGS Use below knee stockings 15 to 30 mm HG.  
  
 polyethylene glycol 17 gram/dose powder Commonly known as:  MIRALAX  
MIX THE CONTENTS OF 1 HEAPING TABLESPOON (=17GRAMS) WITH 8 OUNCES OF WATER AND DRINK DAILY DX:BOWEL AIDE  
  
 pravastatin 40 mg tablet Commonly known as:  PRAVACHOL  
TAKE 1 TABLET BY MOUTH EVERY NIGHT AT BEDTIME DX: CHOLESTEROL  
  
 predniSONE 5 mg tablet Commonly known as:  Jamey Bala Take 1 Tab by mouth two (2) times a day. raNITIdine 150 mg tablet Commonly known as:  ZANTAC Take 150 mg by mouth nightly. SENEXON-S 8.6-50 mg per tablet Generic drug:  senna-docusate TAKE 1 TABLET BY MOUTH DAILY AS NEEDED FOR CONSTIPATION  
  
 traMADol 50 mg tablet Commonly known as:  ULTRAM  
TAKE 1/2 (25MG) TABLET BY MOUTH IN THE MORNING AND 1 TAB (50MG) IN THE EVENING DX:PAIN  
  
 venlafaxine-SR 75 mg capsule Commonly known as:  EFFEXOR-XR  
TAKE 1 CAPSULE BY MOUTH EVERY DAY DX:ANTIDEPRESSANT  
  
 VITAMIN B-12 500 mcg tablet Generic drug:  cyanocobalamin TAKE 1 TABLET BY MOUTH EVERY DAY DX: SUPPLEMENT * Notice:   This list has 2 medication(s) that are the same as other medications prescribed for you. Read the directions carefully, and ask your doctor or other care provider to review them with you. Prescriptions Sent to Pharmacy Refills  
 predniSONE (DELTASONE) 5 mg tablet 0 Sig: Take 1 Tab by mouth two (2) times a day. Class: Normal  
 [facility] Ph #: [phone] Route: Oral  
 hydrocortisone (HYTONE) 2.5 % lotion 4 Sig: Apply  to affected area two (2) times a day. use thin layer Class: Normal  
 Pharmacy: [facility] Ph #: [phone] Route: Topical  
  
Introducing [facility] [place] & HEALTH SERVICES! Meghan Centeno introduces [employer] patient portal. Now you can access parts of your medical record, email your doctor's office, and request medication refills online. 1. In your internet browser, go to [email or web address] 2. Click on the First Time User? Click Here link in the Sign In box. You will see the New Member Sign Up page. 3. Enter your [employer] Access Code exactly as it appears below. You will not need to use this code after youve completed the sign-up process. If you do not sign up before the expiration date, you must request a new code. · [employer] Access Code: [email or web address] Expires: 9/18/2017  2:46 PM 
 
4. Enter the last four digits of your Social Security Number (xxxx) and Date of Birth (mm/dd/yyyy) as indicated and click Submit. You will be taken to the next sign-up page. 5. Create a Medesent ID. This will be your [employer] login ID and cannot be changed, so think of one that is secure and easy to remember. 6. Create a [employer] password. You can change your password at any time. 7. Enter your Password Reset Question and Answer. This can be used at a later time if you forget your password. 8. Enter your e-mail address. You will receive e-mail notification when new information is available in [address] 9. Click Sign Up. You can now view and download portions of your medical record. 10. Click the Download Summary menu link to download a portable copy of your medical information. If you have questions, please visit the Frequently Asked Questions section of the [employer] website. Remember, [employer] is NOT to be used for urgent needs. For medical emergencies, dial 911. Now available from your iPhone and Android! Please provide this summary of care documentation to your next provider. Your primary care clinician is listed as Nick Chance. If you have any questions after today's visit, please call [phone].

## 2017-08-01 NOTE — PROGRESS NOTES
Health Maintenance Due   Topic Date Due    DTaP/Tdap/Td series (1 - Tdap) 05/26/1947    ZOSTER VACCINE AGE 60>  03/26/1986    INFLUENZA AGE 9 TO ADULT  08/01/2017       Chief Complaint   Patient presents with   Namita Higginbotham 83     bed bugs with bites on arms and her back       1. Have you been to the ER, urgent care clinic since your last visit? Hospitalized since your last visit? No    2. Have you seen or consulted any other health care providers outside of the [facility] since your last visit? Include any pap smears or colon screening. No    3) Do you have an Advance Directive on file? no    4) Are you interested in receiving information on Advance Directives? NO      Patient is accompanied by self I have received verbal consent from Richard De Luna to discuss any/all medical information while they are present in the room.

## 2017-08-01 NOTE — PROGRESS NOTES
HISTORY OF PRESENT ILLNESS  Carlos Pelletier is a 80 y.o. female here with C/O multiple bed bugs under her mattress. she bought bed bug in a jar and showed to authority but nothing was done. She has rash in her back and front of her trunk. itching. feels terrible. She is tearful. She had her room fumigated 2 months back but bug is back. Other wise doing OK. taking all meds. HPI    Review of Systems   Constitutional: Negative. HENT: Negative. Eyes: Negative. Respiratory: Negative. Cardiovascular: Negative. Gastrointestinal: Negative. Genitourinary: Negative. Musculoskeletal: Negative. Skin: Positive for itching and rash. Neurological: Negative. Endo/Heme/Allergies: Negative. Psychiatric/Behavioral: Negative. Physical Exam   Constitutional: She appears well-developed and well-nourished. No distress. Neck: Normal range of motion. Neck supple. No JVD present. No thyromegaly present. Cardiovascular: Normal rate, regular rhythm, normal heart sounds and intact distal pulses. Pulmonary/Chest: Effort normal and breath sounds normal. No respiratory distress. She has no wheezes. Skin: Skin is dry. Rash noted. Multiple bug bite marks on upper back and front of trunk. itchy. Psychiatric: She has a normal mood and affect. Her behavior is normal.       ASSESSMENT and PLAN  Diagnoses and all orders for this visit:    1. Bed bug bite, initial encounter  Will call in,  -     predniSONE (DELTASONE) 5 mg tablet; Take 1 Tab by mouth two (2) times a day. 2. Itching  Will call in,  -     hydrocortisone (HYTONE) 2.5 % lotion; Apply  to affected area two (2) times a day. use thin layer    3. Dermatitis  -     hydrocortisone (HYTONE) 2.5 % lotion; Apply  to affected area two (2) times a day. use thin layer    Discussed expected course/resolution/complications of diagnosis in detail with patient. Medication risks/benefits/costs/interactions/alternatives discussed with patient.    Pt was given an after visit summary which includes diagnoses, current medications & vitals. Pt expressed understanding with the diagnosis and plan.

## 2017-08-24 DIAGNOSIS — I50.22 CHRONIC SYSTOLIC CONGESTIVE HEART FAILURE (HCC): ICD-10-CM

## 2017-08-24 DIAGNOSIS — G89.29 CHRONIC BILATERAL LOW BACK PAIN WITHOUT SCIATICA: ICD-10-CM

## 2017-08-24 DIAGNOSIS — M54.50 CHRONIC BILATERAL LOW BACK PAIN WITHOUT SCIATICA: ICD-10-CM

## 2017-08-24 RX ORDER — TRAMADOL HYDROCHLORIDE 50 MG/1
TABLET ORAL
Qty: 45 TAB | Refills: 4 | Status: SHIPPED | OUTPATIENT
Start: 2017-08-24 | End: 2017-10-17

## 2017-08-24 RX ORDER — ACETAMINOPHEN 325 MG/1
TABLET ORAL
Qty: 180 TAB | Refills: 0 | Status: SHIPPED | OUTPATIENT
Start: 2017-08-24 | End: 2017-09-25 | Stop reason: SDUPTHER

## 2017-08-24 RX ORDER — APIXABAN 2.5 MG/1
TABLET, FILM COATED ORAL
Qty: 60 TAB | Refills: 5 | Status: SHIPPED | OUTPATIENT
Start: 2017-08-24 | End: 2018-02-20 | Stop reason: SDUPTHER

## 2017-08-24 RX ORDER — PRAVASTATIN SODIUM 40 MG/1
TABLET ORAL
Qty: 30 TAB | Refills: 3 | Status: SHIPPED | OUTPATIENT
Start: 2017-08-24 | End: 2018-04-10 | Stop reason: DRUGHIGH

## 2017-08-29 ENCOUNTER — OFFICE VISIT (OUTPATIENT)
Dept: INTERNAL MEDICINE CLINIC | Age: 82
End: 2017-08-29

## 2017-08-29 ENCOUNTER — HOSPITAL ENCOUNTER (OUTPATIENT)
Dept: LAB | Age: 82
Discharge: HOME | End: 2017-08-29
Payer: MEDICARE

## 2017-08-29 VITALS
RESPIRATION RATE: 18 BRPM | BODY MASS INDEX: 32.4 KG/M2 | HEART RATE: 56 BPM | WEIGHT: 171.6 LBS | OXYGEN SATURATION: 93 % | TEMPERATURE: 97.1 F | DIASTOLIC BLOOD PRESSURE: 63 MMHG | HEIGHT: 61 IN | SYSTOLIC BLOOD PRESSURE: 125 MMHG

## 2017-08-29 DIAGNOSIS — E55.9 VITAMIN D DEFICIENCY: ICD-10-CM

## 2017-08-29 DIAGNOSIS — I25.10 CORONARY ARTERY DISEASE DUE TO LIPID RICH PLAQUE: Primary | ICD-10-CM

## 2017-08-29 DIAGNOSIS — I10 ESSENTIAL HYPERTENSION: ICD-10-CM

## 2017-08-29 DIAGNOSIS — I50.22 CHRONIC SYSTOLIC CONGESTIVE HEART FAILURE (HCC): ICD-10-CM

## 2017-08-29 DIAGNOSIS — G30.8 ALZHEIMER'S DISEASE OF OTHER ONSET WITHOUT BEHAVIORAL DISTURBANCE: ICD-10-CM

## 2017-08-29 DIAGNOSIS — F02.80 ALZHEIMER'S DISEASE OF OTHER ONSET WITHOUT BEHAVIORAL DISTURBANCE: ICD-10-CM

## 2017-08-29 DIAGNOSIS — I25.83 CORONARY ARTERY DISEASE DUE TO LIPID RICH PLAQUE: Primary | ICD-10-CM

## 2017-08-29 DIAGNOSIS — F32.89 DEPRESSIVE DISORDER, NOT ELSEWHERE CLASSIFIED: ICD-10-CM

## 2017-08-29 PROCEDURE — 80053 COMPREHEN METABOLIC PANEL: CPT

## 2017-08-29 PROCEDURE — 85025 COMPLETE CBC W/AUTO DIFF WBC: CPT

## 2017-08-29 PROCEDURE — 82306 VITAMIN D 25 HYDROXY: CPT

## 2017-08-29 RX ORDER — LISINOPRIL 10 MG/1
1 TABLET ORAL DAILY
Status: ON HOLD | COMMUNITY
Start: 2017-08-24 | End: 2018-08-22

## 2017-08-29 NOTE — PROGRESS NOTES
Health Maintenance Due   Topic Date Due    DTaP/Tdap/Td series (1 - Tdap) 05/26/1947    INFLUENZA AGE 9 TO ADULT  08/01/2017       Chief Complaint   Patient presents with    CHF     3 mo follow up visit, Wellness    Hypertension    Osteopenia       1. Have you been to the ER, urgent care clinic since your last visit? Hospitalized since your last visit? No    2. Have you seen or consulted any other health care providers outside of the [address] [place] [address] since your last visit? Include any pap smears or colon screening. No    3) Do you have an Advance Directive on file? yes    4) Are you interested in receiving information on Advance Directives? NO      Patient is accompanied by self I have received verbal consent from Cali Fish to discuss any/all medical information while they are present in the room.

## 2017-08-29 NOTE — PATIENT INSTRUCTIONS
Limiting Sodium and Fluids With Heart Failure: Care Instructions  Your Care Instructions  Sodium causes your body to hold on to extra water. This may cause your heart failure symptoms to get worse. Limiting sodium may help you feel better and lower your risk of having to go to the hospital.  People get most of their sodium from processed foods. Fast food and restaurant meals also tend to be very high in sodium. Your doctor may suggest that you limit sodium to 2,000 milligrams (mg) a day or less. That is less than 1 teaspoon of salt a day, including all the salt you eat in cooked or packaged foods. Usually, you have to limit the amount of liquids you drink only if your heart failure is severe. Limiting sodium alone often is enough to help your body get rid of extra fluids. However, your doctor may tell you to limit your fluid intake to a set amount each day. Follow-up care is a key part of your treatment and safety. Be sure to make and go to all appointments, and call your doctor if you are having problems. It's also a good idea to know your test results and keep a list of the medicines you take. How can you care for yourself at home? Read food labels  · Read food labels on cans and food packages. The labels tell you how much sodium is in each serving. Make sure that you look at the serving size. If you eat more than the serving size, you have eaten more sodium than is listed for one serving. · Food labels also tell you the Percent Daily Value. If the Percent Daily Value says 50%, it means that you will get at least 50% of all the sodium you need for the entire day in one serving. Choose products with low Percent Daily Values for sodium. · Be aware that sodium can come in forms other than salt, including monosodium glutamate (MSG), sodium citrate, and sodium bicarbonate (baking soda). MSG is often added to Asian food. You can sometimes ask for food without MSG or salt.   Buy low-sodium foods  · Buy foods that are labeled \"unsalted\" (no salt added), \"sodium-free\" (less than 5 mg of sodium per serving), or \"low-sodium\" (less than 140 mg of sodium per serving). A food labeled \"light sodium\" has less than half of the full-sodium version of that food. Foods labeled \"reduced-sodium\" may still have too much sodium. · Buy fresh vegetables or plain, frozen vegetables. Buy low-sodium versions of canned vegetables, soups, and other canned goods. Prepare low-sodium meals  · Use less salt each day when cooking. Reducing salt in this way will help you adjust to the taste. Do not add salt after cooking. Take the salt shaker off the table. · Flavor your food with garlic, lemon juice, onion, vinegar, herbs, and spices instead of salt. Do not use soy sauce, steak sauce, onion salt, garlic salt, mustard, or ketchup on your food. · Make your own salad dressings, sauces, and ketchup without adding salt. · Use less salt (or none) when recipes call for it. You can often use half the salt a recipe calls for without losing flavor. Other dishes like rice, pasta, and grains do not need added salt. · Rinse canned vegetables. This removes somebut not allof the salt. · Avoid water that has a naturally high sodium content or that has been treated with water softeners, which add sodium. Call your local water company to find out the sodium content of your water supply. If you buy bottled water, read the label and choose a sodium-free brand. Avoid high-sodium foods, such as:  · Smoked, cured, salted, and canned meat, fish, and poultry. · Ham, cowan, hot dogs, and luncheon meats. · Regular, hard, and processed cheese and regular peanut butter. · Crackers with salted tops. · Frozen prepared meals. · Canned and dried soups, broths, and bouillon, unless labeled sodium-free or low-sodium. · Canned vegetables, unless labeled sodium-free or low-sodium. · Salted snack foods such as chips and pretzels.   · [place] fries, pizza, tacos, and other fast foods. · Pickles, olives, ketchup, and other condiments, especially soy sauce, unless labeled sodium-free or low-sodium. If you cannot cook for yourself  · Have family members or friends help you, or have someone cook low-sodium meals. · Check with your local senior nutrition program to find out where meals are served and whether they offer a low-sodium option. You can often find these programs through your local health department or hospital.  · Have meals delivered to your home. Most [facility] have a Meals on [employer]. These programs provide one hot meal a day for older adults, delivered to their homes. Ask whether these meals are low-sodium. Let them know that you are on a low-sodium diet. Limiting fluid intake  · Find a method that works for you. You might simply write down how much you drink every time you do. Some people keep a container filled with the amount of fluid allowed for that day. If they drink from a source other than the container, then they pour out that amount. · Measure your regular drinking glasses to find out how much fluid each one holds. Once you know this, you will not have to measure every time. · Besides water, milk, juices, and other drinks, some foods have a lot of fluid. Count any foods that will melt (such as ice cream or gelatin dessert) or liquid foods (such as soup) as part of your fluid intake for the day. Where can you learn more? Go to http://emma-rosina.info/. Enter A166 in the search box to learn more about \"Limiting Sodium and Fluids With Heart Failure: Care Instructions. \"  Current as of: November 15, 2016  Content Version: 11.3  © 7773-5154 [employer]. Care instructions adapted under license by [employer] (which disclaims liability or warranty for this information).  If you have questions about a medical condition or this instruction, always ask your healthcare professional. Nadia Ibanez disclaims any warranty or liability for your use of this information.

## 2017-08-29 NOTE — MR AVS SNAPSHOT
Visit Information Date & Time Provider Department Dept. Phone Encounter #  
 8/29/2017  1:30 PM Josue Hanson MD [facility] Internal Medicine [facility] [phone] [id number] Your Appointments 8/29/2017  1:30 PM  
ROUTINE CARE with Josue Hanson MD  
[facility] ([address]) Appt Note: 3 month follow up; medicare wellness visit due; 3 month follow up medicare wellness visit due [address] [place] [id number] [phone]  
  
   
 [address] [place] [address]. [address] [place] [id number] Upcoming Health Maintenance Date Due DTaP/Tdap/Td series (1 - Tdap) 5/26/1947 INFLUENZA AGE 9 TO ADULT 8/1/2017 MEDICARE YEARLY EXAM 5/31/2018 GLAUCOMA SCREENING Q2Y 6/9/2019 Pneumococcal 65+ High/Highest Risk (2 of 2 - PPSV23) 11/4/2019 Allergies as of 8/29/2017  Review Complete On: 8/29/2017 By: Josue Hanson MD  
  
 Severity Noted Reaction Type Reactions Neurontin [Gabapentin]  08/18/2015    Unknown (comments) Pt unknown reaction Current Immunizations  Reviewed on 8/1/2017 Name Date Influenza Vaccine 12/10/2015, 11/15/2014, 9/5/2013 Pneumococcal Vaccine (Unspecified Type) 11/4/2014 Zoster Vaccine, Live 6/6/2017 Not reviewed this visit You Were Diagnosed With   
  
 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  
 32.42 kg/m 2 1.83 m 2 Preferred Pharmacy Pharmacy Name Phone [address] [phone] Your Updated Medication List  
  
   
This list is accurate as of: 8/29/17 12:16 PM.  Always use your most recent med list.  
  
  
  
  
 * albuterol 2.5 mg /3 mL (0.083 %) nebulizer solution Commonly known as:  PROVENTIL VENTOLIN  
INHALE THE CONTENTS OF 1 VIAL VIA NEBULIZER EVERY 6 HOURS AS NEEDED FOR WHEEZING  
  
 * albuterol 90 mcg/actuation inhaler Commonly known as:  PROVENTIL HFA Take 1 Puff by inhalation every six (6) hours as needed for Wheezing. ammonium lactate 12 % lotion Commonly known as:  LAC-HYDRIN  
rub in to affected area well  
  
 calcium-cholecalciferol (D3) tablet Commonly known as:  CALTRATE 600+D TAKE 1 TABLET BY MOUTH TWICE DAILY DX: SUPPLEMENT  
  
 diphenhydrAMINE 12.5 mg/5 mL syrup Commonly known as:  BENADRYL Take 5 mL by mouth two (2) times daily as needed. DOC-Q-LACE 100 mg capsule Generic drug:  docusate sodium TAKE 1 CAPSULE BY MOUTH TWICE DAILY DX:BOWEL SUPPORT  
  
 donepezil 5 mg tablet Commonly known as:  ARICEPT  
TAKE 1 TABLET BY MOUTH EVERY NIGHT AT BEDTIME DX: MEMORY SUPPORT  
  
 ELIQUIS 2.5 mg tablet Generic drug:  apixaban TAKE 1 TABLET BY MOUTH 2 TIMES A DAY. DX ANTICOAGULANT  
  
 furosemide 40 mg tablet Commonly known as:  LASIX Take 40 mg by mouth daily. hydrocortisone 2.5 % lotion Commonly known as:  HYTONE Apply  to affected area two (2) times a day. use thin layer * lisinopril 5 mg tablet Commonly known as:  PRINIVIL, ZESTRIL  
TAKE 1 TABLET BY MOUTH EVERY DAY DX: HTN  
  
 * lisinopril 10 mg tablet Commonly known as:  Therisa Semen Take 1 Tab by mouth daily. Mapap 325 mg tablet Generic drug:  acetaminophen TAKE 2 TABS BY MOUTH EVERY 6 HOURS AT 8A, 2P AND 8P DX:PAIN *MAX 3000MG/DAY OF APAP* metoprolol succinate 25 mg XL tablet Commonly known as:  TOPROL-XL  
TAKE 1 TABLET BY MOUTH EVERY DAY DX: HTN  
  
 MILK OF MAGNESIA 400 mg/5 mL suspension Generic drug:  magnesium hydroxide TAKE 30ML BY MOUTH EVERY 24 HOURS AS NEEDED FOR CONSTIPATION  
  
 miscellaneous medical supply Misc Commonly known as:  ANTI-EMBOLISM STOCKINGS Use below knee stockings 15 to 30 mm HG.  
  
 polyethylene glycol 17 gram/dose powder Commonly known as:  MIRALAX  
MIX THE CONTENTS OF 1 HEAPING TABLESPOON (=17GRAMS) WITH 8 OUNCES OF WATER AND DRINK DAILY DX:BOWEL AIDE  
  
 pravastatin 40 mg tablet Commonly known as:  PRAVACHOL  
TAKE 1 TABLET BY MOUTH EVERY NIGHT AT BEDTIME DX: CHOLESTEROL  
  
 raNITIdine 150 mg tablet Commonly known as:  ZANTAC Take 150 mg by mouth nightly. SENEXON-S 8.6-50 mg per tablet Generic drug:  senna-docusate TAKE 1 TABLET BY MOUTH DAILY AS NEEDED FOR CONSTIPATION  
  
 traMADol 50 mg tablet Commonly known as:  ULTRAM  
TAKE 1 TAB (50MG) AT BEDTIME DX:PAIN  
  
 venlafaxine-SR 75 mg capsule Commonly known as:  EFFEXOR-XR  
TAKE 1 CAPSULE BY MOUTH EVERY DAY DX:ANTIDEPRESSANT  
  
 VITAMIN B-12 500 mcg tablet Generic drug:  cyanocobalamin TAKE 1 TABLET BY MOUTH EVERY DAY DX: SUPPLEMENT * Notice: This list has 4 medication(s) that are the same as other medications prescribed for you. Read the directions carefully, and ask your doctor or other care provider to review them with you. We Performed the Following CBC WITH AUTOMATED DIFF [44540 CPT(R)] METABOLIC PANEL, COMPREHENSIVE [87722 CPT(R)] VITAMIN D, 25 HYDROXY [id number] CPT(R)] Patient Instructions Limiting Sodium and Fluids With Heart Failure: Care Instructions Your Care Instructions Sodium causes your body to hold on to extra water. This may cause your heart failure symptoms to get worse. Limiting sodium may help you feel better and lower your risk of having to go to the hospital. 
People get most of their sodium from processed foods. Fast food and restaurant meals also tend to be very high in sodium. Your doctor may suggest that you limit sodium to 2,000 milligrams (mg) a day or less. That is less than 1 teaspoon of salt a day, including all the salt you eat in cooked or packaged foods. Usually, you have to limit the amount of liquids you drink only if your heart failure is severe. Limiting sodium alone often is enough to help your body get rid of extra fluids. However, your doctor may tell you to limit your fluid intake to a set amount each day. Follow-up care is a key part of your treatment and safety. Be sure to make and go to all appointments, and call your doctor if you are having problems. It's also a good idea to know your test results and keep a list of the medicines you take. How can you care for yourself at home? Read food labels · Read food labels on cans and food packages. The labels tell you how much sodium is in each serving. Make sure that you look at the serving size. If you eat more than the serving size, you have eaten more sodium than is listed for one serving. · Food labels also tell you the Percent Daily Value. If the Percent Daily Value says 50%, it means that you will get at least 50% of all the sodium you need for the entire day in one serving. Choose products with low Percent Daily Values for sodium.  
· Be aware that sodium can come in forms other than salt, including monosodium glutamate (MSG), sodium citrate, and sodium bicarbonate (baking soda). MSG is often added to Asian food. You can sometimes ask for food without MSG or salt. Buy low-sodium foods · Buy foods that are labeled \"unsalted\" (no salt added), \"sodium-free\" (less than 5 mg of sodium per serving), or \"low-sodium\" (less than 140 mg of sodium per serving). A food labeled \"light sodium\" has less than half of the full-sodium version of that food. Foods labeled \"reduced-sodium\" may still have too much sodium. · Buy fresh vegetables or plain, frozen vegetables. Buy low-sodium versions of canned vegetables, soups, and other canned goods. Prepare low-sodium meals · Use less salt each day when cooking. Reducing salt in this way will help you adjust to the taste. Do not add salt after cooking. Take the salt shaker off the table. · Flavor your food with garlic, lemon juice, onion, vinegar, herbs, and spices instead of salt. Do not use soy sauce, steak sauce, onion salt, garlic salt, mustard, or ketchup on your food. · Make your own salad dressings, sauces, and ketchup without adding salt. · Use less salt (or none) when recipes call for it. You can often use half the salt a recipe calls for without losing flavor. Other dishes like rice, pasta, and grains do not need added salt. · Rinse canned vegetables. This removes somebut not allof the salt. · Avoid water that has a naturally high sodium content or that has been treated with water softeners, which add sodium. Call your local water company to find out the sodium content of your water supply. If you buy bottled water, read the label and choose a sodium-free brand. Avoid high-sodium foods, such as: 
· Smoked, cured, salted, and canned meat, fish, and poultry. · Ham, cowan, hot dogs, and luncheon meats. · Regular, hard, and processed cheese and regular peanut butter. · Crackers with salted tops. · Frozen prepared meals. · Canned and dried soups, broths, and bouillon, unless labeled sodium-free or low-sodium. · Canned vegetables, unless labeled sodium-free or low-sodium. · Salted snack foods such as chips and pretzels. · [place] fries, pizza, tacos, and other fast foods. · Pickles, olives, ketchup, and other condiments, especially soy sauce, unless labeled sodium-free or low-sodium. If you cannot cook for yourself · Have family members or friends help you, or have someone cook low-sodium meals. · Check with your local senior nutrition program to find out where meals are served and whether they offer a low-sodium option. You can often find these programs through your local health department or hospital. 
· Have meals delivered to your home. Most [facility] have a Meals on [employer]. These programs provide one hot meal a day for older adults, delivered to their homes. Ask whether these meals are low-sodium. Let them know that you are on a low-sodium diet. Limiting fluid intake · Find a method that works for you. You might simply write down how much you drink every time you do. Some people keep a container filled with the amount of fluid allowed for that day. If they drink from a source other than the container, then they pour out that amount. · Measure your regular drinking glasses to find out how much fluid each one holds. Once you know this, you will not have to measure every time. · Besides water, milk, juices, and other drinks, some foods have a lot of fluid. Count any foods that will melt (such as ice cream or gelatin dessert) or liquid foods (such as soup) as part of your fluid intake for the day. Where can you learn more? Go to http://emma-rosina.info/. Enter A166 in the search box to learn more about \"Limiting Sodium and Fluids With Heart Failure: Care Instructions. \" Current as of: November 15, 2016 Content Version: 11.3 © 4639-7367 [employer].  Care instructions adapted under license by [employer] (which disclaims liability or warranty for this information). If you have questions about a medical condition or this instruction, always ask your healthcare professional. [address] any warranty or liability for your use of this information. Introducing [facility] SERVICES! Juan Mills introduces Access Media 3 patient portal. Now you can access parts of your medical record, email your doctor's office, and request medication refills online. 1. In your internet browser, go to [email or web address] 2. Click on the First Time User? Click Here link in the Sign In box. You will see the New Member Sign Up page. 3. Enter your Access Media 3 Access Code exactly as it appears below. You will not need to use this code after youve completed the sign-up process. If you do not sign up before the expiration date, you must request a new code. · Access Media 3 Access Code: [email or web address] Expires: 9/18/2017  2:46 PM 
 
4. Enter the last four digits of your Social Security Number (xxxx) and Date of Birth (mm/dd/yyyy) as indicated and click Submit. You will be taken to the next sign-up page. 5. Create a Access Media 3 ID. This will be your Access Media 3 login ID and cannot be changed, so think of one that is secure and easy to remember. 6. Create a Access Media 3 password. You can change your password at any time. 7. Enter your Password Reset Question and Answer. This can be used at a later time if you forget your password. 8. Enter your e-mail address. You will receive e-mail notification when new information is available in [address] 9. Click Sign Up. You can now view and download portions of your medical record. 10. Click the Download Summary menu link to download a portable copy of your medical information. If you have questions, please visit the Frequently Asked Questions section of the Access Media 3 website. Remember, Access Media 3 is NOT to be used for urgent needs. For medical emergencies, dial 911. Now available from your iPhone and Android! Please provide this summary of care documentation to your next provider. Your primary care clinician is listed as Yvonne Che. If you have any questions after today's visit, please call [phone].

## 2017-08-29 NOTE — LETTER
9/6/2017 11:13 AM 
 
Ms. Nagi Aguila [phone] Dear Nagi Taylor: 
 
Please find your most recent results below. Resulted Orders METABOLIC PANEL, COMPREHENSIVE Result Value Ref Range Glucose 152 (H) 65 - 99 mg/dL BUN 25 10 - 36 mg/dL Creatinine 1.41 (H) 0.57 - 1.00 mg/dL GFR est non-AA 33 (L) >59 mL/min/1.73 GFR est AA 38 (L) >59 mL/min/1.73  
 BUN/Creatinine ratio 18 12 - 28 Sodium 147 (H) 134 - 144 mmol/L Potassium 4.7 3.5 - 5.2 mmol/L Chloride 104 96 - 106 mmol/L  
 CO2 25 18 - 29 mmol/L Calcium 8.9 8.7 - 10.3 mg/dL Protein, total 6.0 6.0 - 8.5 g/dL Albumin 3.9 3.2 - 4.6 g/dL GLOBULIN, TOTAL 2.1 1.5 - 4.5 g/dL A-G Ratio 1.9 1.2 - 2.2 Bilirubin, total 0.4 0.0 - 1.2 mg/dL Alk. phosphatase 52 39 - 117 IU/L  
 AST (SGOT) 17 0 - 40 IU/L  
 ALT (SGPT) 9 0 - 32 IU/L Narrative Performed at:  [address]  [id number] : May Gardner MD, Phone:  [phone] CBC WITH AUTOMATED DIFF Result Value Ref Range WBC 6.5 3.4 - 10.8 x10E3/uL  
 RBC 4.53 3.77 - 5.28 x10E6/uL HGB 14.0 11.1 - 15.9 g/dL HCT 42.7 34.0 - 46.6 % MCV 94 79 - 97 fL  
 MCH 30.9 26.6 - 33.0 pg  
 MCHC 32.8 31.5 - 35.7 g/dL  
 RDW 14.5 12.3 - 15.4 % PLATELET 229 021 - 611 x10E3/uL NEUTROPHILS 58 % Lymphocytes 30 % MONOCYTES 9 % EOSINOPHILS 3 % BASOPHILS 0 %  
 ABS. NEUTROPHILS 3.8 1.4 - 7.0 x10E3/uL Abs Lymphocytes 2.0 0.7 - 3.1 x10E3/uL  
 ABS. MONOCYTES 0.6 0.1 - 0.9 x10E3/uL  
 ABS. EOSINOPHILS 0.2 0.0 - 0.4 x10E3/uL  
 ABS. BASOPHILS 0.0 0.0 - 0.2 x10E3/uL IMMATURE GRANULOCYTES 0 %  
 ABS. IMM. GRANS. 0.0 0.0 - 0.1 x10E3/uL Narrative Performed at:  [address]  [id number] : May Gardner MD, Phone:  [phone] VITAMIN D, 25 HYDROXY Result Value Ref Range VITAMIN D, 25-HYDROXY 30.7 30.0 - 100.0 ng/mL Comment:  
   Vitamin D deficiency has been defined by the [facility] practice guideline as a 
level of serum 25-OH vitamin D less than 20 ng/mL (1,2). The Endocrine Society went on to further define vitamin D 
insufficiency as a level between 21 and 29 ng/mL (2). 1. IOM ([place] of Medicine). 2010. Dietary reference 
   intakes for calcium and D. [facility]: The 
   Doctor on Dem[employer]. 2. Tori MF, Dixie NC, Sergei MORRIS, et al. 
   Evaluation, treatment, and prevention of vitamin D 
   deficiency: an Endocrine Society clinical practice 
   guideline. JCEM. 2011 Jul; 96(7):1911-30. Narrative Performed at:  [address]  [id number] : Ranjana Hart MD, Phone:  [phone] CKD REPORT Result Value Ref Range Interpretation Note Comment:  
   Supplement report is available. Narrative Performed at:  [address] [place] [address]  [id number] : Harriet Alpers PhD, Phone:  [phone] RECOMMENDATIONS: 
None. Keep up the good work! Please call me if you have any questions: [phone] Sincerely, Stephy Chase MD

## 2017-08-29 NOTE — PROGRESS NOTES
HISTORY OF PRESENT ILLNESS  Sherolyn Collet is a 80 y.o. female here to follow up. Doing well. back pain is controlled. no need to use lidoderm patch. use tramadol at night,sleeping well too. C/o sleeping too much some days during day time. not always. Gained weight.eating too much. Has CHF,seen ,feels SOb with exertion. no orthopnea or PND. Has CAD and HTN,complaint with meds. Labs reviewed. CHF     Hypertension      Follow-up     Cholesterol Problem         Review of Systems   Constitutional: Negative. Negative for chills and fever. Eyes: Negative. Respiratory: Negative. Cardiovascular: Negative. Gastrointestinal: Negative. Genitourinary: Negative. Musculoskeletal: Positive for back pain, falls and joint pain. Skin: Positive for itching. Neurological: Negative. Endo/Heme/Allergies: Negative. Psychiatric/Behavioral: Positive for memory loss. Physical Exam   Constitutional: She appears well-developed and well-nourished. No distress. HENT:   Pharynx;mildly congested. no exudate   Neck: Normal range of motion. Neck supple. No JVD present. No thyromegaly present. Cardiovascular: Normal rate, regular rhythm, normal heart sounds and intact distal pulses. Pulmonary/Chest: Effort normal and breath sounds normal. No respiratory distress. She has no wheezes. Musculoskeletal: She exhibits no edema or tenderness. Skin: Skin is dry. Psychiatric: She has a normal mood and affect. Her behavior is normal.       ASSESSMENT and PLAN  Diagnoses and all orders for this visit:    1. Coronary artery disease due to lipid rich plaque    Stable. will do,  -     METABOLIC PANEL, COMPREHENSIVE  -     CBC WITH AUTOMATED DIFF    2. Essential hypertension  On meds. -     METABOLIC PANEL, COMPREHENSIVE  -     CBC WITH AUTOMATED DIFF    3. Depressive disorder, not elsewhere classified  Stable. on med.     4. Alzheimer's disease of other onset without behavioral disturbance  On aricept. Ok. 5. Chronic systolic congestive heart failure (HCC)  On lasix. seen by cardiologist.  -     METABOLIC PANEL, COMPREHENSIVE    6. Vitamin D deficiency  -     VITAMIN D, 25 HYDROXY          Discussed expected course/resolution/complications of diagnosis in detail with patient. Medication risks/benefits/costs/interactions/alternatives discussed with patient. Pt was given an after visit summary which includes diagnoses, current medications & vitals. Pt expressed understanding with the diagnosis and plan.

## 2017-08-30 LAB
25(OH)D3+25(OH)D2 SERPL-MCNC: 30.7 NG/ML (ref 30–100)
ALBUMIN SERPL-MCNC: 3.9 G/DL (ref 3.2–4.6)
ALBUMIN/GLOB SERPL: 1.9 {RATIO} (ref 1.2–2.2)
ALP SERPL-CCNC: 52 IU/L (ref 39–117)
ALT SERPL-CCNC: 9 IU/L (ref 0–32)
AST SERPL-CCNC: 17 IU/L (ref 0–40)
BASOPHILS # BLD AUTO: 0 X10E3/UL (ref 0–0.2)
BASOPHILS NFR BLD AUTO: 0 %
BILIRUB SERPL-MCNC: 0.4 MG/DL (ref 0–1.2)
BUN SERPL-MCNC: 25 MG/DL (ref 10–36)
BUN/CREAT SERPL: 18 (ref 12–28)
CALCIUM SERPL-MCNC: 8.9 MG/DL (ref 8.7–10.3)
CHLORIDE SERPL-SCNC: 104 MMOL/L (ref 96–106)
CO2 SERPL-SCNC: 25 MMOL/L (ref 18–29)
CREAT SERPL-MCNC: 1.41 MG/DL (ref 0.57–1)
EOSINOPHIL # BLD AUTO: 0.2 X10E3/UL (ref 0–0.4)
EOSINOPHIL NFR BLD AUTO: 3 %
ERYTHROCYTE [DISTWIDTH] IN BLOOD BY AUTOMATED COUNT: 14.5 % (ref 12.3–15.4)
GLOBULIN SER CALC-MCNC: 2.1 G/DL (ref 1.5–4.5)
GLUCOSE SERPL-MCNC: 152 MG/DL (ref 65–99)
HCT VFR BLD AUTO: 42.7 % (ref 34–46.6)
HGB BLD-MCNC: 14 G/DL (ref 11.1–15.9)
IMM GRANULOCYTES # BLD: 0 X10E3/UL (ref 0–0.1)
IMM GRANULOCYTES NFR BLD: 0 %
INTERPRETATION: NORMAL
LYMPHOCYTES # BLD AUTO: 2 X10E3/UL (ref 0.7–3.1)
LYMPHOCYTES NFR BLD AUTO: 30 %
MCH RBC QN AUTO: 30.9 PG (ref 26.6–33)
MCHC RBC AUTO-ENTMCNC: 32.8 G/DL (ref 31.5–35.7)
MCV RBC AUTO: 94 FL (ref 79–97)
MONOCYTES # BLD AUTO: 0.6 X10E3/UL (ref 0.1–0.9)
MONOCYTES NFR BLD AUTO: 9 %
NEUTROPHILS # BLD AUTO: 3.8 X10E3/UL (ref 1.4–7)
NEUTROPHILS NFR BLD AUTO: 58 %
PLATELET # BLD AUTO: 190 X10E3/UL (ref 150–379)
POTASSIUM SERPL-SCNC: 4.7 MMOL/L (ref 3.5–5.2)
PROT SERPL-MCNC: 6 G/DL (ref 6–8.5)
RBC # BLD AUTO: 4.53 X10E6/UL (ref 3.77–5.28)
SODIUM SERPL-SCNC: 147 MMOL/L (ref 134–144)
WBC # BLD AUTO: 6.5 X10E3/UL (ref 3.4–10.8)

## 2017-08-31 DIAGNOSIS — L29.9 ITCHING: ICD-10-CM

## 2017-08-31 DIAGNOSIS — L30.9 DERMATITIS: ICD-10-CM

## 2017-08-31 RX ORDER — HYDROCORTISONE 25 MG/ML
LOTION TOPICAL
Qty: 59 ML | Refills: 0 | Status: SHIPPED | OUTPATIENT
Start: 2017-08-31 | End: 2018-08-22

## 2017-09-25 DIAGNOSIS — G89.29 CHRONIC BILATERAL LOW BACK PAIN WITHOUT SCIATICA: ICD-10-CM

## 2017-09-25 DIAGNOSIS — M54.50 CHRONIC BILATERAL LOW BACK PAIN WITHOUT SCIATICA: ICD-10-CM

## 2017-09-25 RX ORDER — ACETAMINOPHEN 325 MG/1
TABLET ORAL
Qty: 180 TAB | Refills: 0 | Status: SHIPPED | OUTPATIENT
Start: 2017-09-25 | End: 2017-10-24 | Stop reason: SDUPTHER

## 2017-10-17 ENCOUNTER — HOSPITAL ENCOUNTER (OUTPATIENT)
Dept: LAB | Age: 82
Discharge: HOME | End: 2017-10-17
Payer: MEDICARE

## 2017-10-17 ENCOUNTER — OFFICE VISIT (OUTPATIENT)
Dept: INTERNAL MEDICINE CLINIC | Age: 82
End: 2017-10-17

## 2017-10-17 VITALS
DIASTOLIC BLOOD PRESSURE: 75 MMHG | RESPIRATION RATE: 22 BRPM | SYSTOLIC BLOOD PRESSURE: 111 MMHG | HEIGHT: 61 IN | HEART RATE: 96 BPM | TEMPERATURE: 96.7 F | OXYGEN SATURATION: 93 %

## 2017-10-17 DIAGNOSIS — J01.10 ACUTE NON-RECURRENT FRONTAL SINUSITIS: ICD-10-CM

## 2017-10-17 DIAGNOSIS — I25.10 CORONARY ARTERY DISEASE DUE TO LIPID RICH PLAQUE: ICD-10-CM

## 2017-10-17 DIAGNOSIS — I25.83 CORONARY ARTERY DISEASE DUE TO LIPID RICH PLAQUE: ICD-10-CM

## 2017-10-17 DIAGNOSIS — N28.9 RENAL INSUFFICIENCY: ICD-10-CM

## 2017-10-17 DIAGNOSIS — J15.9 PNEUMONIA, BACTERIAL: Primary | ICD-10-CM

## 2017-10-17 DIAGNOSIS — I10 ESSENTIAL HYPERTENSION: ICD-10-CM

## 2017-10-17 PROCEDURE — 80048 BASIC METABOLIC PNL TOTAL CA: CPT

## 2017-10-17 PROCEDURE — 85025 COMPLETE CBC W/AUTO DIFF WBC: CPT

## 2017-10-17 RX ORDER — GUAIFENESIN 600 MG/1
600 TABLET, EXTENDED RELEASE ORAL 2 TIMES DAILY
Qty: 14 TAB | Refills: 0 | Status: SHIPPED | OUTPATIENT
Start: 2017-10-17 | End: 2018-04-10

## 2017-10-17 RX ORDER — AMOXICILLIN AND CLAVULANATE POTASSIUM 875; 125 MG/1; MG/1
1 TABLET, FILM COATED ORAL EVERY 12 HOURS
Qty: 20 TAB | Refills: 0 | Status: SHIPPED | OUTPATIENT
Start: 2017-10-17 | End: 2018-01-02 | Stop reason: ALTCHOICE

## 2017-10-17 NOTE — LETTER
10/24/2017 5:38 PM 
 
[facility] [phone] Dear Jade Tripathi: 
 
Please find your most recent results below. Resulted Orders METABOLIC PANEL, BASIC Result Value Ref Range Glucose 196 (H) 65 - 99 mg/dL BUN 28 10 - 36 mg/dL Creatinine 1.83 (H) 0.57 - 1.00 mg/dL GFR est non-AA 24 (L) >59 mL/min/1.73 GFR est AA 27 (L) >59 mL/min/1.73  
 BUN/Creatinine ratio 15 12 - 28 Sodium 142 134 - 144 mmol/L Potassium 5.3 (H) 3.5 - 5.2 mmol/L Chloride 98 96 - 106 mmol/L  
 CO2 24 18 - 29 mmol/L Calcium 9.7 8.7 - 10.3 mg/dL Narrative Performed at:  [address]  [id number] : Darletta Dancer MD, Phone:  [phone] CBC WITH AUTOMATED DIFF Result Value Ref Range WBC 9.3 3.4 - 10.8 x10E3/uL  
 RBC 4.84 3.77 - 5.28 x10E6/uL HGB 14.8 11.1 - 15.9 g/dL HCT 46.6 34.0 - 46.6 % MCV 96 79 - 97 fL  
 MCH 30.6 26.6 - 33.0 pg  
 MCHC 31.8 31.5 - 35.7 g/dL  
 RDW 13.8 12.3 - 15.4 % PLATELET 261 671 - 423 x10E3/uL NEUTROPHILS 77 Not Estab. % Lymphocytes 12 Not Estab. % MONOCYTES 9 Not Estab. % EOSINOPHILS 2 Not Estab. % BASOPHILS 0 Not Estab. %  
 ABS. NEUTROPHILS 7.1 (H) 1.4 - 7.0 x10E3/uL Abs Lymphocytes 1.1 0.7 - 3.1 x10E3/uL  
 ABS. MONOCYTES 0.9 0.1 - 0.9 x10E3/uL  
 ABS. EOSINOPHILS 0.2 0.0 - 0.4 x10E3/uL  
 ABS. BASOPHILS 0.0 0.0 - 0.2 x10E3/uL IMMATURE GRANULOCYTES 0 Not Estab. %  
 ABS. IMM. GRANS. 0.0 0.0 - 0.1 x10E3/uL Narrative Performed at:  [address]  [id number] : Darletta Dancer MD, Phone:  [phone] CKD REPORT Result Value Ref Range Interpretation Note Comment:  
   Supplement report is available. Narrative Performed at:  [address] [place] [address]  [id number] : Chel Asencio PhD, Phone:  [phone] RECOMMENDATIONS: 
 
Kidney function little reduced. Need to drink more fluid. need to reduce Lasix to 6 days a week,off 1 day. Please call me if you have any questions: [phone] Sincerely, Tasha Walter MD

## 2017-10-17 NOTE — PROGRESS NOTES
Health Maintenance Due   Topic Date Due    DTaP/Tdap/Td series (1 - Tdap) 05/26/1947    INFLUENZA AGE 9 TO ADULT  08/01/2017       Chief Complaint   Patient presents with    Wheezing       1. Have you been to the ER, urgent care clinic since your last visit? Hospitalized since your last visit? No    2. Have you seen or consulted any other health care providers outside of the [address] [place] [address] since your last visit? Include any pap smears or colon screening. No    3) Do you have an Advance Directive on file? no    4) Are you interested in receiving information on Advance Directives? NO      Patient is accompanied by self I have received verbal consent from Faisal Goddard to discuss any/all medical information while they are present in the room.

## 2017-10-17 NOTE — PATIENT INSTRUCTIONS
Pneumonia: Care Instructions  Your Care Instructions    Pneumonia is an infection of the lungs. Most cases are caused by infections from bacteria or viruses. Pneumonia may be mild or very severe. If it is caused by bacteria, you will be treated with antibiotics. It may take a few weeks to a few months to recover fully from pneumonia, depending on how sick you were and whether your overall health is good. Follow-up care is a key part of your treatment and safety. Be sure to make and go to all appointments, and call your doctor if you are having problems. Its also a good idea to know your test results and keep a list of the medicines you take. How can you care for yourself at home? · Take your antibiotics exactly as directed. Do not stop taking the medicine just because you are feeling better. You need to take the full course of antibiotics. · Take your medicines exactly as prescribed. Call your doctor if you think you are having a problem with your medicine. · Get plenty of rest and sleep. You may feel weak and tired for a while, but your energy level will improve with time. · To prevent dehydration, drink plenty of fluids, enough so that your urine is light yellow or clear like water. Choose water and other caffeine-free clear liquids until you feel better. If you have kidney, heart, or liver disease and have to limit fluids, talk with your doctor before you increase the amount of fluids you drink. · Take care of your cough so you can rest. A cough that brings up mucus from your lungs is common with pneumonia. It is one way your body gets rid of the infection. But if coughing keeps you from resting or causes severe fatigue and chest-wall pain, talk to your doctor. He or she may suggest that you take a medicine to reduce the cough. · Use a vaporizer or humidifier to add moisture to your bedroom. Follow the directions for cleaning the machine. · Do not smoke or allow others to smoke around you.  Smoke will make your cough last longer. If you need help quitting, talk to your doctor about stop-smoking programs and medicines. These can increase your chances of quitting for good. · Take an over-the-counter pain medicine, such as acetaminophen (Tylenol), ibuprofen (Advil, Motrin), or naproxen (Aleve). Read and follow all instructions on the label. · Do not take two or more pain medicines at the same time unless the doctor told you to. Many pain medicines have acetaminophen, which is Tylenol. Too much acetaminophen (Tylenol) can be harmful. · If you were given a spirometer to measure how well your lungs are working, use it as instructed. This can help your doctor tell how your recovery is going. · To prevent pneumonia in the future, talk to your doctor about getting a flu vaccine (once a year) and a pneumococcal vaccine (one time only for most people). When should you call for help? Call 911 anytime you think you may need emergency care. For example, call if:  · You have severe trouble breathing. Call your doctor now or seek immediate medical care if:  · You cough up dark brown or bloody mucus (sputum). · You have new or worse trouble breathing. · You are dizzy or lightheaded, or you feel like you may faint. Watch closely for changes in your health, and be sure to contact your doctor if:  · You have a new or higher fever. · You are coughing more deeply or more often. · You are not getting better after 2 days (48 hours). · You do not get better as expected. Where can you learn more? Go to http://emma-rosina.info/. Enter [id number] in the search box to learn more about \"Pneumonia: Care Instructions. \"  Current as of: March 25, 2017  Content Version: 11.3  © 9967-2157 [employer] ([employer]). Care instructions adapted under license by [employer] (which disclaims liability or warranty for this information).  If you have questions about a medical condition or this instruction, always ask your healthcare professional. [place] [address] any warranty or liability for your use of this information.

## 2017-10-17 NOTE — MR AVS SNAPSHOT
Visit Information Date & Time Provider Department Dept. Phone Encounter #  
 10/17/2017 12:00 PM Reggie Sanders MD [facility] Internal Medicine [employer] [phone] [id number] Your Appointments 1/2/2018 10:30 AM  
Any with Reggie Sanders MD  
[facility] Appt Note: fu 4m  
 [address] [place] [address]. A [place] [id number] [phone]  
  
   
 [address] [place] [address]. [address] [place] [phone] Upcoming Health Maintenance Date Due DTaP/Tdap/Td series (1 - Tdap) 5/26/1947 INFLUENZA AGE 9 TO ADULT 8/1/2017 MEDICARE YEARLY EXAM 5/31/2018 GLAUCOMA SCREENING Q2Y 6/9/2019 Pneumococcal 65+ High/Highest Risk (2 of 2 - PPSV23) 11/4/2019 Allergies as of 10/17/2017  Review Complete On: 10/17/2017 By: Reggie Sanders MD  
  
 Severity Noted Reaction Type Reactions Neurontin [Gabapentin]  08/18/2015    Unknown (comments) Pt unknown reaction Current Immunizations  Reviewed on 8/1/2017 Name Date Influenza Vaccine 12/10/2015, 11/15/2014, 9/5/2013 Pneumococcal Vaccine (Unspecified Type) 11/4/2014 Zoster Vaccine, Live 6/6/2017 Not reviewed this visit You Were Diagnosed With   
  
 Codes Comments Pneumonia, bacterial    -  Primary ICD-10-CM: J15.9 ICD-9-CM: 482.9 Acute non-recurrent frontal sinusitis     ICD-10-CM: J01.10 ICD-9-CM: 015.9 Essential hypertension     ICD-10-CM: I10 
ICD-9-CM: 401.9 Coronary artery disease due to lipid rich plaque     ICD-10-CM: I25.10, I25.83 ICD-9-CM: 414.00, 414.3 Renal insufficiency     ICD-10-CM: N28.9 ICD-9-CM: 593.9 Vitals BP Pulse Temp Resp Height(growth percentile) SpO2  
 111/75 (BP 1 Location: Left arm, BP Patient Position: Sitting) 96 96.7 °F (35.9 °C) (Oral) 22 5' 1\" (1.549 m) 93% OB Status Smoking Status Postmenopausal Never Smoker Vitals History Preferred Pharmacy Pharmacy Name Phone [phone] [facility]. [phone] Your Updated Medication List  
  
   
This list is accurate as of: 10/17/17 12:43 PM.  Always use your most recent med list.  
  
  
  
  
 * albuterol 2.5 mg /3 mL (0.083 %) nebulizer solution Commonly known as:  PROVENTIL VENTOLIN  
INHALE THE CONTENTS OF 1 VIAL VIA NEBULIZER EVERY 6 HOURS AS NEEDED FOR WHEEZING  
  
 * albuterol 90 mcg/actuation inhaler Commonly known as:  PROVENTIL HFA Take 1 Puff by inhalation every six (6) hours as needed for Wheezing. ammonium lactate 12 % lotion Commonly known as:  LAC-HYDRIN  
rub in to affected area well  
  
 amoxicillin-clavulanate 875-125 mg per tablet Commonly known as:  AUGMENTIN Take 1 Tab by mouth every twelve (12) hours. calcium-cholecalciferol (D3) tablet Commonly known as:  CALTRATE 600+D TAKE 1 TABLET BY MOUTH TWICE DAILY DX: SUPPLEMENT  
  
 diphenhydrAMINE 12.5 mg/5 mL syrup Commonly known as:  BENADRYL Take 5 mL by mouth two (2) times daily as needed. DOC-Q-LACE 100 mg capsule Generic drug:  docusate sodium TAKE 1 CAPSULE BY MOUTH TWICE DAILY DX:BOWEL SUPPORT  
  
 donepezil 5 mg tablet Commonly known as:  ARICEPT  
TAKE 1 TABLET BY MOUTH EVERY NIGHT AT BEDTIME DX: MEMORY SUPPORT  
  
 ELIQUIS 2.5 mg tablet Generic drug:  apixaban TAKE 1 TABLET BY MOUTH 2 TIMES A DAY. DX ANTICOAGULANT  
  
 furosemide 40 mg tablet Commonly known as:  LASIX Take 40 mg by mouth daily. guaiFENesin  mg ER tablet Commonly known as:  Marc & Marc Take 1 Tab by mouth two (2) times a day. hydrocortisone 2.5 % lotion Commonly known as:  HYTONE  
APPLY A THIN LAYER TO AFFECTED AREA 2 TIMES DAILY. lisinopril 10 mg tablet Commonly known as:  Tennyson Lights Take 1 Tab by mouth daily. Mapap 325 mg tablet Generic drug:  acetaminophen TAKE 2 TABS BY MOUTH EVERY 6 HOURS AT 8A, 2P AND 8P DX:PAIN *MAX 3000MG/DAY OF APAP* metoprolol succinate 25 mg XL tablet Commonly known as:  TOPROL-XL  
TAKE 1 TABLET BY MOUTH EVERY DAY DX: HTN  
  
 MILK OF MAGNESIA 400 mg/5 mL suspension Generic drug:  magnesium hydroxide TAKE 30ML BY MOUTH EVERY 24 HOURS AS NEEDED FOR CONSTIPATION  
  
 miscellaneous medical supply Misc Commonly known as:  ANTI-EMBOLISM STOCKINGS Use below knee stockings 15 to 30 mm HG.  
  
 polyethylene glycol 17 gram/dose powder Commonly known as:  MIRALAX  
MIX THE CONTENTS OF 1 HEAPING TABLESPOON (=17GRAMS) WITH 8 OUNCES OF WATER AND DRINK DAILY DX:BOWEL AIDE  
  
 pravastatin 40 mg tablet Commonly known as:  PRAVACHOL  
TAKE 1 TABLET BY MOUTH EVERY NIGHT AT BEDTIME DX: CHOLESTEROL  
  
 raNITIdine 150 mg tablet Commonly known as:  ZANTAC Take 150 mg by mouth nightly. SENEXON-S 8.6-50 mg per tablet Generic drug:  senna-docusate TAKE 1 TABLET BY MOUTH DAILY AS NEEDED FOR CONSTIPATION  
  
 venlafaxine-SR 75 mg capsule Commonly known as:  EFFEXOR-XR  
TAKE 1 CAPSULE BY MOUTH EVERY DAY DX:ANTIDEPRESSANT  
  
 VITAMIN B-12 500 mcg tablet Generic drug:  cyanocobalamin TAKE 1 TABLET BY MOUTH EVERY DAY DX: SUPPLEMENT * Notice: This list has 2 medication(s) that are the same as other medications prescribed for you. Read the directions carefully, and ask your doctor or other care provider to review them with you. Prescriptions Sent to Pharmacy Refills  
 amoxicillin-clavulanate (AUGMENTIN) 875-125 mg per tablet 0 Sig: Take 1 Tab by mouth every twelve (12) hours. Class: Normal  
 Pharmacy: [address] [place] [address] [facility]. Ph #: [phone] Route: Oral  
 guaiFENesin ER (MUCINEX) 600 mg ER tablet 0 Sig: Take 1 Tab by mouth two (2) times a day. Class: Normal  
 Pharmacy: [address] [place] [address] [facility]. Ph #: [phone] Route: Oral  
  
We Performed the Following CBC WITH AUTOMATED DIFF [02554 CPT(R)] METABOLIC PANEL, BASIC [48544 CPT(R)] To-Do List   
 10/18/2017 Imaging:  XR CHEST PA LAT Patient Instructions Pneumonia: Care Instructions Your Care Instructions Pneumonia is an infection of the lungs. Most cases are caused by infections from bacteria or viruses. Pneumonia may be mild or very severe. If it is caused by bacteria, you will be treated with antibiotics. It may take a few weeks to a few months to recover fully from pneumonia, depending on how sick you were and whether your overall health is good. Follow-up care is a key part of your treatment and safety. Be sure to make and go to all appointments, and call your doctor if you are having problems. Its also a good idea to know your test results and keep a list of the medicines you take. How can you care for yourself at home? · Take your antibiotics exactly as directed. Do not stop taking the medicine just because you are feeling better. You need to take the full course of antibiotics. · Take your medicines exactly as prescribed. Call your doctor if you think you are having a problem with your medicine. · Get plenty of rest and sleep. You may feel weak and tired for a while, but your energy level will improve with time. · To prevent dehydration, drink plenty of fluids, enough so that your urine is light yellow or clear like water. Choose water and other caffeine-free clear liquids until you feel better. If you have kidney, heart, or liver disease and have to limit fluids, talk with your doctor before you increase the amount of fluids you drink. · Take care of your cough so you can rest. A cough that brings up mucus from your lungs is common with pneumonia. It is one way your body gets rid of the infection. But if coughing keeps you from resting or causes severe fatigue and chest-wall pain, talk to your doctor.  He or she may suggest that you take a medicine to reduce the cough. · Use a vaporizer or humidifier to add moisture to your bedroom. Follow the directions for cleaning the machine. · Do not smoke or allow others to smoke around you. Smoke will make your cough last longer. If you need help quitting, talk to your doctor about stop-smoking programs and medicines. These can increase your chances of quitting for good. · Take an over-the-counter pain medicine, such as acetaminophen (Tylenol), ibuprofen (Advil, Motrin), or naproxen (Aleve). Read and follow all instructions on the label. · Do not take two or more pain medicines at the same time unless the doctor told you to. Many pain medicines have acetaminophen, which is Tylenol. Too much acetaminophen (Tylenol) can be harmful. · If you were given a spirometer to measure how well your lungs are working, use it as instructed. This can help your doctor tell how your recovery is going. · To prevent pneumonia in the future, talk to your doctor about getting a flu vaccine (once a year) and a pneumococcal vaccine (one time only for most people). When should you call for help? Call 911 anytime you think you may need emergency care. For example, call if: 
· You have severe trouble breathing. Call your doctor now or seek immediate medical care if: 
· You cough up dark brown or bloody mucus (sputum). · You have new or worse trouble breathing. · You are dizzy or lightheaded, or you feel like you may faint. Watch closely for changes in your health, and be sure to contact your doctor if: 
· You have a new or higher fever. · You are coughing more deeply or more often. · You are not getting better after 2 days (48 hours). · You do not get better as expected. Where can you learn more? Go to http://emma-rosina.info/. Enter [id number] in the search box to learn more about \"Pneumonia: Care Instructions. \" Current as of: March 25, 2017 Content Version: 11.3 © 2781-8009 Healthwise, Incorporated. Care instructions adapted under license by [employer] (which disclaims liability or warranty for this information). If you have questions about a medical condition or this instruction, always ask your healthcare professional. [address] any warranty or liability for your use of this information. Introducing [facility]! Shanti Morris introduces [employer] patient portal. Now you can access parts of your medical record, email your doctor's office, and request medication refills online. 1. In your internet browser, go to [email or web address] 2. Click on the First Time User? Click Here link in the Sign In box. You will see the New Member Sign Up page. 3. Enter your [employer] Access Code exactly as it appears below. You will not need to use this code after youve completed the sign-up process. If you do not sign up before the expiration date, you must request a new code. · [employer] Access Code: [email or web address] Expires: 1/15/2018 12:43 PM 
 
4. Enter the last four digits of your Social Security Number (xxxx) and Date of Birth (mm/dd/yyyy) as indicated and click Submit. You will be taken to the next sign-up page. 5. Create a [employer] ID. This will be your [employer] login ID and cannot be changed, so think of one that is secure and easy to remember. 6. Create a [employer] password. You can change your password at any time. 7. Enter your Password Reset Question and Answer. This can be used at a later time if you forget your password. 8. Enter your e-mail address. You will receive e-mail notification when new information is available in [address] 9. Click Sign Up. You can now view and download portions of your medical record. 10. Click the Download Summary menu link to download a portable copy of your medical information.  
 
If you have questions, please visit the Frequently Asked Questions section of the [employer]. Remember, [employer] is NOT to be used for urgent needs. For medical emergencies, dial 911. Now available from your iPhone and Android! Please provide this summary of care documentation to your next provider. Your primary care clinician is listed as Jenifer Bass. If you have any questions after today's visit, please call [phone].

## 2017-10-17 NOTE — PROGRESS NOTES
HISTORY OF PRESENT ILLNESS  Raysa Shipley is a 80 y.o. female here accompanied by her daughter today. She has accidentally step on a craft tool and fell on her side. she was not able to get back,was on floor little long. reports lower back pain since her fall. No bowel bladder problem. Already had compression fracture in her back. She has dry skin and itching day and night. want to refill lotion. Has CAD and HTN,complaint with meds. Labs reviewed. Here for medicare wellness visit. has  living will. Wheezing    Pertinent negatives include no fever. Cold Symptoms   Associated symptoms include wheezing. Pertinent negatives include no chills. Follow-up     Hypertension      Cholesterol Problem         Review of Systems   Constitutional: Negative. Negative for chills and fever. Eyes: Negative. Respiratory: Positive for wheezing. Cardiovascular: Negative. Gastrointestinal: Negative. Genitourinary: Negative. Musculoskeletal: Positive for back pain, falls and joint pain. Skin: Positive for itching. Neurological: Negative. Endo/Heme/Allergies: Negative. Psychiatric/Behavioral: Positive for memory loss. Physical Exam   Constitutional: She appears well-developed and well-nourished. No distress. HENT:   Head: Normocephalic and atraumatic. Right Ear: External ear normal.   Left Ear: External ear normal.   Mouth/Throat: Oropharynx is clear and moist. No oropharyngeal exudate. Pharynx;mildly congested. no exudate  Nasal turbinates:red inflamed,NT    Cobble stoning present. Neck: Normal range of motion. Neck supple. No JVD present. No thyromegaly present. Cardiovascular: Normal rate, regular rhythm, normal heart sounds and intact distal pulses. Pulmonary/Chest: Effort normal. No respiratory distress. She has wheezes. She has rales. Bibasilar crackles and rhonchi and wheezing present   Musculoskeletal: She exhibits no edema or tenderness.    Neurological: She exhibits normal muscle tone. Skin: Skin is dry. Psychiatric: She has a normal mood and affect. Her behavior is normal.       ASSESSMENT and PLAN  Diagnoses and all orders for this visit:    1. Pneumonia, bacterial    Probable. Will do,  -     XR CHEST PA LAT; Future  -     amoxicillin-clavulanate (AUGMENTIN) 875-125 mg per tablet; Take 1 Tab by mouth every twelve (12) hours. -     guaiFENesin ER (MUCINEX) 600 mg ER tablet; Take 1 Tab by mouth two (2) times a day. -     CBC WITH AUTOMATED DIFF    2. Acute non-recurrent frontal sinusitis  Sinus wash. Will do,  -     amoxicillin-clavulanate (AUGMENTIN) 875-125 mg per tablet; Take 1 Tab by mouth every twelve (12) hours. 3. Essential hypertension    Stable. on med. -     METABOLIC PANEL, BASIC    4. Coronary artery disease due to lipid rich plaque  Will do,  -     METABOLIC PANEL, BASIC  -     CBC WITH AUTOMATED DIFF    5. Renal insufficiency  Cr was elevated. -     METABOLIC PANEL, BASIC          Discussed expected course/resolution/complications of diagnosis in detail with patient. Medication risks/benefits/costs/interactions/alternatives discussed with patient. Pt was given an after visit summary which includes diagnoses, current medications & vitals. Pt expressed understanding with the diagnosis and plan.

## 2017-10-18 LAB
BASOPHILS # BLD AUTO: 0 X10E3/UL (ref 0–0.2)
BASOPHILS NFR BLD AUTO: 0 %
BUN SERPL-MCNC: 28 MG/DL (ref 10–36)
BUN/CREAT SERPL: 15 (ref 12–28)
CALCIUM SERPL-MCNC: 9.7 MG/DL (ref 8.7–10.3)
CHLORIDE SERPL-SCNC: 98 MMOL/L (ref 96–106)
CO2 SERPL-SCNC: 24 MMOL/L (ref 18–29)
CREAT SERPL-MCNC: 1.83 MG/DL (ref 0.57–1)
EOSINOPHIL # BLD AUTO: 0.2 X10E3/UL (ref 0–0.4)
EOSINOPHIL NFR BLD AUTO: 2 %
ERYTHROCYTE [DISTWIDTH] IN BLOOD BY AUTOMATED COUNT: 13.8 % (ref 12.3–15.4)
GLUCOSE SERPL-MCNC: 196 MG/DL (ref 65–99)
HCT VFR BLD AUTO: 46.6 % (ref 34–46.6)
HGB BLD-MCNC: 14.8 G/DL (ref 11.1–15.9)
IMM GRANULOCYTES # BLD: 0 X10E3/UL (ref 0–0.1)
IMM GRANULOCYTES NFR BLD: 0 %
INTERPRETATION: NORMAL
LYMPHOCYTES # BLD AUTO: 1.1 X10E3/UL (ref 0.7–3.1)
LYMPHOCYTES NFR BLD AUTO: 12 %
MCH RBC QN AUTO: 30.6 PG (ref 26.6–33)
MCHC RBC AUTO-ENTMCNC: 31.8 G/DL (ref 31.5–35.7)
MCV RBC AUTO: 96 FL (ref 79–97)
MONOCYTES # BLD AUTO: 0.9 X10E3/UL (ref 0.1–0.9)
MONOCYTES NFR BLD AUTO: 9 %
NEUTROPHILS # BLD AUTO: 7.1 X10E3/UL (ref 1.4–7)
NEUTROPHILS NFR BLD AUTO: 77 %
PLATELET # BLD AUTO: 190 X10E3/UL (ref 150–379)
POTASSIUM SERPL-SCNC: 5.3 MMOL/L (ref 3.5–5.2)
RBC # BLD AUTO: 4.84 X10E6/UL (ref 3.77–5.28)
SODIUM SERPL-SCNC: 142 MMOL/L (ref 134–144)
WBC # BLD AUTO: 9.3 X10E3/UL (ref 3.4–10.8)

## 2017-10-23 NOTE — PROGRESS NOTES
Kidney function little reduced. need to drink more fluid. need to reduce lasix to 6 days a week,off 1 day.

## 2017-10-24 DIAGNOSIS — M54.50 CHRONIC BILATERAL LOW BACK PAIN WITHOUT SCIATICA: ICD-10-CM

## 2017-10-24 DIAGNOSIS — G89.29 CHRONIC BILATERAL LOW BACK PAIN WITHOUT SCIATICA: ICD-10-CM

## 2017-10-25 RX ORDER — ACETAMINOPHEN 325 MG/1
TABLET ORAL
Qty: 180 TAB | Refills: 0 | Status: SHIPPED | OUTPATIENT
Start: 2017-10-25 | End: 2017-11-21 | Stop reason: SDUPTHER

## 2017-11-21 DIAGNOSIS — M54.50 CHRONIC BILATERAL LOW BACK PAIN WITHOUT SCIATICA: ICD-10-CM

## 2017-11-21 DIAGNOSIS — G89.29 CHRONIC BILATERAL LOW BACK PAIN WITHOUT SCIATICA: ICD-10-CM

## 2017-11-21 RX ORDER — ACETAMINOPHEN 325 MG/1
TABLET ORAL
Qty: 180 TAB | Refills: 0 | Status: SHIPPED | OUTPATIENT
Start: 2017-11-21 | End: 2018-02-20 | Stop reason: SDUPTHER

## 2017-11-21 RX ORDER — CYANOCOBALAMIN (VITAMIN B-12) 500 MCG
TABLET ORAL
Qty: 30 TAB | Refills: 5 | Status: SHIPPED | OUTPATIENT
Start: 2017-11-21 | End: 2019-01-01 | Stop reason: SDUPTHER

## 2017-11-21 RX ORDER — METOPROLOL SUCCINATE 25 MG/1
TABLET, EXTENDED RELEASE ORAL
Qty: 30 TAB | Refills: 5 | Status: SHIPPED | OUTPATIENT
Start: 2017-11-21 | End: 2018-01-19 | Stop reason: SDUPTHER

## 2017-11-21 RX ORDER — VENLAFAXINE HYDROCHLORIDE 75 MG/1
CAPSULE, EXTENDED RELEASE ORAL
Qty: 30 CAP | Refills: 5 | Status: SHIPPED | OUTPATIENT
Start: 2017-11-21 | End: 2018-09-18 | Stop reason: SDUPTHER

## 2017-11-21 RX ORDER — DONEPEZIL HYDROCHLORIDE 5 MG/1
TABLET, FILM COATED ORAL
Qty: 30 TAB | Refills: 5 | Status: SHIPPED | OUTPATIENT
Start: 2017-11-21

## 2018-01-02 ENCOUNTER — OFFICE VISIT (OUTPATIENT)
Dept: INTERNAL MEDICINE CLINIC | Age: 83
End: 2018-01-02

## 2018-01-02 ENCOUNTER — HOSPITAL ENCOUNTER (OUTPATIENT)
Dept: LAB | Age: 83
Discharge: HOME | End: 2018-01-02
Payer: MEDICARE

## 2018-01-02 VITALS
BODY MASS INDEX: 31.91 KG/M2 | HEIGHT: 61 IN | HEART RATE: 68 BPM | DIASTOLIC BLOOD PRESSURE: 59 MMHG | TEMPERATURE: 98.7 F | OXYGEN SATURATION: 97 % | WEIGHT: 169 LBS | RESPIRATION RATE: 22 BRPM | SYSTOLIC BLOOD PRESSURE: 113 MMHG

## 2018-01-02 DIAGNOSIS — S99.921A INJURY OF RIGHT FOOT, INITIAL ENCOUNTER: Primary | ICD-10-CM

## 2018-01-02 DIAGNOSIS — I10 ESSENTIAL HYPERTENSION: ICD-10-CM

## 2018-01-02 DIAGNOSIS — F32.A DEPRESSION, UNSPECIFIED DEPRESSION TYPE: ICD-10-CM

## 2018-01-02 DIAGNOSIS — I25.10 CORONARY ARTERY DISEASE DUE TO LIPID RICH PLAQUE: ICD-10-CM

## 2018-01-02 DIAGNOSIS — I50.22 CHRONIC SYSTOLIC CONGESTIVE HEART FAILURE (HCC): ICD-10-CM

## 2018-01-02 DIAGNOSIS — I25.83 CORONARY ARTERY DISEASE DUE TO LIPID RICH PLAQUE: ICD-10-CM

## 2018-01-02 PROBLEM — F33.9 RECURRENT DEPRESSION (HCC): Status: ACTIVE | Noted: 2018-01-02

## 2018-01-02 PROCEDURE — 80053 COMPREHEN METABOLIC PANEL: CPT

## 2018-01-02 RX ORDER — FUROSEMIDE 40 MG/1
TABLET ORAL
Qty: 26 TAB | Refills: 5 | Status: ON HOLD | OUTPATIENT
Start: 2018-01-02 | End: 2018-08-22

## 2018-01-02 RX ORDER — FUROSEMIDE 40 MG/1
TABLET ORAL DAILY
COMMUNITY
End: 2018-01-02 | Stop reason: SDUPTHER

## 2018-01-02 NOTE — MR AVS SNAPSHOT
Visit Information Date & Time Provider Department Dept. Phone Encounter #  
 1/2/2018 10:30 AM Kaia Polanco MD [facility] Internal Medicine [facility] [phone] [id number] Upcoming Health Maintenance Date Due DTaP/Tdap/Td series (1 - Tdap) 5/26/1947 Influenza Age 5 to Adult 8/1/2017 MEDICARE YEARLY EXAM 5/31/2018 GLAUCOMA SCREENING Q2Y 6/9/2019 Pneumococcal 65+ High/Highest Risk (2 of 2 - PPSV23) 11/4/2019 Allergies as of 1/2/2018  Review Complete On: 1/2/2018 By: Kaia Polanco MD  
  
 Severity Noted Reaction Type Reactions Neurontin [Gabapentin]  08/18/2015    Unknown (comments) Pt unknown reaction Current Immunizations  Reviewed on 8/1/2017 Name Date Influenza Vaccine 12/10/2015, 11/15/2014, 9/5/2013 Pneumococcal Vaccine (Unspecified Type) 11/4/2014 Zoster Vaccine, Live 6/6/2017 Not reviewed this visit You Were Diagnosed With   
  
 Codes Comments Injury of right foot, initial encounter    -  Primary ICD-10-CM: C68.732R ICD-9-CM: 131. 7 Chronic systolic congestive heart failure (HCC)     ICD-10-CM: I50.22 ICD-9-CM: 428.22, 428.0 Essential hypertension     ICD-10-CM: I10 
ICD-9-CM: 401.9 Coronary artery disease due to lipid rich plaque     ICD-10-CM: I25.10, I25.83 ICD-9-CM: 414.00, 414.3 Depression, unspecified depression type     ICD-10-CM: F32.9 ICD-9-CM: 050 Vitals BP Pulse Temp Resp Height(growth percentile) Weight(growth percentile) 113/59 (BP 1 Location: Right arm, BP Patient Position: Sitting) 68 98.7 °F (37.1 °C) (Oral) 22 5' 1\" (1.549 m) 169 lb (76.7 kg) SpO2 BMI OB Status Smoking Status 97% 31.93 kg/m2 Postmenopausal Never Smoker Vitals History BMI and BSA Data Body Mass Index Body Surface Area  
 31.93 kg/m 2 1.82 m 2 Preferred Pharmacy Pharmacy Name Phone [address] [phone] Your Updated Medication List  
  
   
This list is accurate as of: 1/2/18 12:08 PM.  Always use your most recent med list.  
  
  
  
  
 * albuterol 2.5 mg /3 mL (0.083 %) nebulizer solution Commonly known as:  PROVENTIL VENTOLIN  
INHALE THE CONTENTS OF 1 VIAL VIA NEBULIZER EVERY 6 HOURS AS NEEDED FOR WHEEZING  
  
 * albuterol 90 mcg/actuation inhaler Commonly known as:  PROVENTIL HFA Take 1 Puff by inhalation every six (6) hours as needed for Wheezing. ammonium lactate 12 % lotion Commonly known as:  LAC-HYDRIN  
rub in to affected area well  
  
 calcium-cholecalciferol (D3) tablet Commonly known as:  CALTRATE 600+D TAKE 1 TABLET BY MOUTH TWICE DAILY DX: SUPPLEMENT  
  
 diphenhydrAMINE 12.5 mg/5 mL syrup Commonly known as:  BENADRYL Take 5 mL by mouth two (2) times daily as needed. DOC-Q-LACE 100 mg capsule Generic drug:  docusate sodium TAKE 1 CAPSULE BY MOUTH TWICE DAILY DX:BOWEL SUPPORT  
  
 donepezil 5 mg tablet Commonly known as:  ARICEPT  
TAKE 1 TABLET BY MOUTH EVERY NIGHT AT BEDTIME DX: MEMORY SUPPORT  
  
 ELIQUIS 2.5 mg tablet Generic drug:  apixaban TAKE 1 TABLET BY MOUTH 2 TIMES A DAY. DX ANTICOAGULANT  
  
 furosemide 40 mg tablet Commonly known as:  LASIX Take 1 tab po every day from  Monday through [facility] sunday  
  
 guaiFENesin  mg ER tablet Commonly known as:  Marc & Marc Take 1 Tab by mouth two (2) times a day. hydrocortisone 2.5 % lotion Commonly known as:  HYTONE  
APPLY A THIN LAYER TO AFFECTED AREA 2 TIMES DAILY. lisinopril 10 mg tablet Commonly known as:  Samina Cockayne Take 1 Tab by mouth daily. Mapap 325 mg tablet Generic drug:  acetaminophen TAKE 2 TABS BY MOUTH 3 TIMES DAILY AT 8A, 2P AND 8P DX:PAIN *MAX 3000MG/DAY OF APAP* metoprolol succinate 25 mg XL tablet Commonly known as:  TOPROL-XL  
TAKE 1 TABLET BY MOUTH EVERY DAY DX: HTN  
  
 MILK OF MAGNESIA 400 mg/5 mL suspension Generic drug:  magnesium hydroxide TAKE 30ML BY MOUTH EVERY 24 HOURS AS NEEDED FOR CONSTIPATION  
  
 miscellaneous medical supply Misc Commonly known as:  ANTI-EMBOLISM STOCKINGS Use below knee stockings 15 to 30 mm HG.  
  
 polyethylene glycol 17 gram/dose powder Commonly known as:  MIRALAX  
MIX THE CONTENTS OF 1 HEAPING TABLESPOON (=17GRAMS) WITH 8 OUNCES OF WATER AND DRINK DAILY DX:BOWEL AIDE  
  
 pravastatin 40 mg tablet Commonly known as:  PRAVACHOL  
TAKE 1 TABLET BY MOUTH EVERY NIGHT AT BEDTIME DX: CHOLESTEROL  
  
 raNITIdine 150 mg tablet Commonly known as:  ZANTAC Take 150 mg by mouth nightly. SENEXON-S 8.6-50 mg per tablet Generic drug:  senna-docusate TAKE 1 TABLET BY MOUTH DAILY AS NEEDED FOR CONSTIPATION  
  
 venlafaxine-SR 75 mg capsule Commonly known as:  EFFEXOR-XR  
TAKE 1 CAPSULE BY MOUTH EVERY DAY DX:ANTIDEPRESSANT  
  
 VITAMIN B-12 500 mcg tablet Generic drug:  cyanocobalamin TAKE 1 TABLET BY MOUTH EVERY DAY DX: SUPPLEMENT * Notice: This list has 2 medication(s) that are the same as other medications prescribed for you. Read the directions carefully, and ask your doctor or other care provider to review them with you. Prescriptions Sent to Pharmacy Refills  
 furosemide (LASIX) 40 mg tablet 5 Sig: Take 1 tab po every day from  Monday through [facility] sunday Class: Normal  
 Pharmacy: [address] [place] [address]. Ph #: [phone] We Performed the Following METABOLIC PANEL, COMPREHENSIVE [52087 CPT(R)] To-Do List   
 01/03/2018 Imaging:  XR FOOT RT MIN 3 V Introducing [facility]! Erma Denise introduces [employer] patient portal. Now you can access parts of your medical record, email your doctor's office, and request medication refills online. 1. In your internet browser, go to [email or web address] 2. Click on the First Time User? Click Here link in the Sign In box. You will see the New Member Sign Up page. 3. Enter your [employer] Access Code exactly as it appears below. You will not need to use this code after youve completed the sign-up process. If you do not sign up before the expiration date, you must request a new code. · [employer] Access Code: [email or web address] Expires: 1/15/2018 11:43 AM 
 
4. Enter the last four digits of your Social Security Number (xxxx) and Date of Birth (mm/dd/yyyy) as indicated and click Submit. You will be taken to the next sign-up page. 5. Create a [employer] ID. This will be your [employer] login ID and cannot be changed, so think of one that is secure and easy to remember. 6. Create a [employer] password. You can change your password at any time. 7. Enter your Password Reset Question and Answer. This can be used at a later time if you forget your password. 8. Enter your e-mail address. You will receive e-mail notification when new information is available in [address] 9. Click Sign Up. You can now view and download portions of your medical record. 10. Click the Download Summary menu link to download a portable copy of your medical information. If you have questions, please visit the Frequently Asked Questions section of the [employer] website. Remember, [employer] is NOT to be used for urgent needs. For medical emergencies, dial 911. Now available from your iPhone and Android! Please provide this summary of care documentation to your next provider. Your primary care clinician is listed as Johnson Campbell. If you have any questions after today's visit, please call [phone].

## 2018-01-02 NOTE — LETTER
1/4/2018 1:48 PM 
 
Ms. Osbaldo Aguila [phone] Dear Osbaldo Durant: 
 
Please find your most recent results below. Resulted Orders METABOLIC PANEL, COMPREHENSIVE Result Value Ref Range Glucose 120 (H) 65 - 99 mg/dL Comment:  
   Specimen received in contact with cells. No visible hemolysis 
present. However GLUC may be decreased and K increased. Clinical 
correlation indicated. BUN 24 10 - 36 mg/dL Creatinine 1.42 (H) 0.57 - 1.00 mg/dL GFR est non-AA 32 (L) >59 mL/min/1.73 GFR est AA 37 (L) >59 mL/min/1.73  
 BUN/Creatinine ratio 17 12 - 28 Sodium 149 (H) 134 - 144 mmol/L Potassium 5.0 3.5 - 5.2 mmol/L Comment:  
   Specimen received in contact with cells. No visible hemolysis 
present. However GLUC may be decreased and K increased. Clinical 
correlation indicated. Chloride 103 96 - 106 mmol/L  
 CO2 27 18 - 29 mmol/L Calcium 9.4 8.7 - 10.3 mg/dL Protein, total 6.5 6.0 - 8.5 g/dL Albumin 4.1 3.2 - 4.6 g/dL GLOBULIN, TOTAL 2.4 1.5 - 4.5 g/dL A-G Ratio 1.7 1.2 - 2.2 Bilirubin, total 0.3 0.0 - 1.2 mg/dL Alk. phosphatase 57 39 - 117 IU/L  
 AST (SGOT) 18 0 - 40 IU/L  
 ALT (SGPT) 7 0 - 32 IU/L Narrative Specimen Comment: A duplicate report has been generated due to demographic  
updates. Performed at:  [address]  [id number] : Arlice Meckel MD, Phone:  [phone] CKD REPORT Result Value Ref Range Interpretation Note Comment:  
   Supplemental report is available. Narrative Specimen Comment: A duplicate report has been generated due to demographic  
updates. Performed at:  [id number] [place] [address]  [id number] : Isaac Galvin PhD, Phone:  [phone] RECOMMENDATIONS: 
Osbaldo Durant your labs indicate that your kidney functions have improved, keep up the good work! Please call me if you have any questions: [phone] Sincerely, Nidia Norris MD

## 2018-01-02 NOTE — PROGRESS NOTES
Health Maintenance Due   Topic Date Due    DTaP/Tdap/Td series (1 - Tdap) 05/26/1947    Influenza Age 5 to Adult  08/01/2017       Chief Complaint   Patient presents with    Hypertension     4 month follow up    CHF    Dementia    Anxiety       1. Have you been to the ER, urgent care clinic since your last visit? Hospitalized since your last visit? No    2. Have you seen or consulted any other health care providers outside of the [address] [place] [address] since your last visit? Include any pap smears or colon screening. No    3) Do you have an Advance Directive on file? no    4) Are you interested in receiving information on Advance Directives? NO      Patient is accompanied by self I have received verbal consent from Omi Goldman to discuss any/all medical information while they are present in the room.

## 2018-01-02 NOTE — PROGRESS NOTES
HISTORY OF PRESENT ILLNESS  Manoj Navarro is a 80 y.o. female here the complaining of pain on right foot. She hurt her foot 2 days back. No fall. Has congestive heart failure, no shortness of breath orthopnea. She is worried since she is on Lasix every day. She was told to take Lasix 26 days a week. Her assisted-living is continuing it every day. Last lab work has elevated creatinine. Has CAD and HTN,complaint with meds. Labs reviewed. Depression seems stable. Hypertension    Associated symptoms include anxiety. CHF     Dementia    Her past medical history is significant for hypertension. Foot Pain     Follow-up         Review of Systems   Constitutional: Negative. Negative for chills and fever. Eyes: Negative. Respiratory: Negative. Cardiovascular: Negative. Gastrointestinal: Negative. Genitourinary: Negative. Musculoskeletal: Positive for joint pain. Skin: Negative. Neurological: Negative. Endo/Heme/Allergies: Negative. Psychiatric/Behavioral: Positive for memory loss. Physical Exam   Constitutional: She appears well-developed and well-nourished. No distress. HENT:        Neck: Normal range of motion. Neck supple. No JVD present. No thyromegaly present. Cardiovascular: Normal rate, regular rhythm, normal heart sounds and intact distal pulses. Pulmonary/Chest: Effort normal. No respiratory distress. She has no wheezes. Musculoskeletal: She exhibits no edema or tenderness. Skin: Skin is dry. Psychiatric: She has a normal mood and affect. Her behavior is normal.       ASSESSMENT and PLAN  Diagnoses and all orders for this visit:    1. Injury of right foot, initial encounter    We will order,  -     XR FOOT RT MIN 3 V; Future    2. Chronic systolic congestive heart failure ([facility] [address]  She has low ejection fraction. Taking Lasix 40 mg once a day. Was advised to take 6 days a week only. We will check CMP.   Will order,    -     furosemide (LASIX) 40 mg tablet; Take 1 tab po every day from  Monday through [address] [facility], COMPREHENSIVE    3. Essential hypertension    Stable on current regimen. We will check,  -     METABOLIC PANEL, COMPREHENSIVE    4. Coronary artery disease due to lipid rich plaque  On Toprol Eliquis and statin. Will check,    -     METABOLIC PANEL, COMPREHENSIVE    5. Depression, unspecified depression type  Stable. Discussed expected course/resolution/complications of diagnosis in detail with patient. Medication risks/benefits/costs/interactions/alternatives discussed with patient. Pt was given an after visit summary which includes diagnoses, current medications & vitals. Pt expressed understanding with the diagnosis and plan.

## 2018-01-03 LAB
ALBUMIN SERPL-MCNC: 4.1 G/DL (ref 3.2–4.6)
ALBUMIN/GLOB SERPL: 1.7 {RATIO} (ref 1.2–2.2)
ALP SERPL-CCNC: 57 IU/L (ref 39–117)
ALT SERPL-CCNC: 7 IU/L (ref 0–32)
AST SERPL-CCNC: 18 IU/L (ref 0–40)
BILIRUB SERPL-MCNC: 0.3 MG/DL (ref 0–1.2)
BUN SERPL-MCNC: 24 MG/DL (ref 10–36)
BUN/CREAT SERPL: 17 (ref 12–28)
CALCIUM SERPL-MCNC: 9.4 MG/DL (ref 8.7–10.3)
CHLORIDE SERPL-SCNC: 103 MMOL/L (ref 96–106)
CO2 SERPL-SCNC: 27 MMOL/L (ref 18–29)
CREAT SERPL-MCNC: 1.42 MG/DL (ref 0.57–1)
GLOBULIN SER CALC-MCNC: 2.4 G/DL (ref 1.5–4.5)
GLUCOSE SERPL-MCNC: 120 MG/DL (ref 65–99)
INTERPRETATION: NORMAL
POTASSIUM SERPL-SCNC: 5 MMOL/L (ref 3.5–5.2)
PROT SERPL-MCNC: 6.5 G/DL (ref 6–8.5)
SODIUM SERPL-SCNC: 149 MMOL/L (ref 134–144)

## 2018-01-22 RX ORDER — METOPROLOL SUCCINATE 25 MG/1
TABLET, EXTENDED RELEASE ORAL
Qty: 30 TAB | Refills: 1 | Status: SHIPPED | OUTPATIENT
Start: 2018-01-22 | End: 2018-03-21 | Stop reason: SDUPTHER

## 2018-01-24 DIAGNOSIS — M54.50 CHRONIC MIDLINE LOW BACK PAIN WITHOUT SCIATICA: Primary | ICD-10-CM

## 2018-01-24 DIAGNOSIS — G89.29 CHRONIC MIDLINE LOW BACK PAIN WITHOUT SCIATICA: Primary | ICD-10-CM

## 2018-01-25 RX ORDER — TRAMADOL HYDROCHLORIDE 50 MG/1
50 TABLET ORAL DAILY
Qty: 30 TAB | Refills: 2 | Status: SHIPPED | OUTPATIENT
Start: 2018-01-25 | End: 2018-04-10 | Stop reason: SDUPTHER

## 2018-02-20 DIAGNOSIS — M54.50 CHRONIC BILATERAL LOW BACK PAIN WITHOUT SCIATICA: ICD-10-CM

## 2018-02-20 DIAGNOSIS — G89.29 CHRONIC BILATERAL LOW BACK PAIN WITHOUT SCIATICA: ICD-10-CM

## 2018-02-20 DIAGNOSIS — I50.22 CHRONIC SYSTOLIC CONGESTIVE HEART FAILURE (HCC): ICD-10-CM

## 2018-02-20 RX ORDER — APIXABAN 2.5 MG/1
TABLET, FILM COATED ORAL
Qty: 60 TAB | Refills: 5 | Status: SHIPPED | OUTPATIENT
Start: 2018-02-20 | End: 2019-01-01 | Stop reason: SDUPTHER

## 2018-02-20 RX ORDER — ACETAMINOPHEN 325 MG/1
TABLET ORAL
Qty: 180 TAB | Refills: 1 | Status: SHIPPED | OUTPATIENT
Start: 2018-02-20 | End: 2018-08-17

## 2018-03-22 RX ORDER — METOPROLOL SUCCINATE 25 MG/1
TABLET, EXTENDED RELEASE ORAL
Qty: 30 TAB | Refills: 1 | Status: SHIPPED | OUTPATIENT
Start: 2018-03-22 | End: 2018-09-18 | Stop reason: SDUPTHER

## 2018-04-10 ENCOUNTER — HOSPITAL ENCOUNTER (OUTPATIENT)
Dept: LAB | Age: 83
Discharge: HOME | End: 2018-04-10
Payer: MEDICARE

## 2018-04-10 ENCOUNTER — OFFICE VISIT (OUTPATIENT)
Dept: INTERNAL MEDICINE CLINIC | Age: 83
End: 2018-04-10

## 2018-04-10 VITALS
BODY MASS INDEX: 32.1 KG/M2 | SYSTOLIC BLOOD PRESSURE: 117 MMHG | RESPIRATION RATE: 20 BRPM | DIASTOLIC BLOOD PRESSURE: 62 MMHG | HEART RATE: 70 BPM | HEIGHT: 61 IN | TEMPERATURE: 98.3 F | OXYGEN SATURATION: 93 % | WEIGHT: 170 LBS

## 2018-04-10 DIAGNOSIS — G30.8 ALZHEIMER'S DISEASE OF OTHER ONSET WITHOUT BEHAVIORAL DISTURBANCE: ICD-10-CM

## 2018-04-10 DIAGNOSIS — I10 ESSENTIAL HYPERTENSION: ICD-10-CM

## 2018-04-10 DIAGNOSIS — F02.80 ALZHEIMER'S DISEASE OF OTHER ONSET WITHOUT BEHAVIORAL DISTURBANCE: ICD-10-CM

## 2018-04-10 DIAGNOSIS — I50.22 CHRONIC SYSTOLIC CONGESTIVE HEART FAILURE (HCC): Primary | ICD-10-CM

## 2018-04-10 DIAGNOSIS — G89.29 CHRONIC MIDLINE LOW BACK PAIN WITHOUT SCIATICA: ICD-10-CM

## 2018-04-10 DIAGNOSIS — I25.83 CORONARY ARTERY DISEASE DUE TO LIPID RICH PLAQUE: ICD-10-CM

## 2018-04-10 DIAGNOSIS — I25.10 CORONARY ARTERY DISEASE DUE TO LIPID RICH PLAQUE: ICD-10-CM

## 2018-04-10 DIAGNOSIS — M54.50 CHRONIC MIDLINE LOW BACK PAIN WITHOUT SCIATICA: ICD-10-CM

## 2018-04-10 DIAGNOSIS — R53.83 FATIGUE, UNSPECIFIED TYPE: ICD-10-CM

## 2018-04-10 PROCEDURE — 84443 ASSAY THYROID STIM HORMONE: CPT

## 2018-04-10 PROCEDURE — 85027 COMPLETE CBC AUTOMATED: CPT

## 2018-04-10 PROCEDURE — 80053 COMPREHEN METABOLIC PANEL: CPT

## 2018-04-10 RX ORDER — TRAMADOL HYDROCHLORIDE 50 MG/1
25 TABLET ORAL
Qty: 15 TAB | Refills: 4 | Status: ON HOLD | OUTPATIENT
Start: 2018-04-10 | End: 2018-08-22

## 2018-04-10 RX ORDER — PRAVASTATIN SODIUM 20 MG/1
20 TABLET ORAL
Qty: 30 TAB | Refills: 5 | Status: SHIPPED | OUTPATIENT
Start: 2018-04-10

## 2018-04-10 NOTE — PROGRESS NOTES
Health Maintenance Due   Topic Date Due    DTaP/Tdap/Td series (1 - Tdap) 05/26/1947    Influenza Age 5 to Adult  08/01/2017       Chief Complaint   Patient presents with    CHF     3 month follow up    Dementia    Hypertension    Fatigue     states she is tried all the time       1. Have you been to the ER, urgent care clinic since your last visit? Hospitalized since your last visit? No    2. Have you seen or consulted any other health care providers outside of the [address] [place] [address] since your last visit? Include any pap smears or colon screening. No    3) Do you have an Advance Directive on file? yes    4) Are you interested in receiving information on Advance Directives? NO      Patient is accompanied by self I have received verbal consent from Sidra Hodgson to discuss any/all medical information while they are present in the room. Kylee Rowe

## 2018-04-10 NOTE — PROGRESS NOTES
HISTORY OF PRESENT ILLNESS  Everett Strickland is a 80 y.o. female here for follow-up. Report fatigue and daytime sleepiness for last several months. She is taking tramadol 50 mg at night. For lower back pain. Back hurts only with movement. Also on Tylenol. Has CHF, on Lasix. No orthopnea or shortness of breath. Has CAD and HTN,complaint with meds. Labs reviewed. Has depression, stable. Need lab work. CHF     Dementia    Her past medical history is significant for hypertension. Hypertension    Associated symptoms include malaise/fatigue. Fatigue     Follow-up     Cholesterol Problem         Review of Systems   Constitutional: Positive for fatigue and malaise/fatigue. Negative for chills and fever. Eyes: Negative. Respiratory: Negative. Cardiovascular: Negative. Gastrointestinal: Negative. Genitourinary: Negative. Musculoskeletal: Positive for back pain and joint pain. Skin: Negative. Neurological: Negative. Endo/Heme/Allergies: Negative. Psychiatric/Behavioral: Positive for memory loss. Physical Exam   Constitutional: She appears well-developed and well-nourished. No distress. HENT:        Neck: Normal range of motion. Neck supple. No JVD present. No thyromegaly present. Cardiovascular: Normal rate, regular rhythm, normal heart sounds and intact distal pulses. Pulmonary/Chest: Effort normal. No respiratory distress. Musculoskeletal: She exhibits no edema or tenderness. Skin: Skin is dry. Psychiatric: She has a normal mood and affect. Her behavior is normal.       ASSESSMENT and PLAN  Diagnoses and all orders for this visit:    1. Chronic systolic congestive heart failure (HCC)    Compensated. On Lasix. Will check,  -     CBC W/O DIFF  -     METABOLIC PANEL, COMPREHENSIVE    2. Coronary artery disease due to lipid rich plaque    Stable. On aspirin. LDL is in 50s. Will reduce pravastatin.   -     CBC W/O DIFF  -     METABOLIC PANEL, COMPREHENSIVE  - pravastatin (PRAVACHOL) 20 mg tablet; Take 1 Tab by mouth nightly. D/C pravastatin 40 mg tab    3. Essential hypertension  Stable on current regimen. -     pravastatin (PRAVACHOL) 20 mg tablet; Take 1 Tab by mouth nightly. D/C pravastatin 40 mg tab    4. Alzheimer's disease of other onset without behavioral disturbance  On medicine. Stable. 5. Fatigue, unspecified type    Probably tramadol 50 mg at night making her too sleepy daytime. Will reduce to 25 mg at night. Will check,  -     CBC W/O DIFF  -     METABOLIC PANEL, COMPREHENSIVE  -     TSH 3RD GENERATION    6. Chronic midline low back pain without sciatica  -     traMADol (ULTRAM) 50 mg tablet; Take 0.5 Tabs by mouth nightly. Max Daily Amount: 25 mg. D/C tramadol 50 mg tabAt bedtime          Discussed expected course/resolution/complications of diagnosis in detail with patient. Medication risks/benefits/costs/interactions/alternatives discussed with patient. Pt was given an after visit summary which includes diagnoses, current medications & vitals. Pt expressed understanding with the diagnosis and plan.

## 2018-04-10 NOTE — MR AVS SNAPSHOT
[address] [place] [address]. [place] [id number] [phone] Patient: Shirlene Cabrera MRN: [id number] Visit Information Date & Time Provider Department Dept. Phone Encounter #  
 4/10/2018 10:30 AM Jayne Pires MD [facility] Internal Medicine [facility] [phone] [id number] Upcoming Health Maintenance Date Due DTaP/Tdap/Td series (1 - Tdap) 1947 Influenza Age 5 to Adult 2017 MEDICARE YEARLY EXAM 2018 GLAUCOMA SCREENING Q2Y 2019 Pneumococcal 65+ Low/Medium Risk (2 of 2 - PPSV23) 2019 Allergies as of 4/10/2018  Review Complete On: 4/10/2018 By: Jayne Pires MD  
  
 Severity Noted Reaction Type Reactions Neurontin [Gabapentin]  2015    Unknown (comments) Pt unknown reaction Current Immunizations  Reviewed on 2017 Name Date Influenza Vaccine 12/10/2015, 11/15/2014, 2013 Pneumococcal Vaccine (Unspecified Type) 2014 Zoster Vaccine, Live 2017 Not reviewed this visit You Were Diagnosed With   
  
 Codes Comments Chronic systolic congestive heart failure (HCC)    -  Primary ICD-10-CM: T08.95 ICD-9-CM: 428.22, 428.0 Coronary artery disease due to lipid rich plaque     ICD-10-CM: I25.10, I25.83 ICD-9-CM: 414.00, 414.3 Essential hypertension     ICD-10-CM: I10 
ICD-9-CM: 401.9 Alzheimer's disease of other onset without behavioral disturbance     ICD-10-CM: G30.8, F02.80 ICD-9-CM: 331.0, 294.10 Fatigue, unspecified type     ICD-10-CM: R53.83 ICD-9-CM: 780.79 Chronic midline low back pain without sciatica     ICD-10-CM: M54.5, G89.29 ICD-9-CM: 724.2, 338.29 Vitals BP Pulse Temp Resp Height(growth percentile) Weight(growth percentile)  
 117/62 (BP 1 Location: Left arm, BP Patient Position: Sitting) 70 98.3 °F (36.8 °C) (Oral) 20 5' 1\" (1.549 m) 170 lb (77.1 kg) SpO2 BMI OB Status Smoking Status 93% 32.12 kg/m2 Postmenopausal Never Smoker Vitals History BMI and BSA Data Body Mass Index Body Surface Area  
 32.12 kg/m 2 1.82 m 2 Preferred Pharmacy Pharmacy Name Phone [phone] [phone] Your Updated Medication List  
  
   
This list is accurate as of 4/10/18 10:56 AM.  Always use your most recent med list.  
  
  
  
  
 * albuterol 2.5 mg /3 mL (0.083 %) nebulizer solution Commonly known as:  PROVENTIL VENTOLIN  
INHALE THE CONTENTS OF 1 VIAL VIA NEBULIZER EVERY 6 HOURS AS NEEDED FOR WHEEZING  
  
 * albuterol 90 mcg/actuation inhaler Commonly known as:  PROVENTIL HFA Take 1 Puff by inhalation every six (6) hours as needed for Wheezing. ammonium lactate 12 % lotion Commonly known as:  LAC-HYDRIN  
rub in to affected area well  
  
 calcium-cholecalciferol (D3) tablet Commonly known as:  CALTRATE 600+D TAKE 1 TABLET BY MOUTH TWICE DAILY DX: SUPPLEMENT  
  
 DOC-Q-LACE 100 mg capsule Generic drug:  docusate sodium TAKE 1 CAPSULE BY MOUTH TWICE DAILY DX:BOWEL SUPPORT  
  
 donepezil 5 mg tablet Commonly known as:  ARICEPT  
TAKE 1 TABLET BY MOUTH EVERY NIGHT AT BEDTIME DX: MEMORY SUPPORT  
  
 ELIQUIS 2.5 mg tablet Generic drug:  apixaban TAKE 1 TABLET BY MOUTH 2 TIMES A DAY. DX ANTICOAGULANT  
  
 furosemide 40 mg tablet Commonly known as:  LASIX Take 1 tab po every day from  Monday through [facility] sunday  
  
 hydrocortisone 2.5 % lotion Commonly known as:  HYTONE  
APPLY A THIN LAYER TO AFFECTED AREA 2 TIMES DAILY. lisinopril 10 mg tablet Commonly known as:  Aitkin Conrado Take 1 Tab by mouth daily. Mapap 325 mg tablet Generic drug:  acetaminophen TAKE 2 TABS BY MOUTH 3 TIMES DAILY AT 8A, 2P AND 8P DX:PAIN *MAX 3000MG/DAY OF APAP* metoprolol succinate 25 mg XL tablet Commonly known as:  TOPROL-XL  
TAKE 1 TABLET BY MOUTH EVERY DAY DX: HTN  
  
 MILK OF MAGNESIA 400 mg/5 mL suspension Generic drug:  magnesium hydroxide TAKE 30ML BY MOUTH EVERY 24 HOURS AS NEEDED FOR CONSTIPATION  
  
 miscellaneous medical supply Misc Commonly known as:  ANTI-EMBOLISM STOCKINGS Use below knee stockings 15 to 30 mm HG.  
  
 polyethylene glycol 17 gram/dose powder Commonly known as:  MIRALAX  
MIX THE CONTENTS OF 1 HEAPING TABLESPOON (=17GRAMS) WITH 8 OUNCES OF WATER AND DRINK DAILY DX:BOWEL AIDE  
  
 pravastatin 20 mg tablet Commonly known as:  PRAVACHOL Take 1 Tab by mouth nightly. D/C pravastatin 40 mg tab  
  
 raNITIdine 150 mg tablet Commonly known as:  ZANTAC Take 150 mg by mouth nightly. SENEXON-S 8.6-50 mg per tablet Generic drug:  senna-docusate TAKE 1 TABLET BY MOUTH DAILY AS NEEDED FOR CONSTIPATION  
  
 traMADol 50 mg tablet Commonly known as:  ULTRAM  
Take 0.5 Tabs by mouth nightly. Max Daily Amount: 25 mg. D/C tramadol 50 mg tabAt bedtime  
  
 venlafaxine-SR 75 mg capsule Commonly known as:  EFFEXOR-XR  
TAKE 1 CAPSULE BY MOUTH EVERY DAY DX:ANTIDEPRESSANT  
  
 VITAMIN B-12 500 mcg tablet Generic drug:  cyanocobalamin TAKE 1 TABLET BY MOUTH EVERY DAY DX: SUPPLEMENT * Notice: This list has 2 medication(s) that are the same as other medications prescribed for you. Read the directions carefully, and ask your doctor or other care provider to review them with you. Prescriptions Printed Refills  
 traMADol (ULTRAM) 50 mg tablet 4 Sig: Take 0.5 Tabs by mouth nightly. Max Daily Amount: 25 mg. D/C tramadol 50 mg tabAt bedtime Class: Print Route: Oral  
  
Prescriptions Sent to Pharmacy Refills  
 pravastatin (PRAVACHOL) 20 mg tablet 5 Sig: Take 1 Tab by mouth nightly. D/C pravastatin 40 mg tab Class: Normal  
 Pharmacy: [address] [place] [address]. Ph #: [phone] Route: Oral  
  
We Performed the Following CBC W/O DIFF [44911 CPT(R)] METABOLIC PANEL, COMPREHENSIVE [85277 CPT(R)] TSH 3RD GENERATION [38733 CPT(R)] Introducing [facility] & HEALTH SERVICES! [facility] introduces [employer] patient portal. Now you can access parts of your medical record, email your doctor's office, and request medication refills online. 1. In your internet browser, go to [email or web address] 2. Click on the First Time User? Click Here link in the Sign In box. You will see the New Member Sign Up page. 3. Enter your [employer] Access Code exactly as it appears below. You will not need to use this code after youve completed the sign-up process. If you do not sign up before the expiration date, you must request a new code. · [employer] Access Code: [email or web address] Expires: 7/9/2018 10:55 AM 
 
4. Enter the last four digits of your Social Security Number (xxxx) and Date of Birth (mm/dd/yyyy) as indicated and click Submit. You will be taken to the next sign-up page. 5. Create a S[employer]i ID. This will be your [employer] login ID and cannot be changed, so think of one that is secure and easy to remember. 6. Create a S[employer] password. You can change your password at any time. 7. Enter your Password Reset Question and Answer. This can be used at a later time if you forget your password. 8. Enter your e-mail address. You will receive e-mail notification when new information is available in [address] 9. Click Sign Up. You can now view and download portions of your medical record. 10. Click the Download Summary menu link to download a portable copy of your medical information. If you have questions, please visit the Frequently Asked Questions section of the [employer] website. Remember, [employer] is NOT to be used for urgent needs. For medical emergencies, dial 911. Now available from your iPhone and Android! Please provide this summary of care documentation to your next provider. Your primary care clinician is listed as [facility]. If you have any questions after today's visit, please call [phone].

## 2018-04-11 LAB
ALBUMIN SERPL-MCNC: 4 G/DL (ref 3.2–4.6)
ALBUMIN/GLOB SERPL: 1.7 {RATIO} (ref 1.2–2.2)
ALP SERPL-CCNC: 50 IU/L (ref 39–117)
ALT SERPL-CCNC: 9 IU/L (ref 0–32)
AST SERPL-CCNC: 16 IU/L (ref 0–40)
BILIRUB SERPL-MCNC: 0.4 MG/DL (ref 0–1.2)
BUN SERPL-MCNC: 28 MG/DL (ref 10–36)
BUN/CREAT SERPL: 20 (ref 12–28)
CALCIUM SERPL-MCNC: 9.4 MG/DL (ref 8.7–10.3)
CHLORIDE SERPL-SCNC: 100 MMOL/L (ref 96–106)
CO2 SERPL-SCNC: 31 MMOL/L (ref 18–29)
CREAT SERPL-MCNC: 1.41 MG/DL (ref 0.57–1)
ERYTHROCYTE [DISTWIDTH] IN BLOOD BY AUTOMATED COUNT: 13.9 % (ref 12.3–15.4)
GFR SERPLBLD CREATININE-BSD FMLA CKD-EPI: 33 ML/MIN/1.73
GFR SERPLBLD CREATININE-BSD FMLA CKD-EPI: 38 ML/MIN/1.73
GLOBULIN SER CALC-MCNC: 2.4 G/DL (ref 1.5–4.5)
GLUCOSE SERPL-MCNC: 101 MG/DL (ref 65–99)
HCT VFR BLD AUTO: 40.2 % (ref 34–46.6)
HGB BLD-MCNC: 13.2 G/DL (ref 11.1–15.9)
INTERPRETATION: NORMAL
MCH RBC QN AUTO: 31 PG (ref 26.6–33)
MCHC RBC AUTO-ENTMCNC: 32.8 G/DL (ref 31.5–35.7)
MCV RBC AUTO: 94 FL (ref 79–97)
PLATELET # BLD AUTO: 196 X10E3/UL (ref 150–379)
POTASSIUM SERPL-SCNC: 4.8 MMOL/L (ref 3.5–5.2)
PROT SERPL-MCNC: 6.4 G/DL (ref 6–8.5)
RBC # BLD AUTO: 4.26 X10E6/UL (ref 3.77–5.28)
SODIUM SERPL-SCNC: 145 MMOL/L (ref 134–144)
TSH SERPL DL<=0.005 MIU/L-ACNC: 1.26 UIU/ML (ref 0.45–4.5)
WBC # BLD AUTO: 7.5 X10E3/UL (ref 3.4–10.8)

## 2018-08-02 ENCOUNTER — OFFICE VISIT (OUTPATIENT)
Dept: INTERNAL MEDICINE CLINIC | Age: 83
End: 2018-08-02

## 2018-08-02 VITALS
BODY MASS INDEX: 32.19 KG/M2 | DIASTOLIC BLOOD PRESSURE: 74 MMHG | WEIGHT: 170.5 LBS | HEIGHT: 61 IN | OXYGEN SATURATION: 92 % | RESPIRATION RATE: 20 BRPM | SYSTOLIC BLOOD PRESSURE: 127 MMHG | TEMPERATURE: 98 F | HEART RATE: 63 BPM

## 2018-08-02 DIAGNOSIS — I10 ESSENTIAL HYPERTENSION: ICD-10-CM

## 2018-08-02 DIAGNOSIS — C44.92 CANCER, SKIN, SQUAMOUS CELL: ICD-10-CM

## 2018-08-02 DIAGNOSIS — F02.80 ALZHEIMER'S DISEASE OF OTHER ONSET WITHOUT BEHAVIORAL DISTURBANCE: ICD-10-CM

## 2018-08-02 DIAGNOSIS — L98.9 SKIN LESION OF FACE: Primary | ICD-10-CM

## 2018-08-02 DIAGNOSIS — R26.81 GAIT INSTABILITY: ICD-10-CM
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
## 2018-08-02 DIAGNOSIS — G30.8 ALZHEIMER'S DISEASE OF OTHER ONSET WITHOUT BEHAVIORAL DISTURBANCE: ICD-10-CM

## 2018-08-02 NOTE — PROGRESS NOTES
HISTORY OF PRESENT ILLNESS Mark Chavez is a 80 y.o. female. Pt. comes in for f/u. Has multiple medical problems. Reports a recent fall with no injuries. Gait is unsteady. Energy is low. Appetite and weight been okay. Has a nonhealing lesion on her face. No symptoms. Memory is poor with underlying dementia. Reports compliance with medications and diet. Med list and most recent labs/studies reviewed. Reports no other new c/o. Fall Pertinent negatives include no abdominal pain and no headaches. Skin Problem Associated symptoms include shortness of breath. Pertinent negatives include no chest pain, no abdominal pain and no headaches. Fatigue Associated symptoms include shortness of breath. Pertinent negatives include no chest pain, no abdominal pain and no headaches. Review of Systems Constitutional: Positive for fatigue. HENT: Positive for hearing loss. Negative for sore throat. Eyes: Negative for blurred vision. Respiratory: Positive for shortness of breath. Cardiovascular: Positive for leg swelling. Negative for chest pain. Gastrointestinal: Negative for abdominal pain. Genitourinary: Negative for dysuria. Musculoskeletal: Positive for falls and joint pain. Skin: Negative. Neurological: Positive for sensory change. Negative for dizziness, focal weakness and headaches. Psychiatric/Behavioral: Positive for depression and memory loss. The patient is nervous/anxious. All other systems reviewed and are negative. Physical Exam  
Constitutional: She is oriented to person, place, and time. She appears well-developed and well-nourished. No distress. elderly HENT:  
Head: Normocephalic and atraumatic. Mouth/Throat: Oropharynx is clear and moist.  
Eyes: Conjunctivae are normal. No scleral icterus. Neck: Normal range of motion. Neck supple. No JVD present. No thyromegaly present.   
Cardiovascular: Normal rate, regular rhythm, normal heart sounds and intact distal pulses. No murmur heard. Pulmonary/Chest: Effort normal and breath sounds normal. No respiratory distress. She has no wheezes. She has no rales. Abdominal: Soft. Bowel sounds are normal. She exhibits no distension. Musculoskeletal: She exhibits edema (trace pedal). Neurological: She is alert and oriented to person, place, and time. Coordination normal.  
Poor memory Skin: Skin is warm and dry. No rash noted. Left temple skin lesion, suspicious for cancer Psychiatric: She has a normal mood and affect. Her behavior is normal.  
Seems anxious Nursing note and vitals reviewed. ASSESSMENT and PLAN Diagnoses and all orders for this visit: 1. Skin lesion of face 
-     REFERRAL TO PLASTIC SURGERY 2. Cancer, skin, squamous cell 
-     REFERRAL TO PLASTIC SURGERY 3. Alzheimer's disease of other onset without behavioral disturbance 4. Essential hypertension 5. Gait instability 6. Fall, initial encounter Follow-up Disposition: 
Return in about 3 months (around 11/2/2018), or if symptoms worsen or fail to improve.  
lab results and schedule of future lab studies reviewed with patient 
reviewed diet, exercise and weight control 
reviewed medications and side effects in detail

## 2018-08-02 NOTE — MR AVS SNAPSHOT
Jen Fam 
 
 
 [address] [place] [address]. A [facility] 7 [phone] 
[phone] Patient: Inga Trent MRN: [id number] Visit Information Date & Time Provider Department Dept. Phone Encounter #  
 2018 11:15 AM Jason Espinosa [phone] [id number] Follow-up Instructions Return in about 3 months (around 2018), or if symptoms worsen or fail to improve. Your Appointments 2018 11:15 AM  
Any with DO Jason Espinosa (Jaylin Giraldo) Appt Note: wants to see about spot on temple. fell [address] [place] [address]. [facility] 7 [phone]  
[phone]  
  
   
 [address] [place] [address]. [address] [place] [phone]  
  
    
 2018 10:15 AM  
Any with MD Jason Wu (Jaylin Giraldo) Appt Note: follow up 4m [address] [place] [address]. A [facility] 7 [phone]  
[phone]  
  
   
 [address] [place] [address]. [address] [place] [phone] Upcoming Health Maintenance Date Due DTaP/Tdap/Td series (1 - Tdap) 1947 MEDICARE YEARLY EXAM 2018 Influenza Age 5 to Adult 2018* GLAUCOMA SCREENING Q2Y 2019 Pneumococcal 65+ Low/Medium Risk (2 of 2 - PPSV23) 2019 *Topic was postponed. The date shown is not the original due date. Allergies as of 2018  Review Complete On: 2018 By: Annelise Bacon DO Severity Noted Reaction Type Reactions Neurontin [Gabapentin]  2015    Unknown (comments) Pt unknown reaction Current Immunizations  Reviewed on 2017 Name Date Influenza Vaccine 12/10/2015, 11/15/2014, 2013 Pneumococcal Vaccine (Unspecified Type) 2014 Zoster Vaccine, Live 2017 Not reviewed this visit You Were Diagnosed With   
  
 Codes Comments Skin lesion of face    -  Primary ICD-10-CM: L98.9 ICD-9-CM: 709.9 Cancer, skin, squamous cell     ICD-10-CM: C44.92 
ICD-9-CM: 173.92 Alzheimer's disease of other onset without behavioral disturbance     ICD-10-CM: G30.8, F02.80 ICD-9-CM: 331.0, 294.10 Essential hypertension     ICD-10-CM: I10 
ICD-9-CM: 401.9 Gait instability     ICD-10-CM: R26.81 
ICD-9-CM: 568. 2 Fall, initial encounter     ICD-10-CM: W19. Marcia Lord ICD-9-CM: E888.9 Vitals BP Pulse Temp Resp Height(growth percentile) Weight(growth percentile) 127/74 (BP 1 Location: Left arm, BP Patient Position: Sitting) 63 98 °F (36.7 °C) (Oral) 20 5' 1\" (1.549 m) 170 lb 8 oz (77.3 kg) SpO2 BMI OB Status Smoking Status 92% 32.22 kg/m2 Postmenopausal Never Smoker Vitals History BMI and BSA Data Body Mass Index Body Surface Area  
 32.22 kg/m 2 1.82 m 2 Preferred Pharmacy Pharmacy Name Phone Susan Arias, [address] [facility] [phone] Your Updated Medication List  
  
   
This list is accurate as of 8/2/18 11:14 AM.  Always use your most recent med list.  
  
  
  
  
 * albuterol 2.5 mg /3 mL (0.083 %) nebulizer solution Commonly known as:  PROVENTIL VENTOLIN  
INHALE THE CONTENTS OF 1 VIAL VIA NEBULIZER EVERY 6 HOURS AS NEEDED FOR WHEEZING  
  
 * albuterol 90 mcg/actuation inhaler Commonly known as:  PROVENTIL HFA Take 1 Puff by inhalation every six (6) hours as needed for Wheezing. ammonium lactate 12 % lotion Commonly known as:  LAC-HYDRIN  
rub in to affected area well  
  
 calcium-cholecalciferol (D3) tablet Commonly known as:  CALTRATE 600+D TAKE 1 TABLET BY MOUTH TWICE DAILY DX: SUPPLEMENT  
  
 DOC-Q-LACE 100 mg capsule Generic drug:  docusate sodium TAKE 1 CAPSULE BY MOUTH TWICE DAILY DX:BOWEL SUPPORT  
  
 donepezil 5 mg tablet Commonly known as:  ARICEPT  
 TAKE 1 TABLET BY MOUTH EVERY NIGHT AT BEDTIME DX: MEMORY SUPPORT  
  
 ELIQUIS 2.5 mg tablet Generic drug:  apixaban TAKE 1 TABLET BY MOUTH 2 TIMES A DAY. DX ANTICOAGULANT  
  
 furosemide 40 mg tablet Commonly known as:  LASIX Take 1 tab po every day from  Monday through [facility] sunday  
  
 hydrocortisone 2.5 % lotion Commonly known as:  HYTONE  
APPLY A THIN LAYER TO AFFECTED AREA 2 TIMES DAILY. lisinopril 10 mg tablet Commonly known as:  Sunny Pares Take 1 Tab by mouth daily. Mapap 325 mg tablet Generic drug:  acetaminophen TAKE 2 TABS BY MOUTH 3 TIMES DAILY AT 8A, 2P AND 8P DX:PAIN *MAX 3000MG/DAY OF APAP* metoprolol succinate 25 mg XL tablet Commonly known as:  TOPROL-XL  
TAKE 1 TABLET BY MOUTH EVERY DAY DX: HTN  
  
 MILK OF MAGNESIA 400 mg/5 mL suspension Generic drug:  magnesium hydroxide TAKE 30ML BY MOUTH EVERY 24 HOURS AS NEEDED FOR CONSTIPATION  
  
 miscellaneous medical supply Misc Commonly known as:  ANTI-EMBOLISM STOCKINGS Use below knee stockings 15 to 30 mm HG.  
  
 polyethylene glycol 17 gram/dose powder Commonly known as:  MIRALAX  
MIX THE CONTENTS OF 1 HEAPING TABLESPOON (=17GRAMS) WITH 8 OUNCES OF WATER AND DRINK DAILY DX:BOWEL AIDE  
  
 pravastatin 20 mg tablet Commonly known as:  PRAVACHOL Take 1 Tab by mouth nightly. D/C pravastatin 40 mg tab  
  
 raNITIdine 150 mg tablet Commonly known as:  ZANTAC Take 150 mg by mouth nightly. SENEXON-S 8.6-50 mg per tablet Generic drug:  senna-docusate TAKE 1 TABLET BY MOUTH DAILY AS NEEDED FOR CONSTIPATION  
  
 traMADol 50 mg tablet Commonly known as:  ULTRAM  
Take 0.5 Tabs by mouth nightly. Max Daily Amount: 25 mg. D/C tramadol 50 mg tabAt bedtime  
  
 venlafaxine-SR 75 mg capsule Commonly known as:  EFFEXOR-XR  
TAKE 1 CAPSULE BY MOUTH EVERY DAY DX:ANTIDEPRESSANT  
  
 VITAMIN B-12 500 mcg tablet Generic drug:  cyanocobalamin TAKE 1 TABLET BY MOUTH EVERY DAY DX: SUPPLEMENT * Notice: This list has 2 medication(s) that are the same as other medications prescribed for you. Read the directions carefully, and ask your doctor or other care provider to review them with you. We Performed the Following REFERRAL TO PLASTIC SURGERY [REF89 Custom] Follow-up Instructions Return in about 3 months (around 11/2/2018), or if symptoms worsen or fail to improve. Referral Information Referral ID Referred By Referred To  
  
 [id number] [facility] [address] [place] [address] Visits Status Start Date End Date 1 New Request 8/2/18 8/2/19 If your referral has a status of pending review or denied, additional information will be sent to support the outcome of this decision. Introducing [facility] & HEALTH SERVICES! Rafa Webster introduces [employer] patient portal. Now you can access parts of your medical record, email your doctor's office, and request medication refills online. 1. In your internet browser, go to [email or web address] 2. Click on the First Time User? Click Here link in the Sign In box. You will see the New Member Sign Up page. 3. Enter your [employer] Access Code exactly as it appears below. You will not need to use this code after youve completed the sign-up process. If you do not sign up before the expiration date, you must request a new code. · [employer] Access Code: [email or web address] Expires: 10/31/2018 11:07 AM 
 
4. Enter the last four digits of your Social Security Number (xxxx) and Date of Birth (mm/dd/yyyy) as indicated and click Submit. You will be taken to the next sign-up page. 5. Create a [employer] ID. This will be your [employer] login ID and cannot be changed, so think of one that is secure and easy to remember. 6. Create a [employer] password. You can change your password at any time. 7. Enter your Password Reset Question and Answer. This can be used at a later time if you forget your password. 8. Enter your e-mail address. You will receive e-mail notification when new information is available in [address] 9. Click Sign Up. You can now view and download portions of your medical record. 10. Click the Download Summary menu link to download a portable copy of your medical information. If you have questions, please visit the Frequently Asked Questions section of the [employer] website. Remember, [employer] is NOT to be used for urgent needs. For medical emergencies, dial 911. Now available from your iPhone and Android! Please provide this summary of care documentation to your next provider. Your primary care clinician is listed as Winifred Castro. If you have any questions after today's visit, please call [phone].

## 2018-08-02 NOTE — PROGRESS NOTES
Health Maintenance Due Topic Date Due  
 DTaP/Tdap/Td series (1 - Tdap) 05/26/1947  MEDICARE YEARLY EXAM  05/31/2018 Chief Complaint Patient presents with  Fall  
  6 weeks ago  Skin Problem  
  left temple started after fall per pt 1. Have you been to the ER, urgent care clinic since your last visit? Hospitalized since your last visit? No 
 
2. Have you seen or consulted any other health care providers outside of the [address] [place] [address] since your last visit? Include any pap smears or colon screening. No 
 
3) Do you have an Advance Directive on file? no 
 
4) Are you interested in receiving information on Advance Directives? NO Patient is accompanied by self I have received verbal consent from Amanda Will to discuss any/all medical information while they are present in the room.

## 2018-08-17 ENCOUNTER — HOSPITAL ENCOUNTER (INPATIENT)
Age: 83
LOS: 5 days | Discharge: NURSING, REHAB OR LONG-TERM CARE | DRG: 291 | End: 2018-08-22
Attending: STUDENT IN AN ORGANIZED HEALTH CARE EDUCATION/TRAINING PROGRAM | Admitting: HOSPITALIST
Payer: MEDICARE

## 2018-08-17 ENCOUNTER — APPOINTMENT (OUTPATIENT)
Dept: GENERAL RADIOLOGY | Age: 83
DRG: 291 | End: 2018-08-17
Attending: STUDENT IN AN ORGANIZED HEALTH CARE EDUCATION/TRAINING PROGRAM
Payer: MEDICARE

## 2018-08-17 ENCOUNTER — APPOINTMENT (OUTPATIENT)
Dept: CT IMAGING | Age: 83
DRG: 291 | End: 2018-08-17
Attending: HOSPITALIST
Payer: MEDICARE

## 2018-08-17 DIAGNOSIS — I50.22 CHRONIC SYSTOLIC CONGESTIVE HEART FAILURE (HCC): ICD-10-CM

## 2018-08-17 DIAGNOSIS — R06.02 SHORTNESS OF BREATH: ICD-10-CM

## 2018-08-17 DIAGNOSIS — F02.80 ALZHEIMER'S DEMENTIA WITHOUT BEHAVIORAL DISTURBANCE, UNSPECIFIED TIMING OF DEMENTIA ONSET: ICD-10-CM

## 2018-08-17 DIAGNOSIS — M54.50 CHRONIC MIDLINE LOW BACK PAIN WITHOUT SCIATICA: ICD-10-CM

## 2018-08-17 DIAGNOSIS — I50.21 ACUTE SYSTOLIC CONGESTIVE HEART FAILURE (HCC): Primary | ICD-10-CM

## 2018-08-17 DIAGNOSIS — F03.90 DEMENTIA WITHOUT BEHAVIORAL DISTURBANCE, UNSPECIFIED DEMENTIA TYPE: ICD-10-CM

## 2018-08-17 DIAGNOSIS — Z71.89 ACP (ADVANCE CARE PLANNING): ICD-10-CM

## 2018-08-17 DIAGNOSIS — G89.29 CHRONIC MIDLINE LOW BACK PAIN WITHOUT SCIATICA: ICD-10-CM

## 2018-08-17 DIAGNOSIS — G30.9 ALZHEIMER'S DEMENTIA WITHOUT BEHAVIORAL DISTURBANCE, UNSPECIFIED TIMING OF DEMENTIA ONSET: ICD-10-CM

## 2018-08-17 DIAGNOSIS — F41.8 ANXIETY ABOUT HEALTH: ICD-10-CM

## 2018-08-17 LAB
ALBUMIN SERPL-MCNC: 3.2 G/DL (ref 3.5–5)
ALBUMIN/GLOB SERPL: 0.8 {RATIO} (ref 1.1–2.2)
ALP SERPL-CCNC: 63 U/L (ref 45–117)
ALT SERPL-CCNC: 11 U/L (ref 12–78)
ANION GAP SERPL CALC-SCNC: 10 MMOL/L (ref 5–15)
APTT PPP: 29.3 SEC (ref 22.1–32)
ARTERIAL PATENCY WRIST A: YES
AST SERPL-CCNC: 14 U/L (ref 15–37)
BASE EXCESS BLD CALC-SCNC: 4 MMOL/L
BASOPHILS # BLD: 0.1 K/UL (ref 0–0.1)
BASOPHILS NFR BLD: 0 % (ref 0–1)
BDY SITE: ABNORMAL
BILIRUB SERPL-MCNC: 0.8 MG/DL (ref 0.2–1)
BNP SERPL-MCNC: ABNORMAL PG/ML (ref 0–450)
BUN SERPL-MCNC: 21 MG/DL (ref 6–20)
BUN/CREAT SERPL: 15 (ref 12–20)
CALCIUM SERPL-MCNC: 8.9 MG/DL (ref 8.5–10.1)
CHLORIDE SERPL-SCNC: 101 MMOL/L (ref 97–108)
CO2 SERPL-SCNC: 28 MMOL/L (ref 21–32)
CREAT SERPL-MCNC: 1.39 MG/DL (ref 0.55–1.02)
DIFFERENTIAL METHOD BLD: ABNORMAL
EOSINOPHIL # BLD: 0 K/UL (ref 0–0.4)
EOSINOPHIL NFR BLD: 0 % (ref 0–7)
ERYTHROCYTE [DISTWIDTH] IN BLOOD BY AUTOMATED COUNT: 13.2 % (ref 11.5–14.5)
GAS FLOW.O2 O2 DELIVERY SYS: ABNORMAL L/MIN
GLOBULIN SER CALC-MCNC: 4.1 G/DL (ref 2–4)
GLUCOSE SERPL-MCNC: 143 MG/DL (ref 65–100)
HCO3 BLD-SCNC: 29.4 MMOL/L (ref 22–26)
HCT VFR BLD AUTO: 46.6 % (ref 35–47)
HGB BLD-MCNC: 14.8 G/DL (ref 11.5–16)
IMM GRANULOCYTES # BLD: 0.1 K/UL (ref 0–0.04)
IMM GRANULOCYTES NFR BLD AUTO: 1 % (ref 0–0.5)
INR PPP: 1.1 (ref 0.9–1.1)
LACTATE BLD-SCNC: 1.3 MMOL/L (ref 0.4–2)
LYMPHOCYTES # BLD: 1.9 K/UL (ref 0.8–3.5)
LYMPHOCYTES NFR BLD: 14 % (ref 12–49)
MAGNESIUM SERPL-MCNC: 1.8 MG/DL (ref 1.6–2.4)
MCH RBC QN AUTO: 30.8 PG (ref 26–34)
MCHC RBC AUTO-ENTMCNC: 31.8 G/DL (ref 30–36.5)
MCV RBC AUTO: 96.9 FL (ref 80–99)
MONOCYTES # BLD: 1.7 K/UL (ref 0–1)
MONOCYTES NFR BLD: 13 % (ref 5–13)
NEUTS SEG # BLD: 9.7 K/UL (ref 1.8–8)
NEUTS SEG NFR BLD: 72 % (ref 32–75)
NRBC # BLD: 0 K/UL (ref 0–0.01)
NRBC BLD-RTO: 0 PER 100 WBC
O2/TOTAL GAS SETTING VFR VENT: 60 %
PCO2 BLD: 51.6 MMHG (ref 35–45)
PEEP RESPIRATORY: 6 CMH2O
PH BLD: 7.36 [PH] (ref 7.35–7.45)
PIP ISTAT,IPIP: 14
PLATELET # BLD AUTO: 153 K/UL (ref 150–400)
PMV BLD AUTO: 11.2 FL (ref 8.9–12.9)
PO2 BLD: 254 MMHG (ref 80–100)
POTASSIUM SERPL-SCNC: 3.9 MMOL/L (ref 3.5–5.1)
PROT SERPL-MCNC: 7.3 G/DL (ref 6.4–8.2)
PROTHROMBIN TIME: 11.1 SEC (ref 9–11.1)
RBC # BLD AUTO: 4.81 M/UL (ref 3.8–5.2)
SAO2 % BLD: 100 % (ref 92–97)
SODIUM SERPL-SCNC: 139 MMOL/L (ref 136–145)
SPECIMEN TYPE: ABNORMAL
THERAPEUTIC RANGE,PTTT: NORMAL SECS (ref 58–77)
TOTAL RESP. RATE, ITRR: 44
TROPONIN I SERPL-MCNC: 0.08 NG/ML
WBC # BLD AUTO: 13.4 K/UL (ref 3.6–11)

## 2018-08-17 PROCEDURE — 36415 COLL VENOUS BLD VENIPUNCTURE: CPT | Performed by: STUDENT IN AN ORGANIZED HEALTH CARE EDUCATION/TRAINING PROGRAM

## 2018-08-17 PROCEDURE — 71250 CT THORAX DX C-: CPT

## 2018-08-17 PROCEDURE — 85610 PROTHROMBIN TIME: CPT | Performed by: STUDENT IN AN ORGANIZED HEALTH CARE EDUCATION/TRAINING PROGRAM

## 2018-08-17 PROCEDURE — 94660 CPAP INITIATION&MGMT: CPT

## 2018-08-17 PROCEDURE — 71045 X-RAY EXAM CHEST 1 VIEW: CPT

## 2018-08-17 PROCEDURE — 65660000001 HC RM ICU INTERMED STEPDOWN

## 2018-08-17 PROCEDURE — 93005 ELECTROCARDIOGRAM TRACING: CPT

## 2018-08-17 PROCEDURE — 77030013033 HC MSK BPAP/CPAP MMKA -B

## 2018-08-17 PROCEDURE — 85730 THROMBOPLASTIN TIME PARTIAL: CPT | Performed by: STUDENT IN AN ORGANIZED HEALTH CARE EDUCATION/TRAINING PROGRAM

## 2018-08-17 PROCEDURE — 74011250637 HC RX REV CODE- 250/637: Performed by: HOSPITALIST

## 2018-08-17 PROCEDURE — 83880 ASSAY OF NATRIURETIC PEPTIDE: CPT | Performed by: STUDENT IN AN ORGANIZED HEALTH CARE EDUCATION/TRAINING PROGRAM

## 2018-08-17 PROCEDURE — 74011000250 HC RX REV CODE- 250: Performed by: STUDENT IN AN ORGANIZED HEALTH CARE EDUCATION/TRAINING PROGRAM

## 2018-08-17 PROCEDURE — 83605 ASSAY OF LACTIC ACID: CPT

## 2018-08-17 PROCEDURE — 96374 THER/PROPH/DIAG INJ IV PUSH: CPT

## 2018-08-17 PROCEDURE — 36600 WITHDRAWAL OF ARTERIAL BLOOD: CPT

## 2018-08-17 PROCEDURE — 99285 EMERGENCY DEPT VISIT HI MDM: CPT

## 2018-08-17 PROCEDURE — 5A09357 ASSISTANCE WITH RESPIRATORY VENTILATION, LESS THAN 24 CONSECUTIVE HOURS, CONTINUOUS POSITIVE AIRWAY PRESSURE: ICD-10-PCS | Performed by: HOSPITALIST

## 2018-08-17 PROCEDURE — 87040 BLOOD CULTURE FOR BACTERIA: CPT | Performed by: STUDENT IN AN ORGANIZED HEALTH CARE EDUCATION/TRAINING PROGRAM

## 2018-08-17 PROCEDURE — 74011250636 HC RX REV CODE- 250/636: Performed by: STUDENT IN AN ORGANIZED HEALTH CARE EDUCATION/TRAINING PROGRAM

## 2018-08-17 PROCEDURE — 80053 COMPREHEN METABOLIC PANEL: CPT | Performed by: STUDENT IN AN ORGANIZED HEALTH CARE EDUCATION/TRAINING PROGRAM

## 2018-08-17 PROCEDURE — 84484 ASSAY OF TROPONIN QUANT: CPT | Performed by: STUDENT IN AN ORGANIZED HEALTH CARE EDUCATION/TRAINING PROGRAM

## 2018-08-17 PROCEDURE — 85025 COMPLETE CBC W/AUTO DIFF WBC: CPT | Performed by: STUDENT IN AN ORGANIZED HEALTH CARE EDUCATION/TRAINING PROGRAM

## 2018-08-17 PROCEDURE — 83735 ASSAY OF MAGNESIUM: CPT | Performed by: STUDENT IN AN ORGANIZED HEALTH CARE EDUCATION/TRAINING PROGRAM

## 2018-08-17 PROCEDURE — 82803 BLOOD GASES ANY COMBINATION: CPT

## 2018-08-17 PROCEDURE — 74011000250 HC RX REV CODE- 250: Performed by: HOSPITALIST

## 2018-08-17 RX ORDER — SODIUM CHLORIDE 0.9 % (FLUSH) 0.9 %
5-10 SYRINGE (ML) INJECTION AS NEEDED
Status: DISCONTINUED | OUTPATIENT
Start: 2018-08-17 | End: 2018-08-22 | Stop reason: HOSPADM

## 2018-08-17 RX ORDER — DONEPEZIL HYDROCHLORIDE 5 MG/1
5 TABLET, FILM COATED ORAL DAILY
Status: DISCONTINUED | OUTPATIENT
Start: 2018-08-18 | End: 2018-08-22 | Stop reason: HOSPADM

## 2018-08-17 RX ORDER — GUAIFENESIN 100 MG/5ML
81 LIQUID (ML) ORAL
Status: COMPLETED | OUTPATIENT
Start: 2018-08-17 | End: 2018-08-17

## 2018-08-17 RX ORDER — IPRATROPIUM BROMIDE AND ALBUTEROL SULFATE 2.5; .5 MG/3ML; MG/3ML
3 SOLUTION RESPIRATORY (INHALATION)
Status: DISCONTINUED | OUTPATIENT
Start: 2018-08-18 | End: 2018-08-18

## 2018-08-17 RX ORDER — POLYETHYLENE GLYCOL 3350 17 G/17G
17 POWDER, FOR SOLUTION ORAL DAILY
Status: DISCONTINUED | OUTPATIENT
Start: 2018-08-18 | End: 2018-08-22 | Stop reason: HOSPADM

## 2018-08-17 RX ORDER — ACETAMINOPHEN 500 MG
500 TABLET ORAL
COMMUNITY

## 2018-08-17 RX ORDER — DOCUSATE SODIUM 100 MG/1
100 CAPSULE, LIQUID FILLED ORAL 2 TIMES DAILY
Status: DISCONTINUED | OUTPATIENT
Start: 2018-08-18 | End: 2018-08-22 | Stop reason: HOSPADM

## 2018-08-17 RX ORDER — LEVOFLOXACIN 5 MG/ML
250 INJECTION, SOLUTION INTRAVENOUS EVERY 24 HOURS
Status: DISCONTINUED | OUTPATIENT
Start: 2018-08-19 | End: 2018-08-20 | Stop reason: CLARIF

## 2018-08-17 RX ORDER — LEVOFLOXACIN 5 MG/ML
500 INJECTION, SOLUTION INTRAVENOUS ONCE
Status: COMPLETED | OUTPATIENT
Start: 2018-08-18 | End: 2018-08-18

## 2018-08-17 RX ORDER — SODIUM CHLORIDE 0.9 % (FLUSH) 0.9 %
5-10 SYRINGE (ML) INJECTION EVERY 8 HOURS
Status: DISCONTINUED | OUTPATIENT
Start: 2018-08-18 | End: 2018-08-22 | Stop reason: HOSPADM

## 2018-08-17 RX ORDER — AMOXICILLIN 250 MG
1 CAPSULE ORAL DAILY
Status: DISCONTINUED | OUTPATIENT
Start: 2018-08-18 | End: 2018-08-22 | Stop reason: HOSPADM

## 2018-08-17 RX ORDER — METOPROLOL SUCCINATE 25 MG/1
25 TABLET, EXTENDED RELEASE ORAL DAILY
Status: DISCONTINUED | OUTPATIENT
Start: 2018-08-18 | End: 2018-08-22 | Stop reason: HOSPADM

## 2018-08-17 RX ORDER — BUMETANIDE 0.25 MG/ML
1 INJECTION INTRAMUSCULAR; INTRAVENOUS ONCE
Status: COMPLETED | OUTPATIENT
Start: 2018-08-17 | End: 2018-08-17

## 2018-08-17 RX ORDER — BUMETANIDE 0.25 MG/ML
1 INJECTION INTRAMUSCULAR; INTRAVENOUS
Status: COMPLETED | OUTPATIENT
Start: 2018-08-17 | End: 2018-08-17

## 2018-08-17 RX ORDER — FUROSEMIDE 10 MG/ML
40 INJECTION INTRAMUSCULAR; INTRAVENOUS DAILY
Status: DISCONTINUED | OUTPATIENT
Start: 2018-08-18 | End: 2018-08-19

## 2018-08-17 RX ORDER — VENLAFAXINE HYDROCHLORIDE 37.5 MG/1
75 CAPSULE, EXTENDED RELEASE ORAL
Status: DISCONTINUED | OUTPATIENT
Start: 2018-08-18 | End: 2018-08-22 | Stop reason: HOSPADM

## 2018-08-17 RX ORDER — FAMOTIDINE 20 MG/1
20 TABLET, FILM COATED ORAL
Status: DISCONTINUED | OUTPATIENT
Start: 2018-08-17 | End: 2018-08-22 | Stop reason: HOSPADM

## 2018-08-17 RX ORDER — PRAVASTATIN SODIUM 20 MG/1
20 TABLET ORAL
Status: DISCONTINUED | OUTPATIENT
Start: 2018-08-18 | End: 2018-08-22 | Stop reason: HOSPADM

## 2018-08-17 RX ORDER — ACETAMINOPHEN 500 MG
500 TABLET ORAL
Status: DISCONTINUED | OUTPATIENT
Start: 2018-08-17 | End: 2018-08-22 | Stop reason: HOSPADM

## 2018-08-17 RX ORDER — LISINOPRIL 10 MG/1
10 TABLET ORAL DAILY
Status: DISCONTINUED | OUTPATIENT
Start: 2018-08-18 | End: 2018-08-21

## 2018-08-17 RX ORDER — LANOLIN ALCOHOL/MO/W.PET/CERES
500 CREAM (GRAM) TOPICAL DAILY
Status: DISCONTINUED | OUTPATIENT
Start: 2018-08-18 | End: 2018-08-22 | Stop reason: HOSPADM

## 2018-08-17 RX ORDER — TRAMADOL HYDROCHLORIDE 50 MG/1
25 TABLET ORAL
Status: DISCONTINUED | OUTPATIENT
Start: 2018-08-18 | End: 2018-08-22 | Stop reason: HOSPADM

## 2018-08-17 RX ADMIN — ASPIRIN 81 MG CHEWABLE TABLET 81 MG: 81 TABLET CHEWABLE at 20:58

## 2018-08-17 RX ADMIN — BUMETANIDE 1 MG: 0.25 INJECTION INTRAMUSCULAR; INTRAVENOUS at 20:44

## 2018-08-17 RX ADMIN — BUMETANIDE 1 MG: 0.25 INJECTION INTRAMUSCULAR; INTRAVENOUS at 20:32

## 2018-08-17 RX ADMIN — SODIUM CHLORIDE 500 ML: 900 INJECTION, SOLUTION INTRAVENOUS at 19:59

## 2018-08-17 NOTE — IP AVS SNAPSHOT
[address] [facility] 13 
[phone] Patient: Sneha Sorenson MRN: [id number] :[id number] A check jonah indicates which time of day the medication should be taken. My Medications START taking these medications Instructions Each Dose to Equal  
 Morning Noon Evening Bedtime  
 guaiFENesin 1,200 mg Ta12 ER tablet Commonly known as:  Marc & Marc Your last dose was: Your next dose is: Take 1 Tab by mouth every twelve (12) hours. Indications: Cough 1200 mg  
    
   
   
   
  
 levoFLOXacin 250 mg tablet Commonly known as:  Pam Augustine Start taking on:  2018 Your last dose was: Your next dose is: Take 1 Tab by mouth daily for 2 days. 250 mg  
    
   
   
   
  
 polyethylene glycol 17 gram packet Commonly known as:  Jc Pereira Replaces:  polyethylene glycol 17 gram/dose powder Your last dose was: Your next dose is: Take 1 Packet by mouth daily. 17 g  
    
   
   
   
  
 polyvinyl alcohol 1.4 % ophthalmic solution Commonly known as:  Brooks Reas Your last dose was: Your next dose is:    
   
   
 Administer 1 Drop to both eyes as needed for up to 30 days. 1 Drop  
    
   
   
   
  
 predniSONE 10 mg tablet Commonly known as:  Fabian Turcios Your last dose was: Your next dose is: Take 20 mg (2 tab) x 2 days (-) then drop to 10 mg (1 tab) x 2 days (-) then stop. CHANGE how you take these medications Instructions Each Dose to Equal  
 Morning Noon Evening Bedtime  
 furosemide 40 mg tablet Commonly known as:  LASIX What changed:   
- how much to take 
- how to take this - when to take this 
- additional instructions Your last dose was: Your next dose is: Take 1 Tab by mouth daily.   
 40 mg  
    
   
   
   
  
 lisinopril 5 mg tablet Commonly known as:  Keri Jamil What changed:   
- medication strength 
- how much to take Your last dose was: Your next dose is: Take 1 Tab by mouth daily. 5 mg  
    
   
   
   
  
 traMADol 50 mg tablet Commonly known as:  ULTRAM  
What changed:  additional instructions Your last dose was: Your next dose is: Take 0.5 Tabs by mouth nightly. Max Daily Amount: 25 mg.  
 25 mg CONTINUE taking these medications Instructions Each Dose to Equal  
 Morning Noon Evening Bedtime  
 acetaminophen 500 mg tablet Commonly known as:  TYLENOL Your last dose was: Your next dose is: Take 500 mg by mouth every six (6) hours as needed for Pain. 500 mg  
    
   
   
   
  
 * albuterol 2.5 mg /3 mL (0.083 %) nebulizer solution Commonly known as:  PROVENTIL VENTOLIN Your last dose was: Your next dose is:    
   
   
 INHALE THE CONTENTS OF 1 VIAL VIA NEBULIZER EVERY 6 HOURS AS NEEDED FOR WHEEZING  
     
   
   
   
  
 * albuterol 90 mcg/actuation inhaler Commonly known as:  PROVENTIL HFA Your last dose was: Your next dose is: Take 1 Puff by inhalation every six (6) hours as needed for Wheezing. 1 Puff  
    
   
   
   
  
 calcium-cholecalciferol (D3) tablet Commonly known as:  CALTRATE 600+D Your last dose was: Your next dose is: TAKE 1 TABLET BY MOUTH TWICE DAILY DX: SUPPLEMENT  
     
   
   
   
  
 DOC-Q-LACE 100 mg capsule Generic drug:  docusate sodium Your last dose was: Your next dose is: TAKE 1 CAPSULE BY MOUTH TWICE DAILY DX:BOWEL SUPPORT  
     
   
   
   
  
 donepezil 5 mg tablet Commonly known as:  ARICEPT Your last dose was: Your next dose is:  TAKE 1 TABLET BY MOUTH EVERY NIGHT AT BEDTIME DX: MEMORY SUPPORT  
     
   
   
   
 ELIQUIS 2.5 mg tablet Generic drug:  apixaban Your last dose was: Your next dose is: TAKE 1 TABLET BY MOUTH 2 TIMES A DAY. DX ANTICOAGULANT  
     
   
   
   
  
 metoprolol succinate 25 mg XL tablet Commonly known as:  TOPROL-XL Your last dose was: Your next dose is: TAKE 1 TABLET BY MOUTH EVERY DAY DX: HTN  
     
   
   
   
  
 pravastatin 20 mg tablet Commonly known as:  PRAVACHOL Your last dose was: Your next dose is: Take 1 Tab by mouth nightly. D/C pravastatin 40 mg tab  
 20 mg  
    
   
   
   
  
 raNITIdine 150 mg tablet Commonly known as:  ZANTAC Your last dose was: Your next dose is: Take 150 mg by mouth nightly as needed for Indigestion. 150 mg SENEXON-S 8.6-50 mg per tablet Generic drug:  senna-docusate Your last dose was: Your next dose is: TAKE 1 TABLET BY MOUTH DAILY AS NEEDED FOR CONSTIPATION  
     
   
   
   
  
 venlafaxine-SR 75 mg capsule Commonly known as:  EFFEXOR-XR Your last dose was: Your next dose is: TAKE 1 CAPSULE BY MOUTH EVERY DAY DX:ANTIDEPRESSANT  
     
   
   
   
  
 VITAMIN B-12 500 mcg tablet Generic drug:  cyanocobalamin Your last dose was: Your next dose is: TAKE 1 TABLET BY MOUTH EVERY DAY DX: SUPPLEMENT * Notice: This list has 2 medication(s) that are the same as other medications prescribed for you. Read the directions carefully, and ask your doctor or other care provider to review them with you. STOP taking these medications   
 ammonium lactate 12 % lotion Commonly known as:  LAC-HYDRIN  
   
  
 hydrocortisone 2.5 % lotion Commonly known as:  HYTONE  
   
  
 polyethylene glycol 17 gram/dose powder Commonly known as:  Bel Dominion Replaced by:  polyethylene glycol 17 gram packet Where to Get Your Medications These medications were sent to [address], [address] [facility]  [address] [facility] [address], [place] [address] Phone:  [phone]  
  predniSONE 10 mg tablet Information on where to get these meds will be given to you by the nurse or doctor. ! Ask your nurse or doctor about these medications  
  furosemide 40 mg tablet  
 guaiFENesin 1,200 mg Ta12 ER tablet  
 levoFLOXacin 250 mg tablet  
 lisinopril 5 mg tablet  
 polyethylene glycol 17 gram packet  
 polyvinyl alcohol 1.4 % ophthalmic solution  
 traMADol 50 mg tablet

## 2018-08-17 NOTE — ED TRIAGE NOTES
TRIAGE: Pt arrives from [facility] assisted living for increased shortness of breath since this morning.  Pt was given \"many neb treatments\" per staff  Hx: PEs, Afib, Dementia  101 Tempanic  AFib RVR with supporting Hx  86% RA  96% 4L    Pt placed on NRB upon arrival and RT called for BIPAP
comprehensive exam...

## 2018-08-17 NOTE — ED PROVIDER NOTES
HPI Comments: 80 y.o. female with past medical history significant for PEs, atrial fib, and dementia who presents from [facility] assisted living via EMS with chief complaint of shortness of breath. Per EMS, pt has had difficulty breathing since this morning. Per EMS, staff at [facility] tried [place] neb treatments\" with no relief to her symptoms. Per EMS, pt had diminished breath sounds in both lungs that were more diminished on the right. Per EMS, pt's O2 saturation was 86% on room air and increased to 96% with 4L of O2 via NC. Per EMS, pt had a tympanic temperature of 101.4 F. Per EMS, pt showed afib with RVR on their monitor and has history of afib. Pt reports she does not wear O2 at home. Pt states she was unable to take her medications today. Pt denies any chest pain. There are no other acute medical concerns at this time. Social hx - Tobacco use: none, Alcohol Use: none    PCP: Elida Specne MD    Note written by Braden Hilliard, as dictated by Naveen Walden MD 7:04 PM.        The history is provided by the EMS personnel and the patient. No  was used.         Past Medical History:   Diagnosis Date    Altered mental state     Arrhythmia     AFIB HISTORY     Arthritis     BACK     Asthma     CAD (coronary artery disease)     by pass surgery in 2002,2008    Depression     Fibromyalgia     HTN (hypertension) 6/11/2013    Hypercholesterolemia     Ill-defined condition     FOUR COMPRESSION FRACTURES IN BACK    PE (pulmonary embolism)     Pneumonia 04/2014    Pneumonia, organism unspecified(486) 3/8/2014    Thyroid disease        Past Surgical History:   Procedure Laterality Date    CARDIAC SURG PROCEDURE UNLIST      heart attack,bypass sx         Family History:   Problem Relation Age of Onset    No Known Problems Mother     No Known Problems Father     Anesth Problems Neg Hx        Social History     Social History    Marital status:      Spouse name: N/A  Number of children: N/A    Years of education: N/A     Occupational History    Not on file. Social History Main Topics    Smoking status: Never Smoker    Smokeless tobacco: Never Used    Alcohol use No    Drug use: No    Sexual activity: No      Comment: ,4 children,[facility] [place] [facility]      Other Topics Concern    Not on file     Social History Narrative         ALLERGIES: Neurontin [gabapentin]    Review of Systems   Constitutional: Negative for chills and fever. HENT: Negative for sore throat. Respiratory: Positive for shortness of breath. Negative for cough. Cardiovascular: Negative for chest pain. Gastrointestinal: Negative for abdominal pain and vomiting. Genitourinary: Negative for dysuria. Musculoskeletal: Negative for back pain. Skin: Negative for rash. Neurological: Negative for syncope and headaches. Psychiatric/Behavioral: Negative for confusion. All other systems reviewed and are negative. There were no vitals filed for this visit. Physical Exam   Constitutional: She is oriented to person, place, and time. She appears well-developed. No distress. HENT:   Head: Normocephalic and atraumatic. Eyes: Conjunctivae and EOM are normal. Pupils are equal, round, and reactive to light. Neck: Normal range of motion. Neck supple. Cardiovascular: Normal heart sounds. An irregularly irregular rhythm present. Tachycardia present. No murmur heard. Pulmonary/Chest: Tachypnea noted. Moderate respiratory distress with retractions. Decreased breath sounds bilaterally, but equal.    Abdominal: Soft. Bowel sounds are normal. She exhibits no distension. There is no tenderness. There is no rebound. Musculoskeletal: Normal range of motion. She exhibits no edema. Neurological: She is alert and oriented to person, place, and time. No cranial nerve deficit. She exhibits normal muscle tone.  Coordination normal.   Skin: Skin is warm and dry. No rash noted. Psychiatric: She has a normal mood and affect. Her behavior is normal.   Nursing note and vitals reviewed. Note written by Braden Lea, as dictated by Leander Tavera MD 7:05 PM.    MDM  Number of Diagnoses or Management Options  Acute systolic congestive heart failure [facility]):   Risk of Complications, Morbidity, and/or Mortality  Presenting problems: high  Diagnostic procedures: high  Management options: high    Critical Care  Total time providing critical care: 30-74 minutes (Total critical care time spend exclusive of procedures:  35 minutes.  )        ED Course       Procedures  ED EKG interpretation:  7:26 PM  Rhythm: sinus tachycardia with frequent PVC's and a LBBB; and regular . Rate (approx.): 122; Axis: left axis deviation; ST/T wave: no STEMI  Note written by Braden Lea, as dictated by Leander Tavera MD 7:39 PM.    CONSULT NOTE:  8:24 PM Leander Tavera MD spoke with Dr. Froilan Garcia, Consult for Hospitalist.  Discussed available diagnostic tests and clinical findings. Dr. Froilan Garcia will evaluate and admit the pt.    8:25 PM  Patient is being admitted to the hospital.  The results of their tests and reasons for their admission have been discussed with them and/or available family. They convey agreement and understanding for the need to be admitted and for their admission diagnosis. Consultation will be made now with the inpatient physician for hospitalization.

## 2018-08-17 NOTE — IP AVS SNAPSHOT
[address] [address] 
[phone] Patient: Coretta Mendez MRN: [id number] :[id number] About your hospitalization You were admitted on:  2018 You last received care in the:  [facility] 4 IM 2 You were discharged on:  2018 Why you were hospitalized Your primary diagnosis was:  Shortness Of Breath Follow-up Information Follow up With Details Comments Contact Info Carolina Griffith MD In 1 week or with facility provider Kennedi Molina  [address] [place] [address] 
[phone] Sophie Scruggs MD In 2 weeks or associate [address] [facility] [address] [place] [address] 
[phone] Discharge Orders None A check jonah indicates which time of day the medication should be taken. My Medications START taking these medications Instructions Each Dose to Equal  
 Morning Noon Evening Bedtime  
 guaiFENesin 1,200 mg Ta12 ER tablet Commonly known as:  Marc & Marc Your last dose was: Your next dose is: Take 1 Tab by mouth every twelve (12) hours. Indications: Cough 1200 mg  
    
   
   
   
  
 levoFLOXacin 250 mg tablet Commonly known as:  Shavon Haider Start taking on:  2018 Your last dose was: Your next dose is: Take 1 Tab by mouth daily for 2 days. 250 mg  
    
   
   
   
  
 polyethylene glycol 17 gram packet Commonly known as:  Taniack Scale Replaces:  polyethylene glycol 17 gram/dose powder Your last dose was: Your next dose is: Take 1 Packet by mouth daily. 17 g  
    
   
   
   
  
 polyvinyl alcohol 1.4 % ophthalmic solution Commonly known as:  Saud Perdomo Your last dose was: Your next dose is:    
   
   
 Administer 1 Drop to both eyes as needed for up to 30 days. 1 Drop  
    
   
   
   
  
 predniSONE 10 mg tablet Commonly known as:  Thurston Foot  
   
 Your last dose was: Your next dose is: Take 20 mg (2 tab) x 2 days (8/23-8/24) then drop to 10 mg (1 tab) x 2 days (8/25-8/26) then stop. CHANGE how you take these medications Instructions Each Dose to Equal  
 Morning Noon Evening Bedtime  
 furosemide 40 mg tablet Commonly known as:  LASIX What changed:   
- how much to take 
- how to take this - when to take this 
- additional instructions Your last dose was: Your next dose is: Take 1 Tab by mouth daily. 40 mg  
    
   
   
   
  
 lisinopril 5 mg tablet Commonly known as:  Nakita Archer What changed:   
- medication strength 
- how much to take Your last dose was: Your next dose is: Take 1 Tab by mouth daily. 5 mg  
    
   
   
   
  
 traMADol 50 mg tablet Commonly known as:  ULTRAM  
What changed:  additional instructions Your last dose was: Your next dose is: Take 0.5 Tabs by mouth nightly. Max Daily Amount: 25 mg.  
 25 mg CONTINUE taking these medications Instructions Each Dose to Equal  
 Morning Noon Evening Bedtime  
 acetaminophen 500 mg tablet Commonly known as:  TYLENOL Your last dose was: Your next dose is: Take 500 mg by mouth every six (6) hours as needed for Pain. 500 mg  
    
   
   
   
  
 * albuterol 2.5 mg /3 mL (0.083 %) nebulizer solution Commonly known as:  PROVENTIL VENTOLIN Your last dose was: Your next dose is:    
   
   
 INHALE THE CONTENTS OF 1 VIAL VIA NEBULIZER EVERY 6 HOURS AS NEEDED FOR WHEEZING  
     
   
   
   
  
 * albuterol 90 mcg/actuation inhaler Commonly known as:  PROVENTIL HFA Your last dose was: Your next dose is: Take 1 Puff by inhalation every six (6) hours as needed for Wheezing. 1 Puff  
    
   
   
   
  
 calcium-cholecalciferol (D3) tablet Commonly known as:  CALTRATE 600+D Your last dose was: Your next dose is: TAKE 1 TABLET BY MOUTH TWICE DAILY DX: SUPPLEMENT  
     
   
   
   
  
 DOC-Q-LACE 100 mg capsule Generic drug:  docusate sodium Your last dose was: Your next dose is: TAKE 1 CAPSULE BY MOUTH TWICE DAILY DX:BOWEL SUPPORT  
     
   
   
   
  
 donepezil 5 mg tablet Commonly known as:  ARICEPT Your last dose was: Your next dose is: TAKE 1 TABLET BY MOUTH EVERY NIGHT AT BEDTIME DX: MEMORY SUPPORT  
     
   
   
   
  
 ELIQUIS 2.5 mg tablet Generic drug:  apixaban Your last dose was: Your next dose is: TAKE 1 TABLET BY MOUTH 2 TIMES A DAY. DX ANTICOAGULANT  
     
   
   
   
  
 metoprolol succinate 25 mg XL tablet Commonly known as:  TOPROL-XL Your last dose was: Your next dose is: TAKE 1 TABLET BY MOUTH EVERY DAY DX: HTN  
     
   
   
   
  
 pravastatin 20 mg tablet Commonly known as:  PRAVACHOL Your last dose was: Your next dose is: Take 1 Tab by mouth nightly. D/C pravastatin 40 mg tab  
 20 mg  
    
   
   
   
  
 raNITIdine 150 mg tablet Commonly known as:  ZANTAC Your last dose was: Your next dose is: Take 150 mg by mouth nightly as needed for Indigestion. 150 mg SENEXON-S 8.6-50 mg per tablet Generic drug:  senna-docusate Your last dose was: Your next dose is: TAKE 1 TABLET BY MOUTH DAILY AS NEEDED FOR CONSTIPATION  
     
   
   
   
  
 venlafaxine-SR 75 mg capsule Commonly known as:  EFFEXOR-XR Your last dose was: Your next dose is: TAKE 1 CAPSULE BY MOUTH EVERY DAY DX:ANTIDEPRESSANT  
     
   
   
   
  
 VITAMIN B-12 500 mcg tablet Generic drug:  cyanocobalamin Your last dose was: Your next dose is: TAKE 1 TABLET BY MOUTH EVERY DAY DX: SUPPLEMENT * Notice: This list has 2 medication(s) that are the same as other medications prescribed for you. Read the directions carefully, and ask your doctor or other care provider to review them with you. STOP taking these medications   
 ammonium lactate 12 % lotion Commonly known as:  LAC-HYDRIN  
   
  
 hydrocortisone 2.5 % lotion Commonly known as:  HYTONE  
   
  
 polyethylene glycol 17 gram/dose powder Commonly known as:  Annita Heidi Replaced by:  polyethylene glycol 17 gram packet Where to Get Your Medications These medications were sent to [address], [address] [facility]  [address] [facility] [facility] [address], [facility] 7 [address] Phone:  [phone]  
  predniSONE 10 mg tablet Information on where to get these meds will be given to you by the nurse or doctor. ! Ask your nurse or doctor about these medications  
  furosemide 40 mg tablet  
 guaiFENesin 1,200 mg Ta12 ER tablet  
 levoFLOXacin 250 mg tablet  
 lisinopril 5 mg tablet  
 polyethylene glycol 17 gram packet  
 polyvinyl alcohol 1.4 % ophthalmic solution  
 traMADol 50 mg tablet Opioid Education Prescription Opioids: What You Need to Know: 
 
  
PATIENT ID: Vince Davis MRN: [id number] YOB: 1926 DATE OF ADMISSION: 8/17/2018  7:01 PM   
DATE OF DISCHARGE: 8/22/2018 PRIMARY CARE PROVIDER: Omer Núñez MD  
ATTENDING PHYSICIAN: Jordan Miramontes MD 
DISCHARGING PROVIDER: Josie Johnson NP. To contact this individual call [phone] and ask the  to page.   If unavailable ask to be transferred the Adult Hospitalist Department. 
  
 CONSULTATIONS: IP CONSULT TO HOSPITALIST 
IP CONSULT TO CARDIOLOGY 
IP CONSULT TO PALLIATIVE CARE - PROVIDER 
  
ADMITTING [facility] COURSE:  
Pt came to ED due to fatigue, SOB and fever over the past several days PTA. She was placed on BiPAP in the ED. Imaging was concerning for developing PNA and CHF exacerbation.  
  
DISCHARGE DIAGNOSES / PLAN:   
  
Acute hypoxic respiratory failure (POA): - Initially on BiPAP and now on RA 
- Diuresis with Lasix -> now back on daily lasix, will continue on discharge 
- continue steroid taper - prn neb treatments 
   
Acute on chronic CHF exacerbation, NYHA class III (POA): 
- BNP 77558 on arrival 
- lasix changed to po daily. Weight is stable. - Cardiology has seen, appreciated input 
- Echo 8/18: ventricle was mildly dilated. Systolic function severely reduced by visual assessment. EF 20-25%. Akinesis of the basal-mid anteroseptal, mid anterolateral, and apical wall(s). There was mild concentric hypertrophy - Reduced dose of lasix due to orthostatic BP (5 mg), continue with Toprol-XL 
- unable to tolerate TEDs 
   
Acute renal insufficiency (POA): Cr with a slow trend down 
   
Hyperkalemia: resolved with dose of kayexalate 
   
Community Acquired PNA, possible (POA) - On Levaquin x 7 days (today was day 5), needs two more days 
- continue Mucinex - prn nebs 
   
Chronic atrial fibrillation: Rate controlled, Continue Eliquis 
   
Alzheimer's Dementia: Frequent reorientation & continue Aricept 
   
H/o CAD and Dyslipidemia: Continue home medications     
  
FOLLOW UP APPOINTMENTS:   
Follow-up Information Follow up With Details Comments Contact Info  
  Adela Leon MD In 1 week or with facility provider Jourdan Ervin  [address] 
[phone]  
  Fernando Huynh MD In 2 weeks or associate [address] [facility] [address] 
[phone] Future Appointments Date Time Provider Theodora Goldberg 11/6/2018 10:15 AM Mel Polk  [facility] [address]  
  
ADDITIONAL CARE RECOMMENDATIONS:  
Complete full course of abx and steroids.  
  
Obtains daily weights. Goal is for no more than 2 pounds in one day. If you gain 3 pounds in one day or 5 pounds in one week, CALL MD 
  
DIET: Cardiac Diet 
  
ACTIVITY: Activity as tolerated and PT/OT Eval and Treat 
  
EQUIPMENT needed: as per PT/OT 
  
DISCHARGE MEDICATIONS: 
     
Current Discharge Medication List  
   
     
START taking these medications  
  Details  
polyethylene glycol (MIRALAX) 17 gram packet Take 1 Packet by mouth daily. Qty: 30 Packet, Refills: 0  
   
guaiFENesin ER (MUCINEX) 1,200 mg Ta12 ER tablet Take 1 Tab by mouth every twelve (12) hours. Indications: Cough Qty: 60 Tab, Refills: 0  
   
polyvinyl alcohol (LIQUIFILM TEARS) 1.4 % ophthalmic solution Administer 1 Drop to both eyes as needed for up to 30 days. Qty: 30 mL, Refills: 0  
   
predniSONE (DELTASONE) 10 mg tablet Take 20 mg (2 tab) x 2 days (8/23-8/24) then drop to 10 mg (1 tab) x 2 days (8/25-8/26) then stop. Qty: 6 Tab, Refills: 0  
   
levoFLOXacin (LEVAQUIN) 250 mg tablet Take 1 Tab by mouth daily for 2 days. Qty: 2 Tab, Refills: 0  
   
   
     
CONTINUE these medications which have CHANGED  
  Details  
furosemide (LASIX) 40 mg tablet Take 1 Tab by mouth daily. Qty: 30 Tab, Refills: 0  
  Associated Diagnoses: Chronic systolic congestive heart failure (HCC)  
   
traMADol (ULTRAM) 50 mg tablet Take 0.5 Tabs by mouth nightly. Max Daily Amount: 25 mg. 
Qty: 20 Tab, Refills: 0  
  Associated Diagnoses: Chronic midline low back pain without sciatica  
   
lisinopril (PRINIVIL, ZESTRIL) 5 mg tablet Take 1 Tab by mouth daily.  
Qty: 30 Tab, Refills: 0  
   
   
     
CONTINUE these medications which have NOT CHANGED  
  Details  
acetaminophen (TYLENOL) 500 mg tablet Take 500 mg by mouth every six (6) hours as needed for Pain.  
   
 metoprolol succinate (TOPROL-XL) 25 mg XL tablet TAKE 1 TABLET BY MOUTH EVERY DAY DX: HTN Qty: 30 Tab, Refills: 1  
   
ELIQUIS 2.5 mg tablet TAKE 1 TABLET BY MOUTH 2 TIMES A DAY. DX ANTICOAGULANT Qty: 60 Tab, Refills: 5  
  Associated Diagnoses: Chronic systolic congestive heart failure (HCC)  
   
VITAMIN B-12 500 mcg tablet TAKE 1 TABLET BY MOUTH EVERY DAY DX: SUPPLEMENT Qty: 30 Tab, Refills: 5  
   
venlafaxine-SR (EFFEXOR-XR) 75 mg capsule TAKE 1 CAPSULE BY MOUTH EVERY DAY DX:ANTIDEPRESSANT Qty: 30 Cap, Refills: 5  
   
DOC-Q-LACE 100 mg capsule TAKE 1 CAPSULE BY MOUTH TWICE DAILY DX:BOWEL SUPPORT Qty: 60 Cap, Refills: 5  
   
donepezil (ARICEPT) 5 mg tablet TAKE 1 TABLET BY MOUTH EVERY NIGHT AT BEDTIME DX: MEMORY SUPPORT Qty: 30 Tab, Refills: 5  
   
albuterol (PROVENTIL HFA) 90 mcg/actuation inhaler Take 1 Puff by inhalation every six (6) hours as needed for Wheezing. Qty: 1 Inhaler, Refills: prn  
   
albuterol (PROVENTIL VENTOLIN) 2.5 mg /3 mL (0.083 %) nebulizer solution INHALE THE CONTENTS OF 1 VIAL VIA NEBULIZER EVERY 6 HOURS AS NEEDED FOR WHEEZING Qty: 100 Package, Refills: 6  
   
calcium-cholecalciferol, D3, (CALTRATE 600+D) tablet TAKE 1 TABLET BY MOUTH TWICE DAILY DX: SUPPLEMENT Qty: 60 Tab, Refills: 12  
  Associated Diagnoses: Osteopenia  
   
raNITIdine (ZANTAC) 150 mg tablet Take 150 mg by mouth nightly as needed for Indigestion.  
   
SENEXON-S 8.6-50 mg per tablet TAKE 1 TABLET BY MOUTH DAILY AS NEEDED FOR CONSTIPATION Qty: 30 Tab, Refills: 4  
  Associated Diagnoses: Constipation, unspecified constipation type  
   
pravastatin (PRAVACHOL) 20 mg tablet Take 1 Tab by mouth nightly. D/C pravastatin 40 mg tab Qty: 30 Tab, Refills: 5  
  Associated Diagnoses: Coronary artery disease due to lipid rich plaque; Essential hypertension  
   
   
    
STOP taking these medications  
   
  hydrocortisone (HYTONE) 2.5 % lotion Comments:  
Reason for Stopping:   
     
   ammonium lactate (LAC-HYDRIN) 12 % lotion Comments:  
Reason for Stopping:   
     
  polyethylene glycol (MIRALAX) 17 gram/dose powder Comments:  
Reason for Stopping:   
     
   
  
NOTIFY YOUR PHYSICIAN FOR ANY OF THE FOLLOWING:  
Fever over 101 degrees for 24 hours. Chest pain, shortness of breath, fever, chills, nausea, vomiting, diarrhea, change in mentation, falling, weakness, bleeding. Severe pain or pain not relieved by medications. Or, any other signs or symptoms that you may have questions about. 
  
DISPOSITION: 
   Home With: 
  OT   PT   HH   RN  
  
xx Long term SNF/Inpatient Rehab  
  Independent/assisted living  
  Hospice  
  Other:  
  
PATIENT CONDITION AT DISCHARGE:  
Functional status  
  Poor   
xx Deconditioned   
  Independent   
  
Cognition  
xx  Lucid   
xx Forgetful   
  Dementia   
  
Catheters/lines (plus indication)  
  Hicks   
  PICC   
  PEG   
xx None   
  
Code status  
xx  Full code   
  DNR   
  
PHYSICAL EXAMINATION AT DISCHARGE: 
/78  Pulse 92  Temp 97.7 °F (36.5 °C)  Resp 22  Wt 73.9 kg (162 lb 14.7 oz)  SpO2 96%  BMI 30.78 kg/m2 
  
Pt in bed resting, no new complaints this am - still just is tired. No pain, no shortness of breath. Discussed plan for discharge today to SNF with patient, nurse and pts daughter Cleo Jain. Cleo Jain will be coming to pick patient up later this am.  
  
Constitutional:  No acute distress, cooperative, pleasant   
ENT:  Oral mucous membranes moist, oropharynx benign.    
Resp:  Clear but diminished. No crackles and no wheezing. On RA, sats 96% CV:  Irregular rhythm, HR . SR with PVC/PAC with some episodic Afib  
 GI:  Soft, non distended, non tender. normoactive bowel sounds + BM  
 Musculoskeletal:  Mild pedal edema (non pitting), warm, 2+ pulses throughout  
 Neurologic: Milus Lapidus and oriented x person/family, place, year. Follows all commands with clear speech                         Psych: Moderate insight. Calm and not anxious 
  
CHRONIC MEDICAL DIAGNOSES: 
Problem List as of 8/22/2018  Date Reviewed: 8/22/2018  
          Codes Class Noted - Resolved  
  Skin lesion of face ICD-10-CM: L98.9 ICD-9-CM: 294. 9   8/2/2018 - Present  
     
  Cancer, skin, squamous cell ICD-10-CM: C44.92 
ICD-9-CM: 173.92   8/2/2018 - Present  
     
  Gait instability ICD-10-CM: R26.81 
ICD-9-CM: 281. 2   8/2/2018 - Present  
     
  Recurrent depression ([facility] ICD-10-CM: F33.9 ICD-9-CM: 296.30   1/2/2018 - Present  
     
  ACP (advance care planning) ICD-10-CM: Z71.89 ICD-9-CM: V65.49   5/30/2017 - Present  
     
  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  
  Overview Signed 8/29/2016  8:43 AM by Felicia Cardoza MD  
    Left forehead  
   
     
  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  
     
  Squamous cell carcinoma in situ of skin of right cheek ICD-10-CM: D04.39 
ICD-9-CM: 232. 3   8/29/2016 - Present  
     
  Cardiac LV ejection fraction 21-40% ICD-10-CM: R94.30 ICD-9-CM: 196. 9   7/21/2016 - Present  
     
  Osteopenia ICD-10-CM: M85.80 ICD-9-CM: 733.90   4/12/2016 - Present  
     
  Advance care planning ICD-10-CM: Z71.89 ICD-9-CM: V65.49   2/9/2016 - Present  
     
  Intractable back pain ICD-10-CM: M54.9 ICD-9-CM: 039. 5   8/8/2015 - Present  
     
  Rotator cuff tear arthropathy of right shoulder ICD-10-CM: M12.811 ICD-9-CM: 716.81   5/15/2015 - Present  
     
  Acute chest pain ICD-10-CM: R07.9 ICD-9-CM: 786.50   5/14/2015 - Present  
     
  Dementia without behavioral disturbance ICD-10-CM: F03.90 ICD-9-CM: 294.20   4/27/2015 - Present  
     
  Depressive disorder, not elsewhere classified ICD-10-CM: F32.9 ICD-9-CM: 311   4/27/2015 - Present  
     
  Anxiety state, unspecified ICD-10-CM: F41.1 ICD-9-CM: 300.00   4/27/2015 - Present  
     
  Pain Disorder Associated w/ Both Psychological & Medical Factors ICD-10-CM: F45.42 
ICD-9-CM: 307.89   4/27/2015 - Present  
     
  CHF (congestive heart failure) (HCC) ICD-10-CM: I50.9 ICD-9-CM: 515. 0   5/1/2014 - Present  
     
  Acute on chronic systolic heart failure (HCC) ICD-10-CM: F76.94 ICD-9-CM: 428.23   3/14/2014 - Present  
     
  Memory impairment ICD-10-CM: R41.3 ICD-9-CM: 780.93   8/15/2013 - Present  
     
  Depression ICD-10-CM: F32.9 ICD-9-CM: 412   8/15/2013 - Present  
     
  S/P CABG (coronary artery bypass graft) ICD-10-CM: Z95.1 ICD-9-CM: V45.81   8/15/2013 - Present  
     
  MI, old ICD-10-CM: I25.2 ICD-9-CM: 988   8/15/2013 - Present  
     
  Mixed hyperlipidemia ICD-10-CM: E78.2 ICD-9-CM: 272.2   6/11/2013 - Present  
     
  CAD (coronary artery disease) ICD-10-CM: I25.10 ICD-9-CM: 414.00   6/11/2013 - Present  
     
  HTN (hypertension) ICD-10-CM: I10 
ICD-9-CM: 401. 9   6/11/2013 - Present  
     
  * (Principal)RESOLVED: Shortness of breath ICD-10-CM: R06.02 
ICD-9-CM: 786.05   8/17/2018 - 8/22/2018  
     
  RESOLVED: Diarrhea ICD-10-CM: R19.7 ICD-9-CM: 787.91   1/26/2017 - 1/26/2017  
     
  RESOLVED: Acute respiratory failure with hypoxia (HCC) ICD-10-CM: J96.01 
ICD-9-CM: 518.81   1/24/2017 - 1/26/2017  
     
  RESOLVED: [facility] ICD-10-CM: N17.9 ICD-9-CM: 855. 9   8/14/2015 - 8/20/2015  
     
  RESOLVED: Encephalopathy ICD-10-CM: G93.40 ICD-9-CM: 348.30   8/14/2015 - 8/20/2015  
     
  RESOLVED: Pneumonia, organism unspecified(486) ICD-10-CM: J18.9 ICD-9-CM: 772   3/8/2014 - 11/4/2014  
     
  
  
Greater than 30 minutes were spent with the patient on counseling and coordination of care 
  
Signed:  
Quita Julien NP 
8/22/2018 
8:38 AM 
 
  
 
 
[facility]O Transitions of Care Introducing [facility] [address] offers a voluntary care coordination program to provide high quality service and care to [facility] fee-for-service beneficiaries. Kehinde Bunn was designed to help you enhance your health and well-being through the following services: ? Transitions of Care  support for individuals who are transitioning from one care setting to another (example: Hospital to home). ? Chronic and Complex Care Coordination  support for individuals and caregivers of those with serious or chronic illnesses or with more than one chronic (ongoing) condition and those who take a number of different medications. If you meet specific medical criteria, a [address] [place] [address] Rd may call you directly to coordinate your care with your primary care physician and your other care providers. For questions about the [facility] programs, please, contact your physicians office. For general questions or additional information about Accountable Care Organizations: 
Please visit www.medicare.gov/acos. html or call 1-800-MEDICARE (9-537.467.9670) TTY users should call 0-816.228.1722. [employer] Announcement We are excited to announce that we are making your provider's discharge notes available to you in [employer]. You will see these notes when they are completed and signed by the physician that discharged you from your recent hospital stay. If you have any questions or concerns about any information you see in [employer], please call the Health Information Department where you were seen or reach out to your Primary Care Provider for more information about your plan of care. Introducing [facility]! Tawnya Snyder introduces [employer] patient portal. Now you can access parts of your medical record, email your doctor's office, and request medication refills online. 1. In your internet browser, go to [email or web address] 2. Click on the First Time User? Click Here link in the Sign In box. You will see the New Member Sign Up page. 3. Enter your [employer] Access Code exactly as it appears below. You will not need to use this code after youve completed the sign-up process. If you do not sign up before the expiration date, you must request a new code. · [employer] Access Code: [email or web address] Expires: 10/31/2018 11:07 AM 
 
4. Enter the last four digits of your Social Security Number (xxxx) and Date of Birth (mm/dd/yyyy) as indicated and click Submit. You will be taken to the next sign-up page. 5. Create a [employer] ID. This will be your [employer] login ID and cannot be changed, so think of one that is secure and easy to remember. 6. Create a [employer] password. You can change your password at any time. 7. Enter your Password Reset Question and Answer. This can be used at a later time if you forget your password. 8. Enter your e-mail address. You will receive e-mail notification when new information is available in [address] 9. Click Sign Up. You can now view and download portions of your medical record. 10. Click the Download Summary menu link to download a portable copy of your medical information. If you have questions, please visit the Frequently Asked Questions section of the [employer] website. Remember, [employer] is NOT to be used for urgent needs. For medical emergencies, dial 911. Now available from your iPhone and Android! Introducing Chauncey Zimmerman As a Leander Caylaas patient, I wanted to make you aware of our electronic visit tool called Chauncey Zimmerman. [facility] Her[facility] 24/7 allows you to connect within minutes with a medical provider 24 hours a day, seven days a week via a mobile device or tablet or logging into a secure website from your computer. You can access Chauncey Zimmerman from anywhere in the [place].  
 
A virtual visit might be right for you when you have a simple condition and feel like you just dont want to get out of bed, or cant get away from work for an appointment, when your regular [employer] provider is not available (evenings, weekends or holidays), or when youre out of town and need minor care. Electronic visits cost only $49 and if the [employer] 24/7 provider determines a prescription is needed to treat your condition, one can be electronically transmitted to a nearby pharmacy*. Please take a moment to enroll today if you have not already done so. The enrollment process is free and takes just a few minutes. To enroll, please download the [employer] 24/7 rohini to your tablet or phone, or visit [email or web address] to enroll on your computer. And, as an [address] [place] [address] patient with a [employer] account, the results of your visits will be scanned into your electronic medical record and your primary care provider will be able to view the scanned results. We urge you to continue to see your regular [employer] provider for your ongoing medical care. And while your primary care provider may not be the one available when you seek a [employer] virtual visit, the peace of mind you get from getting a real diagnosis real time can be priceless. For more information on [employer], view our Frequently Asked Questions (FAQs) at [email or web address]. Sincerely, 
 
Arvell Najjar, MD 
Chief Medical Officer [address] *:  certain medications cannot be prescribed via [employer] Last Palliative Care Discharge Note   
 08/22/18 1031  Version 1 of 1 Dear Ms. Delane Dandy, The Palliative Medicine team was consulted as part of your / your loved one's care in the hospital. Our team is a supportive service; we strive to relieve suffering and improve quality of life.  
 
The focus of our consultation was to get to know you better as a person; to gain a better understanding of how your health has changed over time and to discuss care goals for your future. We reviewed advance care planning information, which includes the following: 
Advance Care Planning 8/21/2018 Patient's Healthcare Decision Maker is: Named in scanned ACP document Primary Decision Maker Name Anibal sutton Primary Decision Maker Phone Number c [phone] Primary Decision Maker Relationship to Patient Adult child Confirm Advance Directive Yes, not on file Patient Would Like to Complete Advance Directive - Does the patient have other document types - You shared with us that your health has been fairly stable for the last few years. We discussed your chronic health conditions, especially, your congestive heart failure (CHF). We discussed the chronic and progressive nature of this disease; the likelihood that symptoms will worsen over time. For now, we assume the pneumonia caused your acute heart failure. Our hope is that you can recover quickly from this and return to your baseline functioning. There are some unknowns, however, about your ability to recover well. If your find that rehab is more burdensome than beneficial; that you are using up more energy than you have to give, you may want to reconsider goals for the future. If you find yourself in a more frequent cycle of admissions to the hospital, we may also want to talk more about goals. We reviewed the Advance Directive you completed in 1996. You shared you have since updated, but that your general goals for allowing natural death; not having life-prolonging treatments at end-of-life remain. We discussed the importance of backing up your wishes with a Do Not Attempt Resuscitation (DNR) order. We encourage you to talk more with family and provider about this decision. Thank you for allowing us to be a part of your care. We wish you well in rehab. Because of the importance of this information, we are providing you with a printed copy to share with other healthcare providers after this hospitalization is complete. If any of the above information is incomplete or incorrect, please contact the Palliative Medicine team at [phone] CHELSI Hamilton MD 
 
 
 
 
 
 
 
  
 
  
  
Providers Seen During Your Hospitalization Provider Specialty Primary office phone Jj Crawley MD Emergency Medicine [phone] Anna Marie Mata MD Internal Medicine [phone] Latoya Coy MD Internal Medicine [phone] Jazzy Cardenas MD Internal Medicine [phone] May Borja MD Internal Medicine [phone] Your Primary Care Physician (PCP) Primary Care Physician Office Phone Office Fax [phone] [facility] [phone] You are allergic to the following Allergen Reactions Neurontin (Gabapentin) Unknown (comments) Pt unknown reaction Recent Documentation Weight BMI OB Status Smoking Status 73.9 kg 30.78 kg/m2 Postmenopausal Never Smoker Emergency Contacts Name Discharge Info Relation Home Work Mobile [facility] DISCHARGE CAREGIVER [3] Child [2] [phone] Jennifer Moyer  Child [2] [phone] Patient Belongings The following personal items are in your possession at time of discharge: 
  Dental Appliances: Uppers  Visual Aid: None      Home Medications: None   Jewelry: None  Clothing: None    Other Valuables: None Discharge Instructions Attachments/References HEART FAILURE: AVOIDING TRIGGERS (ENGLISH) Patient Handouts Avoiding Triggers With Heart Failure: Care Instructions Your Care Instructions Triggers are anything that make your heart failure flare up. A flare-up is also called \"sudden heart failure\" or \"acute heart failure. \" When you have a flare-up, fluid builds up in your lungs, and you have problems breathing. You might need to go to the hospital. By watching for changes in your condition and avoiding triggers, you can prevent heart failure flare-ups. Follow-up care is a key part of your treatment and safety. Be sure to make and go to all appointments, and call your doctor if you are having problems. It's also a good idea to know your test results and keep a list of the medicines you take. How can you care for yourself at home? Watch for changes in your weight and condition · Weigh yourself without clothing at the same time each day. Record your weight. Call your doctor if you have sudden weight gain, such as more than 2 to 3 pounds in a day or 5 pounds in a week. (Your doctor may suggest a different range of weight gain.) A sudden weight gain may mean that your heart failure is getting worse. · Keep a daily record of your symptoms. Write down any changes in how you feel, such as new shortness of breath, cough, or problems eating. Also record if your ankles are more swollen than usual and if you feel more tired than usual. Note anything that you ate or did that could have triggered these changes. Limit sodium Sodium causes your body to hold on to extra water. This may cause your heart failure symptoms to get worse. People get most of their sodium from processed foods. Fast food and restaurant meals also tend to be very high in sodium. · Your doctor may suggest that you limit sodium to 2,000 milligrams (mg) a day or less. That is less than 1 teaspoon of salt a day, including all the salt you eat in cooking or in packaged foods. · Read food labels on cans and food packages. They tell you how much sodium you get in one serving. Check the serving size. If you eat more than one serving, you are getting more sodium.  
· Be aware that sodium can come in forms other than salt, including monosodium glutamate (MSG), sodium citrate, and sodium bicarbonate (baking soda). MSG is often added to Asian food. You can sometimes ask for food without MSG or salt. · Slowly reducing salt will help you adjust to the taste. Take the salt shaker off the table. · Flavor your food with garlic, lemon juice, onion, vinegar, herbs, and spices instead of salt. Do not use soy sauce, steak sauce, onion salt, garlic salt, mustard, or ketchup on your food, unless it is labeled \"low-sodium\" or \"low-salt. \" 
· Make your own salad dressings, sauces, and ketchup without adding salt. · Use fresh or frozen ingredients, instead of canned ones, whenever you can. Choose low-sodium canned goods. · Eat less processed food and food from restaurants, including fast food. Exercise as directed Moderate, regular exercise is very good for your heart. It improves your blood flow and helps control your weight. But too much exercise can stress your heart and cause a heart failure flare-up. · Check with your doctor before you start an exercise program. 
· Walking is an easy way to get exercise. Start out slowly. Gradually increase the length and pace of your walk. Swimming, riding a bike, and using a treadmill are also good forms of exercise. · When you exercise, watch for signs that your heart is working too hard. You are pushing yourself too hard if you cannot talk while you are exercising. If you become short of breath or dizzy or have chest pain, stop, sit down, and rest. 
· Do not exercise when you do not feel well. Take medicines correctly · Take your medicines exactly as prescribed. Call your doctor if you think you are having a problem with your medicine. · Make a list of all the medicines you take. Include those prescribed to you by other doctors and any over-the-counter medicines, vitamins, or supplements you take. Take this list with you when you go to any doctor. · Take your medicines at the same time every day. It may help you to post a list of all the medicines you take every day and what time of day you take them. · Make taking your medicine as simple as you can. Plan times to take your medicines when you are doing other things, such as eating a meal or getting ready for bed. This will make it easier to remember to take your medicines. · Get organized. Use helpful tools, such as daily or weekly pill containers. When should you call for help? Call 911 if you have symptoms of sudden heart failure such as: 
  · You have severe trouble breathing.  
  · You cough up pink, foamy mucus.  
  · You have a new irregular or rapid heartbeat.  
 Call your doctor now or seek immediate medical care if: 
  · You have new or increased shortness of breath.  
  · You are dizzy or lightheaded, or you feel like you may faint.  
  · You have sudden weight gain, such as more than 2 to 3 pounds in a day or 5 pounds in a week. (Your doctor may suggest a different range of weight gain.)  
  · You have increased swelling in your legs, ankles, or feet.  
  · You are suddenly so tired or weak that you cannot do your usual activities.  
 Watch closely for changes in your health, and be sure to contact your doctor if you develop new symptoms. Where can you learn more? Go to http://emma-rosina.info/. Enter O874 in the search box to learn more about \"Avoiding Triggers With Heart Failure: Care Instructions. \" Current as of: December 6, 2017 Content Version: 11.7 © 1762-8859 [employer]. Care instructions adapted under license by [employer] (which disclaims liability or warranty for this information). If you have questions about a medical condition or this instruction, always ask your healthcare professional. [address] any warranty or liability for your use of this information. Please provide this summary of care documentation to your next provider. Signatures-by signing, you are acknowledging that this After Visit Summary has been reviewed with you and you have received a copy. Patient Signature:  ____________________________________________________________ Date:  ____________________________________________________________  
  
Marquise Captain Provider Signature:  ____________________________________________________________ Date:  ____________________________________________________________

## 2018-08-18 LAB
TROPONIN I SERPL-MCNC: 0.05 NG/ML
TROPONIN I SERPL-MCNC: <0.05 NG/ML

## 2018-08-18 PROCEDURE — 65660000001 HC RM ICU INTERMED STEPDOWN

## 2018-08-18 PROCEDURE — 74011000250 HC RX REV CODE- 250: Performed by: FAMILY MEDICINE

## 2018-08-18 PROCEDURE — 94664 DEMO&/EVAL PT USE INHALER: CPT

## 2018-08-18 PROCEDURE — 94640 AIRWAY INHALATION TREATMENT: CPT

## 2018-08-18 PROCEDURE — 36415 COLL VENOUS BLD VENIPUNCTURE: CPT | Performed by: HOSPITALIST

## 2018-08-18 PROCEDURE — 84484 ASSAY OF TROPONIN QUANT: CPT | Performed by: HOSPITALIST

## 2018-08-18 PROCEDURE — 94660 CPAP INITIATION&MGMT: CPT

## 2018-08-18 PROCEDURE — 74011250637 HC RX REV CODE- 250/637: Performed by: NURSE PRACTITIONER

## 2018-08-18 PROCEDURE — 74011000250 HC RX REV CODE- 250: Performed by: HOSPITALIST

## 2018-08-18 PROCEDURE — 77030013140 HC MSK NEB VYRM -A

## 2018-08-18 PROCEDURE — C8923 2D TTE W OR W/O FOL W/CON,CO: HCPCS

## 2018-08-18 PROCEDURE — 77010033678 HC OXYGEN DAILY

## 2018-08-18 PROCEDURE — 74011250636 HC RX REV CODE- 250/636: Performed by: HOSPITALIST

## 2018-08-18 PROCEDURE — 74011250637 HC RX REV CODE- 250/637: Performed by: HOSPITALIST

## 2018-08-18 RX ORDER — IPRATROPIUM BROMIDE AND ALBUTEROL SULFATE 2.5; .5 MG/3ML; MG/3ML
3 SOLUTION RESPIRATORY (INHALATION)
Status: DISCONTINUED | OUTPATIENT
Start: 2018-08-18 | End: 2018-08-22 | Stop reason: HOSPADM

## 2018-08-18 RX ORDER — POLYVINYL ALCOHOL 14 MG/ML
1 SOLUTION/ DROPS OPHTHALMIC AS NEEDED
Status: DISCONTINUED | OUTPATIENT
Start: 2018-08-18 | End: 2018-08-22 | Stop reason: HOSPADM

## 2018-08-18 RX ORDER — IPRATROPIUM BROMIDE AND ALBUTEROL SULFATE 2.5; .5 MG/3ML; MG/3ML
3 SOLUTION RESPIRATORY (INHALATION)
Status: DISCONTINUED | OUTPATIENT
Start: 2018-08-18 | End: 2018-08-18

## 2018-08-18 RX ORDER — IPRATROPIUM BROMIDE AND ALBUTEROL SULFATE 2.5; .5 MG/3ML; MG/3ML
3 SOLUTION RESPIRATORY (INHALATION)
Status: DISCONTINUED | OUTPATIENT
Start: 2018-08-18 | End: 2018-08-19

## 2018-08-18 RX ORDER — GUAIFENESIN 600 MG/1
600 TABLET, EXTENDED RELEASE ORAL EVERY 12 HOURS
Status: DISCONTINUED | OUTPATIENT
Start: 2018-08-18 | End: 2018-08-19

## 2018-08-18 RX ADMIN — DONEPEZIL HYDROCHLORIDE 5 MG: 5 TABLET, FILM COATED ORAL at 09:44

## 2018-08-18 RX ADMIN — VENLAFAXINE HYDROCHLORIDE 75 MG: 37.5 CAPSULE, EXTENDED RELEASE ORAL at 09:44

## 2018-08-18 RX ADMIN — GUAIFENESIN 600 MG: 600 TABLET, EXTENDED RELEASE ORAL at 21:35

## 2018-08-18 RX ADMIN — CYANOCOBALAMIN TAB 500 MCG 500 MCG: 500 TAB at 09:44

## 2018-08-18 RX ADMIN — LEVOFLOXACIN 500 MG: 5 INJECTION, SOLUTION INTRAVENOUS at 01:08

## 2018-08-18 RX ADMIN — DOCUSATE SODIUM 100 MG: 100 CAPSULE, LIQUID FILLED ORAL at 09:44

## 2018-08-18 RX ADMIN — APIXABAN 2.5 MG: 2.5 TABLET, FILM COATED ORAL at 09:44

## 2018-08-18 RX ADMIN — STANDARDIZED SENNA CONCENTRATE AND DOCUSATE SODIUM 1 TABLET: 8.6; 5 TABLET, FILM COATED ORAL at 09:44

## 2018-08-18 RX ADMIN — IPRATROPIUM BROMIDE AND ALBUTEROL SULFATE 3 ML: .5; 3 SOLUTION RESPIRATORY (INHALATION) at 19:33

## 2018-08-18 RX ADMIN — TRAMADOL HYDROCHLORIDE 25 MG: 50 TABLET, FILM COATED ORAL at 21:35

## 2018-08-18 RX ADMIN — Medication 10 ML: at 13:55

## 2018-08-18 RX ADMIN — DOCUSATE SODIUM 100 MG: 100 CAPSULE, LIQUID FILLED ORAL at 17:35

## 2018-08-18 RX ADMIN — POLYETHYLENE GLYCOL 3350 17 G: 17 POWDER, FOR SOLUTION ORAL at 09:46

## 2018-08-18 RX ADMIN — IPRATROPIUM BROMIDE AND ALBUTEROL SULFATE 3 ML: .5; 3 SOLUTION RESPIRATORY (INHALATION) at 01:29

## 2018-08-18 RX ADMIN — Medication 10 ML: at 21:35

## 2018-08-18 RX ADMIN — PRAVASTATIN SODIUM 20 MG: 20 TABLET ORAL at 21:35

## 2018-08-18 RX ADMIN — IPRATROPIUM BROMIDE AND ALBUTEROL SULFATE 3 ML: .5; 3 SOLUTION RESPIRATORY (INHALATION) at 08:28

## 2018-08-18 RX ADMIN — METHYLPREDNISOLONE SODIUM SUCCINATE 20 MG: 40 INJECTION, POWDER, FOR SOLUTION INTRAMUSCULAR; INTRAVENOUS at 01:07

## 2018-08-18 RX ADMIN — Medication 10 ML: at 01:10

## 2018-08-18 RX ADMIN — APIXABAN 2.5 MG: 2.5 TABLET, FILM COATED ORAL at 17:34

## 2018-08-18 RX ADMIN — METHYLPREDNISOLONE SODIUM SUCCINATE 20 MG: 40 INJECTION, POWDER, FOR SOLUTION INTRAMUSCULAR; INTRAVENOUS at 11:52

## 2018-08-18 RX ADMIN — GUAIFENESIN 600 MG: 600 TABLET, EXTENDED RELEASE ORAL at 11:52

## 2018-08-18 NOTE — CONSULTS
[address]    [place]  MR#: [id number]  : 1926  ACCOUNT #: [de-identified]   DATE OF SERVICE: 2018    Cardiology Consult  Note   [facility] M[facility] , F[facility]   --------PCP:-Cee Bach MD   -----Subjective:   Irma Lugo is a 80 y.o. female    HISTORY OF PRESENT ILLNESS:  The patient says she has been short of breath at rest for more than a year or 2. She says she has had multiple MIs and bypass surgery, she thinks more than 8 years ago in [place], and heart failure. She said she came in because she does not feel well. She had cold-like symptoms, coughing. She is able to lie flat. She denies chest pain. She denies tobacco.  Mother and father parents had CAD. She lives at a facility but cannot name it and seems forgetful. She is in sinus rhythm at 98, sats 97%. She has normal breath sounds and normal heart sounds. It looks like she lives in [facility] in assisted living. Brought to the emergency room with fatigue, shortness of breath, a fever and was thought to have pneumonia and possible volume overload. Her BNP was 21,054. An echo in  documented EF of 20% to 25% and she was placed on Levaquin. She is already continued on Eliquis for her chronic atrial fibrillation. It is unclear who her regular cardiologist may be. An echo 2015 showed EF 20% to 25%, mildly dilated left atrium, moderate MR, mild to moderate TR and then echo 2014 showed an EF of 20% with wall motion abnormalities. After we had seen the patient we were able to obtain records that show that Dr. Chandler Medina is her cardiologist and he follows her for chronic CHF, dilated cardiomyopathy with hypertension and left bundle branch block. This was after I had seen the patient and was dictating the note. I was not aware of this prior to nor was I informed. Discussion/Plan/Impression:    1.   The care of the patient from a cardiac point of view will be turned over to Dr. Bob Diallo starting tomorrow. I have already seen the patient today and, as is custom in this situation since the patient is not acutely ill, I think that can wait until tomorrow. 2.  The patient is admitted with shortness of breath and pneumonia is suspected. CT of the chest seems to show, however, no acute findings. Chest x-ray yesterday showed no abnormalities so the question is whether this is a CHF exacerbation. The patient was continued on Eliquis. I would agree with the use of furosemide and Levaquin. If she is unimproved with antibiotics I would increase the diuretic. 3.  Coronary artery disease, no current angina. I will have the primary service informed, Dr. Nohemi Coe, who is on call for Dr. Bob Diallo this week. Bhavya House MD     VKS / SN  D: 08/18/2018 17:06     T: 08/18/2018 19:20  JOB #: [id number]    Lab Results   Component Value Date/Time    Creatinine 1.39 (H) 08/17/2018 07:17 PM      Results for orders placed or performed during the hospital encounter of 08/17/18   EKG, 12 LEAD, INITIAL   Result Value Ref Range    Ventricular Rate 122 BPM    Atrial Rate 122 BPM    P-R Interval 168 ms    QRS Duration 130 ms    Q-T Interval 348 ms    QTC Calculation (Bezet) 495 ms    Calculated R Axis -33 degrees    Calculated T Axis 121 degrees    Diagnosis       Sinus tachycardia with frequent premature ventricular complexes and fusion   complexes  Left axis deviation  Left bundle branch block  When compared with ECG of 24-JAN-2017 01:03,  fusion complexes are now present  premature ventricular complexes are now present  ST no longer depressed in Inferior leads  T wave inversion no longer evident in Inferior leads  T wave inversion less evident in Lateral leads         Cardiac Studies/Hx:  No specialty comments available. Medical history notable for  has a past medical history of Altered mental state; Arrhythmia; Arthritis;  Asthma; CAD (coronary artery disease); Depression; Fibromyalgia; HTN (hypertension) (6/11/2013); Hypercholesterolemia; Ill-defined condition; PE (pulmonary embolism); Pneumonia (04/2014); Pneumonia, organism unspecified(486) (3/8/2014); and Thyroid disease. Social history notable for  reports that she has never smoked. She has never used smokeless tobacco. She reports that she does not drink alcohol or use illicit drugs. Family history notable for family history includes No Known Problems in her father and mother. There is no history of Anesth Problems. Past Medical History:   Diagnosis Date    Altered mental state     Arrhythmia     AFIB HISTORY     Arthritis     BACK     Asthma     CAD (coronary artery disease)     by pass surgery in 2002,2008    Depression     Fibromyalgia     HTN (hypertension) 6/11/2013    Hypercholesterolemia     Ill-defined condition     FOUR COMPRESSION FRACTURES IN BACK    PE (pulmonary embolism)     Pneumonia 04/2014    Pneumonia, organism unspecified(486) 3/8/2014    Thyroid disease         ROS-pertinents  negative except as above  The pertinent portions of the medical history,physician and nursing notes, meds,vitals , labs and Ins/Outs,are reviewed in the electronic recordcomplete multisystem 11 ROS  reviewed as pertinent    Social History   Substance Use Topics    Smoking status: Never Smoker    Smokeless tobacco: Never Used    Alcohol use No      Family History   Problem Relation Age of Onset    No Known Problems Mother     No Known Problems Father     Anesth Problems Neg Hx       Prior to Admission Medications   Prescriptions Last Dose Informant Patient Reported? Taking? DOC-Q-LACE 100 mg capsule   No Yes   Sig: TAKE 1 CAPSULE BY MOUTH TWICE DAILY DX:BOWEL SUPPORT   ELIQUIS 2.5 mg tablet   No Yes   Sig: TAKE 1 TABLET BY MOUTH 2 TIMES A DAY.  DX ANTICOAGULANT   SENEXON-S 8.6-50 mg per tablet   No Yes   Sig: TAKE 1 TABLET BY MOUTH DAILY AS NEEDED FOR CONSTIPATION VITAMIN B-12 500 mcg tablet   No Yes   Sig: TAKE 1 TABLET BY MOUTH EVERY DAY DX: SUPPLEMENT   acetaminophen (TYLENOL) 500 mg tablet   Yes Yes   Sig: Take 500 mg by mouth every six (6) hours as needed for Pain. albuterol (PROVENTIL HFA) 90 mcg/actuation inhaler   No Yes   Sig: Take 1 Puff by inhalation every six (6) hours as needed for Wheezing. albuterol (PROVENTIL VENTOLIN) 2.5 mg /3 mL (0.083 %) nebulizer solution   No Yes   Sig: INHALE THE CONTENTS OF 1 VIAL VIA NEBULIZER EVERY 6 HOURS AS NEEDED FOR WHEEZING   ammonium lactate (LAC-HYDRIN) 12 % lotion   No Yes   Sig: rub in to affected area well   calcium-cholecalciferol, D3, (CALTRATE 600+D) tablet   No Yes   Sig: TAKE 1 TABLET BY MOUTH TWICE DAILY DX: SUPPLEMENT   donepezil (ARICEPT) 5 mg tablet   No Yes   Sig: TAKE 1 TABLET BY MOUTH EVERY NIGHT AT BEDTIME DX: MEMORY SUPPORT   furosemide (LASIX) 40 mg tablet   No Yes   Sig: Take 1 tab po every day from  Monday through [address] sunday   hydrocortisone (HYTONE) 2.5 % lotion   No Yes   Sig: APPLY A THIN LAYER TO AFFECTED AREA 2 TIMES DAILY. lisinopril (PRINIVIL, ZESTRIL) 10 mg tablet   Yes Yes   Sig: Take 1 Tab by mouth daily. metoprolol succinate (TOPROL-XL) 25 mg XL tablet   No Yes   Sig: TAKE 1 TABLET BY MOUTH EVERY DAY DX: HTN   pravastatin (PRAVACHOL) 20 mg tablet   No No   Sig: Take 1 Tab by mouth nightly. D/C pravastatin 40 mg tab   raNITIdine (ZANTAC) 150 mg tablet   Yes Yes   Sig: Take 150 mg by mouth nightly as needed for Indigestion. traMADol (ULTRAM) 50 mg tablet   No Yes   Sig: Take 0.5 Tabs by mouth nightly.  Max Daily Amount: 25 mg. D/C tramadol 50 mg tabAt bedtime   venlafaxine-SR (EFFEXOR-XR) 75 mg capsule   No Yes   Sig: TAKE 1 CAPSULE BY MOUTH EVERY DAY DX:ANTIDEPRESSANT      Facility-Administered Medications: None     Allergies   Allergen Reactions    Neurontin [Gabapentin] Unknown (comments)     Pt unknown reaction      Objective:    Physical Exam:   Patient Vitals for the past 12 hrs:   Temp Pulse Resp BP SpO2   08/18/18 1525 97.7 °F (36.5 °C) 92 28 108/59 93 %   08/18/18 1152 98 °F (36.7 °C) 98 30 104/58 96 %   08/18/18 1002 - - - 103/62 -   08/18/18 0945 - - - 95/77 -   08/18/18 0939 - 91 - - -   08/18/18 0828 - - - - 98 %   08/18/18 0800 97.9 °F (36.6 °C) 74 28 138/48 -      General:  alert, cooperative, mild general resp  distress, appears stated age   HEENT, Neck:  NC,AT- no JVD   Chest Wall: inspection normal - no chest wall deformities or tenderness, respiratory effort increased   Lung:   clear to auscultation bilaterally   Heart:    normal rate, regular rhythm, normal S1, S2, no murmurs, rubs, clicks or gallops   Abdomen: nondistended   Extremities: extremities normal, atraumatic, no cyanosis or edema     Last 24hr Input/Output:  No intake or output data in the 24 hours ending 08/18/18 1707     Data Review:   Recent Results (from the past 24 hour(s))   CBC WITH AUTOMATED DIFF    Collection Time: 08/17/18  7:17 PM   Result Value Ref Range    WBC 13.4 (H) 3.6 - 11.0 K/uL    RBC 4.81 3.80 - 5.20 M/uL    HGB 14.8 11.5 - 16.0 g/dL    HCT 46.6 35.0 - 47.0 %    MCV 96.9 80.0 - 99.0 FL    MCH 30.8 26.0 - 34.0 PG    MCHC 31.8 30.0 - 36.5 g/dL    RDW 13.2 11.5 - 14.5 %    PLATELET 241 778 - 393 K/uL    MPV 11.2 8.9 - 12.9 FL    NRBC 0.0 0  WBC    ABSOLUTE NRBC 0.00 0.00 - 0.01 K/uL    NEUTROPHILS 72 32 - 75 %    LYMPHOCYTES 14 12 - 49 %    MONOCYTES 13 5 - 13 %    EOSINOPHILS 0 0 - 7 %    BASOPHILS 0 0 - 1 %    IMMATURE GRANULOCYTES 1 (H) 0.0 - 0.5 %    ABS. NEUTROPHILS 9.7 (H) 1.8 - 8.0 K/UL    ABS. LYMPHOCYTES 1.9 0.8 - 3.5 K/UL    ABS. MONOCYTES 1.7 (H) 0.0 - 1.0 K/UL    ABS. EOSINOPHILS 0.0 0.0 - 0.4 K/UL    ABS. BASOPHILS 0.1 0.0 - 0.1 K/UL    ABS. IMM.  GRANS. 0.1 (H) 0.00 - 0.04 K/UL    DF AUTOMATED     METABOLIC PANEL, COMPREHENSIVE    Collection Time: 08/17/18  7:17 PM   Result Value Ref Range    Sodium 139 136 - 145 mmol/L    Potassium 3.9 3.5 - 5.1 mmol/L    Chloride 101 97 - 108 mmol/L    CO2 28 21 - 32 mmol/L    Anion gap 10 5 - 15 mmol/L    Glucose 143 (H) 65 - 100 mg/dL    BUN 21 (H) 6 - 20 MG/DL    Creatinine 1.39 (H) 0.55 - 1.02 MG/DL    BUN/Creatinine ratio 15 12 - 20      GFR est AA 43 (L) >60 ml/min/1.73m2    GFR est non-AA 35 (L) >60 ml/min/1.73m2    Calcium 8.9 8.5 - 10.1 MG/DL    Bilirubin, total 0.8 0.2 - 1.0 MG/DL    ALT (SGPT) 11 (L) 12 - 78 U/L    AST (SGOT) 14 (L) 15 - 37 U/L    Alk.  phosphatase 63 45 - 117 U/L    Protein, total 7.3 6.4 - 8.2 g/dL    Albumin 3.2 (L) 3.5 - 5.0 g/dL    Globulin 4.1 (H) 2.0 - 4.0 g/dL    A-G Ratio 0.8 (L) 1.1 - 2.2     CULTURE, BLOOD, PAIRED    Collection Time: 08/17/18  7:17 PM   Result Value Ref Range    Special Requests: NO SPECIAL REQUESTS      Culture result: NO GROWTH AFTER 8 HOURS     PROTHROMBIN TIME + INR    Collection Time: 08/17/18  7:17 PM   Result Value Ref Range    INR 1.1 0.9 - 1.1      Prothrombin time 11.1 9.0 - 11.1 sec   PTT    Collection Time: 08/17/18  7:17 PM   Result Value Ref Range    aPTT 29.3 22.1 - 32.0 sec    aPTT, therapeutic range     58.0 - 77.0 SECS   MAGNESIUM    Collection Time: 08/17/18  7:17 PM   Result Value Ref Range    Magnesium 1.8 1.6 - 2.4 mg/dL   TROPONIN I    Collection Time: 08/17/18  7:17 PM   Result Value Ref Range    Troponin-I, Qt. 0.08 (H) <0.05 ng/mL   NT-PRO BNP    Collection Time: 08/17/18  7:17 PM   Result Value Ref Range    NT pro-BNP 17511 (H) 0 - 450 PG/ML   POC LACTIC ACID    Collection Time: 08/17/18  7:18 PM   Result Value Ref Range    Lactic Acid (POC) 1.3 0.4 - 2.0 mmol/L   EKG, 12 LEAD, INITIAL    Collection Time: 08/17/18  7:26 PM   Result Value Ref Range    Ventricular Rate 122 BPM    Atrial Rate 122 BPM    P-R Interval 168 ms    QRS Duration 130 ms    Q-T Interval 348 ms    QTC Calculation (Bezet) 495 ms    Calculated R Axis -33 degrees    Calculated T Axis 121 degrees    Diagnosis       Sinus tachycardia with frequent premature ventricular complexes and fusion complexes  Left axis deviation  Left bundle branch block  When compared with ECG of 24-JAN-2017 01:03,  fusion complexes are now present  premature ventricular complexes are now present  ST no longer depressed in Inferior leads  T wave inversion no longer evident in Inferior leads  T wave inversion less evident in Lateral leads     POC G3 - PUL    Collection Time: 08/17/18  8:35 PM   Result Value Ref Range    FIO2 (POC) 60 %    pH (POC) 7.363 7.35 - 7.45      pCO2 (POC) 51.6 (H) 35.0 - 45.0 MMHG    pO2 (POC) 254 (H) 80 - 100 MMHG    HCO3 (POC) 29.4 (H) 22 - 26 MMOL/L    sO2 (POC) 100 (H) 92 - 97 %    Base excess (POC) 4 mmol/L    Site RIGHT RADIAL      Device: BIPAP      PEEP/CPAP (POC) 6 cmH2O    PIP (POC) 14      Allens test (POC) YES      Specimen type (POC) ARTERIAL      Total resp.  rate 44     TROPONIN I    Collection Time: 08/18/18  5:20 AM   Result Value Ref Range    Troponin-I, Qt. 0.05 (H) <0.05 ng/mL      Lab Results   Component Value Date/Time    Cholesterol, total 135 01/24/2017 04:27 AM    Cholesterol, total 149 05/14/2015 05:45 AM    Cholesterol, total 163 08/15/2013 01:19 PM    HDL Cholesterol 59 01/24/2017 04:27 AM    HDL Cholesterol 52 05/14/2015 05:45 AM    HDL Cholesterol 57 08/15/2013 01:19 PM    LDL, calculated 57.2 01/24/2017 04:27 AM    LDL, calculated 76.2 05/14/2015 05:45 AM    LDL, calculated 75 08/15/2013 01:19 PM    Triglyceride 94 01/24/2017 04:27 AM    Triglyceride 104 05/14/2015 05:45 AM    Triglyceride 153 (H) 08/15/2013 01:19 PM    CHOL/HDL Ratio 2.3 01/24/2017 04:27 AM    CHOL/HDL Ratio 2.9 05/14/2015 05:45 AM      Nicho Alvares MD 8/18/2018

## 2018-08-18 NOTE — PROGRESS NOTES
Admission Medication Reconciliation:    Information obtained from: medication record from [facility]    Significant PMH/Disease States:   Past Medical History:   Diagnosis Date    Altered mental state     Arrhythmia     AFIB HISTORY     Arthritis     BACK     Asthma     CAD (coronary artery disease)     by pass surgery in 2002,2008    Depression     Fibromyalgia     HTN (hypertension) 6/11/2013    Hypercholesterolemia     Ill-defined condition     FOUR COMPRESSION FRACTURES IN BACK    PE (pulmonary embolism)     Pneumonia 04/2014    Pneumonia, organism unspecified(486) 3/8/2014    Thyroid disease        Chief Complaint for this Admission:  respiratory distress    Allergies:  Neurontin [gabapentin]    Prior to Admission Medications:   Prior to Admission Medications   Prescriptions Last Dose Informant Patient Reported? Taking? DOC-Q-LACE 100 mg capsule   No Yes   Sig: TAKE 1 CAPSULE BY MOUTH TWICE DAILY DX:BOWEL SUPPORT   ELIQUIS 2.5 mg tablet   No Yes   Sig: TAKE 1 TABLET BY MOUTH 2 TIMES A DAY. DX ANTICOAGULANT   SENEXON-S 8.6-50 mg per tablet   No Yes   Sig: TAKE 1 TABLET BY MOUTH DAILY AS NEEDED FOR CONSTIPATION   VITAMIN B-12 500 mcg tablet   No Yes   Sig: TAKE 1 TABLET BY MOUTH EVERY DAY DX: SUPPLEMENT   acetaminophen (TYLENOL) 500 mg tablet   Yes Yes   Sig: Take 500 mg by mouth every six (6) hours as needed for Pain. albuterol (PROVENTIL HFA) 90 mcg/actuation inhaler   No Yes   Sig: Take 1 Puff by inhalation every six (6) hours as needed for Wheezing.    albuterol (PROVENTIL VENTOLIN) 2.5 mg /3 mL (0.083 %) nebulizer solution   No Yes   Sig: INHALE THE CONTENTS OF 1 VIAL VIA NEBULIZER EVERY 6 HOURS AS NEEDED FOR WHEEZING   ammonium lactate (LAC-HYDRIN) 12 % lotion   No Yes   Sig: rub in to affected area well   calcium-cholecalciferol, D3, (CALTRATE 600+D) tablet   No Yes   Sig: TAKE 1 TABLET BY MOUTH TWICE DAILY DX: SUPPLEMENT   donepezil (ARICEPT) 5 mg tablet   No Yes   Sig: TAKE 1 TABLET BY MOUTH EVERY NIGHT AT BEDTIME DX: MEMORY SUPPORT   furosemide (LASIX) 40 mg tablet   No Yes   Sig: Take 1 tab po every day from  Monday through [address] sunday   hydrocortisone (HYTONE) 2.5 % lotion   No Yes   Sig: APPLY A THIN LAYER TO AFFECTED AREA 2 TIMES DAILY. lisinopril (PRINIVIL, ZESTRIL) 10 mg tablet   Yes Yes   Sig: Take 1 Tab by mouth daily. metoprolol succinate (TOPROL-XL) 25 mg XL tablet   No Yes   Sig: TAKE 1 TABLET BY MOUTH EVERY DAY DX: HTN   pravastatin (PRAVACHOL) 20 mg tablet   No No   Sig: Take 1 Tab by mouth nightly. D/C pravastatin 40 mg tab   raNITIdine (ZANTAC) 150 mg tablet   Yes Yes   Sig: Take 150 mg by mouth nightly as needed for Indigestion. traMADol (ULTRAM) 50 mg tablet   No Yes   Sig: Take 0.5 Tabs by mouth nightly. Max Daily Amount: 25 mg. D/C tramadol 50 mg tabAt bedtime   venlafaxine-SR (EFFEXOR-XR) 75 mg capsule   No Yes   Sig: TAKE 1 CAPSULE BY MOUTH EVERY DAY DX:ANTIDEPRESSANT      Facility-Administered Medications: None         Comments/Recommendations: Removed scheduled tylenol and added prn tylenol. Changed ranitidine from scheduled to as needed. Removed milk of mag, and miralax. No other changes made.

## 2018-08-18 NOTE — PROGRESS NOTES
After completing exam and review of chart, digging deeper it appears her regular Cardiologist is Dr Raymond Sparks  Please consult Dr Agata Bello in am to assume cardiac care  I dont think he had to see her tonight if she remains stable  Suspect pneumonia exacerbating her CHF systolic, continue ABx, increase diuretics if does not respond and stable CAD   Pt was unable to name her Cardiologist when I interviewed her  Thank you

## 2018-08-18 NOTE — PROGRESS NOTES
Hospitalist Progress Note  Marco Rivas NP  Answering service: [phone] from in house phone  Cell: [phone]      Date of Service:  2018  NAME:  Vince Davis  :  1926  MRN:  [id number]      Admission Summary:   Ms. Jaylin Ortiz is a 81 yo female with PMH significant for chronic a-fib, dementia, CAD and systolic heart failure. Resides at [address] [place] [address]. Brought to ED due to fatigue last few days and some SOB with new onset fever. On presentation she was SOB and placed on BiPAP. Imaging concerning for developing PNA and CHF exacerbation. Interval history / Subjective:   Off BiPAP this morning and ate some breakfast. Breathing a bit fast but says she is comfortable. Assisted her in placing her dentures and sitting her up in the bed. C/o \"wet cough. \" Not clear on what brought her into the hospital. RN reports she slept well after she was settled into the room. Assessment & Plan:     Acute hypoxic respiratory failure (POA)  - Initially on BiPAP and now on 3 liters NC -> BIPAP PRN only  - OOB as able and pulmonary toilet  - Neb treatments  - Antibiotic therapy as below  - Diuresis with Lasix  - On Solu-Medrol but likely can stop this by end of the day    Acute on chronic CHF exacerbation, NYHA class III (POA)  - BNP 07208 on arrival  - On Lasix 40mg IV now but weight same -> will monitor and increase if needed  - Cardiology consulted  - No ECHO on file since  but documented EF of 20-25%  - Continue Lisinopril and Toprol-XL    Community Acquired PNA, possible (POA)  - On Levaquin x 7 days  - Start Mucinex    Chronic atrial fibrillation  - Rate controlled  - Continue Eliquis    Alzheimer's Dementia  - Frequent reorientation & continue Aricept    H/o CAD and Dyslipidemia  - Continue home medications    Code status: Full.  No ACP note on file so will discuss with daughter on her arrival.   DVT prophylaxis: Eliquis    Care Plan discussed with: Patient, RN, Attending  Disposition: [facility]     Hospital Problems  Date Reviewed: 8/17/2018          Codes Class Noted POA    * (Principal)Shortness of breath ICD-10-CM: R06.02  ICD-9-CM: 786.05  8/17/2018 Unknown                Review of Systems:   Denied CP, no abdominal pain or nausea, no fevers or chills       Vital Signs:    Last 24hrs VS reviewed since prior progress note. Most recent are:  Visit Vitals    BP 95/77    Pulse 91    Temp 97.9 °F (36.6 °C)    Resp 28    Wt 77.3 kg (170 lb 6.7 oz)    SpO2 98%    BMI 32.2 kg/m2       No intake or output data in the 24 hours ending 08/18/18 0957     Physical Examination:             Constitutional:  No acute distress, cooperative, pleasant    ENT:  Oral mucous moist, oropharynx benign. Neck supple    Resp:  A bit coarse throughout. Tachypneic with RR about 28. Upper airway congestion   CV:  Irregular rhythm, normal rate, no murmurs, gallops, rubs    GI:  Soft, non distended, non tender. normoactive bowel sounds, no hepatosplenomegaly     Musculoskeletal:  Trace pedal edema, warm, 2+ pulses throughout    Neurologic:  Moves all extremities. Alert and oriented x person, place, year. Follows all commands with clear speech     Psych: Moderate insight. Calm and not anxious       Data Review:    Review and/or order of clinical lab test  Review and/or order of tests in the radiology section of CPT  Review and/or order of tests in the medicine section of CPT      Labs:     Recent Labs      08/17/18 1917   WBC  13.4*   HGB  14.8   HCT  46.6   PLT  153     Recent Labs      08/17/18 1917   NA  139   K  3.9   CL  101   CO2  28   BUN  21*   CREA  1.39*   GLU  143*   CA  8.9   MG  1.8     Recent Labs      08/17/18 1917   SGOT  14*   ALT  11*   AP  63   TBILI  0.8   TP  7.3   ALB  3.2*   GLOB  4.1*     Recent Labs      08/17/18 1917   INR  1.1   PTP  11.1   APTT  29.3      No results for input(s): FE, TIBC, PSAT, FERR in the last 72 hours.    Lab Results   Component Value Date/Time    Folate 6.3 03/11/2014 04:07 AM      No results for input(s): PH, PCO2, PO2 in the last 72 hours.   Recent Labs      08/18/18   0520  08/17/18   1917   TROIQ  0.05*  0.08*     Lab Results   Component Value Date/Time    Cholesterol, total 135 01/24/2017 04:27 AM    HDL Cholesterol 59 01/24/2017 04:27 AM    LDL, calculated 57.2 01/24/2017 04:27 AM    Triglyceride 94 01/24/2017 04:27 AM    CHOL/HDL Ratio 2.3 01/24/2017 04:27 AM     Lab Results   Component Value Date/Time    Glucose (POC) 133 (H) 08/18/2015 01:50 AM     Lab Results   Component Value Date/Time    Color YELLOW/STRAW 01/24/2017 12:30 AM    Appearance CLOUDY (A) 01/24/2017 12:30 AM    Specific gravity 1.024 01/24/2017 12:30 AM    pH (UA) 5.0 01/24/2017 12:30 AM    Protein NEGATIVE  01/24/2017 12:30 AM    Glucose NEGATIVE  01/24/2017 12:30 AM    Ketone TRACE (A) 01/24/2017 12:30 AM    Bilirubin NEGATIVE  08/14/2015 04:42 PM    Urobilinogen 0.2 01/24/2017 12:30 AM    Nitrites NEGATIVE  01/24/2017 12:30 AM    Leukocyte Esterase SMALL (A) 01/24/2017 12:30 AM    Epithelial cells FEW 01/24/2017 12:30 AM    Bacteria NEGATIVE  01/24/2017 12:30 AM    WBC 0-4 01/24/2017 12:30 AM    RBC 0-5 01/24/2017 12:30 AM         Medications Reviewed:     Current Facility-Administered Medications   Medication Dose Route Frequency    sodium chloride (NS) flush 5-10 mL  5-10 mL IntraVENous PRN    sodium chloride (NS) flush 5-10 mL  5-10 mL IntraVENous Q8H    sodium chloride (NS) flush 5-10 mL  5-10 mL IntraVENous PRN    docusate sodium (COLACE) capsule 100 mg  100 mg Oral BID    donepezil (ARICEPT) tablet 5 mg  5 mg Oral DAILY    apixaban (ELIQUIS) tablet 2.5 mg  2.5 mg Oral BID    lisinopril (PRINIVIL, ZESTRIL) tablet 10 mg  10 mg Oral DAILY    metoprolol succinate (TOPROL-XL) XL tablet 25 mg  25 mg Oral DAILY    polyethylene glycol (MIRALAX) packet 17 g  17 g Oral DAILY    pravastatin (PRAVACHOL) tablet 20 mg  20 mg Oral QHS    famotidine (PEPCID) tablet 20 mg  20 mg Oral QHS PRN    senna-docusate (PERICOLACE) 8.6-50 mg per tablet 1 Tab  1 Tab Oral DAILY    traMADol (ULTRAM) tablet 25 mg  25 mg Oral QHS    venlafaxine-SR (EFFEXOR-XR) capsule 75 mg  75 mg Oral DAILY WITH BREAKFAST    cyanocobalamin (VITAMIN B12) tablet 500 mcg  500 mcg Oral DAILY    furosemide (LASIX) injection 40 mg  40 mg IntraVENous DAILY    albuterol-ipratropium (DUO-NEB) 2.5 MG-0.5 MG/3 ML  3 mL Nebulization Q6H RT    methylPREDNISolone (PF) (SOLU-MEDROL) injection 20 mg  20 mg IntraVENous Q12H    acetaminophen (TYLENOL) tablet 500 mg  500 mg Oral Q6H PRN    [START ON 8/19/2018] levoFLOXacin (LEVAQUIN) 250 mg in D5W IVPB  250 mg IntraVENous Q24H     ______________________________________________________________________  EXPECTED LENGTH OF STAY: - - -  ACTUAL LENGTH OF STAY:          1                 Aziza Fontanez NP

## 2018-08-18 NOTE — PROGRESS NOTES
Problem: Falls - Risk of  Goal: *Absence of Falls  Document Clementina Fall Risk and appropriate interventions in the flowsheet. Outcome: Progressing Towards Goal  Fall Risk Interventions:  Mobility Interventions: Communicate number of staff needed for ambulation/transfer, Patient to call before getting OOB, Strengthening exercises (ROM-active/passive), Utilize walker, cane, or other assistive device, Utilize gait belt for transfers/ambulation    Mentation Interventions: Bed/chair exit alarm, Adequate sleep, hydration, pain control, Familiar objects from home, Increase mobility, More frequent rounding, Room close to nurse's station, Reorient patient    Medication Interventions: Patient to call before getting OOB, Teach patient to arise slowly    Elimination Interventions: Call light in reach, Patient to call for help with toileting needs, Toileting schedule/hourly rounds, Bed/chair exit alarm             Problem: Pressure Injury - Risk of  Goal: *Prevention of pressure injury  Document Osmin Scale and appropriate interventions in the flowsheet.    Outcome: Progressing Towards Goal  Pressure Injury Interventions:  Sensory Interventions: Discuss PT/OT consult with provider, Assess changes in LOC, Float heels, Keep linens dry and wrinkle-free, Maintain/enhance activity level, Minimize linen layers, Monitor skin under medical devices    Moisture Interventions: Absorbent underpads, Check for incontinence Q2 hours and as needed, Internal/External urinary devices, Minimize layers    Activity Interventions: Increase time out of bed, PT/OT evaluation    Mobility Interventions: Float heels, HOB 30 degrees or less, PT/OT evaluation    Nutrition Interventions: Document food/fluid/supplement intake, Offer support with meals,snacks and hydration    Friction and Shear Interventions: HOB 30 degrees or less, Lift sheet, Minimize layers               Problem: Heart Failure: Day 1  Goal: Nutrition/Diet  Outcome: Progressing Towards Goal  Patient on cardiac diet. Poor intake. Goal: Respiratory  Outcome: Progressing Towards Goal  Patient off Bipap. On 2L, tolerating well. Tachypnea. Educated to deep breath. IS given. Bedside shift change report given to Dameon Burns RN (oncoming nurse) by Ck Salazar RN (offgoing nurse). Report included the following information SBAR, Kardex, Intake/Output, MAR, Accordion and Recent Results.

## 2018-08-18 NOTE — ROUTINE PROCESS
TRANSFER - OUT REPORT:    Verbal report given to Ekta(name) on Thelma Tucker  being transferred to [facility]) for routine progression of care       Report consisted of patients Situation, Background, Assessment and   Recommendations(SBAR). Information from the following report(s) SBAR and ED Summary was reviewed with the receiving nurse. Lines:   Peripheral IV 08/17/18 Left Antecubital (Active)   Site Assessment Clean, dry, & intact 8/17/2018  7:18 PM   Phlebitis Assessment 0 8/17/2018  7:18 PM   Infiltration Assessment 0 8/17/2018  7:18 PM   Dressing Status Clean, dry, & intact 8/17/2018  7:18 PM       Peripheral IV 08/17/18 Right Hand (Active)   Site Assessment Clean, dry, & intact 8/17/2018  8:06 PM   Phlebitis Assessment 0 8/17/2018  8:06 PM   Infiltration Assessment 0 8/17/2018  8:06 PM   Dressing Status Clean, dry, & intact 8/17/2018  8:06 PM   Dressing Type Transparent 8/17/2018  8:06 PM   Hub Color/Line Status Patent; Flushed;Capped 8/17/2018  8:06 PM   Action Taken Blood drawn 8/17/2018  8:06 PM        Opportunity for questions and clarification was provided.       Patient transported with:   Registered Nurse

## 2018-08-18 NOTE — H&P
[address]   HISTORY AND PHYSICAL      Charissa BRIGHT  MR#: [id number]  : 1926  ACCOUNT #: [de-identified]   ADMIT DATE: 2018    SURGEON:  Johny Marin MD    PRESENTING COMPLAINT:  Shortness of breath and fever. HISTORY OF PRESENT ILLNESS:  The patient is a very pleasant 70-year-old female who has a previous history significant for chronic atrial fibrillation and systolic heart failure. She also has history of coronary artery disease and asthma. She  currently lives at [facility]. The patient's daughter is with her and has given me most of the history. According to the daughter for the last couple of days, she has not been eating and has been fatigued. Today when she went to see the patient, she was fine. Later on, she got a call from [facility] that the patient is short of breath and had a fever of 101.8. She also has dry cough for the last 2 days. The patient was ultimately brought to the emergency room where she was tachypneic and tachycardic and was put on BiPAP. According to the daughter, she was given a few breathing treatments at [facility] as well. The patient is unable to give me any meaningful history due to history of dementia. She, however, denies having any chest pains, nausea, vomiting or any problem with urination. PAST MEDICAL HISTORY:  Significant for:  1. Chronic atrial fibrillation. 2.  History of compression fracture of vertebra. 3.  History of coronary artery disease. 4.  History of systolic heart failure. 5.  History of asthma. 6.  Depression. 7.  Fibromyalgia. 8.  Hyperlipidemia. 9.  History of pulmonary embolism. 10.  History of hypothyroidism. PAST SURGICAL HISTORY:  Significant for CABG. SOCIAL HISTORY:  The patient does not smoke, does not drink. She is . She lives at [facility]. FAMILY HISTORY:  Not significant for coronary artery disease.     ALLERGIES:  INCLUDE NEURONTIN. Code Status: Full code . Family will try to locate living will. HOME MEDICATIONS:  Not up to date. However, she is supposed to be on:    1. Albuterol 1 puff every 6 hours. 2.  Vitamin D3.  3.  Aricept 5 mg every night. 4.  Eliquis 2.5 mg b.i.d.  5.  Lasix 40 mg Monday through Saturday. She is off on Sunday. 6.  Lisinopril 10 mg daily. 7.  Metoprolol XL 25 mg daily. 8.  MiraLax 17 grams daily. 9.  Pravachol 20 mg daily. 10.  Zantac 150 mg at night. 11.  Tramadol 50 mg half tablet at night. 12.  Effexor XR 75 mg daily. 13.  Vitamin B12 500 mcg daily. REVIEW OF SYSTEMS:  Not reliable due to patient's condition. PHYSICAL EXAMINATION:  GENERAL:  The patient is a 70-year-old female, not in any acute distress. VITAL SIGNS:  Reveal temperature 97.8, blood pressure is 135/81, pulse is 119, respiratory rate is 34, saturation 98% on BiPAP. HEENT:  Reveals pupils equally reacting to light and accommodation. NECK:  Supple. There is no lymphadenopathy or JVD. CHEST:  Revealed distant air entry, no obvious crackles. HEART:  S1, S2, tachycardic, no murmur, no S3.  ABDOMEN:  No tenderness. No guarding or rigidity. Bowel sounds are active. EXTREMITIES:  No pedal edema. Decreased peripheral pulses. No cyanosis or clubbing. CENTRAL NERVOUS SYSTEM:  The patient is alert, oriented, follows simple command. Plantars downgoing. Cranial nerves are normal.  Sensory examination is unremarkable. LABORATORY DATA:  Lab work done in the emergency room reveals a white count of 13.4, hemoglobin 14.8, hematocrit 46.6, MCV 96.9, platelet count is 826,912. Chemistries reveal sodium 139, potassium 3.9, chloride 101, bicarb is 28, gap of 10, glucose 143, BUN is 21, creatinine is 1.39, magnesium is 1.8. Her bili is 0.8, protein is 7.3, albumin 3.2, globulin is 4.1, ALT 11, AST 14, alk phos 63. Troponin I is 0.08. Her BNP is 21,054. INR is 1.1. EKG shows sinus tach with PVCs.       Chest x-ray shows no acute abnormality. Ct chest showed  1. No acute findings. 2. Nodular airspace opacification in the right upper lobe. Follow-up CT is  recommended in one month    ASSESSMENT AND PLAN:      The patient is a 35-year-old female who has previous history significant for systolic heart failure. She is usually seen by Dr. Hank Boothe. Last echocardiogram showed low ejection fraction of 20-25%, is being admitted with:    1. Acute respiratory failure. I think this is multifactorial.  Patient was given IV fluids in the emergency room which has been stopped and I have ordered 2 mg of Bumex. Currently on BIPAP may wean down to nasal cannula soon. 2.  Patient will be admitted on tele floor. We will consult Cariology for follow up of Systolic heart failure. 3.  RUL Pneumonia on CT along with fever and Leukocytosis  will cover her with antibiotics. 4.  History of chronic obstructive pulmonary disease. ABG has been ordered. She will be given frequent bronchodilators and will monitor her breathing. Add Low dose Steroids can stop soon. 5.  History of chronic kidney disease. Her renal indices are stable. 6.  History of PE. Patient is already on Eliquis, which will be continued. Does not need DVT prophylaxis. 7.  History of coronary artery disease. We will check serial troponins.       MD CHRISTINE Kwon/[place]  D: 08/17/2018 20:44     T: 08/17/2018 23:04  JOB #: [id number]

## 2018-08-19 LAB
ANION GAP SERPL CALC-SCNC: 8 MMOL/L (ref 5–15)
BASOPHILS # BLD: 0 K/UL (ref 0–0.1)
BASOPHILS NFR BLD: 0 % (ref 0–1)
BUN SERPL-MCNC: 45 MG/DL (ref 6–20)
BUN/CREAT SERPL: 27 (ref 12–20)
CALCIUM SERPL-MCNC: 8.6 MG/DL (ref 8.5–10.1)
CHLORIDE SERPL-SCNC: 106 MMOL/L (ref 97–108)
CO2 SERPL-SCNC: 29 MMOL/L (ref 21–32)
CREAT SERPL-MCNC: 1.66 MG/DL (ref 0.55–1.02)
DIFFERENTIAL METHOD BLD: ABNORMAL
EOSINOPHIL # BLD: 0.1 K/UL (ref 0–0.4)
EOSINOPHIL NFR BLD: 0 % (ref 0–7)
ERYTHROCYTE [DISTWIDTH] IN BLOOD BY AUTOMATED COUNT: 13.2 % (ref 11.5–14.5)
ERYTHROCYTE [DISTWIDTH] IN BLOOD BY AUTOMATED COUNT: 13.2 % (ref 11.5–14.5)
GLUCOSE SERPL-MCNC: 130 MG/DL (ref 65–100)
HCT VFR BLD AUTO: 43.2 % (ref 35–47)
HCT VFR BLD AUTO: 44.5 % (ref 35–47)
HGB BLD-MCNC: 13.6 G/DL (ref 11.5–16)
HGB BLD-MCNC: 13.8 G/DL (ref 11.5–16)
IMM GRANULOCYTES # BLD: 0.1 K/UL (ref 0–0.04)
IMM GRANULOCYTES NFR BLD AUTO: 1 % (ref 0–0.5)
LYMPHOCYTES # BLD: 1.6 K/UL (ref 0.8–3.5)
LYMPHOCYTES NFR BLD: 12 % (ref 12–49)
MCH RBC QN AUTO: 30.6 PG (ref 26–34)
MCH RBC QN AUTO: 30.8 PG (ref 26–34)
MCHC RBC AUTO-ENTMCNC: 31 G/DL (ref 30–36.5)
MCHC RBC AUTO-ENTMCNC: 31.5 G/DL (ref 30–36.5)
MCV RBC AUTO: 98 FL (ref 80–99)
MCV RBC AUTO: 98.7 FL (ref 80–99)
MONOCYTES # BLD: 1.1 K/UL (ref 0–1)
MONOCYTES NFR BLD: 9 % (ref 5–13)
NEUTS SEG # BLD: 10.3 K/UL (ref 1.8–8)
NEUTS SEG NFR BLD: 78 % (ref 32–75)
NRBC # BLD: 0 K/UL (ref 0–0.01)
NRBC # BLD: 0 K/UL (ref 0–0.01)
NRBC BLD-RTO: 0 PER 100 WBC
NRBC BLD-RTO: 0 PER 100 WBC
PLATELET # BLD AUTO: 154 K/UL (ref 150–400)
PLATELET # BLD AUTO: 164 K/UL (ref 150–400)
PMV BLD AUTO: 11.1 FL (ref 8.9–12.9)
PMV BLD AUTO: 11.3 FL (ref 8.9–12.9)
POTASSIUM SERPL-SCNC: 4.8 MMOL/L (ref 3.5–5.1)
RBC # BLD AUTO: 4.41 M/UL (ref 3.8–5.2)
RBC # BLD AUTO: 4.51 M/UL (ref 3.8–5.2)
SODIUM SERPL-SCNC: 143 MMOL/L (ref 136–145)
WBC # BLD AUTO: 13.2 K/UL (ref 3.6–11)
WBC # BLD AUTO: 13.5 K/UL (ref 3.6–11)

## 2018-08-19 PROCEDURE — 80048 BASIC METABOLIC PNL TOTAL CA: CPT | Performed by: NURSE PRACTITIONER

## 2018-08-19 PROCEDURE — 97161 PT EVAL LOW COMPLEX 20 MIN: CPT

## 2018-08-19 PROCEDURE — 77010033678 HC OXYGEN DAILY

## 2018-08-19 PROCEDURE — 74011250637 HC RX REV CODE- 250/637: Performed by: NURSE PRACTITIONER

## 2018-08-19 PROCEDURE — 74011250637 HC RX REV CODE- 250/637: Performed by: HOSPITALIST

## 2018-08-19 PROCEDURE — 65660000001 HC RM ICU INTERMED STEPDOWN

## 2018-08-19 PROCEDURE — 94664 DEMO&/EVAL PT USE INHALER: CPT

## 2018-08-19 PROCEDURE — 97530 THERAPEUTIC ACTIVITIES: CPT

## 2018-08-19 PROCEDURE — 85025 COMPLETE CBC W/AUTO DIFF WBC: CPT | Performed by: NURSE PRACTITIONER

## 2018-08-19 PROCEDURE — 74011000250 HC RX REV CODE- 250: Performed by: HOSPITALIST

## 2018-08-19 PROCEDURE — 74011000250 HC RX REV CODE- 250: Performed by: FAMILY MEDICINE

## 2018-08-19 PROCEDURE — 74011250636 HC RX REV CODE- 250/636: Performed by: HOSPITALIST

## 2018-08-19 PROCEDURE — 74011636637 HC RX REV CODE- 636/637: Performed by: NURSE PRACTITIONER

## 2018-08-19 PROCEDURE — 85027 COMPLETE CBC AUTOMATED: CPT | Performed by: NURSE PRACTITIONER

## 2018-08-19 PROCEDURE — 94640 AIRWAY INHALATION TREATMENT: CPT

## 2018-08-19 PROCEDURE — 36415 COLL VENOUS BLD VENIPUNCTURE: CPT | Performed by: NURSE PRACTITIONER

## 2018-08-19 PROCEDURE — 74011000250 HC RX REV CODE- 250: Performed by: NURSE PRACTITIONER

## 2018-08-19 RX ORDER — BUDESONIDE 0.5 MG/2ML
500 INHALANT ORAL
Status: DISCONTINUED | OUTPATIENT
Start: 2018-08-19 | End: 2018-08-22 | Stop reason: HOSPADM

## 2018-08-19 RX ORDER — GUAIFENESIN 600 MG/1
1200 TABLET, EXTENDED RELEASE ORAL EVERY 12 HOURS
Status: DISCONTINUED | OUTPATIENT
Start: 2018-08-19 | End: 2018-08-22 | Stop reason: HOSPADM

## 2018-08-19 RX ORDER — PREDNISONE 20 MG/1
40 TABLET ORAL
Status: DISCONTINUED | OUTPATIENT
Start: 2018-08-19 | End: 2018-08-20

## 2018-08-19 RX ORDER — FUROSEMIDE 10 MG/ML
40 INJECTION INTRAMUSCULAR; INTRAVENOUS EVERY 12 HOURS
Status: DISCONTINUED | OUTPATIENT
Start: 2018-08-19 | End: 2018-08-19

## 2018-08-19 RX ORDER — FUROSEMIDE 40 MG/1
40 TABLET ORAL DAILY
Status: DISCONTINUED | OUTPATIENT
Start: 2018-08-20 | End: 2018-08-22 | Stop reason: HOSPADM

## 2018-08-19 RX ORDER — IPRATROPIUM BROMIDE AND ALBUTEROL SULFATE 2.5; .5 MG/3ML; MG/3ML
3 SOLUTION RESPIRATORY (INHALATION)
Status: DISCONTINUED | OUTPATIENT
Start: 2018-08-19 | End: 2018-08-21

## 2018-08-19 RX ORDER — GUAIFENESIN 600 MG/1
600 TABLET, EXTENDED RELEASE ORAL ONCE
Status: COMPLETED | OUTPATIENT
Start: 2018-08-19 | End: 2018-08-19

## 2018-08-19 RX ADMIN — Medication 10 ML: at 14:00

## 2018-08-19 RX ADMIN — FUROSEMIDE 40 MG: 10 INJECTION, SOLUTION INTRAMUSCULAR; INTRAVENOUS at 08:34

## 2018-08-19 RX ADMIN — DOCUSATE SODIUM 100 MG: 100 CAPSULE, LIQUID FILLED ORAL at 19:12

## 2018-08-19 RX ADMIN — IPRATROPIUM BROMIDE AND ALBUTEROL SULFATE 3 ML: .5; 3 SOLUTION RESPIRATORY (INHALATION) at 20:37

## 2018-08-19 RX ADMIN — GUAIFENESIN 1200 MG: 600 TABLET, EXTENDED RELEASE ORAL at 21:52

## 2018-08-19 RX ADMIN — IPRATROPIUM BROMIDE AND ALBUTEROL SULFATE 3 ML: .5; 3 SOLUTION RESPIRATORY (INHALATION) at 07:54

## 2018-08-19 RX ADMIN — GUAIFENESIN 600 MG: 600 TABLET, EXTENDED RELEASE ORAL at 11:06

## 2018-08-19 RX ADMIN — PRAVASTATIN SODIUM 20 MG: 20 TABLET ORAL at 21:52

## 2018-08-19 RX ADMIN — DOCUSATE SODIUM 100 MG: 100 CAPSULE, LIQUID FILLED ORAL at 08:32

## 2018-08-19 RX ADMIN — TRAMADOL HYDROCHLORIDE 25 MG: 50 TABLET, FILM COATED ORAL at 21:52

## 2018-08-19 RX ADMIN — IPRATROPIUM BROMIDE AND ALBUTEROL SULFATE 3 ML: .5; 3 SOLUTION RESPIRATORY (INHALATION) at 13:50

## 2018-08-19 RX ADMIN — LISINOPRIL 10 MG: 10 TABLET ORAL at 08:33

## 2018-08-19 RX ADMIN — STANDARDIZED SENNA CONCENTRATE AND DOCUSATE SODIUM 1 TABLET: 8.6; 5 TABLET, FILM COATED ORAL at 09:00

## 2018-08-19 RX ADMIN — GUAIFENESIN 600 MG: 600 TABLET, EXTENDED RELEASE ORAL at 08:33

## 2018-08-19 RX ADMIN — APIXABAN 2.5 MG: 2.5 TABLET, FILM COATED ORAL at 08:33

## 2018-08-19 RX ADMIN — BUDESONIDE 500 MCG: 0.5 INHALANT RESPIRATORY (INHALATION) at 20:37

## 2018-08-19 RX ADMIN — VENLAFAXINE HYDROCHLORIDE 75 MG: 37.5 CAPSULE, EXTENDED RELEASE ORAL at 08:33

## 2018-08-19 RX ADMIN — POLYETHYLENE GLYCOL 3350 17 G: 17 POWDER, FOR SOLUTION ORAL at 08:32

## 2018-08-19 RX ADMIN — PREDNISONE 40 MG: 20 TABLET ORAL at 11:06

## 2018-08-19 RX ADMIN — APIXABAN 2.5 MG: 2.5 TABLET, FILM COATED ORAL at 19:12

## 2018-08-19 RX ADMIN — METOPROLOL SUCCINATE 25 MG: 25 TABLET, EXTENDED RELEASE ORAL at 08:33

## 2018-08-19 RX ADMIN — LEVOFLOXACIN 250 MG: 5 INJECTION, SOLUTION INTRAVENOUS at 05:26

## 2018-08-19 RX ADMIN — DONEPEZIL HYDROCHLORIDE 5 MG: 5 TABLET, FILM COATED ORAL at 08:33

## 2018-08-19 RX ADMIN — CYANOCOBALAMIN TAB 500 MCG 500 MCG: 500 TAB at 08:32

## 2018-08-19 RX ADMIN — Medication 10 ML: at 05:27

## 2018-08-19 NOTE — PROGRESS NOTES
Hospitalist Progress Note  Kolton Driver NP  Answering service: [phone] from in house phone  Cell: [phone]      Date of Service:  2018  NAME:  Mathew Dubois  :  1926  MRN:  [id number]      Admission Summary:   Ms. Maciel Diaz is a 81 yo female with PMH significant for chronic a-fib, dementia, CAD and systolic heart failure. Resides at [address] [place] [address]. Brought to ED due to fatigue last few days and some SOB with new onset fever. On presentation she was SOB and placed on BiPAP. Imaging concerning for developing PNA and CHF exacerbation. Interval history / Subjective:   No BiPAP last 24 hours but she is frustrated because she is coughing a lot and unable to expectorate anything. Feels \"about the same\" today and said she feels a bit wheezy. Assessment & Plan:     Acute hypoxic respiratory failure (POA)  - Initially on BiPAP and now on 3 liters NC  - OOB as able and pulmonary toilet  - Increase scheduled neb treatments and add Pulmicort  - Antibiotic therapy as below  - Diuresis with Lasix -> increase to BID as weight remains same and some rales heard.  Monitor kidney function slowly  - PO steroids    Acute on chronic CHF exacerbation, NYHA class III (POA)  - BNP 04877 on arrival  - On Lasix 40mg IV now but weight same -> increase  - Cardiology consulted  - No ECHO on file since  but documented EF of 20-25% -> ordered and completed but not resulted  - Continue Lisinopril and Toprol-XL    Acute renal insufficiency (POA)  - Cr up to 1.66 but may need to tolerate a little higher Cr to optimize heart failure  - Recheck BMP in am    Community Acquired PNA, possible (POA)  - On Levaquin x 7 days  - Increase Mucinex    Chronic atrial fibrillation  - Rate controlled  - Continue Eliquis    Alzheimer's Dementia  - Frequent reorientation & continue Aricept    H/o CAD and Dyslipidemia  - Continue home medications    Code status: Full. No ACP note and would benefit from this discussion  DVT prophylaxis: Eliquis    Care Plan discussed with: Patient, RN, Attending  Disposition: TBD     Hospital Problems  Date Reviewed: 8/17/2018          Codes Class Noted POA    * (Principal)Shortness of breath ICD-10-CM: R06.02  ICD-9-CM: 786.05  8/17/2018 Unknown                Review of Systems:   Denied CP, unproductive cough, feels tired, no abdominal pain      Vital Signs:    Last 24hrs VS reviewed since prior progress note. Most recent are:  Visit Vitals    /85 (BP 1 Location: Right arm, BP Patient Position: At rest)    Pulse 100    Temp 97.9 °F (36.6 °C)    Resp 21    Wt 77 kg (169 lb 12.1 oz)    SpO2 99%    BMI 32.07 kg/m2       No intake or output data in the 24 hours ending 08/19/18 0585     Physical Examination:             Constitutional:  No acute distress, cooperative, pleasant    ENT:  Oral mucous moist, oropharynx benign. Neck supple    Resp:  Coarse throughout with some expiratory wheezes and rales to bases. +upper airway congestion   CV:  Irregular rhythm, normal rate, no murmurs, gallops, rubs    GI:  Soft, non distended, non tender. normoactive bowel sounds, no hepatosplenomegaly     Musculoskeletal:  Mild to moderate pedal edema, warm, 2+ pulses throughout    Neurologic:  Moves all extremities. Alert and oriented x person, place, year. Follows all commands with clear speech     Psych: Moderate insight.  Calm and not anxious       Data Review:    Review and/or order of clinical lab test  Review and/or order of tests in the radiology section of CPT  Review and/or order of tests in the medicine section of CPT      Labs:     Recent Labs      08/19/18   0526  08/17/18 1917   WBC  13.2*  13.4*   HGB  13.8  14.8   HCT  44.5  46.6   PLT  154  153     Recent Labs      08/19/18   0526  08/17/18 1917   NA  143  139   K  4.8  3.9   CL  106  101   CO2  29  28   BUN  45*  21*   CREA  1.66*  1.39*   GLU  130*  143*   CA  8.6  8.9   MG --   1.8     Recent Labs      08/17/18 1917   SGOT  14*   ALT  11*   AP  63   TBILI  0.8   TP  7.3   ALB  3.2*   GLOB  4.1*     Recent Labs      08/17/18 1917   INR  1.1   PTP  11.1   APTT  29.3      No results for input(s): FE, TIBC, PSAT, FERR in the last 72 hours. Lab Results   Component Value Date/Time    Folate 6.3 03/11/2014 04:07 AM      No results for input(s): PH, PCO2, PO2 in the last 72 hours.   Recent Labs      08/18/18   1902  08/18/18   0520  08/17/18 1917   TROIQ  <0.05  0.05*  0.08*     Lab Results   Component Value Date/Time    Cholesterol, total 135 01/24/2017 04:27 AM    HDL Cholesterol 59 01/24/2017 04:27 AM    LDL, calculated 57.2 01/24/2017 04:27 AM    Triglyceride 94 01/24/2017 04:27 AM    CHOL/HDL Ratio 2.3 01/24/2017 04:27 AM     Lab Results   Component Value Date/Time    Glucose (POC) 133 (H) 08/18/2015 01:50 AM     Lab Results   Component Value Date/Time    Color YELLOW/STRAW 01/24/2017 12:30 AM    Appearance CLOUDY (A) 01/24/2017 12:30 AM    Specific gravity 1.024 01/24/2017 12:30 AM    pH (UA) 5.0 01/24/2017 12:30 AM    Protein NEGATIVE  01/24/2017 12:30 AM    Glucose NEGATIVE  01/24/2017 12:30 AM    Ketone TRACE (A) 01/24/2017 12:30 AM    Bilirubin NEGATIVE  08/14/2015 04:42 PM    Urobilinogen 0.2 01/24/2017 12:30 AM    Nitrites NEGATIVE  01/24/2017 12:30 AM    Leukocyte Esterase SMALL (A) 01/24/2017 12:30 AM    Epithelial cells FEW 01/24/2017 12:30 AM    Bacteria NEGATIVE  01/24/2017 12:30 AM    WBC 0-4 01/24/2017 12:30 AM    RBC 0-5 01/24/2017 12:30 AM         Medications Reviewed:     Current Facility-Administered Medications   Medication Dose Route Frequency    furosemide (LASIX) injection 40 mg  40 mg IntraVENous Q12H    budesonide (PULMICORT) 500 mcg/2 ml nebulizer suspension  500 mcg Nebulization BID RT    albuterol-ipratropium (DUO-NEB) 2.5 MG-0.5 MG/3 ML  3 mL Nebulization Q6H RT    predniSONE (DELTASONE) tablet 40 mg  40 mg Oral DAILY WITH BREAKFAST    guaiFENesin ER (MUCINEX) tablet 1,200 mg  1,200 mg Oral Q12H    guaiFENesin ER (MUCINEX) tablet 600 mg  600 mg Oral ONCE    polyvinyl alcohol (LIQUIFILM TEARS) 1.4 % ophthalmic solution 1 Drop  1 Drop Both Eyes PRN    albuterol-ipratropium (DUO-NEB) 2.5 MG-0.5 MG/3 ML  3 mL Nebulization Q6H PRN    sodium chloride (NS) flush 5-10 mL  5-10 mL IntraVENous PRN    sodium chloride (NS) flush 5-10 mL  5-10 mL IntraVENous Q8H    sodium chloride (NS) flush 5-10 mL  5-10 mL IntraVENous PRN    docusate sodium (COLACE) capsule 100 mg  100 mg Oral BID    donepezil (ARICEPT) tablet 5 mg  5 mg Oral DAILY    apixaban (ELIQUIS) tablet 2.5 mg  2.5 mg Oral BID    lisinopril (PRINIVIL, ZESTRIL) tablet 10 mg  10 mg Oral DAILY    metoprolol succinate (TOPROL-XL) XL tablet 25 mg  25 mg Oral DAILY    polyethylene glycol (MIRALAX) packet 17 g  17 g Oral DAILY    pravastatin (PRAVACHOL) tablet 20 mg  20 mg Oral QHS    famotidine (PEPCID) tablet 20 mg  20 mg Oral QHS PRN    senna-docusate (PERICOLACE) 8.6-50 mg per tablet 1 Tab  1 Tab Oral DAILY    traMADol (ULTRAM) tablet 25 mg  25 mg Oral QHS    venlafaxine-SR (EFFEXOR-XR) capsule 75 mg  75 mg Oral DAILY WITH BREAKFAST    cyanocobalamin (VITAMIN B12) tablet 500 mcg  500 mcg Oral DAILY    acetaminophen (TYLENOL) tablet 500 mg  500 mg Oral Q6H PRN    levoFLOXacin (LEVAQUIN) 250 mg in D5W IVPB  250 mg IntraVENous Q24H     ______________________________________________________________________  EXPECTED LENGTH OF STAY: - - -  ACTUAL LENGTH OF STAY:          2                 Aziza Mcallister NP

## 2018-08-19 NOTE — PROGRESS NOTES
Problem: Mobility Impaired (Adult and Pediatric)  Goal: *Acute Goals and Plan of Care (Insert Text)  physical Therapy EVALUATION  Patient: Musa Shea (13 y.o. female)  Date: 8/19/2018  Primary Diagnosis: Shortness of breath        Precautions:        ASSESSMENT :  Based on the objective data described below, the patient presents with decreased mobility and overall activity tolerance. Patient required minimum assistance for being able to sit EOB. She did report that she felt light headed and that the room was spinning while sitting up. BP remained around 121/65 mmHg, her RR was up to 35 breaths per minute. PT did not pursue walking due to complaints of dizziness/room spinning. Patient did tolerate sitting EOB for approx. 8 minutes but dizziness did not disappear. Patient will benefit from SNF placement upon returning to her AL apartment. .    Patient will benefit from skilled intervention to address the above impairments. Patients rehabilitation potential is considered to be Good  Factors which may influence rehabilitation potential include:   [x]         None noted  []         Mental ability/status  []         Medical condition  []         Home/family situation and support systems  []         Safety awareness  []         Pain tolerance/management  []         Other:      PLAN :  Recommendations and Planned Interventions:  [x]           Bed Mobility Training             [x]    Neuromuscular Re-Education  [x]           Transfer Training                   []    Orthotic/Prosthetic Training  [x]           Gait Training                         []    Modalities  [x]           Therapeutic Exercises           []    Edema Management/Control  [x]           Therapeutic Activities            [x]    Patient and Family Training/Education  []           Other (comment):    Frequency/Duration: Patient will be followed by physical therapy  5 times a week to address goals.   Discharge Recommendations: Skilled Nursing Facility  Further Equipment Recommendations for Discharge: to be determined based on dc destination      SUBJECTIVE:   Patient stated I think the [facility] is ready for me but I can't do that to my family.     OBJECTIVE DATA SUMMARY:   HISTORY:    Past Medical History:   Diagnosis Date    Altered mental state     Arrhythmia     AFIB HISTORY     Arthritis     BACK     Asthma     CAD (coronary artery disease)     by pass surgery in 2002,2008    Depression     Fibromyalgia     HTN (hypertension) 6/11/2013    Hypercholesterolemia     Ill-defined condition     FOUR COMPRESSION FRACTURES IN BACK    PE (pulmonary embolism)     Pneumonia 04/2014    Pneumonia, organism unspecified(486) 3/8/2014    Thyroid disease      Past Surgical History:   Procedure Laterality Date    CARDIAC SURG PROCEDURE UNLIST      heart attack,bypass sx     Prior Level of Function/Home Situation: Pt lives in an FPC, was independent with mobility (per her report) and used a rollator for ambulation   Personal factors and/or comorbidities impacting plan of care: patient does have a dementia diagnosis so this will factor into her therapy POC     Home Situation  Support Systems: Family member(s), Friends \ neighbors, Assisted living    EXAMINATION/PRESENTATION/DECISION MAKING:   Critical Behavior:  Neurologic State: Alert, Confused  Orientation Level: Oriented to person  Cognition: Follows commands     Hearing:   Auditory  Auditory Impairment: Hard of hearing, bilateral  Skin:     Edema:   Range Of Motion:  AROM: Generally decreased, functional                       Strength:    Strength: Generally decreased, functional                    Tone & Sensation:                                  Coordination:     Vision:      Functional Mobility:  Bed Mobility:     Supine to Sit: Supervision  Sit to Supine: Minimum assistance     Transfers:                             Balance:   Sitting: Intact  Ambulation/Gait Training: Stairs: Therapeutic Exercises:       Functional Measure:  30\" chair rise    G codes: In compliance with CMSs Claims Based Outcome Reporting, the following G-code set was chosen for this patient based on their primary functional limitation being treated: The outcome measure chosen to determine the severity of the functional limitation was the 30\" chair rise  with a score of 0 which was correlated with the impairment scale. ? Mobility - Walking and Moving Around:     - CURRENT STATUS: CL - 60%-79% impaired, limited or restricted    - GOAL STATUS: CK - 40%-59% impaired, limited or restricted    - D/C STATUS:  ---------------To be determined---------------      Physical Therapy Evaluation Charge Determination   History Examination Presentation Decision-Making   LOW Complexity : Zero comorbidities / personal factors that will impact the outcome / POC LOW Complexity : 1-2 Standardized tests and measures addressing body structure, function, activity limitation and / or participation in recreation  LOW Complexity : Stable, uncomplicated  LOW Complexity : FOTO score of       Based on the above components, the patient evaluation is determined to be of the following complexity level: LOW     Pain:  Pain Scale 1: Numeric (0 - 10)  Pain Intensity 1: 0              Activity Tolerance:     Please refer to the flowsheet for vital signs taken during this treatment. After treatment:   []         Patient left in no apparent distress sitting up in chair  [x]         Patient left in no apparent distress in bed  [x]         Call bell left within reach  [x]         Nursing notified  []         Caregiver present  []         Bed alarm activated    COMMUNICATION/EDUCATION:   The patients plan of care was discussed with: Registered Nurse. [x]         Fall prevention education was provided and the patient/caregiver indicated understanding.   []         Patient/family have participated as able in goal setting and plan of care. [x]         Patient/family agree to work toward stated goals and plan of care. []         Patient understands intent and goals of therapy, but is neutral about his/her participation. []         Patient is unable to participate in goal setting and plan of care.     Thank you for this referral.  Felicia Olivares, PT   Time Calculation: 25 mins

## 2018-08-19 NOTE — PROGRESS NOTES
Updated daughter, Breanna Roy via phone. Answered all questions to her satisfaction.      Alexandria Ward DNP, ACNP-BC   Hospitalist

## 2018-08-20 ENCOUNTER — PATIENT OUTREACH (OUTPATIENT)
Dept: INTERNAL MEDICINE CLINIC | Age: 83
End: 2018-08-20

## 2018-08-20 LAB
ANION GAP SERPL CALC-SCNC: 6 MMOL/L (ref 5–15)
ANION GAP SERPL CALC-SCNC: 8 MMOL/L (ref 5–15)
ATRIAL RATE: 122 BPM
BUN SERPL-MCNC: 38 MG/DL (ref 6–20)
BUN SERPL-MCNC: 52 MG/DL (ref 6–20)
BUN/CREAT SERPL: 35 (ref 12–20)
BUN/CREAT SERPL: 40 (ref 12–20)
CALCIUM SERPL-MCNC: 6.3 MG/DL (ref 8.5–10.1)
CALCIUM SERPL-MCNC: 8.1 MG/DL (ref 8.5–10.1)
CALCULATED R AXIS, ECG10: -33 DEGREES
CALCULATED T AXIS, ECG11: 121 DEGREES
CHLORIDE SERPL-SCNC: 106 MMOL/L (ref 97–108)
CHLORIDE SERPL-SCNC: 124 MMOL/L (ref 97–108)
CO2 SERPL-SCNC: 19 MMOL/L (ref 21–32)
CO2 SERPL-SCNC: 31 MMOL/L (ref 21–32)
CREAT SERPL-MCNC: 0.95 MG/DL (ref 0.55–1.02)
CREAT SERPL-MCNC: 1.49 MG/DL (ref 0.55–1.02)
DIAGNOSIS, 93000: NORMAL
GLUCOSE SERPL-MCNC: 120 MG/DL (ref 65–100)
GLUCOSE SERPL-MCNC: 80 MG/DL (ref 65–100)
MAGNESIUM SERPL-MCNC: 1.8 MG/DL (ref 1.6–2.4)
P-R INTERVAL, ECG05: 168 MS
POTASSIUM SERPL-SCNC: 3.9 MMOL/L (ref 3.5–5.1)
POTASSIUM SERPL-SCNC: 4.4 MMOL/L (ref 3.5–5.1)
Q-T INTERVAL, ECG07: 348 MS
QRS DURATION, ECG06: 130 MS
QTC CALCULATION (BEZET), ECG08: 495 MS
SODIUM SERPL-SCNC: 143 MMOL/L (ref 136–145)
SODIUM SERPL-SCNC: 151 MMOL/L (ref 136–145)
VENTRICULAR RATE, ECG03: 122 BPM

## 2018-08-20 PROCEDURE — 74011250637 HC RX REV CODE- 250/637: Performed by: NURSE PRACTITIONER

## 2018-08-20 PROCEDURE — 36415 COLL VENOUS BLD VENIPUNCTURE: CPT | Performed by: NURSE PRACTITIONER

## 2018-08-20 PROCEDURE — 77010033678 HC OXYGEN DAILY

## 2018-08-20 PROCEDURE — 74011250637 HC RX REV CODE- 250/637: Performed by: HOSPITALIST

## 2018-08-20 PROCEDURE — 74011636637 HC RX REV CODE- 636/637: Performed by: NURSE PRACTITIONER

## 2018-08-20 PROCEDURE — 74011000250 HC RX REV CODE- 250: Performed by: NURSE PRACTITIONER

## 2018-08-20 PROCEDURE — 65660000000 HC RM CCU STEPDOWN

## 2018-08-20 PROCEDURE — 94640 AIRWAY INHALATION TREATMENT: CPT

## 2018-08-20 PROCEDURE — 74011000250 HC RX REV CODE- 250: Performed by: HOSPITALIST

## 2018-08-20 PROCEDURE — 97530 THERAPEUTIC ACTIVITIES: CPT

## 2018-08-20 PROCEDURE — 83735 ASSAY OF MAGNESIUM: CPT | Performed by: NURSE PRACTITIONER

## 2018-08-20 PROCEDURE — 74011250637 HC RX REV CODE- 250/637: Performed by: INTERNAL MEDICINE

## 2018-08-20 PROCEDURE — 74011250636 HC RX REV CODE- 250/636: Performed by: HOSPITALIST

## 2018-08-20 PROCEDURE — 80048 BASIC METABOLIC PNL TOTAL CA: CPT | Performed by: NURSE PRACTITIONER

## 2018-08-20 RX ORDER — LEVOFLOXACIN 250 MG/1
250 TABLET ORAL EVERY 24 HOURS
Status: COMPLETED | OUTPATIENT
Start: 2018-08-21 | End: 2018-08-22

## 2018-08-20 RX ADMIN — VENLAFAXINE HYDROCHLORIDE 75 MG: 37.5 CAPSULE, EXTENDED RELEASE ORAL at 08:57

## 2018-08-20 RX ADMIN — POLYETHYLENE GLYCOL 3350 17 G: 17 POWDER, FOR SOLUTION ORAL at 08:56

## 2018-08-20 RX ADMIN — DOCUSATE SODIUM 100 MG: 100 CAPSULE, LIQUID FILLED ORAL at 08:56

## 2018-08-20 RX ADMIN — DOCUSATE SODIUM 100 MG: 100 CAPSULE, LIQUID FILLED ORAL at 17:48

## 2018-08-20 RX ADMIN — Medication 10 ML: at 06:06

## 2018-08-20 RX ADMIN — GUAIFENESIN 1200 MG: 600 TABLET, EXTENDED RELEASE ORAL at 08:57

## 2018-08-20 RX ADMIN — STANDARDIZED SENNA CONCENTRATE AND DOCUSATE SODIUM 1 TABLET: 8.6; 5 TABLET, FILM COATED ORAL at 08:57

## 2018-08-20 RX ADMIN — Medication 10 ML: at 14:00

## 2018-08-20 RX ADMIN — APIXABAN 2.5 MG: 2.5 TABLET, FILM COATED ORAL at 17:48

## 2018-08-20 RX ADMIN — CYANOCOBALAMIN TAB 500 MCG 500 MCG: 500 TAB at 08:56

## 2018-08-20 RX ADMIN — Medication 10 ML: at 21:44

## 2018-08-20 RX ADMIN — DONEPEZIL HYDROCHLORIDE 5 MG: 5 TABLET, FILM COATED ORAL at 08:57

## 2018-08-20 RX ADMIN — PRAVASTATIN SODIUM 20 MG: 20 TABLET ORAL at 21:43

## 2018-08-20 RX ADMIN — LISINOPRIL 10 MG: 10 TABLET ORAL at 08:57

## 2018-08-20 RX ADMIN — IPRATROPIUM BROMIDE AND ALBUTEROL SULFATE 3 ML: .5; 3 SOLUTION RESPIRATORY (INHALATION) at 02:55

## 2018-08-20 RX ADMIN — TRAMADOL HYDROCHLORIDE 25 MG: 50 TABLET, FILM COATED ORAL at 21:43

## 2018-08-20 RX ADMIN — IPRATROPIUM BROMIDE AND ALBUTEROL SULFATE 3 ML: .5; 3 SOLUTION RESPIRATORY (INHALATION) at 14:23

## 2018-08-20 RX ADMIN — IPRATROPIUM BROMIDE AND ALBUTEROL SULFATE 3 ML: .5; 3 SOLUTION RESPIRATORY (INHALATION) at 07:42

## 2018-08-20 RX ADMIN — APIXABAN 2.5 MG: 2.5 TABLET, FILM COATED ORAL at 08:58

## 2018-08-20 RX ADMIN — LEVOFLOXACIN 250 MG: 5 INJECTION, SOLUTION INTRAVENOUS at 06:05

## 2018-08-20 RX ADMIN — GUAIFENESIN 1200 MG: 600 TABLET, EXTENDED RELEASE ORAL at 21:44

## 2018-08-20 RX ADMIN — PREDNISONE 40 MG: 20 TABLET ORAL at 08:58

## 2018-08-20 RX ADMIN — BUDESONIDE 500 MCG: 0.5 INHALANT RESPIRATORY (INHALATION) at 07:42

## 2018-08-20 RX ADMIN — FUROSEMIDE 40 MG: 40 TABLET ORAL at 08:57

## 2018-08-20 RX ADMIN — METOPROLOL SUCCINATE 25 MG: 25 TABLET, EXTENDED RELEASE ORAL at 08:57

## 2018-08-20 NOTE — PROGRESS NOTES
Spiritual Care Partner Volunteer visited patient in Rm 425 on 8/20/2018.   Documented by:  Chaplain Gomez MDiv, MS, [address]  [address] [place] [address] ([phone])

## 2018-08-20 NOTE — PROGRESS NOTES
Cardiology Progress Note      8/20/2018 8:04 AM    Admit Date: 8/17/2018    Admit Diagnosis: Shortness of breath      Subjective:     Salty Weiner says she is feeling a bit better  Still weak  On a neb treatment currently      Current Facility-Administered Medications   Medication Dose Route Frequency    budesonide (PULMICORT) 500 mcg/2 ml nebulizer suspension  500 mcg Nebulization BID RT    albuterol-ipratropium (DUO-NEB) 2.5 MG-0.5 MG/3 ML  3 mL Nebulization Q6H RT    predniSONE (DELTASONE) tablet 40 mg  40 mg Oral DAILY WITH BREAKFAST    guaiFENesin ER (MUCINEX) tablet 1,200 mg  1,200 mg Oral Q12H    furosemide (LASIX) tablet 40 mg  40 mg Oral DAILY    polyvinyl alcohol (LIQUIFILM TEARS) 1.4 % ophthalmic solution 1 Drop  1 Drop Both Eyes PRN    albuterol-ipratropium (DUO-NEB) 2.5 MG-0.5 MG/3 ML  3 mL Nebulization Q6H PRN    sodium chloride (NS) flush 5-10 mL  5-10 mL IntraVENous PRN    sodium chloride (NS) flush 5-10 mL  5-10 mL IntraVENous Q8H    sodium chloride (NS) flush 5-10 mL  5-10 mL IntraVENous PRN    docusate sodium (COLACE) capsule 100 mg  100 mg Oral BID    donepezil (ARICEPT) tablet 5 mg  5 mg Oral DAILY    apixaban (ELIQUIS) tablet 2.5 mg  2.5 mg Oral BID    lisinopril (PRINIVIL, ZESTRIL) tablet 10 mg  10 mg Oral DAILY    metoprolol succinate (TOPROL-XL) XL tablet 25 mg  25 mg Oral DAILY    polyethylene glycol (MIRALAX) packet 17 g  17 g Oral DAILY    pravastatin (PRAVACHOL) tablet 20 mg  20 mg Oral QHS    famotidine (PEPCID) tablet 20 mg  20 mg Oral QHS PRN    senna-docusate (PERICOLACE) 8.6-50 mg per tablet 1 Tab  1 Tab Oral DAILY    traMADol (ULTRAM) tablet 25 mg  25 mg Oral QHS    venlafaxine-SR (EFFEXOR-XR) capsule 75 mg  75 mg Oral DAILY WITH BREAKFAST    cyanocobalamin (VITAMIN B12) tablet 500 mcg  500 mcg Oral DAILY    acetaminophen (TYLENOL) tablet 500 mg  500 mg Oral Q6H PRN    levoFLOXacin (LEVAQUIN) 250 mg in D5W IVPB  250 mg IntraVENous Q24H         Objective: Physical Exam:  Visit Vitals    /52 (BP 1 Location: Right arm, BP Patient Position: At rest)    Pulse 71    Temp 97.6 °F (36.4 °C)    Resp 24    Wt 77 kg (169 lb 12.8 oz)    SpO2 98%    BMI 32.08 kg/m2     General Appearance:  Frail elderly female, no acute distress   Ears/Nose/Mouth/Throat:   Hearing grossly normal.         Neck: Supple. Chest:   Lungs clear to auscultation bilaterally. Cardiovascular:  Regular rate and rhythm, S1, S2 normal, no murmur. Abdomen:   Soft, non-tender, bowel sounds are active. Extremities: No edema bilaterally. Skin: Warm and dry.                Data Review:   Labs:    Recent Results (from the past 24 hour(s))   CBC W/O DIFF    Collection Time: 08/19/18 11:16 AM   Result Value Ref Range    WBC 13.5 (H) 3.6 - 11.0 K/uL    RBC 4.41 3.80 - 5.20 M/uL    HGB 13.6 11.5 - 16.0 g/dL    HCT 43.2 35.0 - 47.0 %    MCV 98.0 80.0 - 99.0 FL    MCH 30.8 26.0 - 34.0 PG    MCHC 31.5 30.0 - 36.5 g/dL    RDW 13.2 11.5 - 14.5 %    PLATELET 518 880 - 037 K/uL    MPV 11.1 8.9 - 12.9 FL    NRBC 0.0 0  WBC    ABSOLUTE NRBC 0.00 0.00 - 0.01 K/uL     Tele: NSR w PACs    Assessment:     1) Acute bronchitis  2) Acute on chronic systolic CHF, triggered by bronchitis  Volume status looks ok currently  3) Cardiomyopathy  4) CAD s/p CABG  5) Paroxysmal a.fib  6) HTN    Plan:     Continue current medical therapies

## 2018-08-20 NOTE — PROGRESS NOTES
Problem: Heart Failure: Day 2  Goal: Respiratory  Outcome: Progressing Towards Goal  Pt weaned from 2L nasal cannula to 1L nasal cannula. Oxygen saturation within normal limits. Will continue to monitor. Bedside shift change report given to Haresh Callahan RN by Pallavi Del Castillo RN and Edmund Myles RN. Report included the following information SBAR, Intake/Output, MAR, Recent Results and Cardiac Rhythm NSR with BBB.

## 2018-08-20 NOTE — PALLIATIVE CARE
Ms. Stefania Goss is a 80year old woman with a history significant for HTN, CAD s/p CABG, MI, CHF (EF 20-25%), Afib, PE, thyroid disease, and dementia. She was admitted from [address] [place] [address] on  with CHF exacerbation secondary to PNA. She is being treated for dyspnea, Afib with RVR, ERIKA and PNA. Echo revealed no change in her EF since . She has not bee hospitalized here recently. She has an AMD completed in  on file which designates her  , Daniella Jarrett, as [place]. Daughter, Magalys Mazariegos ([phone]; [phone]) is listed as [place]. Palliative was consulted to assist with care goals decisions. Unable to see patient today, will follow-up tomorrow.

## 2018-08-20 NOTE — PROGRESS NOTES
Spiritual Care Assessment/Progress Note  [facility]      NAME: Migel May      MRN: [id number]  AGE: 80 y.o. SEX: female  Catholic Affiliation: Lutheran   Language: English     8/20/2018     Total Time (in minutes): 5     Spiritual Assessment begun in [facility] 4 IMCU 2 through conversation with:         []Patient        [] Family    [] Friend(s)        Reason for Consult: Palliative Care, Initial/Spiritual Assessment     Spiritual beliefs: (Please include comment if needed)     [] Identifies with a jim tradition:         [] Supported by a jim community:            [] Claims no spiritual orientation:           [] Seeking spiritual identity:                [] Adheres to an individual form of spirituality:           [x] Not able to assess:                           Identified resources for coping:      [] Prayer                               [] Music                  [] Guided Imagery     [] Family/friends                 [] Pet visits     [] Devotional reading                         [] Unknown     [] Other:                                           Interventions offered during this visit: (See comments for more details)    Patient Interventions: Initial visit           Plan of Care:     [] Support spiritual and/or cultural needs    [] Support AMD and/or advance care planning process      [] Support grieving process   [] Coordinate Rites and/or Rituals    [] Coordination with community clergy   [] No spiritual needs identified at this time   [] Detailed Plan of Care below (See Comments)  [] Make referral to Music Therapy  [] Make referral to Pet Therapy     [] Make referral to Addiction services  [] Make referral to [facility]  [] Make referral to Spiritual Care Partner  [] No future visits requested        [x] Follow up visits as needed     Attempted to visit pt on IMCU without success. Pt was being attended to by staff. Chaplains will follow as needed.   Chaplain Frye MDiv, MS, [place] [phone] [place] ([phone])

## 2018-08-20 NOTE — PROGRESS NOTES
Care Management Interventions  PCP Verified by CM: Yes  Mode of Transport at Discharge: Other (see comment) (Patient's daughterSohail)  Transition of Care Consult (CM Consult): Discharge Planning  Discharge Durable Medical Equipment: No  Physical Therapy Consult: Yes  Occupational Therapy Consult: No  Speech Therapy Consult: No  Current Support Network: Assisted Living ([address] [place] [address])  Confirm Follow Up Transport: Family  Plan discussed with Pt/Family/Caregiver: Yes  [place] Resource Information Provided?: No  Discharge Location  Discharge Placement: Unable to determine at this time    Reason for Admission:   Patient was admitted on 08/17 due to shortness of breath, generalized malaise, increased cough and the sensation of weakness. RRAT Score:     28             Resources/supports as identified by patient/family:   Her daughterSohail [phone]                Top Challenges facing patient (as identified by patient/family and CM): Finances/Medication cost?      Patient insured by Medicare Part A & B and [facility] Supp[facility]               Transportation? Her daughterSohail               Support system or lack thereof? Her daughter, Sohail Cortez                     Living arrangements? Lives at [facility]. Self-care/ADLs/Cognition? At times, patient is confused and requires some assistance with completing ADLs          Current Advanced Directive/Advance Care Plan: On file                          Plan for utilizing home health:    Not home bound. HH was not recommenced at this time                      Likelihood of readmission: Low                 Transition of Care Plan:      Patient to discharge to SNF placement at discharge. Met with patient and her daughterSohail at bedside to introduce self and to discuss transitions of care. Patient was alert but somewhat confused during initial assessment. Patient's daughter confirmed demographics and insurance coverage. Patient lives at [address] [place] [address] [facility]. She last seen her PCP approx. 2-3 weeks ago. Discussed PT recommonedations and offered choice. Per daughter, patient was previously at [facility] twice and is willing to use again. Sent referral via [email or web address]; Awaiting response. CM will continue to follow.    PERICO Cooney

## 2018-08-20 NOTE — PROGRESS NOTES
Problem: Heart Failure: Day 3  Goal: Medications  Outcome: Progressing Towards Goal  Pts vital signs remained stable this morning. Pt educated on heart failure medications including lasix, metoprolol, and lisinopril. Pt verbalized understanding of teaching. Will continue to monitor and educate. 4280- critical result of Ca 6.2 received from lab, [facility] NP notified, orders received to recheck BMP    Bedside shift change report given to Isaiah Ramirez RN (oncoming nurse) by Liberty Martinez RN (offgoing nurse). Report included the following information SBAR, Kardex, ED Summary, Procedure Summary, Intake/Output, MAR, Accordion, Recent Results, Med Rec Status, Cardiac Rhythm NSR  and Alarm Parameters .

## 2018-08-20 NOTE — PROGRESS NOTES
Problem: Discharge Planning  Goal: *Discharge to safe environment  Outcome: Progressing Towards Goal  See CM notes  PERICO Lynn, CRM

## 2018-08-20 NOTE — CARDIO/PULMONARY
Cardiac Rehab: Living with Heart Failure Booklet given to Coretta Mendez. Educated using teach back method. Discussed diagnosis definition and assessed patient understanding. Reviewed importance of daily weight monitoring and Low Sodium diet (6075-9208 mg. daily). Encouraged activity and rest periods within symptom limitations and as ordered by physician. Emphasized the importance of medication compliance. Patient nodding off during education. Will follow up for further teaching and to discuss the [facility],  [facility] could benefit from further education on the following HF topics: All HF topics.

## 2018-08-20 NOTE — PROGRESS NOTES
Spiritual Care Assessment/Progress Note  [facility]      NAME: Kamila Schmitt      MRN: [id number]  AGE: 80 y.o. SEX: female  Buddhism Affiliation: Uatsdin   Language: English     8/20/2018     Total Time (in minutes): 5     Spiritual Assessment begun in [address] [place] [address] 4 IMCU 2 through conversation with:         []Patient        [] Family    [] Friend(s)        Reason for Consult: Palliative Care, Initial/Spiritual Assessment     Spiritual beliefs: (Please include comment if needed)     [] Identifies with a jim tradition:         [] Supported by a jim community:            [] Claims no spiritual orientation:           [] Seeking spiritual identity:                [] Adheres to an individual form of spirituality:           [x] Not able to assess:                           Identified resources for coping:      [] Prayer                               [] Music                  [] Guided Imagery     [] Family/friends                 [] Pet visits     [] Devotional reading                         [] Unknown     [] Other:                                              Interventions offered during this visit: (See comments for more details)    Patient Interventions: Initial visit           Plan of Care:     [] Support spiritual and/or cultural needs    [] Support AMD and/or advance care planning process      [] Support grieving process   [] Coordinate Rites and/or Rituals    [] Coordination with community clergy   [] No spiritual needs identified at this time   [] Detailed Plan of Care below (See Comments)  [] Make referral to Music Therapy  [] Make referral to Pet Therapy     [] Make referral to Addiction services  [] Make referral to [facility]  [] Make referral to Spiritual Care Partner  [] No future visits requested        [x] Follow up visits as needed     Attempted to visit pt. Pt was being attended to by staff. Chaplains will follow as needed.   Chaplain Gail, MDiv, MS, [facility]  [phone])

## 2018-08-20 NOTE — PROGRESS NOTES
Problem: Mobility Impaired (Adult and Pediatric)  Goal: *Acute Goals and Plan of Care (Insert Text)  Physical Therapy Goals  Initiated 8/19/2018  1. Patient will move from supine to sit and sit to supine  in bed with modified independence within 3 day(s). 2.  Patient will transfer from bed to chair and chair to bed with supervision using the least restrictive device within 3 day(s). 3.  Patient will perform sit to stand with modified independence within 3 day(s). 4.  Patient will ambulate with minimal assistance/contact guard assist for 100 feet with the least restrictive device within 3 day(s). physical Therapy TREATMENT  Patient: Rodrick Gibson (8 y.o. female)  Date: 8/20/2018  Diagnosis: Shortness of breath Shortness of breath       Precautions:   fall  Chart, physical therapy assessment, plan of care and goals were reviewed. ASSESSMENT:  Pt received in supine agreeable to PT. Did orthostatics and pt did get orthostatic supine to sit. In supine: /69 and HR 82, in sitting BP 94/54 and HR 93 which then recovered to 130/75 and HR 88 in a few minutes with marginal ankle pumps. She was agreeable to standing. Once in standing had her sidestep to [facility], max assist X 2 with bilateral hand held assist and unable to complete taking a standing BP so one that was started in standing was completed in sitting and was 131/92 and . Pt insisted on returning to sitting because she felt weak. She was clearly becoming anxious. She was returned to supine with mod assist X 2 and bed was placed in modified chair position. Overall limited by orthostatic BP supine to sit (that did recover), anxiety and generalized weakness. On the plus side her 02 sats were stable throughout session on room air. Above discussed with her nurse. Will require rehab.   Progression toward goals:  []    Improving appropriately and progressing toward goals  [x]    Improving very slowly and progressing toward goals  []    Not making progress toward goals and plan of care will be adjusted     PLAN:  Patient continues to benefit from skilled intervention to address the above impairments. Continue treatment per established plan of care. Discharge Recommendations:  Skilled Nursing Facility  Further Equipment Recommendations for Discharge:  none     SUBJECTIVE:   Patient stated I'm dizzy and the room air spinning,\" this when orthostatic     OBJECTIVE DATA SUMMARY:   Chart checked, pt cleared by nursing  Critical Behavior:  Neurologic State: Alert, confused  Orientation Level: Oriented to person, Disoriented to situation (partially oriented to time and place)  Cognition: Follows commands, Appropriate for age attention/concentration     Functional Mobility Training:  Bed Mobility:     Supine to Sit: Moderate assistance  Sit to Supine: Moderate assistance;Assist x2           Transfers:  Sit to Stand: Assist x2;Maximum assistance  Stand to Sit: Assist x2;Maximum assistance                             Balance:  Sitting: Impaired  Sitting - Static: Fair (occasional)  Sitting - Dynamic: Fair (occasional)  Standing: Impaired; With support  Standing - Static: Poor  Standing - Dynamic : Poor  Ambulation/Gait Training:  Distance (ft): 2 Feet (ft)  Assistive Device: Gait belt (bilateral hand held assist)           Gait Abnormalities: Decreased step clearance;Shuffling gait        Base of Support: Widened;Center of gravity altered     Speed/Yaneli: Slow;Pace decreased (<100 feet/min); Shuffled  Step Length: Left shortened;Right shortened                    Stairs:              Neuro Re-Education:    Therapeutic Exercises: Ankle pumps  Pain:  Pain Scale 1: Numeric (0 - 10)  Pain Intensity 1: 0              Activity Tolerance:   See assessment above  Please refer to the flowsheet for vital signs taken during this treatment.   After treatment:   []    Patient left in no apparent distress sitting up in chair  [x]    Patient left in no apparent distress in bed to near chair position  [x]    Call bell left within reach  [x]    Nursing notified  []    Caregiver present  []    Bed alarm activated    COMMUNICATION/COLLABORATION:   The patients plan of care was discussed with: Registered Nurse and Respiratory Therapist    Jyotsna Gee   Time Calculation: 26 mins

## 2018-08-20 NOTE — PROGRESS NOTES
Hospitalist Progress Note  Shannen Lambert NP  Answering service: [phone] from in house phone  Cell: [phone]   Date of Service:  2018  NAME:  Katie Ley  :  1926  MRN:  [id number]    Admission Summary:   Pt came to ED due to fatigue, SOB and fever over the past several days PTA. She was placed on BiPAP in the ED. Imaging was concerning for developing PNA and CHF exacerbation. Interval history / Subjective:   No BiPAP for the past 48 hours, says she is feeling much better today. + cough with deep inspiration. Assessment & Plan:     Acute hypoxic respiratory failure (POA):  - Initially on BiPAP and now on 0.5 liters NC  - OOB as able and pulmonary toilet  - continue currently ordered scheduled neb treatments and Pulmicort  - Antibiotic therapy as below  - Diuresis with Lasix -> cards adjusted lasix yesterday (to daily). Monitor kidney function   - now on PO steroids, will start weaning    Acute on chronic CHF exacerbation, NYHA class III (POA):  - BNP 21487 on arrival  - lasix changed to po daily  - Cardiology has seen, appreciated inpout  - Echo : ventricle was mildly dilated. Systolic function severely reduced by visual assessment. EF 20-25 %. Akinesis of the basal-mid anteroseptal, mid anterolateral, and apical wall(s). There was mild concentric hypertrophy  - Continue Lisinopril and Toprol-XL    Acute renal insufficiency (POA):  - Cr up to 1.66 but may need to tolerate a little higher Cr to optimize heart failure  - BMP was checked (and todays looked normal) but need to repeat due to possible hypocalcemia and hyponatremia.     Community Acquired PNA, possible (POA)  - On Levaquin x 7 days (today was day 3)  - Mucinex    Chronic atrial fibrillation: Rate controlled (~100), Continue Eliquis    Alzheimer's Dementia: Frequent reorientation & continue Aricept    H/o CAD and Dyslipidemia: Continue home medications    Code status: Full. No ACP note and would benefit from this discussion. Palliative Care has been consulted. NP 8/19 initiated some discussion but felt it needed to be continued  DVT prophylaxis: none needed, on Eliquis  Care Plan discussed with: Patient, nurse  Disposition: to be determined, may need SNF. PT/OT to work with pt again today    May downgrade to [address] [facility] Matchmaker Videos Problems  Date Reviewed: 8/17/2018          Codes Class Noted POA    * (Principal)Shortness of breath ICD-10-CM: R06.02  ICD-9-CM: 786.05  8/17/2018 Unknown            Review of Systems:   Denies HA. No chest pain or pressure. +cough (nonproducive and when takes deep inspiration. No abdominal pain    Vital Signs:    Last 24hrs VS reviewed since prior progress note. Most recent are:  Visit Vitals    /52 (BP 1 Location: Right arm, BP Patient Position: At rest)    Pulse 71    Temp 97.6 °F (36.4 °C)    Resp 24    Wt 77 kg (169 lb 12.8 oz)    SpO2 98%    BMI 32.08 kg/m2       Intake/Output Summary (Last 24 hours) at 08/20/18 1103  Last data filed at 08/19/18 2000   Gross per 24 hour   Intake               50 ml   Output              850 ml   Net             -800 ml      Physical Examination:         Constitutional:  No acute distress, cooperative, pleasant    ENT:  Oral mucous moist, oropharynx benign. Resp:  CTA today, no wheezing and no crackles/coarsness. On 0.5 L NC   CV:  Irregular rhythm, normal rate. Tele review: HR ~ 93    GI:  Soft, non distended, non tender. normoactive bowel sounds    Musculoskeletal:  Mild pedal edema, warm, 2+ pulses throughout    Neurologic:  Moves all extremities. Alert and oriented x person, place, year. Follows all commands with clear speech              Psych: Moderate insight.  Calm and not anxious    Data Review:   Review and/or order of clinical lab test  Review and/or order of tests in the radiology section of CPT  Review and/or order of tests in the medicine section of CPT    Labs:     Recent Labs      08/19/18   1116  08/19/18   0526   WBC  13.5*  13.2*   HGB  13.6  13.8   HCT  43.2  44.5   PLT  164  154     Recent Labs      08/20/18   0618  08/19/18   0526  08/17/18 1917   NA  151*  143  139   K  4.4  4.8  3.9   CL  124*  106  101   CO2  19*  29  28   BUN  38*  45*  21*   CREA  0.95  1.66*  1.39*   GLU  80  130*  143*   CA  6.3*  8.6  8.9   MG  1.8   --   1.8     Recent Labs      08/17/18 1917   SGOT  14*   ALT  11*   AP  63   TBILI  0.8   TP  7.3   ALB  3.2*   GLOB  4.1*     Recent Labs      08/17/18 1917   INR  1.1   PTP  11.1   APTT  29.3      No results for input(s): FE, TIBC, PSAT, FERR in the last 72 hours. Lab Results   Component Value Date/Time    Folate 6.3 03/11/2014 04:07 AM      No results for input(s): PH, PCO2, PO2 in the last 72 hours.   Recent Labs      08/18/18   1902  08/18/18   0520  08/17/18 1917   TROIQ  <0.05  0.05*  0.08*     Lab Results   Component Value Date/Time    Cholesterol, total 135 01/24/2017 04:27 AM    HDL Cholesterol 59 01/24/2017 04:27 AM    LDL, calculated 57.2 01/24/2017 04:27 AM    Triglyceride 94 01/24/2017 04:27 AM    CHOL/HDL Ratio 2.3 01/24/2017 04:27 AM     Lab Results   Component Value Date/Time    Glucose (POC) 133 (H) 08/18/2015 01:50 AM     Lab Results   Component Value Date/Time    Color YELLOW/STRAW 01/24/2017 12:30 AM    Appearance CLOUDY (A) 01/24/2017 12:30 AM    Specific gravity 1.024 01/24/2017 12:30 AM    pH (UA) 5.0 01/24/2017 12:30 AM    Protein NEGATIVE  01/24/2017 12:30 AM    Glucose NEGATIVE  01/24/2017 12:30 AM    Ketone TRACE (A) 01/24/2017 12:30 AM    Bilirubin NEGATIVE  08/14/2015 04:42 PM    Urobilinogen 0.2 01/24/2017 12:30 AM    Nitrites NEGATIVE  01/24/2017 12:30 AM    Leukocyte Esterase SMALL (A) 01/24/2017 12:30 AM    Epithelial cells FEW 01/24/2017 12:30 AM    Bacteria NEGATIVE  01/24/2017 12:30 AM    WBC 0-4 01/24/2017 12:30 AM    RBC 0-5 01/24/2017 12:30 AM     Medications Reviewed:     Current Facility-Administered Medications   Medication Dose Route Frequency    budesonide (PULMICORT) 500 mcg/2 ml nebulizer suspension  500 mcg Nebulization BID RT    albuterol-ipratropium (DUO-NEB) 2.5 MG-0.5 MG/3 ML  3 mL Nebulization Q6H RT    predniSONE (DELTASONE) tablet 40 mg  40 mg Oral DAILY WITH BREAKFAST    guaiFENesin ER (MUCINEX) tablet 1,200 mg  1,200 mg Oral Q12H    furosemide (LASIX) tablet 40 mg  40 mg Oral DAILY    polyvinyl alcohol (LIQUIFILM TEARS) 1.4 % ophthalmic solution 1 Drop  1 Drop Both Eyes PRN    albuterol-ipratropium (DUO-NEB) 2.5 MG-0.5 MG/3 ML  3 mL Nebulization Q6H PRN    sodium chloride (NS) flush 5-10 mL  5-10 mL IntraVENous PRN    sodium chloride (NS) flush 5-10 mL  5-10 mL IntraVENous Q8H    sodium chloride (NS) flush 5-10 mL  5-10 mL IntraVENous PRN    docusate sodium (COLACE) capsule 100 mg  100 mg Oral BID    donepezil (ARICEPT) tablet 5 mg  5 mg Oral DAILY    apixaban (ELIQUIS) tablet 2.5 mg  2.5 mg Oral BID    lisinopril (PRINIVIL, ZESTRIL) tablet 10 mg  10 mg Oral DAILY    metoprolol succinate (TOPROL-XL) XL tablet 25 mg  25 mg Oral DAILY    polyethylene glycol (MIRALAX) packet 17 g  17 g Oral DAILY    pravastatin (PRAVACHOL) tablet 20 mg  20 mg Oral QHS    famotidine (PEPCID) tablet 20 mg  20 mg Oral QHS PRN    senna-docusate (PERICOLACE) 8.6-50 mg per tablet 1 Tab  1 Tab Oral DAILY    traMADol (ULTRAM) tablet 25 mg  25 mg Oral QHS    venlafaxine-SR (EFFEXOR-XR) capsule 75 mg  75 mg Oral DAILY WITH BREAKFAST    cyanocobalamin (VITAMIN B12) tablet 500 mcg  500 mcg Oral DAILY    acetaminophen (TYLENOL) tablet 500 mg  500 mg Oral Q6H PRN    levoFLOXacin (LEVAQUIN) 250 mg in D5W IVPB  250 mg IntraVENous Q24H   ______________________________________________________________________  EXPECTED LENGTH OF STAY: 3d 16h  ACTUAL LENGTH OF STAY:          3               Quita Julien NP

## 2018-08-20 NOTE — PROGRESS NOTES
Clinical Pharmacy Note: Re: IV to PO Automatic Conversion - Antibiotic    Please note: Henna Alarcon medication- Levofloxacin has been changed from IV to PO based on the following criteria:    The patient:  1. Has received IV therapy for at least 48 hours   2. Has a functioning GI tract  - Taking scheduled oral medications  - Tolerating tube feeds at goal rate or a full liquid, soft, or regular diet         3. Is clinically stable        - Temperature < 100.4F for at least 24 hours        - WBC is trending down    This IV to PO conversion is based on the [employer] approved automatic conversion policy for eligible patients. Please call with questions.

## 2018-08-20 NOTE — NURSE NAVIGATOR
Chart reviewed by Heart Failure Nurse Navigator. Heart Failure database completed. EF:  20/25     ACEi/ARB: Lisinopril     BB: Toprol XL    Aldosterone Antagonist: Please consider a guideline directed Aldosterone Receptor Antagonist for patients with an EF of 35% or less and a NYHA functional class of II-IV unless contraindicated. Contraindications include allergy due to aldosterone receptor antagonist, hyperkalemia,(potassium above 5.0 mEq/L ) and renal dysfunction. (creatinine >2.5 mg/dL in men or >2.0 mg/dL in women (or estimated glomerular filtration rate < 30 ml/min/1.732m2)      CRT Not indicated. NYHA Functional Class III      Heart Failure Teach Back in Patient Education. Heart Failure Avoiding Triggers on Discharge Instructions. Cardiologist: CLARENCE    Outpatient nurse navigator notified of admission      Post discharge follow up phone call to be made within 48-72 hours of discharge.

## 2018-08-21 ENCOUNTER — PATIENT OUTREACH (OUTPATIENT)
Dept: INTERNAL MEDICINE CLINIC | Age: 83
End: 2018-08-21

## 2018-08-21 LAB
ANION GAP SERPL CALC-SCNC: 6 MMOL/L (ref 5–15)
BUN SERPL-MCNC: 52 MG/DL (ref 6–20)
BUN/CREAT SERPL: 38 (ref 12–20)
CALCIUM SERPL-MCNC: 8.6 MG/DL (ref 8.5–10.1)
CHLORIDE SERPL-SCNC: 107 MMOL/L (ref 97–108)
CO2 SERPL-SCNC: 29 MMOL/L (ref 21–32)
CREAT SERPL-MCNC: 1.38 MG/DL (ref 0.55–1.02)
ERYTHROCYTE [DISTWIDTH] IN BLOOD BY AUTOMATED COUNT: 13.2 % (ref 11.5–14.5)
GLUCOSE SERPL-MCNC: 116 MG/DL (ref 65–100)
HCT VFR BLD AUTO: 44.6 % (ref 35–47)
HGB BLD-MCNC: 13.9 G/DL (ref 11.5–16)
MCH RBC QN AUTO: 30.6 PG (ref 26–34)
MCHC RBC AUTO-ENTMCNC: 31.2 G/DL (ref 30–36.5)
MCV RBC AUTO: 98.2 FL (ref 80–99)
NRBC # BLD: 0 K/UL (ref 0–0.01)
NRBC BLD-RTO: 0 PER 100 WBC
PLATELET # BLD AUTO: 199 K/UL (ref 150–400)
PMV BLD AUTO: 10.7 FL (ref 8.9–12.9)
POTASSIUM SERPL-SCNC: 5.8 MMOL/L (ref 3.5–5.1)
RBC # BLD AUTO: 4.54 M/UL (ref 3.8–5.2)
SODIUM SERPL-SCNC: 142 MMOL/L (ref 136–145)
WBC # BLD AUTO: 11.8 K/UL (ref 3.6–11)

## 2018-08-21 PROCEDURE — 74011636637 HC RX REV CODE- 636/637: Performed by: NURSE PRACTITIONER

## 2018-08-21 PROCEDURE — 74011250637 HC RX REV CODE- 250/637: Performed by: NURSE PRACTITIONER

## 2018-08-21 PROCEDURE — 74011000250 HC RX REV CODE- 250: Performed by: NURSE PRACTITIONER

## 2018-08-21 PROCEDURE — 97530 THERAPEUTIC ACTIVITIES: CPT

## 2018-08-21 PROCEDURE — 85027 COMPLETE CBC AUTOMATED: CPT | Performed by: NURSE PRACTITIONER

## 2018-08-21 PROCEDURE — 74011250637 HC RX REV CODE- 250/637: Performed by: INTERNAL MEDICINE

## 2018-08-21 PROCEDURE — 76450000000

## 2018-08-21 PROCEDURE — 94640 AIRWAY INHALATION TREATMENT: CPT

## 2018-08-21 PROCEDURE — 74011250637 HC RX REV CODE- 250/637: Performed by: HOSPITALIST

## 2018-08-21 PROCEDURE — 80048 BASIC METABOLIC PNL TOTAL CA: CPT | Performed by: NURSE PRACTITIONER

## 2018-08-21 PROCEDURE — 36415 COLL VENOUS BLD VENIPUNCTURE: CPT | Performed by: NURSE PRACTITIONER

## 2018-08-21 PROCEDURE — 65660000000 HC RM CCU STEPDOWN

## 2018-08-21 PROCEDURE — 74011000250 HC RX REV CODE- 250: Performed by: HOSPITALIST

## 2018-08-21 RX ORDER — INSULIN LISPRO 100 [IU]/ML
10 INJECTION, SOLUTION INTRAVENOUS; SUBCUTANEOUS ONCE
Status: DISCONTINUED | OUTPATIENT
Start: 2018-08-21 | End: 2018-08-21 | Stop reason: CLARIF

## 2018-08-21 RX ORDER — LISINOPRIL 5 MG/1
5 TABLET ORAL DAILY
Status: DISCONTINUED | OUTPATIENT
Start: 2018-08-21 | End: 2018-08-22 | Stop reason: HOSPADM

## 2018-08-21 RX ORDER — SODIUM POLYSTYRENE SULFONATE 15 G/60ML
15 SUSPENSION ORAL; RECTAL
Status: COMPLETED | OUTPATIENT
Start: 2018-08-21 | End: 2018-08-21

## 2018-08-21 RX ORDER — LISINOPRIL 5 MG/1
5 TABLET ORAL DAILY
Status: DISCONTINUED | OUTPATIENT
Start: 2018-08-22 | End: 2018-08-21

## 2018-08-21 RX ORDER — IPRATROPIUM BROMIDE AND ALBUTEROL SULFATE 2.5; .5 MG/3ML; MG/3ML
3 SOLUTION RESPIRATORY (INHALATION)
Status: DISCONTINUED | OUTPATIENT
Start: 2018-08-21 | End: 2018-08-22 | Stop reason: SDUPTHER

## 2018-08-21 RX ADMIN — VENLAFAXINE HYDROCHLORIDE 75 MG: 37.5 CAPSULE, EXTENDED RELEASE ORAL at 09:41

## 2018-08-21 RX ADMIN — LISINOPRIL 5 MG: 5 TABLET ORAL at 10:00

## 2018-08-21 RX ADMIN — FUROSEMIDE 40 MG: 40 TABLET ORAL at 09:42

## 2018-08-21 RX ADMIN — METOPROLOL SUCCINATE 25 MG: 25 TABLET, EXTENDED RELEASE ORAL at 09:42

## 2018-08-21 RX ADMIN — POLYETHYLENE GLYCOL 3350 17 G: 17 POWDER, FOR SOLUTION ORAL at 09:41

## 2018-08-21 RX ADMIN — BUDESONIDE 500 MCG: 0.5 INHALANT RESPIRATORY (INHALATION) at 19:44

## 2018-08-21 RX ADMIN — Medication 10 ML: at 22:00

## 2018-08-21 RX ADMIN — GUAIFENESIN 1200 MG: 600 TABLET, EXTENDED RELEASE ORAL at 09:42

## 2018-08-21 RX ADMIN — APIXABAN 2.5 MG: 2.5 TABLET, FILM COATED ORAL at 19:23

## 2018-08-21 RX ADMIN — PREDNISONE 30 MG: 5 TABLET ORAL at 09:41

## 2018-08-21 RX ADMIN — PRAVASTATIN SODIUM 20 MG: 20 TABLET ORAL at 23:26

## 2018-08-21 RX ADMIN — BUDESONIDE 500 MCG: 0.5 INHALANT RESPIRATORY (INHALATION) at 07:35

## 2018-08-21 RX ADMIN — Medication 10 ML: at 07:26

## 2018-08-21 RX ADMIN — BUDESONIDE 500 MCG: 0.5 INHALANT RESPIRATORY (INHALATION) at 19:52

## 2018-08-21 RX ADMIN — DOCUSATE SODIUM 100 MG: 100 CAPSULE, LIQUID FILLED ORAL at 09:41

## 2018-08-21 RX ADMIN — DONEPEZIL HYDROCHLORIDE 5 MG: 5 TABLET, FILM COATED ORAL at 09:42

## 2018-08-21 RX ADMIN — STANDARDIZED SENNA CONCENTRATE AND DOCUSATE SODIUM 1 TABLET: 8.6; 5 TABLET, FILM COATED ORAL at 09:42

## 2018-08-21 RX ADMIN — GUAIFENESIN 1200 MG: 600 TABLET, EXTENDED RELEASE ORAL at 23:26

## 2018-08-21 RX ADMIN — CYANOCOBALAMIN TAB 500 MCG 500 MCG: 500 TAB at 09:42

## 2018-08-21 RX ADMIN — LEVOFLOXACIN 250 MG: 250 TABLET, FILM COATED ORAL at 07:26

## 2018-08-21 RX ADMIN — SODIUM POLYSTYRENE SULFONATE 15 G: 15 SUSPENSION ORAL; RECTAL at 09:53

## 2018-08-21 RX ADMIN — APIXABAN 2.5 MG: 2.5 TABLET, FILM COATED ORAL at 09:42

## 2018-08-21 RX ADMIN — Medication 10 ML: at 19:27

## 2018-08-21 NOTE — PROGRESS NOTES
Hospitalist Progress Note  Fred Buchanan NP  Answering service: [phone] from in house phone  Cell: [phone]   Date of Service:  2018  NAME:  Migel May  :  1926  MRN:  [id number]    Admission Summary:   Pt came to ED due to fatigue, SOB and fever over the past several days PTA. She was placed on BiPAP in the ED. Imaging was concerning for developing PNA and CHF exacerbation. Interval history / Subjective:   Pt in bed, says she does not feel as well as she did yesterday - has non-specific complaints. Assessment & Plan:     Acute hypoxic respiratory failure (POA):  - Initially on BiPAP and now on RA  - OOB as able and pulmonary toilet  - continue currently ordered scheduled neb treatments and Pulmicort  - Antibiotic therapy as below  - Diuresis with Lasix -> cards adjusted lasix yesterday (to daily). Monitoring kidney function which is stable with a slow downward trend in Creatinine  - now on PO steroids, will start weaning (30 mg today)    Acute on chronic CHF exacerbation, NYHA class III (POA):  - BNP 50415 on arrival  - lasix changed to po daily. Weight is stable. - Cardiology has seen, appreciated input  - Echo : ventricle was mildly dilated. Systolic function severely reduced by visual assessment. EF 20-25 %. Akinesis of the basal-mid anteroseptal, mid anterolateral, and apical wall(s).  There was mild concentric hypertrophy  - Continue Lisinopril (reduce dose today due to orthostatic BP) and Toprol-XL  - add TEDs    Acute renal insufficiency (POA):  - Cr with a slow trend down    Hyperkalemia:   - K 5.8 today, give 1 dose of kayexalate  - recheck in am    Community Acquired PNA, possible (POA)  - On Levaquin x 7 days (today was day 4)  - Mucinex    Chronic atrial fibrillation: Rate controlled, Continue Eliquis    Alzheimer's Dementia: Frequent reorientation & continue Aricept    H/o CAD and Dyslipidemia: Continue home medications    Code status: Full. Palliative Care has been consulted. NP 8/19 initiated some discussion but felt it needed to be continued  DVT prophylaxis: none needed, on Eliquis  Care Plan discussed with: Patient, nurse and palliative care. Will plan to call daughter later in afternoon. Disposition: SNF     Hospital Problems  Date Reviewed: 8/17/2018          Codes Class Noted POA    * (Principal)Shortness of breath ICD-10-CM: R06.02  ICD-9-CM: 786.05  8/17/2018 Unknown            Review of Systems:   Denies HA. No chest pain or pressure. No respiratory complaints. No abdominal pain, no n/v    Vital Signs:    Last 24hrs VS reviewed since prior progress note. Most recent are:  Visit Vitals    /73    Pulse (!) 110    Temp 98.2 °F (36.8 °C)    Resp 20    Wt 77.1 kg (170 lb)    SpO2 90%    BMI 32.12 kg/m2       Intake/Output Summary (Last 24 hours) at 08/21/18 1018  Last data filed at 08/20/18 1400   Gross per 24 hour   Intake              120 ml   Output              450 ml   Net             -330 ml      Physical Examination:         Constitutional:  No acute distress, cooperative, pleasant    ENT:  Oral mucous membranes moist, oropharynx benign. Resp:  CTA today, no wheezing and no crackles/coarsness. RA   CV:  Regular rhythm, normal rate. GI:  Soft, non distended, non tender. normoactive bowel sounds    Musculoskeletal:  Mild pedal edema (non pitting), warm, 2+ pulses throughout    Neurologic:  JOEL. Alert and oriented x person/family, place, year. Follows all commands with clear speech              Psych: Moderate insight.  Calm and not anxious    Data Review:   Review and/or order of clinical lab test  Review and/or order of tests in the radiology section of CPT  Review and/or order of tests in the medicine section of CPT    Labs:     Recent Labs      08/21/18   0542  08/19/18   1116   WBC  11.8*  13.5*   HGB  13.9  13.6   HCT  44.6  43.2   PLT  199  164     Recent Labs 08/21/18   0542  08/20/18   1107  08/20/18   0618   NA  142  143  151*   K  5.8*  3.9  4.4   CL  107  106  124*   CO2  29  31  19*   BUN  52*  52*  38*   CREA  1.38*  1.49*  0.95   GLU  116*  120*  80   CA  8.6  8.1*  6.3*   MG   --    --   1.8     No results for input(s): SGOT, GPT, ALT, AP, TBIL, TBILI, TP, ALB, GLOB, GGT, AML, LPSE in the last 72 hours. No lab exists for component: AMYP, HLPSE  No results for input(s): INR, PTP, APTT in the last 72 hours. No lab exists for component: INREXT, INREXT   No results for input(s): FE, TIBC, PSAT, FERR in the last 72 hours. Lab Results   Component Value Date/Time    Folate 6.3 03/11/2014 04:07 AM      No results for input(s): PH, PCO2, PO2 in the last 72 hours.   Recent Labs      08/18/18   1902   TROIQ  <0.05     Lab Results   Component Value Date/Time    Cholesterol, total 135 01/24/2017 04:27 AM    HDL Cholesterol 59 01/24/2017 04:27 AM    LDL, calculated 57.2 01/24/2017 04:27 AM    Triglyceride 94 01/24/2017 04:27 AM    CHOL/HDL Ratio 2.3 01/24/2017 04:27 AM     Lab Results   Component Value Date/Time    Glucose (POC) 133 (H) 08/18/2015 01:50 AM     Lab Results   Component Value Date/Time    Color YELLOW/STRAW 01/24/2017 12:30 AM    Appearance CLOUDY (A) 01/24/2017 12:30 AM    Specific gravity 1.024 01/24/2017 12:30 AM    pH (UA) 5.0 01/24/2017 12:30 AM    Protein NEGATIVE  01/24/2017 12:30 AM    Glucose NEGATIVE  01/24/2017 12:30 AM    Ketone TRACE (A) 01/24/2017 12:30 AM    Bilirubin NEGATIVE  08/14/2015 04:42 PM    Urobilinogen 0.2 01/24/2017 12:30 AM    Nitrites NEGATIVE  01/24/2017 12:30 AM    Leukocyte Esterase SMALL (A) 01/24/2017 12:30 AM    Epithelial cells FEW 01/24/2017 12:30 AM    Bacteria NEGATIVE  01/24/2017 12:30 AM    WBC 0-4 01/24/2017 12:30 AM    RBC 0-5 01/24/2017 12:30 AM     Medications Reviewed:     Current Facility-Administered Medications   Medication Dose Route Frequency    albuterol-ipratropium (DUO-NEB) 2.5 MG-0.5 MG/3 ML  3 mL Nebulization Q6H PRN    lisinopril (PRINIVIL, ZESTRIL) tablet 5 mg  5 mg Oral DAILY    predniSONE (DELTASONE) tablet 30 mg  30 mg Oral DAILY WITH BREAKFAST    levoFLOXacin (LEVAQUIN) tablet 250 mg  250 mg Oral Q24H    budesonide (PULMICORT) 500 mcg/2 ml nebulizer suspension  500 mcg Nebulization BID RT    guaiFENesin ER (MUCINEX) tablet 1,200 mg  1,200 mg Oral Q12H    furosemide (LASIX) tablet 40 mg  40 mg Oral DAILY    polyvinyl alcohol (LIQUIFILM TEARS) 1.4 % ophthalmic solution 1 Drop  1 Drop Both Eyes PRN    albuterol-ipratropium (DUO-NEB) 2.5 MG-0.5 MG/3 ML  3 mL Nebulization Q6H PRN    sodium chloride (NS) flush 5-10 mL  5-10 mL IntraVENous PRN    sodium chloride (NS) flush 5-10 mL  5-10 mL IntraVENous Q8H    sodium chloride (NS) flush 5-10 mL  5-10 mL IntraVENous PRN    docusate sodium (COLACE) capsule 100 mg  100 mg Oral BID    donepezil (ARICEPT) tablet 5 mg  5 mg Oral DAILY    apixaban (ELIQUIS) tablet 2.5 mg  2.5 mg Oral BID    metoprolol succinate (TOPROL-XL) XL tablet 25 mg  25 mg Oral DAILY    polyethylene glycol (MIRALAX) packet 17 g  17 g Oral DAILY    pravastatin (PRAVACHOL) tablet 20 mg  20 mg Oral QHS    famotidine (PEPCID) tablet 20 mg  20 mg Oral QHS PRN    senna-docusate (PERICOLACE) 8.6-50 mg per tablet 1 Tab  1 Tab Oral DAILY    traMADol (ULTRAM) tablet 25 mg  25 mg Oral QHS    venlafaxine-SR (EFFEXOR-XR) capsule 75 mg  75 mg Oral DAILY WITH BREAKFAST    cyanocobalamin (VITAMIN B12) tablet 500 mcg  500 mcg Oral DAILY    acetaminophen (TYLENOL) tablet 500 mg  500 mg Oral Q6H PRN   ______________________________________________________________________  EXPECTED LENGTH OF STAY: 3d 16h  ACTUAL LENGTH OF STAY:          4               Shelly Lam NP

## 2018-08-21 NOTE — ACP (ADVANCE CARE PLANNING)
GOALS OF CARE:  Patient/Health Care Proxy Stated Goals: Rehabilitation    TREATMENT PREFERENCES:   Code Status: Full Code    Pt has an updated AMD, the one we currently have on file scanned in 2015 was completed in [address] [place] [address] and names her late  as mPOA. Since then has updated one, naming dtr Gustavo Philip as mPOA. Discussed care decisions, at this time full restorative measures will full code status but she and family are discussing and will cont open conversations.        Advance Care Planning:  Advance Care Planning 8/21/2018   Patient's Healthcare Decision Maker is: Named in scanned ACP document   Primary Decision Maker Name Scotty Cast ([address])     Primary Decision Maker Phone Number C     Primary Decision Maker Relationship to Patient Adult child   Confirm Advance Directive Yes, not on file   Patient Would Like to Complete Advance Directive -   Does the patient have other document types -       Medical Interventions: Full interventions

## 2018-08-21 NOTE — PROGRESS NOTES
Was alerted of pt's admission over the weekend. Pt from [facility] with SOB s/p neb tx w/o releif. EMS reporting sats of 86% on arrival w/diminished breath sounds R>L and requiring 4L O2 to get sats to 96%, tympanic temp of 101.4 F, EKG showed Afib w/RVR. Pt was unable to tolerate her medications that day, per her statement to ED staff. Had been visited by daughter earlier in the day and the pt had seemed to be in her normal state. Pt does not use home O2. Is not a smoker. Does have hx of Afib and HF, Asthma, PE, PNA in 2014, MI, CABG 2002, 2008, Dementia. Hospital EKG: ST w/freq PVC's and a LBBB. , ST/T wave, no STEMI. CT: 1. No acute findings. 2. Nodular airspace opacification in the right upper lobe. Follow-up CT is recommended in one month  Echo: EF 20-25%  Labs on admit: BNP: 66574; WBD 13.4; BUN 21; Cr 1.39    Pt was placed on Bipap, neb tx, solumedrol, diuresed w/IV Lasix, IV Abx for likely PNA, cardiology consult for HF and A-fib RVR, continuing Eliquis    Will continue to follow.

## 2018-08-21 NOTE — CONSULTS
Palliative Medicine Consult  Robby: [phone]    Patient Name: Guilherme Ashford  YOB: 1926    Date of Initial Consult: 18  Reason for Consult: End stage disease  Requesting Provider: Marisa Moore   Primary Care Physician: Casi Mcgee MD     SUMMARY:   Guilherme Ashford is a 80 y.o. with a past history of CHF (EF 20-25% 18 and ), CAD s/p CABG, dementia, A fib on Eliquis, PE who was admitted on 2018 from [facility] with SOB. Being treated for acute resp failure due to CHF exacerbation. Being diuresed and on steroids and abx for possible PNA. Current medical issues leading to Palliative Medicine involvement include: End stage disease. I met pt almost 3 years ago, in 2015 at which time she was hospitalized for PE, UTI, confusion due to metabolic encephalopathy, CHF (EF also 20-25%), a fib w/ RVR. Was also living at [facility] at that time. Had care decision discussion with w/ her dtr [facility] at which point goals were for full restorative measures, but to re-evaluate everything as function change. Social: Pt has lived at [address] [place] [address] since . She likes it there. Has 4 children, but only really close to [facility] (lives locally, is her [facility] and [facility]. Her /their father  about 6 years ago. PALLIATIVE DIAGNOSES:   1. Confusion, improved, some mild dementia at baseline  2. Shortness of breath, improved, off O2  3. Fatigue/generalized weakness  4. Goals of care discussion        PLAN:   1. Along w/ Munir Hill LCSW meet w/ pt and dtr who is alert and feeling much better today. Talk about our team and how I met pt back in  at which point she wanted to [facility] to 100\"   2. We support that goal, but also want to reassess things given her CHF and other cardiac issues. She has not been hospitalized other than 2015, comes to [facility] when she does. Has maintained most of her functioning and likes living at the Assisted Living.  Her EF has stayed the same and she has been able to accommodate to it. 3. Has done a new living will and AMD (the one we have scanned names her late  as mPOA) which names dtr Gladys Beal as primary mPOA. 4. Pt a bit confused about all the details of her medical condition but is clear that she wants to live well, but not be prolonged on machines. Gladys Beal present and appreciative of conversation. Realizes that it is an opportune time to talk and reassess goals w/ each acute events. 5. Pt and dtr Gladys Beal are leaning towards considering allowing natural death/DNR when the time comes- left DDNR form w/ them. Will cont to check in. She has good function and quality of life at this time. 6. Initial consult note routed to primary continuity provider  7. Communicated plan of care with: Palliative IDT; Miracle Reed      GOALS OF CARE / TREATMENT PREFERENCES:     GOALS OF CARE:  Patient/Health Care Proxy Stated Goals: Rehabilitation      TREATMENT PREFERENCES:   Code Status: Full Code    Advance Care Planning:  Advance Care Planning 8/21/2018   Patient's Healthcare Decision Maker is: Named in scanned ACP document   Primary Decision Maker Name Santos Parks ([address])     Primary Decision Maker Phone Number c [phone]    Primary Decision Maker Relationship to Patient Adult child   Confirm Advance Directive Yes, not on file   Patient Would Like to Complete Advance Directive -   Does the patient have other document types -       Medical Interventions: Full interventions   Other Instructions: Other:    As far as possible, the palliative care team has discussed with patient / health care proxy about goals of care / treatment preferences for patient. HISTORY:     History obtained from: Pt, family, chart, staff    CHIEF COMPLAINT: I feel better    HPI/SUBJECTIVE:    The patient is:   [x] Verbal and participatory  [] Non-participatory due to:      Pt awake, alert, oriented to events although a bit confused about her medical condition and took a bit to recall another one of her dtr's names. Denies pain or SOB at rest. Pleased she is feeling so much better. Clinical Pain Assessment (nonverbal scale for severity on nonverbal patients):   Clinical Pain Assessment  Severity: 0          Duration: for how long has pt been experiencing pain (e.g., 2 days, 1 month, years)  Frequency: how often pain is an issue (e.g., several times per day, once every few days, constant)     FUNCTIONAL ASSESSMENT:     Palliative Performance Scale (PPS):  PPS: 60       PSYCHOSOCIAL/SPIRITUAL SCREENING:     Palliative IDT has assessed this patient for cultural preferences / practices and a referral made as appropriate to needs (Cultural Services, Patient Advocacy, Ethics, etc.)    Advance Care Planning:  Advance Care Planning 8/21/2018   Patient's Healthcare Decision Maker is: Named in scanned ACP document   Primary Decision Maker Name Vinny Toro ([address])     Primary Decision Maker Phone Number c [phone]    Primary Decision Maker Relationship to Patient Adult child   Confirm Advance Directive Yes, not on file   Patient Would Like to Complete Advance Directive -   Does the patient have other document types -       Any spiritual / Shinto concerns:  [] Yes /  [x] No    Caregiver Burnout:  [] Yes /  [x] No /  [] No Caregiver Present      Anticipatory grief assessment:   [x] Normal  / [] Maladaptive       ESAS Anxiety: Anxiety: 0    ESAS Depression: Depression: 0        REVIEW OF SYSTEMS:     Positive and pertinent negative findings in ROS are noted above in HPI. The following systems were [x] reviewed / [] unable to be reviewed as noted in HPI  Other findings are noted below. Systems: constitutional, ears/nose/mouth/throat, respiratory, gastrointestinal, genitourinary, musculoskeletal, integumentary, neurologic, psychiatric, endocrine. Positive findings noted below.   Modified ESAS Completed by: provider   Fatigue: 3 Drowsiness: 0 Depression: 0 Pain: 0   Anxiety: 0 Nausea: 0   Anorexia: 0 Dyspnea: 1           Stool Occurrence(s): 1        PHYSICAL EXAM:     From RN flowsheet:  Wt Readings from Last 3 Encounters:   08/21/18 170 lb (77.1 kg)   08/02/18 170 lb 8 oz (77.3 kg)   04/10/18 170 lb (77.1 kg)     Blood pressure 117/73, pulse (!) 110, temperature 98.2 °F (36.8 °C), resp. rate 20, weight 170 lb (77.1 kg), SpO2 90 %. Pain Scale 1: Numeric (0 - 10)  Pain Intensity 1: 0                     Constitutional: awake, alert, pleasant, oriented to person, family, place and most of situation   Eyes: pupils equal, anicteric  ENMT: no nasal discharge, moist mucous membranes  Respiratory: breathing not labored  Musculoskeletal: no deformity, no tenderness to palpation  Skin: warm, dry  Neurologic: following commands, moving all extremities  Psych: full affect, smiling      HISTORY:     Principal Problem:    Shortness of breath (8/17/2018)      Past Medical History:   Diagnosis Date    Altered mental state     Arrhythmia     AFIB HISTORY     Arthritis     BACK     Asthma     CAD (coronary artery disease)     by pass surgery in 2002,2008    Depression     Fibromyalgia     HTN (hypertension) 6/11/2013    Hypercholesterolemia     Ill-defined condition     FOUR COMPRESSION FRACTURES IN BACK    PE (pulmonary embolism)     Pneumonia 04/2014    Pneumonia, organism unspecified(486) 3/8/2014    Thyroid disease       Past Surgical History:   Procedure Laterality Date    CARDIAC SURG PROCEDURE UNLIST      heart attack,bypass sx      Family History   Problem Relation Age of Onset    No Known Problems Mother     No Known Problems Father     Anesth Problems Neg Hx       History reviewed, no pertinent family history.   Social History   Substance Use Topics    Smoking status: Never Smoker    Smokeless tobacco: Never Used    Alcohol use No     Allergies   Allergen Reactions    Neurontin [Gabapentin] Unknown (comments)     Pt unknown reaction Current Facility-Administered Medications   Medication Dose Route Frequency    albuterol-ipratropium (DUO-NEB) 2.5 MG-0.5 MG/3 ML  3 mL Nebulization Q6H PRN    lisinopril (PRINIVIL, ZESTRIL) tablet 5 mg  5 mg Oral DAILY    predniSONE (DELTASONE) tablet 30 mg  30 mg Oral DAILY WITH BREAKFAST    levoFLOXacin (LEVAQUIN) tablet 250 mg  250 mg Oral Q24H    budesonide (PULMICORT) 500 mcg/2 ml nebulizer suspension  500 mcg Nebulization BID RT    guaiFENesin ER (MUCINEX) tablet 1,200 mg  1,200 mg Oral Q12H    furosemide (LASIX) tablet 40 mg  40 mg Oral DAILY    polyvinyl alcohol (LIQUIFILM TEARS) 1.4 % ophthalmic solution 1 Drop  1 Drop Both Eyes PRN    albuterol-ipratropium (DUO-NEB) 2.5 MG-0.5 MG/3 ML  3 mL Nebulization Q6H PRN    sodium chloride (NS) flush 5-10 mL  5-10 mL IntraVENous PRN    sodium chloride (NS) flush 5-10 mL  5-10 mL IntraVENous Q8H    sodium chloride (NS) flush 5-10 mL  5-10 mL IntraVENous PRN    docusate sodium (COLACE) capsule 100 mg  100 mg Oral BID    donepezil (ARICEPT) tablet 5 mg  5 mg Oral DAILY    apixaban (ELIQUIS) tablet 2.5 mg  2.5 mg Oral BID    metoprolol succinate (TOPROL-XL) XL tablet 25 mg  25 mg Oral DAILY    polyethylene glycol (MIRALAX) packet 17 g  17 g Oral DAILY    pravastatin (PRAVACHOL) tablet 20 mg  20 mg Oral QHS    famotidine (PEPCID) tablet 20 mg  20 mg Oral QHS PRN    senna-docusate (PERICOLACE) 8.6-50 mg per tablet 1 Tab  1 Tab Oral DAILY    traMADol (ULTRAM) tablet 25 mg  25 mg Oral QHS    venlafaxine-SR (EFFEXOR-XR) capsule 75 mg  75 mg Oral DAILY WITH BREAKFAST    cyanocobalamin (VITAMIN B12) tablet 500 mcg  500 mcg Oral DAILY    acetaminophen (TYLENOL) tablet 500 mg  500 mg Oral Q6H PRN          LAB AND IMAGING FINDINGS:     Lab Results   Component Value Date/Time    WBC 11.8 (H) 08/21/2018 05:42 AM    HGB 13.9 08/21/2018 05:42 AM    PLATELET 195 70/00/5098 05:42 AM     Lab Results   Component Value Date/Time    Sodium 142 08/21/2018 05:42 AM    Potassium 5.8 (H) 08/21/2018 05:42 AM    Chloride 107 08/21/2018 05:42 AM    CO2 29 08/21/2018 05:42 AM    BUN 52 (H) 08/21/2018 05:42 AM    Creatinine 1.38 (H) 08/21/2018 05:42 AM    Calcium 8.6 08/21/2018 05:42 AM    Magnesium 1.8 08/20/2018 06:18 AM    Phosphorus 2.8 08/18/2015 02:15 AM      Lab Results   Component Value Date/Time    AST (SGOT) 14 (L) 08/17/2018 07:17 PM    Alk. phosphatase 63 08/17/2018 07:17 PM    Protein, total 7.3 08/17/2018 07:17 PM    Albumin 3.2 (L) 08/17/2018 07:17 PM    Globulin 4.1 (H) 08/17/2018 07:17 PM     Lab Results   Component Value Date/Time    INR 1.1 08/17/2018 07:17 PM    Prothrombin time 11.1 08/17/2018 07:17 PM    aPTT 29.3 08/17/2018 07:17 PM      No results found for: IRON, FE, TIBC, IBCT, PSAT, FERR   No results found for: PH, PCO2, PO2  No components found for: [place]   Lab Results   Component Value Date/Time    CK 57 08/19/2015 05:07 AM    CK - MB 1.6 08/19/2015 05:07 AM                Total [id number]   Counseling / coordination time, spent as noted above: 50 min   > 50% counseling / coordination?: yes    Prolonged service was provided for  []30 min   []75 min in face to face time in the presence of the patient, spent as noted above. Time Start:   Time End:   Note: this can only be billed with 11943 (initial) or 40285 (follow up). If multiple start / stop times, list each separately.

## 2018-08-21 NOTE — PROGRESS NOTES
was in contact with [facility] and [facility] they can accept patient and the tentative plan is  For her to go to above facility when she is medically stable.

## 2018-08-21 NOTE — PROGRESS NOTES
0730: Bedside shift change report given to Marquis Missy RN (oncoming nurse) by Winnie Garsia RN (offgoing nurse). Report included the following information SBAR, Kardex, MAR, Accordion and Recent Results. 1221: Patient refused EMILY hose. Tyler Mancilla, NP notified. 0000: Bedside shift change report given to Stephanie Huff RN (oncoming nurse) by Marquis Missy RN (offgoing nurse). Report included the following information SBAR, Kardex, ED Summary, MAR, Accordion and Recent Results.

## 2018-08-21 NOTE — PALLIATIVE CARE
Palliative Medicine Social Work    Dr. Gen Salas and I met with patient and her daughter, Chacorta Car ([phone]; [phone]). Introduced our services as an extra layer of support in the context of her acute admission. Patient was alert and engaging with a bit of confusion noted. She had an accurate understanding of her acute exacerbation of CHF from pneumonia. She related a general health and functional decline since the death of her  in 2012. She acknowledges that she has had to take more medications over time. Her health has been relatively stable, however, for the last few years. She has had no recent admissions to any hospital.  Discussed the hope she can recover from this acute bout and return to baseline. Also discussed this as an opportune time to review goals and clarify decisions for the future. Discussed her AMD on file that was completed in 1996 which designates her late . Patient confirmed that AMD has been updated and now designates Chacorta Car as mPOA. Patient has three other children that are not as involved with decisions or her life. She confirms her wishes for no life-prolonging treatments in the context of imminent death or terminal illness are outlined in Living Will. Engaged in discussion regarding code status; the realities of resuscitative efforts in the context of her chronic, progressive disease. Discussed importance of backing up wishes outlined in directive with a medical order. Reviewed DDNR and patient and family to talk more about this decision. Will continue to support as needed. Thank you for the opportunity to be involved in the care of Ms. Margaret Maria and her family. Marylu Serrato, ELW, [facility]-  Palliative Medicine   Respecting Choices ® ACP Facilitator   [phone]

## 2018-08-21 NOTE — PROGRESS NOTES
Problem: Mobility Impaired (Adult and Pediatric)  Goal: *Acute Goals and Plan of Care (Insert Text)  Physical Therapy Goals  Initiated 8/19/2018  1. Patient will move from supine to sit and sit to supine  in bed with modified independence within 3 day(s). 2.  Patient will transfer from bed to chair and chair to bed with supervision using the least restrictive device within 3 day(s). 3.  Patient will perform sit to stand with modified independence within 3 day(s). 4.  Patient will ambulate with minimal assistance/contact guard assist for 100 feet with the least restrictive device within 3 day(s). physical Therapy TREATMENT  Patient: Kacy Rebolledo (78 y.o. female)  Date: 8/21/2018  Diagnosis: Shortness of breath Shortness of breath       Precautions:   fall, bed alarm  Chart, physical therapy assessment, plan of care and goals were reviewed. ASSESSMENT:  Pt received in bed agreeable to PT. Per conversation with NP, we were to try compression stockings today for BP management, pt got orthostatic yesterday. Discussed with pt's nurse who reported pt was not able to tolerate the donning of the stockings, pt yelling out during attempted donning. Orthostatics performed today. In supine: /71 and HR 85, sitting at EOB doing ankle pumps /40 and HR 95 and pt NOT symptomatic and then post transfer up to the chair /93 and . Pt was on room air the entire session and 02 sats stable in the 90s. Overall provided min to mod assist and second person managed lines. For bed to chair allowed pt to hold onto my elbows the entire time as I used the gait belt, to try to manage pt's anxiety. This technique worked well today.  For tomorrow recommend progressing with amb, trying either a rolling walker or bilateral hand held assist.  Progression toward goals:  []    Improving appropriately and progressing toward goals  [x]    Improving slowly and progressing toward goals  []    Not making progress toward goals and plan of care will be adjusted     PLAN:  Patient continues to benefit from skilled intervention to address the above impairments. Continue treatment per established plan of care. Discharge Recommendations:  [facility]  Further Equipment Recommendations for Discharge:  none     SUBJECTIVE:   Patient stated just don't let me fall.     OBJECTIVE DATA SUMMARY:   Chart checked, pt cleared by nursing  Critical Behavior:  Neurologic State: Alert  Orientation Level: Oriented to person, Oriented to situation (oriented partially to time and place)  Cognition: Follows commands     Functional Mobility Training:  Bed Mobility:  Rolling: Minimum assistance  Supine to Sit: Minimum assistance;Assist x2              Transfers:  Sit to Stand: Moderate assistance;Assist x1  Stand to Sit: Minimum assistance;Assist x1     Stand Pivot Transfers: Moderate assistance  Bed to Chair: Assist x1                    Balance:  Sitting: Impaired  Sitting - Static: Good (unsupported)  Sitting - Dynamic: Good (unsupported)  Standing: Impaired; With support  Standing - Static: Poor  Standing - Dynamic : Poor  Ambulation/Gait Training:                                                        Stairs:              Neuro Re-Education:    Therapeutic Exercises: Ankle pumps  Pain:                    Activity Tolerance:   See assessment above  Please refer to the flowsheet for vital signs taken during this treatment.   After treatment:   [x]    Patient left in no apparent distress sitting up in chair  []    Patient left in no apparent distress in bed  [x]    Call bell left within reach  [x]    Nursing notified  []    Caregiver present  []    Bed alarm activated    COMMUNICATION/COLLABORATION:   The patients plan of care was discussed with: Registered Nurse    Ed Montiel   Time Calculation: 19 mins

## 2018-08-22 ENCOUNTER — PATIENT OUTREACH (OUTPATIENT)
Dept: INTERNAL MEDICINE CLINIC | Age: 83
End: 2018-08-22

## 2018-08-22 VITALS
OXYGEN SATURATION: 97 % | SYSTOLIC BLOOD PRESSURE: 126 MMHG | BODY MASS INDEX: 30.78 KG/M2 | TEMPERATURE: 97.5 F | WEIGHT: 162.92 LBS | HEART RATE: 107 BPM | DIASTOLIC BLOOD PRESSURE: 47 MMHG | RESPIRATION RATE: 30 BRPM

## 2018-08-22 PROBLEM — R06.02 SHORTNESS OF BREATH: Status: RESOLVED | Noted: 2018-08-17 | Resolved: 2018-08-22

## 2018-08-22 LAB
ANION GAP SERPL CALC-SCNC: 9 MMOL/L (ref 5–15)
BACTERIA SPEC CULT: NORMAL
BUN SERPL-MCNC: 40 MG/DL (ref 6–20)
BUN/CREAT SERPL: 34 (ref 12–20)
CALCIUM SERPL-MCNC: 7.8 MG/DL (ref 8.5–10.1)
CHLORIDE SERPL-SCNC: 110 MMOL/L (ref 97–108)
CO2 SERPL-SCNC: 28 MMOL/L (ref 21–32)
CREAT SERPL-MCNC: 1.17 MG/DL (ref 0.55–1.02)
GLUCOSE SERPL-MCNC: 116 MG/DL (ref 65–100)
POTASSIUM SERPL-SCNC: 4.1 MMOL/L (ref 3.5–5.1)
SERVICE CMNT-IMP: NORMAL
SODIUM SERPL-SCNC: 147 MMOL/L (ref 136–145)

## 2018-08-22 PROCEDURE — 74011250637 HC RX REV CODE- 250/637: Performed by: NURSE PRACTITIONER

## 2018-08-22 PROCEDURE — 74011250637 HC RX REV CODE- 250/637: Performed by: HOSPITALIST

## 2018-08-22 PROCEDURE — 36415 COLL VENOUS BLD VENIPUNCTURE: CPT | Performed by: NURSE PRACTITIONER

## 2018-08-22 PROCEDURE — 80048 BASIC METABOLIC PNL TOTAL CA: CPT | Performed by: NURSE PRACTITIONER

## 2018-08-22 PROCEDURE — 74011636637 HC RX REV CODE- 636/637: Performed by: NURSE PRACTITIONER

## 2018-08-22 PROCEDURE — 74011250637 HC RX REV CODE- 250/637: Performed by: INTERNAL MEDICINE

## 2018-08-22 RX ORDER — POLYETHYLENE GLYCOL 3350 17 G/17G
17 POWDER, FOR SOLUTION ORAL DAILY
Qty: 30 PACKET | Refills: 0 | Status: SHIPPED
Start: 2018-08-22 | End: 2018-10-05

## 2018-08-22 RX ORDER — TRAMADOL HYDROCHLORIDE 50 MG/1
25 TABLET ORAL
Qty: 20 TAB | Refills: 0 | Status: SHIPPED
Start: 2018-08-22 | End: 2018-10-08 | Stop reason: SDUPTHER

## 2018-08-22 RX ORDER — LEVOFLOXACIN 250 MG/1
250 TABLET ORAL DAILY
Qty: 2 TAB | Refills: 0 | Status: SHIPPED
Start: 2018-08-23 | End: 2018-08-25

## 2018-08-22 RX ORDER — POLYVINYL ALCOHOL 14 MG/ML
1 SOLUTION/ DROPS OPHTHALMIC AS NEEDED
Qty: 30 ML | Refills: 0 | Status: SHIPPED
Start: 2018-08-22 | End: 2018-09-21

## 2018-08-22 RX ORDER — TRAMADOL HYDROCHLORIDE 50 MG/1
25 TABLET ORAL
Qty: 20 TAB | Refills: 0 | Status: SHIPPED | OUTPATIENT
Start: 2018-08-22 | End: 2018-08-22

## 2018-08-22 RX ORDER — FUROSEMIDE 40 MG/1
40 TABLET ORAL DAILY
Qty: 30 TAB | Refills: 0 | Status: SHIPPED
Start: 2018-08-22 | End: 2018-09-18 | Stop reason: SDUPTHER

## 2018-08-22 RX ORDER — PREDNISONE 10 MG/1
TABLET ORAL
Qty: 6 TAB | Refills: 0 | Status: SHIPPED | OUTPATIENT
Start: 2018-08-22 | End: 2018-09-18 | Stop reason: ALTCHOICE

## 2018-08-22 RX ORDER — LISINOPRIL 5 MG/1
5 TABLET ORAL DAILY
Qty: 30 TAB | Refills: 0 | Status: SHIPPED
Start: 2018-08-22 | End: 2018-09-18 | Stop reason: SDUPTHER

## 2018-08-22 RX ADMIN — Medication 10 ML: at 06:28

## 2018-08-22 RX ADMIN — METOPROLOL SUCCINATE 25 MG: 25 TABLET, EXTENDED RELEASE ORAL at 10:11

## 2018-08-22 RX ADMIN — PREDNISONE 30 MG: 5 TABLET ORAL at 10:08

## 2018-08-22 RX ADMIN — LISINOPRIL 5 MG: 5 TABLET ORAL at 10:11

## 2018-08-22 RX ADMIN — LEVOFLOXACIN 250 MG: 250 TABLET, FILM COATED ORAL at 06:28

## 2018-08-22 RX ADMIN — CYANOCOBALAMIN TAB 500 MCG 500 MCG: 500 TAB at 10:09

## 2018-08-22 RX ADMIN — APIXABAN 2.5 MG: 2.5 TABLET, FILM COATED ORAL at 10:09

## 2018-08-22 RX ADMIN — FUROSEMIDE 40 MG: 40 TABLET ORAL at 10:11

## 2018-08-22 RX ADMIN — VENLAFAXINE HYDROCHLORIDE 75 MG: 37.5 CAPSULE, EXTENDED RELEASE ORAL at 10:08

## 2018-08-22 RX ADMIN — DOCUSATE SODIUM 100 MG: 100 CAPSULE, LIQUID FILLED ORAL at 10:10

## 2018-08-22 RX ADMIN — GUAIFENESIN 1200 MG: 600 TABLET, EXTENDED RELEASE ORAL at 10:09

## 2018-08-22 RX ADMIN — STANDARDIZED SENNA CONCENTRATE AND DOCUSATE SODIUM 1 TABLET: 8.6; 5 TABLET, FILM COATED ORAL at 10:10

## 2018-08-22 RX ADMIN — DONEPEZIL HYDROCHLORIDE 5 MG: 5 TABLET, FILM COATED ORAL at 10:09

## 2018-08-22 NOTE — DISCHARGE INSTRUCTIONS
Discharge Summary      PATIENT ID: Lazaro Liz  MRN: [id number]   YOB: 1926    DATE OF ADMISSION: 8/17/2018  7:01 PM    DATE OF DISCHARGE: 8/22/2018  PRIMARY CARE PROVIDER: Anthony Waggoner MD   ATTENDING PHYSICIAN: Wilian Go MD  DISCHARGING PROVIDER: Namrata Madrigal NP. To contact this individual call [phone] and ask the  to page. If unavailable ask to be transferred the Adult Hospitalist Department.     CONSULTATIONS: IP CONSULT TO HOSPITALIST  IP CONSULT TO CARDIOLOGY  IP CONSULT TO PALLIATIVE CARE - PROVIDER     ADMITTING [address]:   Pt came to ED due to fatigue, SOB and fever over the past several days PTA. She was placed on BiPAP in the ED. Imaging was concerning for developing PNA and CHF exacerbation.      DISCHARGE DIAGNOSES / PLAN:       Acute hypoxic respiratory failure (POA):  - Initially on BiPAP and now on RA  - Diuresis with Lasix -> now back on daily lasix, will continue on discharge  - continue steroid taper  - prn neb treatments      Acute on chronic CHF exacerbation, NYHA class III (POA):  - BNP 03382 on arrival  - lasix changed to po daily. Weight is stable. - Cardiology has seen, appreciated input  - Echo 8/18: ventricle was mildly dilated. Systolic function severely reduced by visual assessment. EF 20-25%. Akinesis of the basal-mid anteroseptal, mid anterolateral, and apical wall(s).  There was mild concentric hypertrophy  - Reduced dose of lasix due to orthostatic BP (5 mg), continue with Toprol-XL  - unable to tolerate TEDs      Acute renal insufficiency (POA): Cr with a slow trend down      Hyperkalemia: resolved with dose of kayexalate      Community Acquired PNA, possible (POA)  - On Levaquin x 7 days (today was day 5), needs two more days  - continue Mucinex  - prn nebs      Chronic atrial fibrillation: Rate controlled, Continue Eliquis      Alzheimer's Dementia: Frequent reorientation & continue Aricept      H/o CAD and Dyslipidemia: Continue home medications          FOLLOW UP APPOINTMENTS:    Follow-up Information     Follow up With Details Comments Contact Info     Kizzy Winston MD In 1 week or with facility provider [address]  [address]  [phone]     Leon Fowler MD In 2 weeks or associate [address] [place] [address]  [address]  [phone]         Future Appointments  Date Time Provider Theodora Goldberg   11/6/2018 10:15 AM Kizzy Winston  [facility]      ADDITIONAL CARE RECOMMENDATIONS:   Complete full course of abx and steroids.      Obtains daily weights. Goal is for no more than 2 pounds in one day. If you gain 3 pounds in one day or 5 pounds in one week, CALL MD     DIET: Cardiac Diet     ACTIVITY: Activity as tolerated and PT/OT Eval and Treat     EQUIPMENT needed: as per PT/OT     DISCHARGE MEDICATIONS:        Current Discharge Medication List             START taking these medications     Details   polyethylene glycol (MIRALAX) 17 gram packet Take 1 Packet by mouth daily. Qty: 30 Packet, Refills: 0       guaiFENesin ER (MUCINEX) 1,200 mg Ta12 ER tablet Take 1 Tab by mouth every twelve (12) hours. Indications: Cough  Qty: 60 Tab, Refills: 0       polyvinyl alcohol (LIQUIFILM TEARS) 1.4 % ophthalmic solution Administer 1 Drop to both eyes as needed for up to 30 days. Qty: 30 mL, Refills: 0       predniSONE (DELTASONE) 10 mg tablet Take 20 mg (2 tab) x 2 days (8/23-8/24) then drop to 10 mg (1 tab) x 2 days (8/25-8/26) then stop. Qty: 6 Tab, Refills: 0       levoFLOXacin (LEVAQUIN) 250 mg tablet Take 1 Tab by mouth daily for 2 days. Qty: 2 Tab, Refills: 0                 CONTINUE these medications which have CHANGED     Details   furosemide (LASIX) 40 mg tablet Take 1 Tab by mouth daily. Qty: 30 Tab, Refills: 0     Associated Diagnoses: Chronic systolic congestive heart failure (HCC)       traMADol (ULTRAM) 50 mg tablet Take 0.5 Tabs by mouth nightly.  Max Daily Amount: 25 mg.  Qty: 20 Tab, Refills: 0     Associated Diagnoses: Chronic midline low back pain without sciatica       lisinopril (PRINIVIL, ZESTRIL) 5 mg tablet Take 1 Tab by mouth daily. Qty: 30 Tab, Refills: 0                 CONTINUE these medications which have NOT CHANGED     Details   acetaminophen (TYLENOL) 500 mg tablet Take 500 mg by mouth every six (6) hours as needed for Pain.       metoprolol succinate (TOPROL-XL) 25 mg XL tablet TAKE 1 TABLET BY MOUTH EVERY DAY DX: HTN  Qty: 30 Tab, Refills: 1       ELIQUIS 2.5 mg tablet TAKE 1 TABLET BY MOUTH 2 TIMES A DAY. DX ANTICOAGULANT  Qty: 60 Tab, Refills: 5     Associated Diagnoses: Chronic systolic congestive heart failure (HCC)       VITAMIN B-12 500 mcg tablet TAKE 1 TABLET BY MOUTH EVERY DAY DX: SUPPLEMENT  Qty: 30 Tab, Refills: 5       venlafaxine-SR (EFFEXOR-XR) 75 mg capsule TAKE 1 CAPSULE BY MOUTH EVERY DAY DX:ANTIDEPRESSANT  Qty: 30 Cap, Refills: 5       DOC-Q-LACE 100 mg capsule TAKE 1 CAPSULE BY MOUTH TWICE DAILY DX:BOWEL SUPPORT  Qty: 60 Cap, Refills: 5       donepezil (ARICEPT) 5 mg tablet TAKE 1 TABLET BY MOUTH EVERY NIGHT AT BEDTIME DX: MEMORY SUPPORT  Qty: 30 Tab, Refills: 5       albuterol (PROVENTIL HFA) 90 mcg/actuation inhaler Take 1 Puff by inhalation every six (6) hours as needed for Wheezing.   Qty: 1 Inhaler, Refills: prn       albuterol (PROVENTIL VENTOLIN) 2.5 mg /3 mL (0.083 %) nebulizer solution INHALE THE CONTENTS OF 1 VIAL VIA NEBULIZER EVERY 6 HOURS AS NEEDED FOR WHEEZING  Qty: 100 Package, Refills: 6       calcium-cholecalciferol, D3, (CALTRATE 600+D) tablet TAKE 1 TABLET BY MOUTH TWICE DAILY DX: SUPPLEMENT  Qty: 60 Tab, Refills: 12     Associated Diagnoses: Osteopenia       raNITIdine (ZANTAC) 150 mg tablet Take 150 mg by mouth nightly as needed for Indigestion.       SENEXON-S 8.6-50 mg per tablet TAKE 1 TABLET BY MOUTH DAILY AS NEEDED FOR CONSTIPATION  Qty: 30 Tab, Refills: 4     Associated Diagnoses: Constipation, unspecified constipation type       pravastatin (PRAVACHOL) 20 mg tablet Take 1 Tab by mouth nightly. D/C pravastatin 40 mg tab  Qty: 30 Tab, Refills: 5     Associated Diagnoses: Coronary artery disease due to lipid rich plaque; Essential hypertension                STOP taking these medications         hydrocortisone (HYTONE) 2.5 % lotion Comments:   Reason for Stopping:            ammonium lactate (LAC-HYDRIN) 12 % lotion Comments:   Reason for Stopping:            polyethylene glycol (MIRALAX) 17 gram/dose powder Comments:   Reason for Stopping:                 NOTIFY YOUR PHYSICIAN FOR ANY OF THE FOLLOWING:   Fever over 101 degrees for 24 hours. Chest pain, shortness of breath, fever, chills, nausea, vomiting, diarrhea, change in mentation, falling, weakness, bleeding. Severe pain or pain not relieved by medications. Or, any other signs or symptoms that you may have questions about.     DISPOSITION:     Home With:    OT   PT   HH   RN      xx Long term SNF/Inpatient Rehab     Independent/assisted living     Hospice     Other:      PATIENT CONDITION AT DISCHARGE:   Functional status     Poor    xx Deconditioned      Independent       Cognition   xx  Lucid    xx Forgetful      Dementia       Catheters/lines (plus indication)     Hicks      PICC      PEG    xx None       Code status   xx  Full code      DNR       PHYSICAL EXAMINATION AT DISCHARGE:  /78  Pulse 92  Temp 97.7 °F (36.5 °C)  Resp 22  Wt 73.9 kg (162 lb 14.7 oz)  SpO2 96%  BMI 30.78 kg/m2     Pt in bed resting, no new complaints this am - still just is tired. No pain, no shortness of breath. Discussed plan for discharge today to SNF with patient, nurse and pts daughter Elise Holedr. Elise Holder will be coming to pick patient up later this am.      Constitutional:  No acute distress, cooperative, pleasant    ENT:  Oral mucous membranes moist, oropharynx benign.     Resp:  Clear but diminished. No crackles and no wheezing.  On RA, sats 96%   CV:  Irregular rhythm, HR . SR with PVC/PAC with some episodic Afib    GI:  Soft, non distended, non tender. normoactive bowel sounds + BM    Musculoskeletal:  Mild pedal edema (non pitting), warm, 2+ pulses throughout    Neurologic: Inderjit Pride and oriented x person/family, place, year. Follows all commands with clear speech                           Psych: Moderate insight. Calm and not anxious     CHRONIC MEDICAL DIAGNOSES:  Problem List as of 8/22/2018  Date Reviewed: 8/22/2018             Codes Class Noted - Resolved     Skin lesion of face ICD-10-CM: L98.9  ICD-9-CM: 275. 9   8/2/2018 - Present           Cancer, skin, squamous cell ICD-10-CM: C44.92  ICD-9-CM: 173.92   8/2/2018 - Present           Gait instability ICD-10-CM: R26.81  ICD-9-CM: 977. 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 3   8/29/2016 - Present           Cardiac LV ejection fraction 21-40% ICD-10-CM: R94.30  ICD-9-CM: 974. 9   7/21/2016 - Present           Osteopenia ICD-10-CM: M85.80  ICD-9-CM: 733.90   4/12/2016 - Present           Advance care planning ICD-10-CM: Z71.89  ICD-9-CM: V65.49   2/9/2016 - Present           Intractable back pain ICD-10-CM: M54.9  ICD-9-CM: 401. 5   8/8/2015 - Present           Rotator cuff tear arthropathy of right shoulder ICD-10-CM: M12.811  ICD-9-CM: 716.81   5/15/2015 - Present           Acute chest pain ICD-10-CM: R07.9  ICD-9-CM: 786.50   5/14/2015 - Present           Dementia without behavioral disturbance ICD-10-CM: F03.90  ICD-9-CM: 294.20   4/27/2015 - Present           Depressive disorder, not elsewhere classified ICD-10-CM: F32.9  ICD-9-CM: 400   4/27/2015 - Present           Anxiety state, unspecified ICD-10-CM: F41.1  ICD-9-CM: 300.00   4/27/2015 - Present           Pain Disorder Associated w/ Both Psychological & Medical Factors ICD-10-CM: F45.42  ICD-9-CM: 307.89   4/27/2015 - Present           CHF (congestive heart failure) ([facility]) ICD-10-CM: I50.9  ICD-9-CM: 428. 0   5/1/2014 - Present           Acute on chronic systolic heart failure (HCC) ICD-10-CM: I50.23  ICD-9-CM: 428.23   3/14/2014 - Present           Memory impairment ICD-10-CM: R41.3  ICD-9-CM: 780.93   8/15/2013 - Present           Depression ICD-10-CM: F32.9  ICD-9-CM: 806   8/15/2013 - Present           S/P CABG (coronary artery bypass graft) ICD-10-CM: Z95.1  ICD-9-CM: V45.81   8/15/2013 - Present           MI, old ICD-10-CM: I25.2  ICD-9-CM: 12   8/15/2013 - Present           Mixed hyperlipidemia ICD-10-CM: E78.2  ICD-9-CM: 272.2   6/11/2013 - Present           CAD (coronary artery disease) ICD-10-CM: I25.10  ICD-9-CM: 414.00   6/11/2013 - Present           HTN (hypertension) ICD-10-CM: I10  ICD-9-CM: 401. 9   6/11/2013 - Present           * (Principal)RESOLVED: Shortness of breath ICD-10-CM: R06.02  ICD-9-CM: 786.05   8/17/2018 - 8/22/2018           RESOLVED: Diarrhea ICD-10-CM: R19.7  ICD-9-CM: 787.91   1/26/2017 - 1/26/2017           RESOLVED: Acute respiratory failure with hypoxia (HCC) ICD-10-CM: J96.01  ICD-9-CM: 518.81   1/24/2017 - 1/26/2017           RESOLVED: Acute kidney injury ([facility] [address] ICD-10-CM: N17.9  ICD-9-CM: 881. 9   8/14/2015 - 8/20/2015           RESOLVED: Encephalopathy ICD-10-CM: G93.40  ICD-9-CM: 348.30   8/14/2015 - 8/20/2015           RESOLVED: Pneumonia, organism unspecified(486) ICD-10-CM: J18.9  ICD-9-CM: 262   3/8/2014 - 11/4/2014               Greater than 30 minutes were spent with the patient on counseling and coordination of care     Signed:   Althea Olivia NP  8/22/2018  8:38 AM

## 2018-08-22 NOTE — PALLIATIVE CARE DISCHARGE
Dear Ms. Ronna Babin,    The Palliative Medicine team was consulted as part of your / your loved one's care in the hospital. Our team is a supportive service; we strive to relieve suffering and improve quality of life. The focus of our consultation was to get to know you better as a person; to gain a better understanding of how your health has changed over time and to discuss care goals for your future. We reviewed advance care planning information, which includes the following:  Advance Care Planning 8/21/2018   Patient's Healthcare Decision Maker is: Named in scanned ACP document   Primary Decision Maker Name Denita Rawls ([address])     Primary Decision Maker Phone Number c [phone]    Primary Decision Maker Relationship to Patient Adult child   Confirm Advance Directive Yes, not on file   Patient Would Like to Complete Advance Directive -   Does the patient have other document types -     You shared with us that your health has been fairly stable for the last few years. We discussed your chronic health conditions, especially, your congestive heart failure (CHF). We discussed the chronic and progressive nature of this disease; the likelihood that symptoms will worsen over time. For now, we assume the pneumonia caused your acute heart failure. Our hope is that you can recover quickly from this and return to your baseline functioning. There are some unknowns, however, about your ability to recover well. If your find that rehab is more burdensome than beneficial; that you are using up more energy than you have to give, you may want to reconsider goals for the future. If you find yourself in a more frequent cycle of admissions to the hospital, we may also want to talk more about goals. We reviewed the Advance Directive you completed in 1996. You shared you have since updated, but that your general goals for allowing natural death; not having life-prolonging treatments at end-of-life remain. We discussed the importance of backing up your wishes with a Do Not Attempt Resuscitation (DNR) order. We encourage you to talk more with family and provider about this decision. Thank you for allowing us to be a part of your care. We wish you well in rehab. Because of the importance of this information, we are providing you with a printed copy to share with other healthcare providers after this hospitalization is complete. If any of the above information is incomplete or incorrect, please contact the Palliative Medicine team at [phone].     CHELSI Carolina MD

## 2018-08-22 NOTE — PROGRESS NOTES
Transition of Care Coordination/Hospital to Post Acute Facility:     Date/Time:  2018 4:25 PM    Patient was admitted to [facility] on 18 and discharged on 18 for SOB r/t Heart Failure and/or possible PNA. Patient was transferred to [facility] SNF/Rehab for continuation of care. Inpatient RRAT score: 26    Top Challenges reviewed    CHF w/EF 20-25%  Risk for C-diff w/abx tx for PNA  Fall risk: dementia, SOB r/t PNA and need to increase activity to move mucus, Lasix daily, decreased Lisinopril which could lead to HTN and possible HA/lightheaded spells     Method of communication with care team :phone     Nurse Navigator(NN) spoke with Jj Vazquez to provide introduction to self and explanation of the Nurse Navigator Role. Verified name and  as patient identifiers. Discussed and reviewed  follow up appointments, medication reconciliation    ACP:   Does the patient have a current ACP (including DDNR):  yes    Medication(s):   New Medications at Discharge: Mucinex 1200mg 1 tab every 12 hours, Levaquin 250mg 1 tab daily x 2 days, Miralax 17gm daily, Liquifilm tears 1 drop to each eye as needed x 30 days, Prednisone 20mg daily x 2 days, then 10mg daily x 2 days, then stop  Changed Medications at Discharge: Lasix 40mg tab daily, Lisinopril 5mg daily, Tramadol 25mg nightly  Discontinued Medications at Discharge: Lucy Cooper     PCP/Specialist follow up: Future Appointments  Date Time Provider Theodora Ashlyn   2018 10:15 AM Gregoria Wright  [facility]       Opportunity to ask questions was provided. Contact information was provided for future reference or further questions. Will continue to monitor. Goals      Prevent complications post hospitalization. Pt to be managed OP without complications warranting readmission for a minimum of 30 days. 18 *JH  · Pt discharged to [facility] for rehab prior to return to [facility].   · Pt to f/u w/Cardiologist within 2 wks of discharge  · Contacted cardiology office to alert of discharge. They currently have no appts available. They will have Bri Urbnia reach out w/availability; gave them dtr's phone number to contact as she wishes to attend appt. Contacted pt's dtr to make aware of phone call that will come in from cardiologist re: appt. Reviewed NN's role with pt in SNF and re: f/u upon discharge from rehab. Discussed pt's f/u with PCP that will not take place until discharged from rehab facility. · Gave NN's name and phone number for return contact with questions and concerns. · [facility] and spoke to Bren Aparicio. Introduced to self and role at PCP office. Gave name, phone # and fax #. Requested medication record. Claudia bright would have nursing get record faxed over once medication list has been fully reconciled and printed, likely tomorrow.

## 2018-08-22 NOTE — PROGRESS NOTES
Problem: Heart Failure: Day 4  Goal: Respiratory  Outcome: Progressing Towards Goal  Patient's pulse ox has remained above 90% throughout the day while on room air. Patient is still dyspneic with exertion.

## 2018-08-22 NOTE — PROGRESS NOTES
0730: Bedside shift change report given to Marquis Missy RN (oncoming nurse) by Stephanie Huff RN (offgoing nurse). Report included the following information MAR, Accordion and Cardiac Rhythm afib.     0922: Message left with patient's daughter concerning patient's discharge. 1130: TRANSFER - OUT REPORT:    Verbal report given to Namita Marsh RN (name) on Guilherme Ashford  being transferred to [facility] (unit) for routine progression of care       Report consisted of patients Situation, Background, Assessment and   Recommendations(SBAR). Information from the following report(s) SBAR, Kardex, ED Summary, MAR, Accordion and Cardiac Rhythm NSR-ST was reviewed with the receiving nurse. Opportunity for questions and clarification was provided. Patient transported by daughter. 1155: I have reviewed discharge instructions with the patient and patient's daughter. The  patient's daughter verbalized understanding. Medication list reviewed with patient's daughter. Discharge paperwork signed and a copy was placed in the patient's chart. AVS and prescription for Tramadol placed in discharge packet to be given to [facility] by daughter. IV removed. Patient wheeled off unit by volunteer services.  Patient's daughter to transport patient to [facility].

## 2018-08-22 NOTE — PROGRESS NOTES
Problem: Falls - Risk of  Goal: *Absence of Falls  Document Clementina Fall Risk and appropriate interventions in the flowsheet. Outcome: Progressing Towards Goal  Fall Risk Interventions:  Mobility Interventions: Communicate number of staff needed for ambulation/transfer, Patient to call before getting OOB    Mentation Interventions: Door open when patient unattended, Adequate sleep, hydration, pain control    Medication Interventions: Patient to call before getting OOB, Teach patient to arise slowly, Evaluate medications/consider consulting pharmacy    Elimination Interventions: Patient to call for help with toileting needs, Call light in reach             Problem: Pressure Injury - Risk of  Goal: *Prevention of pressure injury  Document Osmin Scale and appropriate interventions in the flowsheet.    Outcome: Progressing Towards Goal  Pressure Injury Interventions:  Sensory Interventions: Assess changes in LOC    Moisture Interventions: Absorbent underpads, Internal/External urinary devices, Minimize layers    Activity Interventions: Increase time out of bed, Pressure redistribution bed/mattress(bed type)    Mobility Interventions: Pressure redistribution bed/mattress (bed type), HOB 30 degrees or less    Nutrition Interventions: Document food/fluid/supplement intake, Offer support with meals,snacks and hydration    Friction and Shear Interventions: Minimize layers, HOB 30 degrees or less, Lift sheet

## 2018-08-22 NOTE — PALLIATIVE CARE
Checked in on patient this morning. She was alert and anxious, with NC off and O2 sats around 90%. She is aware of plan for discharge today, but does not feel quite up to this. She states she is afraid she won't be able to do much in rehab. Encouraged that she only has to do what she can; that pushing herself is not always the best way to improve. Daughter not present, but expected to arrive soon. Saw daughter when she arrived. Copy of new AMD delivered and given to nurse by daughter. Daughter did not engage in further discussion about DNR, but will do so at later time. Provided her a copy of Discharge summary of our discussions. Patient at some risk for readmission and will benefit from Palliative Consult in the future.

## 2018-08-22 NOTE — PROGRESS NOTES
Discharge noted and discussed with Thekimber Rocha NP. Patient will discharge to [facility] today and daughter will provide transportation. This CM spoke with Frieda Tatianas in admissions and she is aware of discharge and they have bed available. Left message for Frieda Schofield to confirm discharge and time. Nursing to call report to [phone] and also place Kardex, MARs and AVS in envelope provided.     eTmo Nichole RN CRM  Ext [phone]

## 2018-08-22 NOTE — PROGRESS NOTES
Palliative Medicine Consult  [place]: [phone] ([phone])    Patient Name: Royal Bradley  YOB: 1926    Date of Initial Consult: 18  Reason for Consult: End stage disease  Requesting Provider: Kitty Eckert   Primary Care Physician: Christie Nguyen MD     SUMMARY:   Royal Bradley is a 80 y.o. with a past history of CHF (EF 20-25% 18 and ), CAD s/p CABG, dementia, A fib on Eliquis, PE who was admitted on 2018 from [facility] with SOB. Being treated for acute resp failure due to CHF exacerbation. Being diuresed and on steroids and abx for possible PNA. Current medical issues leading to Palliative Medicine involvement include: End stage disease. I met pt almost 3 years ago, in 2015 at which time she was hospitalized for PE, UTI, confusion due to metabolic encephalopathy, CHF (EF also 20-25%), a fib w/ RVR. Was also living at [facility] at that time. Had care decision discussion with w/ her dtr Acacia Reyes at which point goals were for full restorative measures, but to re-evaluate everything as function change. Social: Pt has lived at [address] [place] [address] since . She likes it there. Has 4 children, but only really close to Acacia Reyes (lives locally, [place] [facility] and [place]. Her /their father  about 6 years ago. PALLIATIVE DIAGNOSES:   1. Confusion, improved, some mild dementia at baseline  2. Shortness of breath, improved, off O2  3. Fatigue/generalized weakness  4. Goals of care discussion        PLAN:   1. Along w/ Ed Jodi LCSW meet w/ pt who is going to be discharged to rehab today. A bit anxious about leaving. 2. Yesterday we talked w/ pt and dtr/Jitendra Reyes about code status in the context of her medical issues. Pt has done well since I last saw her in 7498, but certainly at risk for getting in a cycle of in/out of the hospital and if so, ongoing discussion about goals of care would be important.  Doing a Transition of Care note for family. 3. Meet also w/ dtr Samina Lassiter who will continue conversations as we know how pt stabilizes. Appreciative of discussion and BRIAN note, can f/u with pt's PCP. Communicated plan of care with: Palliative IDT   GOALS OF CARE / TREATMENT PREFERENCES:     GOALS OF CARE:  Patient/Health Care Proxy Stated Goals: Rehabilitation      TREATMENT PREFERENCES:   Code Status: Full Code    Advance Care Planning:  Advance Care Planning 8/21/2018   Patient's Healthcare Decision Maker is: Named in scanned ACP document   Primary Decision Maker Name Ronnie Gloria ([address])     Primary Decision Maker Phone Number c [phone]    Primary Decision Maker Relationship to Patient Adult child   Confirm Advance Directive Yes, not on file   Patient Would Like to Complete Advance Directive -   Does the patient have other document types -       Medical Interventions: Full interventions   Other Instructions: Other:    As far as possible, the palliative care team has discussed with patient / health care proxy about goals of care / treatment preferences for patient. HISTORY:       CHIEF COMPLAINT: (feeling nervous about discharge)    HPI/SUBJECTIVE:    The patient is:   [x] Verbal and participatory  [] Non-participatory due to:     Pt in NAD, a bit anxious about leaving today. Denies pain, SOB, or other sx.      Clinical Pain Assessment (nonverbal scale for severity on nonverbal patients):   Clinical Pain Assessment  Severity: 0          Duration: for how long has pt been experiencing pain (e.g., 2 days, 1 month, years)  Frequency: how often pain is an issue (e.g., several times per day, once every few days, constant)     FUNCTIONAL ASSESSMENT:     Palliative Performance Scale (PPS):  PPS: 60       PSYCHOSOCIAL/SPIRITUAL SCREENING:     Palliative IDT has assessed this patient for cultural preferences / practices and a referral made as appropriate to needs (Cultural Services, Patient Advocacy, Ethics, etc.)    Advance Care Planning:  Advance Care Planning 8/21/2018   Patient's Healthcare Decision Maker is: Named in scanned ACP document   Primary Decision Maker Name Anibal Toney ([address])     Primary Decision Maker Phone Number C     Primary Decision Maker Relationship to Patient Adult child   Confirm Advance Directive Yes, not on file   Patient Would Like to Complete Advance Directive -   Does the patient have other document types -       Any spiritual / Yazidi concerns:  [] Yes /  [x] No    Caregiver Burnout:  [] Yes /  [x] No /  [] No Caregiver Present      Anticipatory grief assessment:   [x] Normal  / [] Maladaptive       ESAS Anxiety: Anxiety: 0    ESAS Depression: Depression: 0        REVIEW OF SYSTEMS:     Positive and pertinent negative findings in ROS are noted above in HPI. The following systems were [x] reviewed / [] unable to be reviewed as noted in HPI  Other findings are noted below. Systems: constitutional, ears/nose/mouth/throat, respiratory, gastrointestinal, genitourinary, musculoskeletal, integumentary, neurologic, psychiatric, endocrine. Positive findings noted below. Modified ESAS Completed by: provider   Fatigue: 3 Drowsiness: 0   Depression: 0 Pain: 0   Anxiety: 0 Nausea: 0   Anorexia: 0 Dyspnea: 1           Stool Occurrence(s): 1        PHYSICAL EXAM:     From RN flowsheet:  Wt Readings from Last 3 Encounters:   08/22/18 162 lb 14.7 oz (73.9 kg)   08/02/18 170 lb 8 oz (77.3 kg)   04/10/18 170 lb (77.1 kg)     Blood pressure 130/78, pulse 92, temperature 97.7 °F (36.5 °C), resp. rate 22, weight 162 lb 14.7 oz (73.9 kg), SpO2 96 %.     Pain Scale 1: Numeric (0 - 10)  Pain Intensity 1: 0                     Constitutional: awake, alert, pleasant  Eyes: pupils equal, anicteric   ENMT: no nasal discharge, moist mucous membranes  Respiratory: breathing a bit labored  Musculoskeletal: no deformity, no tenderness to palpation  Skin: warm, dry  Neurologic: following commands, moving all extremities  Psych: full affect, anxious      HISTORY:     Active Problems:    * No active hospital problems. *    Past Medical History:   Diagnosis Date    Altered mental state     Arrhythmia     AFIB HISTORY     Arthritis     BACK     Asthma     CAD (coronary artery disease)     by pass surgery in 2002,2008    Depression     Fibromyalgia     HTN (hypertension) 6/11/2013    Hypercholesterolemia     Ill-defined condition     FOUR COMPRESSION FRACTURES IN BACK    PE (pulmonary embolism)     Pneumonia 04/2014    Pneumonia, organism unspecified(486) 3/8/2014    Thyroid disease       Past Surgical History:   Procedure Laterality Date    CARDIAC SURG PROCEDURE UNLIST      heart attack,bypass sx      Family History   Problem Relation Age of Onset    No Known Problems Mother     No Known Problems Father     Anesth Problems Neg Hx       History reviewed, no pertinent family history.   Social History   Substance Use Topics    Smoking status: Never Smoker    Smokeless tobacco: Never Used    Alcohol use No     Allergies   Allergen Reactions    Neurontin [Gabapentin] Unknown (comments)     Pt unknown reaction      Current Facility-Administered Medications   Medication Dose Route Frequency    lisinopril (PRINIVIL, ZESTRIL) tablet 5 mg  5 mg Oral DAILY    predniSONE (DELTASONE) tablet 30 mg  30 mg Oral DAILY WITH BREAKFAST    budesonide (PULMICORT) 500 mcg/2 ml nebulizer suspension  500 mcg Nebulization BID RT    guaiFENesin ER (MUCINEX) tablet 1,200 mg  1,200 mg Oral Q12H    furosemide (LASIX) tablet 40 mg  40 mg Oral DAILY    polyvinyl alcohol (LIQUIFILM TEARS) 1.4 % ophthalmic solution 1 Drop  1 Drop Both Eyes PRN    albuterol-ipratropium (DUO-NEB) 2.5 MG-0.5 MG/3 ML  3 mL Nebulization Q6H PRN    sodium chloride (NS) flush 5-10 mL  5-10 mL IntraVENous PRN    sodium chloride (NS) flush 5-10 mL  5-10 mL IntraVENous Q8H    sodium chloride (NS) flush 5-10 mL  5-10 mL IntraVENous PRN    docusate sodium (COLACE) capsule 100 mg  100 mg Oral BID    donepezil (ARICEPT) tablet 5 mg  5 mg Oral DAILY    apixaban (ELIQUIS) tablet 2.5 mg  2.5 mg Oral BID    metoprolol succinate (TOPROL-XL) XL tablet 25 mg  25 mg Oral DAILY    polyethylene glycol (MIRALAX) packet 17 g  17 g Oral DAILY    pravastatin (PRAVACHOL) tablet 20 mg  20 mg Oral QHS    famotidine (PEPCID) tablet 20 mg  20 mg Oral QHS PRN    senna-docusate (PERICOLACE) 8.6-50 mg per tablet 1 Tab  1 Tab Oral DAILY    traMADol (ULTRAM) tablet 25 mg  25 mg Oral QHS    venlafaxine-SR (EFFEXOR-XR) capsule 75 mg  75 mg Oral DAILY WITH BREAKFAST    cyanocobalamin (VITAMIN B12) tablet 500 mcg  500 mcg Oral DAILY    acetaminophen (TYLENOL) tablet 500 mg  500 mg Oral Q6H PRN          LAB AND IMAGING FINDINGS:     Lab Results   Component Value Date/Time    WBC 11.8 (H) 08/21/2018 05:42 AM    HGB 13.9 08/21/2018 05:42 AM    PLATELET 483 60/62/4657 05:42 AM     Lab Results   Component Value Date/Time    Sodium 147 (H) 08/22/2018 03:56 AM    Potassium 4.1 08/22/2018 03:56 AM    Chloride 110 (H) 08/22/2018 03:56 AM    CO2 28 08/22/2018 03:56 AM    BUN 40 (H) 08/22/2018 03:56 AM    Creatinine 1.17 (H) 08/22/2018 03:56 AM    Calcium 7.8 (L) 08/22/2018 03:56 AM    Magnesium 1.8 08/20/2018 06:18 AM    Phosphorus 2.8 08/18/2015 02:15 AM      Lab Results   Component Value Date/Time    AST (SGOT) 14 (L) 08/17/2018 07:17 PM    Alk.  phosphatase 63 08/17/2018 07:17 PM    Protein, total 7.3 08/17/2018 07:17 PM    Albumin 3.2 (L) 08/17/2018 07:17 PM    Globulin 4.1 (H) 08/17/2018 07:17 PM     Lab Results   Component Value Date/Time    INR 1.1 08/17/2018 07:17 PM    Prothrombin time 11.1 08/17/2018 07:17 PM    aPTT 29.3 08/17/2018 07:17 PM      No results found for: IRON, FE, TIBC, IBCT, PSAT, FERR   No results found for: PH, PCO2, PO2  No components found for: [place]   Lab Results   Component Value Date/Time    CK 57 08/19/2015 05:07 AM    CK - MB 1.6 08/19/2015 05:07 AM Total time 25 min   Counseling / coordination time, spent as noted above:20 min   > 50% counseling / coordination?: yes    Prolonged service was provided for  []30 min   []75 min in face to face time in the presence of the patient, spent as noted above. Time Start:   Time End:   Note: this can only be billed with 55978 (initial) or 81774 (follow up). If multiple start / stop times, list each separately.

## 2018-08-22 NOTE — DISCHARGE SUMMARY
Discharge Summary     PATIENT ID: Rosemary Lazar  MRN: [id number]   YOB: 1926    DATE OF ADMISSION: 8/17/2018  7:01 PM    DATE OF DISCHARGE: 8/22/2018  PRIMARY CARE PROVIDER: James Rivera MD   ATTENDING PHYSICIAN: Rubina Gorman MD  DISCHARGING PROVIDER: Tomas Callahan NP. To contact this individual call [phone] and ask the  to page. If unavailable ask to be transferred the Adult Hospitalist Department. CONSULTATIONS: IP CONSULT TO HOSPITALIST  IP CONSULT TO CARDIOLOGY  IP CONSULT TO PALLIATIVE CARE - PROVIDER    ADMITTING [address] [place] [address] COURSE:   Pt came to ED due to fatigue, SOB and fever over the past several days PTA. She was placed on BiPAP in the ED. Imaging was concerning for developing PNA and CHF exacerbation.      DISCHARGE DIAGNOSES / PLAN:      Acute hypoxic respiratory failure (POA):  - Initially on BiPAP and now on RA  - Diuresis with Lasix -> now back on daily lasix, will continue on discharge  - continue steroid taper  - prn neb treatments     Acute on chronic CHF exacerbation, NYHA class III (POA):  - BNP 83398 on arrival  - lasix changed to po daily. Weight is stable. - Cardiology has seen, appreciated input  - Echo 8/18: ventricle was mildly dilated. Systolic function severely reduced by visual assessment. EF 20-25%. Akinesis of the basal-mid anteroseptal, mid anterolateral, and apical wall(s).  There was mild concentric hypertrophy  - Reduced dose of lasix due to orthostatic BP (5 mg), continue with Toprol-XL  - unable to tolerate TEDs     Acute renal insufficiency (POA): Cr with a slow trend down     Hyperkalemia: resolved with dose of kayexalate     Community Acquired PNA, possible (POA)  - On Levaquin x 7 days (today was day 5), needs two more days  - continue Mucinex  - prn nebs     Chronic atrial fibrillation: Rate controlled, Continue Eliquis     Alzheimer's Dementia: Frequent reorientation & continue Aricept     H/o CAD and Dyslipidemia: Continue home medications        FOLLOW UP APPOINTMENTS:    Follow-up Information     Follow up With Details Comments Contact Info    Tatyana Pressley MD In 1 week or with facility provider [address]  [address]  [phone]      Yuniel Nicole MD In 2 weeks or [address]  [address]  [phone]           Future Appointments  Date Time Provider Theodora Goldberg   11/6/2018 10:15 AM Tatyana Pressley  [address] CARE RECOMMENDATIONS:   Complete full course of abx and steroids. Obtains daily weights. Goal is for no more than 2 pounds in one day. If you gain 3 pounds in one day or 5 pounds in one week, CALL MD    DIET: Cardiac Diet    ACTIVITY: Activity as tolerated and PT/OT Eval and Treat    EQUIPMENT needed: as per PT/OT    DISCHARGE MEDICATIONS:  Current Discharge Medication List      START taking these medications    Details   polyethylene glycol (MIRALAX) 17 gram packet Take 1 Packet by mouth daily. Qty: 30 Packet, Refills: 0      guaiFENesin ER (MUCINEX) 1,200 mg Ta12 ER tablet Take 1 Tab by mouth every twelve (12) hours. Indications: Cough  Qty: 60 Tab, Refills: 0      polyvinyl alcohol (LIQUIFILM TEARS) 1.4 % ophthalmic solution Administer 1 Drop to both eyes as needed for up to 30 days. Qty: 30 mL, Refills: 0      predniSONE (DELTASONE) 10 mg tablet Take 20 mg (2 tab) x 2 days (8/23-8/24) then drop to 10 mg (1 tab) x 2 days (8/25-8/26) then stop. Qty: 6 Tab, Refills: 0      levoFLOXacin (LEVAQUIN) 250 mg tablet Take 1 Tab by mouth daily for 2 days. Qty: 2 Tab, Refills: 0         CONTINUE these medications which have CHANGED    Details   furosemide (LASIX) 40 mg tablet Take 1 Tab by mouth daily. Qty: 30 Tab, Refills: 0    Associated Diagnoses: Chronic systolic congestive heart failure (HCC)      traMADol (ULTRAM) 50 mg tablet Take 0.5 Tabs by mouth nightly.  Max Daily Amount: 25 mg.  Qty: 20 Tab, Refills: 0    Associated Diagnoses: Chronic midline low back pain without sciatica      lisinopril (PRINIVIL, ZESTRIL) 5 mg tablet Take 1 Tab by mouth daily. Qty: 30 Tab, Refills: 0         CONTINUE these medications which have NOT CHANGED    Details   acetaminophen (TYLENOL) 500 mg tablet Take 500 mg by mouth every six (6) hours as needed for Pain.      metoprolol succinate (TOPROL-XL) 25 mg XL tablet TAKE 1 TABLET BY MOUTH EVERY DAY DX: HTN  Qty: 30 Tab, Refills: 1      ELIQUIS 2.5 mg tablet TAKE 1 TABLET BY MOUTH 2 TIMES A DAY. DX ANTICOAGULANT  Qty: 60 Tab, Refills: 5    Associated Diagnoses: Chronic systolic congestive heart failure (HCC)      VITAMIN B-12 500 mcg tablet TAKE 1 TABLET BY MOUTH EVERY DAY DX: SUPPLEMENT  Qty: 30 Tab, Refills: 5      venlafaxine-SR (EFFEXOR-XR) 75 mg capsule TAKE 1 CAPSULE BY MOUTH EVERY DAY DX:ANTIDEPRESSANT  Qty: 30 Cap, Refills: 5      DOC-Q-LACE 100 mg capsule TAKE 1 CAPSULE BY MOUTH TWICE DAILY DX:BOWEL SUPPORT  Qty: 60 Cap, Refills: 5      donepezil (ARICEPT) 5 mg tablet TAKE 1 TABLET BY MOUTH EVERY NIGHT AT BEDTIME DX: MEMORY SUPPORT  Qty: 30 Tab, Refills: 5      albuterol (PROVENTIL HFA) 90 mcg/actuation inhaler Take 1 Puff by inhalation every six (6) hours as needed for Wheezing. Qty: 1 Inhaler, Refills: prn      albuterol (PROVENTIL VENTOLIN) 2.5 mg /3 mL (0.083 %) nebulizer solution INHALE THE CONTENTS OF 1 VIAL VIA NEBULIZER EVERY 6 HOURS AS NEEDED FOR WHEEZING  Qty: 100 Package, Refills: 6      calcium-cholecalciferol, D3, (CALTRATE 600+D) tablet TAKE 1 TABLET BY MOUTH TWICE DAILY DX: SUPPLEMENT  Qty: 60 Tab, Refills: 12    Associated Diagnoses: Osteopenia      raNITIdine (ZANTAC) 150 mg tablet Take 150 mg by mouth nightly as needed for Indigestion. SENEXON-S 8.6-50 mg per tablet TAKE 1 TABLET BY MOUTH DAILY AS NEEDED FOR CONSTIPATION  Qty: 30 Tab, Refills: 4    Associated Diagnoses: Constipation, unspecified constipation type      pravastatin (PRAVACHOL) 20 mg tablet Take 1 Tab by mouth nightly. D/C pravastatin 40 mg tab  Qty: 30 Tab, Refills: 5    Associated Diagnoses: Coronary artery disease due to lipid rich plaque; Essential hypertension         STOP taking these medications       hydrocortisone (HYTONE) 2.5 % lotion Comments:   Reason for Stopping:         ammonium lactate (LAC-HYDRIN) 12 % lotion Comments:   Reason for Stopping:         polyethylene glycol (MIRALAX) 17 gram/dose powder Comments:   Reason for Stopping:             NOTIFY YOUR PHYSICIAN FOR ANY OF THE FOLLOWING:   Fever over 101 degrees for 24 hours. Chest pain, shortness of breath, fever, chills, nausea, vomiting, diarrhea, change in mentation, falling, weakness, bleeding. Severe pain or pain not relieved by medications. Or, any other signs or symptoms that you may have questions about. DISPOSITION:    Home With:   OT  PT  HH  RN      xx Long term SNF/Inpatient Rehab    Independent/assisted living    Hospice    Other:     PATIENT CONDITION AT DISCHARGE:   Functional status    Poor    xx Deconditioned     Independent      Cognition   xx  Lucid    xx Forgetful     Dementia      Catheters/lines (plus indication)    Hicks     PICC     PEG    xx None      Code status   xx  Full code     DNR      PHYSICAL EXAMINATION AT DISCHARGE:  /78  Pulse 92  Temp 97.7 °F (36.5 °C)  Resp 22  Wt 73.9 kg (162 lb 14.7 oz)  SpO2 96%  BMI 30.78 kg/m2    Pt in bed resting, no new complaints this am - still just is tired. No pain, no shortness of breath. Discussed plan for discharge today to SNF with patient, nurse and pts daughter Alban Mandujano. [facility] will be coming to pick patient up later this am.     Constitutional:  No acute distress, cooperative, pleasant    ENT:  Oral mucous membranes moist, oropharynx benign. Resp:  Clear but diminished. No crackles and no wheezing. On RA, sats 96%   CV:  Irregular rhythm, HR . SR with PVC/PAC with some episodic Afib    GI:  Soft, non distended, non tender.  normoactive bowel sounds + BM Musculoskeletal:  Mild pedal edema (non pitting), warm, 2+ pulses throughout    Neurologic:  JOEL. Alert and oriented x person/family, place, year. Follows all commands with clear speech                           Psych: Moderate insight.  Calm and not anxious     CHRONIC MEDICAL DIAGNOSES:  Problem List as of 8/22/2018  Date Reviewed: 8/22/2018          Codes Class Noted - Resolved    Skin lesion of face ICD-10-CM: L98.9  ICD-9-CM: 709.9  8/2/2018 - Present        Cancer, skin, squamous cell ICD-10-CM: C44.92  ICD-9-CM: 173.92  8/2/2018 - Present        Gait instability ICD-10-CM: R26.81  ICD-9-CM: 781.2  8/2/2018 - Present        Recurrent depression ([facility] ICD-10-CM: F33.9  ICD-9-CM: 296.30  1/2/2018 - Present        ACP (advance care planning) ICD-10-CM: Z71.89  ICD-9-CM: V65.49  5/30/2017 - Present        Squamous cell carcinoma of forehead ICD-10-CM: C44.329  ICD-9-CM: 173.32  8/29/2016 - Present    Overview Signed 8/29/2016  8:43 AM by Lesly Winn MD     Left forehead             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 ICD-10-CM: D04.11  ICD-9-CM: 232.1  8/29/2016 - Present        Squamous cell carcinoma in situ of skin of right cheek ICD-10-CM: D04.39  ICD-9-CM: 232.3  8/29/2016 - Present        Cardiac LV ejection fraction 21-40% ICD-10-CM: R94.30  ICD-9-CM: 785.9  7/21/2016 - Present        Osteopenia ICD-10-CM: M85.80  ICD-9-CM: 733.90  4/12/2016 - Present        Advance care planning ICD-10-CM: Z71.89  ICD-9-CM: V65.49  2/9/2016 - Present        Intractable back pain ICD-10-CM: M54.9  ICD-9-CM: 724.5  8/8/2015 - Present        Rotator cuff tear arthropathy of right shoulder ICD-10-CM: M12.811  ICD-9-CM: 716.81  5/15/2015 - Present        Acute chest pain ICD-10-CM: R07.9  ICD-9-CM: 786.50  5/14/2015 - Present        Dementia without behavioral disturbance ICD-10-CM: F03.90  ICD-9-CM: 294.20  4/27/2015 - Present        Depressive disorder, not elsewhere classified ICD-10-CM: F32.9  ICD-9-CM: 290  4/27/2015 - Present        Anxiety state, unspecified ICD-10-CM: F41.1  ICD-9-CM: 300.00  4/27/2015 - Present        Pain Disorder Associated w/ Both Psychological & Medical Factors ICD-10-CM: F45.42  ICD-9-CM: 307.89  4/27/2015 - Present        CHF (congestive heart failure) (HCC) ICD-10-CM: I50.9  ICD-9-CM: 428.0  5/1/2014 - Present        Acute on chronic systolic heart failure (HCC) ICD-10-CM: I50.23  ICD-9-CM: 428.23  3/14/2014 - Present        Memory impairment ICD-10-CM: R41.3  ICD-9-CM: 780.93  8/15/2013 - Present        Depression ICD-10-CM: F32.9  ICD-9-CM: 082  8/15/2013 - Present        S/P CABG (coronary artery bypass graft) ICD-10-CM: Z95.1  ICD-9-CM: V45.81  8/15/2013 - Present        MI, old ICD-10-CM: I25.2  ICD-9-CM: 647  8/15/2013 - Present        Mixed hyperlipidemia ICD-10-CM: E78.2  ICD-9-CM: 272.2  6/11/2013 - Present        CAD (coronary artery disease) ICD-10-CM: I25.10  ICD-9-CM: 414.00  6/11/2013 - Present        HTN (hypertension) ICD-10-CM: I10  ICD-9-CM: 401.9  6/11/2013 - Present        * (Principal)RESOLVED: Shortness of breath ICD-10-CM: R06.02  ICD-9-CM: 786.05  8/17/2018 - 8/22/2018        RESOLVED: Diarrhea ICD-10-CM: R19.7  ICD-9-CM: 787.91  1/26/2017 - 1/26/2017        RESOLVED: Acute respiratory failure with hypoxia ([facility] ICD-10-CM: J96.01  ICD-9-CM: 518.81  1/24/2017 - 1/26/2017        RESOLVED: Acute kidney injury ([facility] ICD-10-CM: N17.9  ICD-9-CM: 584.9  8/14/2015 - 8/20/2015        RESOLVED: Encephalopathy ICD-10-CM: G93.40  ICD-9-CM: 348.30  8/14/2015 - 8/20/2015        RESOLVED: Pneumonia, organism unspecified(486) ICD-10-CM: J18.9  ICD-9-CM: 636  3/8/2014 - 11/4/2014            Greater than 30 minutes were spent with the patient on counseling and coordination of care    Signed:   Irvin Duran NP  8/22/2018  8:38 AM

## 2018-08-22 NOTE — PROGRESS NOTES
0000: Bedside and Verbal shift change report given to Poncho Rosa (oncoming nurse) by Wilman Dias (offgoing nurse). Report included the following information SBAR, Kardex, ED Summary, Procedure Summary, Intake/Output, MAR and Recent Results. Bedside and Verbal shift change report given to Wilman Dias (oncoming nurse) by Bashir Prater RN (offgoing nurse). Report included the following information SBAR, Kardex, ED Summary, Procedure Summary, Intake/Output, MAR and Recent Results.

## 2018-08-28 ENCOUNTER — PATIENT OUTREACH (OUTPATIENT)
Dept: CASE MANAGEMENT | Age: 83
End: 2018-08-28

## 2018-08-28 NOTE — PROGRESS NOTES
Community Care Team documentation for patient in [facility] Initial Follow Up Patient was admitted to [facility] on 8/17 and discharged 8/22 to [facility]  Marc Chamberlain [facility]. Hospital Discharge diagnosis:  Acute hypoxic respiratory failure (POA) RRAT score: 26 Advance Care Planning:  POA and Advance Directive on file. PCP : Geremias Fang MD 
Nurse Navigator in PCP office: Vicky Aguilar Note routed to Nurse Navigator team. 
 
SNF Attending:  Jill Huang MD 
 
Spoke with Kirsten Pinto  and team at [facility]. Ensured patient arrived to SNF safely with admission packet in order. Discussed need for cardiology follow up in 2 weeks. Pt is being skilled by PT/OT/SLP. Set up assist with hygiene. Supervision with transfers, upper body and lower body ADLs. Independent with bed mobility. Ambulating 5ft with RW and contact guard assist. Daily weights continue to be stable. 8/25: 164.8lbs, 8/26: 164.2lbs, 8/27: 164.2lbs. O2 discontinued. Nebulizer treatments continue. Therapy anticipates to discharge in 2 weeks to [address] [place] [address]. HH:[facility]. PCP FU scheduled for 9/18 at 10:30AM. Community Care Team will follow up weekly with [facility] until discharge. Medications were not reconciled and general patient assessment was not completed during this [facility] outreach.

## 2018-09-04 ENCOUNTER — PATIENT OUTREACH (OUTPATIENT)
Dept: CASE MANAGEMENT | Age: 83
End: 2018-09-04

## 2018-09-04 NOTE — PROGRESS NOTES
Community Care Team Documentation for Patient in [facility] Subsequent Follow up Patient remains at [facility] ([facility]). See previous [facility] Team notes. PCP : Sekou Castellanos MD 
Nurse Navigator in PCP office: Janell Paget Spoke with SNF team.  PT/OT/SLP continue. Currently ambulating 350 ft with walker and contact guard assist; supervision with transfers. Barrier - self limiting, slow improvement. Daily weights for CHF management are stable:  8/31 - 161.2 lbs; 9/1 - 161.1 lbs; 9/3 - 161.3 lbs; 9/4 161.2 lbs. SNF to schedule cardiology follow up with Dr. Ryan Reyes. Plan for discharge 9/13 to [address] [place] [address]. Home health agency TBD. PCP follow up 9/18 at 10:30 AM. Medications were not reconciled and general patient assessment was not completed during this skilled nursing facility outreach. Cristy Loaiza, MSN, RN, ACNS-BC, [facility] Nurse Navigator, [address] [place] [address] [phone]

## 2018-09-11 ENCOUNTER — PATIENT OUTREACH (OUTPATIENT)
Dept: CASE MANAGEMENT | Age: 83
End: 2018-09-11

## 2018-09-11 NOTE — PROGRESS NOTES
Community Care Team Documentation for Patient in [facility]  Subsequent Follow up     Patient remains at [facility] ([facility]). See previous [facility] Team notes. PCP : Reggie Sanders MD  Nurse Navigator in PCP office: Serge Gaytan    Spoke with SNF team. PT/OT continue. Currently modified independent with bed mobility and transfers. Ambulating 150ft- 200ft with RW. Set up to supervision with ADLs. Daily weights continue to be stable. Plan for discharge 9/13 to [address] [place] [address]. [facility] health agency [facility] and [facility]. PCP follow up 9/18 at 10:30 AM.    Medications were not reconciled and general patient assessment was not completed during this skilled nursing facility outreach.

## 2018-09-17 ENCOUNTER — TELEPHONE (OUTPATIENT)
Dept: INTERNAL MEDICINE CLINIC | Age: 83
End: 2018-09-17

## 2018-09-17 NOTE — TELEPHONE ENCOUNTER
----- Message from Markell Ledbetter sent at 9/14/2018  1:54 PM EDT -----  Regarding: FW: Ian Cruz MD/ telephone      ----- Message -----     From: Peg Jack     Sent: 9/14/2018  11:15 AM       To: [facility]  Subject: Ian Cruz MD/ telephone                      Cj Espino The Physical Therapist from [facility] would need a verbal Okay to see Pt for home health physical therapy for strengthening and endurance and gate training. Cj Espino would also need a referral for speech therapy  for cognition and memory. Jose contact [phone].

## 2018-09-17 NOTE — TELEPHONE ENCOUNTER
Call placed to [facility] and verbal given and office to fax over for signature

## 2018-09-18 ENCOUNTER — OFFICE VISIT (OUTPATIENT)
Dept: INTERNAL MEDICINE CLINIC | Age: 83
End: 2018-09-18

## 2018-09-18 ENCOUNTER — PATIENT OUTREACH (OUTPATIENT)
Dept: CASE MANAGEMENT | Age: 83
End: 2018-09-18

## 2018-09-18 ENCOUNTER — HOSPITAL ENCOUNTER (OUTPATIENT)
Dept: LAB | Age: 83
Discharge: HOME | End: 2018-09-18
Payer: MEDICARE

## 2018-09-18 VITALS
SYSTOLIC BLOOD PRESSURE: 122 MMHG | RESPIRATION RATE: 18 BRPM | HEART RATE: 90 BPM | TEMPERATURE: 97.6 F | WEIGHT: 163 LBS | OXYGEN SATURATION: 95 % | BODY MASS INDEX: 30.78 KG/M2 | HEIGHT: 61 IN | DIASTOLIC BLOOD PRESSURE: 70 MMHG

## 2018-09-18 DIAGNOSIS — I25.10 CORONARY ARTERY DISEASE DUE TO LIPID RICH PLAQUE: ICD-10-CM

## 2018-09-18 DIAGNOSIS — F03.90 DEMENTIA WITHOUT BEHAVIORAL DISTURBANCE, UNSPECIFIED DEMENTIA TYPE: ICD-10-CM

## 2018-09-18 DIAGNOSIS — E78.2 MIXED HYPERLIPIDEMIA: ICD-10-CM

## 2018-09-18 DIAGNOSIS — J15.9 PNEUMONIA, BACTERIAL: ICD-10-CM

## 2018-09-18 DIAGNOSIS — F33.9 RECURRENT DEPRESSION (HCC): ICD-10-CM

## 2018-09-18 DIAGNOSIS — I50.22 CHRONIC SYSTOLIC CONGESTIVE HEART FAILURE (HCC): Primary | ICD-10-CM

## 2018-09-18 DIAGNOSIS — E55.9 VITAMIN D DEFICIENCY: ICD-10-CM

## 2018-09-18 DIAGNOSIS — I25.83 CORONARY ARTERY DISEASE DUE TO LIPID RICH PLAQUE: ICD-10-CM

## 2018-09-18 PROCEDURE — 85025 COMPLETE CBC W/AUTO DIFF WBC: CPT

## 2018-09-18 PROCEDURE — 82306 VITAMIN D 25 HYDROXY: CPT

## 2018-09-18 PROCEDURE — 84443 ASSAY THYROID STIM HORMONE: CPT

## 2018-09-18 PROCEDURE — 80053 COMPREHEN METABOLIC PANEL: CPT

## 2018-09-18 PROCEDURE — 82607 VITAMIN B-12: CPT

## 2018-09-18 RX ORDER — METOPROLOL SUCCINATE 25 MG/1
25 TABLET, EXTENDED RELEASE ORAL DAILY
Qty: 30 TAB | Refills: 6 | Status: SHIPPED | OUTPATIENT
Start: 2018-09-18 | End: 2018-10-09

## 2018-09-18 RX ORDER — HYDROCORTISONE 25 MG/ML
LOTION TOPICAL AS NEEDED
COMMUNITY
Start: 2018-09-13

## 2018-09-18 RX ORDER — FUROSEMIDE 40 MG/1
40 TABLET ORAL DAILY
Qty: 30 TAB | Refills: 3 | Status: SHIPPED | OUTPATIENT
Start: 2018-09-18 | End: 2018-10-16 | Stop reason: ALTCHOICE

## 2018-09-18 RX ORDER — AMMONIUM LACTATE 12 G/100G
LOTION TOPICAL
COMMUNITY
Start: 2018-09-13 | End: 2019-01-01 | Stop reason: ALTCHOICE

## 2018-09-18 RX ORDER — VENLAFAXINE HYDROCHLORIDE 75 MG/1
75 CAPSULE, EXTENDED RELEASE ORAL DAILY
Qty: 30 CAP | Refills: 5 | Status: SHIPPED | OUTPATIENT
Start: 2018-09-18 | End: 2019-01-01 | Stop reason: SDUPTHER

## 2018-09-18 RX ORDER — LISINOPRIL 5 MG/1
5 TABLET ORAL DAILY
Qty: 30 TAB | Refills: 0 | Status: SHIPPED | OUTPATIENT
Start: 2018-09-18 | End: 2019-01-01 | Stop reason: SDUPTHER

## 2018-09-18 NOTE — PROGRESS NOTES
HISTORY OF PRESENT ILLNESS  Paty Rodrigues is a 80 y.o. female here accompanied by her daughter. She is here for transition of care, was contact by my nurse navigator within 48 hours. She was admitted to hospitals with acute congestive heart failure with a pneumonia. Has received IV antibiotics followed by p.o. antibiotics and also she was diuresed. She has lost a lot of weight. Breathing seems much better. Had echocardiogram done, showed low ejection fractions. She is feeling better. Just came back home, home health is following up with her. Has depression, under control. Need medications refilled. Her memory is getting worse. Already have dementia, taking medications. Has coronary artery disease and hyperlipidemia, on medications. All medications reconciled, all labs and imaging reviewed. HPI    Review of Systems   Constitutional: Negative. HENT: Negative. Eyes: Negative. Respiratory: Negative. Cardiovascular: Positive for leg swelling. Negative for orthopnea and PND. Gastrointestinal: Negative. Genitourinary: Negative. Skin: Negative. Neurological: Negative. Endo/Heme/Allergies: Negative. Psychiatric/Behavioral: Positive for depression. The patient is nervous/anxious. Physical Exam   Constitutional: She appears well-developed and well-nourished. No distress. Neck: Normal range of motion. Neck supple. No JVD present. No thyromegaly present. Cardiovascular: Normal rate, regular rhythm, normal heart sounds and intact distal pulses. Pulmonary/Chest: Effort normal and breath sounds normal. No respiratory distress. She has no wheezes. She has no rales. Abdominal: Soft. Bowel sounds are normal. She exhibits no distension. There is no tenderness. Musculoskeletal: She exhibits no tenderness. Trace edema present bilaterally. Psychiatric: She has a normal mood and affect.  Her behavior is normal.       ASSESSMENT and PLAN  Diagnoses and all orders for this visit: 1. Chronic systolic [facility]    Now compensated. She is on low-salt diet. Lasix dosage was increased to 40 mg every day. She has lost weight. Will check,  -     METABOLIC PANEL, COMPREHENSIVE  -     lisinopril (PRINIVIL, ZESTRIL) 5 mg tablet; Take 1 Tab by mouth daily. -     metoprolol succinate (TOPROL-XL) 25 mg XL tablet; Take 1 Tab by mouth daily. -     furosemide (LASIX) 40 mg tablet; Take 1 Tab by mouth daily. 2. Recurrent depression ([facility]    Stable. Will refill,  -     venlafaxine-SR (EFFEXOR-XR) 75 mg capsule; Take 1 Cap by mouth daily. 3. Dementia without behavioral disturbance, unspecified dementia type     On Aricept. Will check,  -     TSH 3RD GENERATION  -     VITAMIN B12    4. Mixed hyperlipidemia    Stable. Will check,  -     METABOLIC PANEL, COMPREHENSIVE    5. Coronary artery disease due to lipid rich plaque  Stable. Will check,    -     METABOLIC PANEL, COMPREHENSIVE    6. Pneumonia, bacterial    Resolved, finished her Levaquin. No cough. Will check,  -     METABOLIC PANEL, COMPREHENSIVE  -     CBC WITH AUTOMATED DIFF    7. Vitamin D deficiency  -     VITAMIN D, 25 HYDROXY    Discussed expected course/resolution/complications of diagnosis in detail with patient. Medication risks/benefits/costs/interactions/alternatives discussed with patient. Pt was given an after visit summary which includes diagnoses, current medications & vitals. Pt expressed understanding with the diagnosis and plan.

## 2018-09-18 NOTE — PROGRESS NOTES
Health Maintenance Due   Topic Date Due    DTaP/Tdap/Td series (1 - Tdap) 05/26/1947    MEDICARE YEARLY EXAM  05/31/2018       Chief Complaint   Patient presents with   [facility] Follow Up     BRIAN     Hypertension    CHF       1. Have you been to the ER, urgent care clinic since your last visit? Hospitalized since your last visit? Yes When: 8/17/18, [facility]    2. Have you seen or consulted any other health care providers outside of the [address] [place] [address] since your last visit? Include any pap smears or colon screening. Yes Dr. Sigifredo Shay for skin cancer. Surgery scheduled 10/22    3) Do you have an Advance Directive on file? yes    4) Are you interested in receiving information on Advance Directives? NO      Patient is accompanied by daughter I have received verbal consent from Paras Ortiz to discuss any/all medical information while they are present in the room.

## 2018-09-18 NOTE — MR AVS SNAPSHOT
[address]. [facility] [address] 
[phone] Patient: Mira Elaine MRN: [id number] :[id number] Visit Information Date & Time Provider Department Dept. Phone Encounter #  
 2018 10:30 AM Isabel Perez MD [facility] Internal Medicine [facility] [phone] [id number] Your Appointments 2018 10:15 AM  
Any with Isabel Perez MD  
[facility] ([facility]) Appt Note: follow up 4m; fu up 3m  
 [address] [place] [address]. A [facility] [address]  
[phone]  
  
   
 [address] [place] [address]. [address] [place] [phone] Upcoming Health Maintenance Date Due DTaP/Tdap/Td series (1 - Tdap) 1947 MEDICARE YEARLY EXAM 2018 Influenza Age 5 to Adult 2018* GLAUCOMA SCREENING Q2Y 2019 Pneumococcal 65+ Low/Medium Risk (2 of 2 - PPSV23) 2019 *Topic was postponed. The date shown is not the original due date. Allergies as of 2018  Review Complete On: 2018 By: Isabel Perez MD  
  
 Severity Noted Reaction Type Reactions Neurontin [Gabapentin]  2015    Unknown (comments) Pt unknown reaction Current Immunizations  Reviewed on 2018 Name Date Influenza Vaccine 12/10/2015, 11/15/2014, 2013 Pneumococcal Vaccine (Unspecified Type) 2014 Zoster Vaccine, Live 2017 Reviewed by Isabel Perez MD on 2018 at 11:00 AM  
You Were Diagnosed With   
  
 Codes Comments Chronic systolic congestive heart failure (HCC)    -  Primary ICD-10-CM: O09.98 ICD-9-CM: 428.22, 428.0 Recurrent depression ([facility]     ICD-10-CM: F33.9 ICD-9-CM: 296.30 Dementia without behavioral disturbance, unspecified dementia type     ICD-10-CM: F03.90 ICD-9-CM: 294.20 Mixed hyperlipidemia     ICD-10-CM: E78.2 ICD-9-CM: 272.2 Coronary artery disease due to lipid rich plaque     ICD-10-CM: I25.10, I25.83 ICD-9-CM: 414.00, 414.3 Pneumonia, bacterial     ICD-10-CM: J15.9 ICD-9-CM: 615. 9 Vitamin D deficiency     ICD-10-CM: E55.9 ICD-9-CM: 268.9 Vitals BP Pulse Temp Resp Height(growth percentile) Weight(growth percentile) 122/70 (BP 1 Location: Left arm, BP Patient Position: Sitting) 90 97.6 °F (36.4 °C) (Oral) 18 5' 1\" (1.549 m) 163 lb (73.9 kg) SpO2 BMI OB Status Smoking Status 95% 30.8 kg/m2 Postmenopausal Never Smoker BMI and BSA Data Body Mass Index Body Surface Area  
 30.8 kg/m 2 1.78 m 2 Preferred Pharmacy Pharmacy Name Phone Darren Cade, [address] [facility] [phone] Your Updated Medication List  
  
   
This list is accurate as of 9/18/18 11:00 AM.  Always use your most recent med list.  
  
  
  
  
 acetaminophen 500 mg tablet Commonly known as:  TYLENOL Take 500 mg by mouth every six (6) hours as needed for Pain. * albuterol 2.5 mg /3 mL (0.083 %) nebulizer solution Commonly known as:  PROVENTIL VENTOLIN  
INHALE THE CONTENTS OF 1 VIAL VIA NEBULIZER EVERY 6 HOURS AS NEEDED FOR WHEEZING  
  
 * albuterol 90 mcg/actuation inhaler Commonly known as:  PROVENTIL HFA Take 1 Puff by inhalation every six (6) hours as needed for Wheezing. ammonium lactate 12 % lotion Commonly known as:  LAC-HYDRIN  
  
 calcium-cholecalciferol (D3) tablet Commonly known as:  CALTRATE 600+D TAKE 1 TABLET BY MOUTH TWICE DAILY DX: SUPPLEMENT  
  
 DOC-Q-LACE 100 mg capsule Generic drug:  docusate sodium TAKE 1 CAPSULE BY MOUTH TWICE DAILY DX:BOWEL SUPPORT  
  
 donepezil 5 mg tablet Commonly known as:  ARICEPT  
TAKE 1 TABLET BY MOUTH EVERY NIGHT AT BEDTIME DX: MEMORY SUPPORT  
  
 ELIQUIS 2.5 mg tablet Generic drug:  apixaban TAKE 1 TABLET BY MOUTH 2 TIMES A DAY. DX ANTICOAGULANT  
  
 furosemide 40 mg tablet Commonly known as:  LASIX Take 1 Tab by mouth daily. guaiFENesin 1,200 mg Ta12 ER tablet Commonly known as:  Marc & Marc Take 1 Tab by mouth every twelve (12) hours. Indications: Cough  
  
 hydrocortisone 2.5 % lotion Commonly known as:  HYTONE  
  
 lisinopril 5 mg tablet Commonly known as:  Nonkaren Tammy Take 1 Tab by mouth daily. metoprolol succinate 25 mg XL tablet Commonly known as:  TOPROL-XL Take 1 Tab by mouth daily. polyethylene glycol 17 gram packet Commonly known as:  Deliliah Hefty Take 1 Packet by mouth daily. polyvinyl alcohol 1.4 % ophthalmic solution Commonly known as:  Jeison Leo Administer 1 Drop to both eyes as needed for up to 30 days. pravastatin 20 mg tablet Commonly known as:  PRAVACHOL Take 1 Tab by mouth nightly. D/C pravastatin 40 mg tab  
  
 raNITIdine 150 mg tablet Commonly known as:  ZANTAC Take 150 mg by mouth nightly as needed for Indigestion. traMADol 50 mg tablet Commonly known as:  ULTRAM  
Take 0.5 Tabs by mouth nightly. Max Daily Amount: 25 mg.  
  
 venlafaxine-SR 75 mg capsule Commonly known as:  EFFEXOR-XR Take 1 Cap by mouth daily. VITAMIN B-12 500 mcg tablet Generic drug:  cyanocobalamin TAKE 1 TABLET BY MOUTH EVERY DAY DX: SUPPLEMENT * Notice: This list has 2 medication(s) that are the same as other medications prescribed for you. Read the directions carefully, and ask your doctor or other care provider to review them with you. Prescriptions Sent to Pharmacy Refills  
 venlafaxine-SR (EFFEXOR-XR) 75 mg capsule 5 Sig: Take 1 Cap by mouth daily. Class: Normal  
 Pharmacy: [address], [address] Ph #: [phone] Route: Oral  
 lisinopril (PRINIVIL, ZESTRIL) 5 mg tablet 0 Sig: Take 1 Tab by mouth daily. Class: Normal  
 Pharmacy: [address], [address] Ph #: [phone] Route: Oral  
 metoprolol succinate (TOPROL-XL) 25 mg XL tablet 6 Sig: Take 1 Tab by mouth daily. Class: Normal  
 Pharmacy: [address], [address] [place] Ph #: [phone] Route: Oral  
 furosemide (LASIX) 40 mg tablet 3 Sig: Take 1 Tab by mouth daily. Class: Normal  
 Pharmacy: [address], [address] Ph #: [phone] Route: Oral  
  
We Performed the Following CBC WITH AUTOMATED DIFF [08889 CPT(R)] METABOLIC PANEL, COMPREHENSIVE [59364 CPT(R)] TSH 3RD GENERATION [08944 CPT(R)] VITAMIN B12 [id number] CPT(R)] VITAMIN D, 25 HYDROXY [id number] CPT(R)] To-Do List   
 10/09/2018 10:30 AM  
  Appointment with [facility] PAT EXAM RM 3 at [address] [place] [address] ([phone]) Introducing [facility]! [facility] introduces [employer] patient portal. Now you can access parts of your medical record, email your doctor's office, and request medication refills online. 1. In your internet browser, go to [email or web address]. [employer] 2. Click on the First Time User? Click Here link in the Sign In box. You will see the New Member Sign Up page. 3. Enter your [employer] Access Code exactly as it appears below. You will not need to use this code after youve completed the sign-up process. If you do not sign up before the expiration date, you must request a new code. · [employer] Access Code: [email or web address] Expires: 10/31/2018 11:07 AM 
 
4. Enter the last four digits of your Social Security Number (xxxx) and Date of Birth (mm/dd/yyyy) as indicated and click Submit. You will be taken to the next sign-up page. 5. Create a [employer] ID. This will be your [employer] login ID and cannot be changed, so think of one that is secure and easy to remember. 6. Create a [employer] password. You can change your password at any time. 7. Enter your Password Reset Question and Answer.  This can be used at a later time if you forget your password. 8. Enter your e-mail address. You will receive e-mail notification when new information is available in [address] 9. Click Sign Up. You can now view and download portions of your medical record. 10. Click the Download Summary menu link to download a portable copy of your medical information. If you have questions, please visit the Frequently Asked Questions section of the [employer] website. Remember, [employer] is NOT to be used for urgent needs. For medical emergencies, dial 911. Now available from your iPhone and Android! Please provide this summary of care documentation to your next provider. Your primary care clinician is listed as Paulie Leo. If you have any questions after today's visit, please call [phone].

## 2018-09-18 NOTE — PROGRESS NOTES
Community Care Team Documentation for Patient in [facility] Discharge Note Patient has been discharged from [address] [place] [address] ([facility]). See previous [facility] Team notes. PCP : Jenifer Bass MD 
Nurse Navigator in PCP office: Rica Scheuermann Note routed to Nurse Navigator team. 
 
Spoke with SNF team.  Confirmed patient was discharged back to [address] [place] [address] with [facility] (facility preferred). Discharge date changed to 9/12 due to expected weather on 9/13. PCP follow up 9/18 at 10:30 AM. Community Care Team will sign off at this time. Medications were not reconciled and general patient assessment was not completed during this skilled nursing facility outreach. Gagan Loaiza, MSN, RN, ACNS-BC, [facility] Nurse Navigator, [address] [place] [address] [phone]

## 2018-09-19 LAB
25(OH)D3+25(OH)D2 SERPL-MCNC: 41.4 NG/ML (ref 30–100)
ALBUMIN SERPL-MCNC: 4 G/DL (ref 3.2–4.6)
ALBUMIN/GLOB SERPL: 1.9 {RATIO} (ref 1.2–2.2)
ALP SERPL-CCNC: 56 IU/L (ref 39–117)
ALT SERPL-CCNC: 9 IU/L (ref 0–32)
AST SERPL-CCNC: 18 IU/L (ref 0–40)
BASOPHILS # BLD AUTO: 0 X10E3/UL (ref 0–0.2)
BASOPHILS NFR BLD AUTO: 1 %
BILIRUB SERPL-MCNC: 0.4 MG/DL (ref 0–1.2)
BUN SERPL-MCNC: 26 MG/DL (ref 10–36)
BUN/CREAT SERPL: 18 (ref 12–28)
CALCIUM SERPL-MCNC: 10.3 MG/DL (ref 8.7–10.3)
CHLORIDE SERPL-SCNC: 101 MMOL/L (ref 96–106)
CO2 SERPL-SCNC: 28 MMOL/L (ref 20–29)
CREAT SERPL-MCNC: 1.45 MG/DL (ref 0.57–1)
EOSINOPHIL # BLD AUTO: 0.2 X10E3/UL (ref 0–0.4)
EOSINOPHIL NFR BLD AUTO: 3 %
ERYTHROCYTE [DISTWIDTH] IN BLOOD BY AUTOMATED COUNT: 14.4 % (ref 12.3–15.4)
GLOBULIN SER CALC-MCNC: 2.1 G/DL (ref 1.5–4.5)
GLUCOSE SERPL-MCNC: 125 MG/DL (ref 65–99)
HCT VFR BLD AUTO: 43.8 % (ref 34–46.6)
HGB BLD-MCNC: 14.6 G/DL (ref 11.1–15.9)
IMM GRANULOCYTES # BLD: 0 X10E3/UL (ref 0–0.1)
IMM GRANULOCYTES NFR BLD: 0 %
INTERPRETATION: NORMAL
LYMPHOCYTES # BLD AUTO: 1.4 X10E3/UL (ref 0.7–3.1)
LYMPHOCYTES NFR BLD AUTO: 25 %
MCH RBC QN AUTO: 29.8 PG (ref 26.6–33)
MCHC RBC AUTO-ENTMCNC: 33.3 G/DL (ref 31.5–35.7)
MCV RBC AUTO: 89 FL (ref 79–97)
MONOCYTES # BLD AUTO: 0.6 X10E3/UL (ref 0.1–0.9)
MONOCYTES NFR BLD AUTO: 9 %
NEUTROPHILS # BLD AUTO: 3.6 X10E3/UL (ref 1.4–7)
NEUTROPHILS NFR BLD AUTO: 62 %
PLATELET # BLD AUTO: 162 X10E3/UL (ref 150–379)
POTASSIUM SERPL-SCNC: 5.1 MMOL/L (ref 3.5–5.2)
PROT SERPL-MCNC: 6.1 G/DL (ref 6–8.5)
RBC # BLD AUTO: 4.9 X10E6/UL (ref 3.77–5.28)
SODIUM SERPL-SCNC: 144 MMOL/L (ref 134–144)
TSH SERPL DL<=0.005 MIU/L-ACNC: 1.72 UIU/ML (ref 0.45–4.5)
VIT B12 SERPL-MCNC: 1088 PG/ML (ref 232–1245)
WBC # BLD AUTO: 5.9 X10E3/UL (ref 3.4–10.8)

## 2018-09-25 ENCOUNTER — TELEPHONE (OUTPATIENT)
Dept: INTERNAL MEDICINE CLINIC | Age: 83
End: 2018-09-25

## 2018-09-25 NOTE — TELEPHONE ENCOUNTER
Patient received PT this am, physical therapy came to clinic stating pts. heart rate was elevated/dropping, appeared SOB causing them concern. BP normal range. PT stated she was told by floor nurses patient did not receive her AM medications. Reviewed concerns w/Dr. Maxi Hamlin, verbal instructions were given to have nurses to give Metoprolol NOW. This nurse called, Chris Decker, Supervisor of floor, gave her providers verbal request to have patient to receive her Metoprolol NOW. Abida verbalized understanding and she would take care of it.

## 2018-10-05 ENCOUNTER — OFFICE VISIT (OUTPATIENT)
Dept: INTERNAL MEDICINE CLINIC | Age: 83
End: 2018-10-05

## 2018-10-05 VITALS
BODY MASS INDEX: 31.57 KG/M2 | DIASTOLIC BLOOD PRESSURE: 53 MMHG | HEART RATE: 72 BPM | TEMPERATURE: 98.1 F | WEIGHT: 167.2 LBS | SYSTOLIC BLOOD PRESSURE: 94 MMHG | OXYGEN SATURATION: 94 % | HEIGHT: 61 IN | RESPIRATION RATE: 24 BRPM

## 2018-10-05 DIAGNOSIS — I50.9 CONGESTIVE HEART FAILURE, UNSPECIFIED HF CHRONICITY, UNSPECIFIED HEART FAILURE TYPE (HCC): ICD-10-CM

## 2018-10-05 DIAGNOSIS — R06.09 DOE (DYSPNEA ON EXERTION): Primary | ICD-10-CM

## 2018-10-05 DIAGNOSIS — R05.9 COUGH: ICD-10-CM

## 2018-10-05 DIAGNOSIS — R06.02 SHORTNESS OF BREATH: ICD-10-CM

## 2018-10-05 DIAGNOSIS — Z00.00 MEDICARE ANNUAL WELLNESS VISIT, SUBSEQUENT: ICD-10-CM

## 2018-10-05 RX ORDER — ALBUTEROL SULFATE 90 UG/1
2 AEROSOL, METERED RESPIRATORY (INHALATION)
Qty: 1 INHALER | Refills: 0 | Status: SHIPPED | OUTPATIENT
Start: 2018-10-05

## 2018-10-05 RX ORDER — FUROSEMIDE 40 MG/1
TABLET ORAL
Qty: 6 TAB | Refills: 0 | Status: SHIPPED | OUTPATIENT
Start: 2018-10-05 | End: 2018-10-16 | Stop reason: ALTCHOICE

## 2018-10-05 RX ORDER — AMOXICILLIN 250 MG
1 CAPSULE ORAL AS NEEDED
COMMUNITY
End: 2019-01-01 | Stop reason: ALTCHOICE

## 2018-10-05 NOTE — MR AVS SNAPSHOT
[address] A Building [facility] [id number] 
[phone] Patient: Musa Shea MRN: [id number] Visit Information Date & Time Provider Department Dept. Phone Encounter #  
 10/5/2018  9:30 AM Aleah Marina, [address] [facility] Internal Medicine [facility] [phone] [id number] Your Appointments 10/16/2018 10:30 AM  
Any with Pat Winkler MD  
[facility] ([address] [place] [address]) Appt Note: follow up 4m; fu up 3m; fu 1m  
 [address] [place] [address]. [facility] [phone]  
[phone]  
  
   
 [address] [place] [address]. [address] [place] [id number] Upcoming Health Maintenance Date Due DTaP/Tdap/Td series (1 - Tdap) 1947 Shingrix Vaccine Age 50> (1 of 2) 1976 MEDICARE YEARLY EXAM 2018 Influenza Age 5 to Adult 2018 GLAUCOMA SCREENING Q2Y 2019 Pneumococcal 65+ Low/Medium Risk (2 of 2 - PPSV23) 2019 Allergies as of 10/5/2018  Review Complete On: 10/5/2018 By: Aleah Marina NP Severity Noted Reaction Type Reactions Neurontin [Gabapentin]  2015    Unknown (comments) Pt unknown reaction Current Immunizations  Reviewed on 2018 Name Date Influenza Vaccine 12/10/2015, 11/15/2014, 2013 Pneumococcal Vaccine (Unspecified Type) 2014 Zoster Vaccine, Live 2017 Not reviewed this visit You Were Diagnosed With   
  
 Codes Comments CHRISTOPHER (dyspnea on exertion)    -  Primary ICD-10-CM: R06.09 
ICD-9-CM: 786.09 Vitals BP Pulse Temp Resp Height(growth percentile) Weight(growth percentile) 94/53 (BP 1 Location: Right arm, BP Patient Position: Sitting) 72 98.1 °F (36.7 °C) 24 5' 1\" (1.549 m) 167 lb 3.2 oz (75.8 kg) SpO2 BMI OB Status Smoking Status 94% 31.59 kg/m2 Postmenopausal Never Smoker Vitals History BMI and BSA Data Body Mass Index Body Surface Area  
 31.59 kg/m 2 1.81 m 2 Preferred Pharmacy Pharmacy Name Phone Pallavi Petty [facility] [phone] Your Updated Medication List  
  
   
This list is accurate as of 10/5/18 10:04 AM.  Always use your most recent med list.  
  
  
  
  
 acetaminophen 500 mg tablet Commonly known as:  TYLENOL Take 500 mg by mouth every six (6) hours as needed for Pain. * albuterol 2.5 mg /3 mL (0.083 %) nebulizer solution Commonly known as:  PROVENTIL VENTOLIN  
INHALE THE CONTENTS OF 1 VIAL VIA NEBULIZER EVERY 6 HOURS AS NEEDED FOR WHEEZING  
  
 * albuterol 90 mcg/actuation inhaler Commonly known as:  PROVENTIL HFA Take 1 Puff by inhalation every six (6) hours as needed for Wheezing. * albuterol 90 mcg/actuation inhaler Commonly known as:  PROVENTIL HFA, VENTOLIN HFA, PROAIR HFA Take 2 Puffs by inhalation every four (4) hours as needed for Wheezing. ammonium lactate 12 % lotion Commonly known as:  LAC-HYDRIN  
  
 calcium-cholecalciferol (D3) tablet Commonly known as:  CALTRATE 600+D TAKE 1 TABLET BY MOUTH TWICE DAILY DX: SUPPLEMENT  
  
 DOC-Q-LACE 100 mg capsule Generic drug:  docusate sodium TAKE 1 CAPSULE BY MOUTH TWICE DAILY DX:BOWEL SUPPORT  
  
 donepezil 5 mg tablet Commonly known as:  ARICEPT  
TAKE 1 TABLET BY MOUTH EVERY NIGHT AT BEDTIME DX: MEMORY SUPPORT  
  
 ELIQUIS 2.5 mg tablet Generic drug:  apixaban TAKE 1 TABLET BY MOUTH 2 TIMES A DAY. DX ANTICOAGULANT * furosemide 40 mg tablet Commonly known as:  LASIX Take 1 Tab by mouth daily. * furosemide 40 mg tablet Commonly known as:  LASIX  
1 tab po in the morning and 1/2 tab po at noon x 3 days  
  
 hydrocortisone 2.5 % lotion Commonly known as:  HYTONE  
  
 lisinopril 5 mg tablet Commonly known as:  Elnita [place] Take 1 Tab by mouth daily. metoprolol succinate 25 mg XL tablet Commonly known as:  TOPROL-XL Take 1 Tab by mouth daily. MELLISSA-COLACE 8.6-50 mg per tablet Generic drug:  senna-docusate Take 1 Tab by mouth daily. pravastatin 20 mg tablet Commonly known as:  PRAVACHOL Take 1 Tab by mouth nightly. D/C pravastatin 40 mg tab  
  
 raNITIdine 150 mg tablet Commonly known as:  ZANTAC Take 150 mg by mouth nightly as needed for Indigestion. traMADol 50 mg tablet Commonly known as:  ULTRAM  
Take 0.5 Tabs by mouth nightly. Max Daily Amount: 25 mg.  
  
 venlafaxine-SR 75 mg capsule Commonly known as:  EFFEXOR-XR Take 1 Cap by mouth daily. VITAMIN B-12 500 mcg tablet Generic drug:  cyanocobalamin TAKE 1 TABLET BY MOUTH EVERY DAY DX: SUPPLEMENT * Notice: This list has 5 medication(s) that are the same as other medications prescribed for you. Read the directions carefully, and ask your doctor or other care provider to review them with you. Prescriptions Sent to Pharmacy Refills  
 albuterol (PROVENTIL HFA, VENTOLIN HFA, PROAIR HFA) 90 mcg/actuation inhaler 0 Sig: Take 2 Puffs by inhalation every four (4) hours as needed for Wheezing. Class: Normal  
 [facility]: [phone] Route: Inhalation  
 furosemide (LASIX) 40 mg tablet 0 Si tab po in the morning and 1/2 tab po at noon x 3 days Class: Normal  
 Pharmacy: [address] [facility] Ph #: [phone] To-Do List   
 10/05/2018 Imaging:  XR CHEST SNGL V   
  
 10/09/2018 10:30 AM  
  Appointment with [facility] PAT EXAM RM 3 at [address] [facility] ([phone]) Introducing [facility]! Jeff Mckeon introduces [employer] patient portal. Now you can access parts of your medical record, email your doctor's office, and request medication refills online. 1. In your internet browser, go to [email or web address] 2. Click on the First Time User? Click Here link in the Sign In box. You will see the New Member Sign Up page. 3. Enter your [employer] Access Code exactly as it appears below. You will not need to use this code after youve completed the sign-up process. If you do not sign up before the expiration date, you must request a new code. · [employer] Access Code: [email or web address] Expires: 10/31/2018 11:07 AM 
 
4. Enter the last four digits of your Social Security Number (xxxx) and Date of Birth (mm/dd/yyyy) as indicated and click Submit. You will be taken to the next sign-up page. 5. Create a [employer] ID. This will be your [employer] login ID and cannot be changed, so think of one that is secure and easy to remember. 6. Create a [employer] password. You can change your password at any time. 7. Enter your Password Reset Question and Answer. This can be used at a later time if you forget your password. 8. Enter your e-mail address. You will receive e-mail notification when new information is available in [address] 9. Click Sign Up. You can now view and download portions of your medical record. 10. Click the Download Summary menu link to download a portable copy of your medical information. If you have questions, please visit the Frequently Asked Questions section of the [employer] website. Remember, [employer] is NOT to be used for urgent needs. For medical emergencies, dial 911. Now available from your iPhone and Android! Please provide this summary of care documentation to your next provider. Your primary care clinician is listed as Natalie Szymanski. If you have any questions after today's visit, please call [phone].

## 2018-10-05 NOTE — PROGRESS NOTES
HISTORY OF PRESENT ILLNESS  Song Donovan is a 80 y.o. female. This is a patient of Dr. Tanna Corona who presents today from [address] [place] [address] related to shortness of breath. Patient states she has had increased shortness of breath with activity. She states she has had difficulty walking down to the dining room due to this. She has had mild non-productive cough. She denies chest pain. Per [facility] staff, patient has orders for albuterol nebulizer solution, but nebulizer machine is not available to administer medication. Per chart review, patient has had recent hospitalization in August 2018 for pneumonia and acute CHF. Problem List: Reviewed with patient and discussed risk factors.     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    Acute chest pain R07.9    Rotator cuff tear arthropathy of right shoulder M75.101, M12.811    Intractable back pain M54.9    Advance care planning Z71.89    Osteopenia M85.80    Cardiac LV ejection fraction 21-40% R94.30    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    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    Squamous cell carcinoma in situ of skin of right cheek D04.39    ACP (advance care planning) Z71.89    Recurrent depression ([facility]) F33.9    Skin lesion of face L98.9    Cancer, skin, squamous cell C44.92    Gait instability R26.81       Current medical providers:  Patient Care Team:  Kizzy Winston MD as PCP - General (Internal Medicine)  Maisha Sow MD (Dermatology)  Mahamed Loaiza, RN as Transitional Nurse Navigator  Kieran Conway as     PSH: Reviewed with patient  Past Surgical History:   Procedure Laterality Date    CARDIAC SURG PROCEDURE UNLIST      heart attack,bypass sx        SH: Reviewed with patient  Social History   Substance Use Topics    Smoking status: Never Smoker    Smokeless tobacco: Never Used    Alcohol use No       FH: Reviewed with patient  Family History   Problem Relation Age of Onset    No Known Problems Mother     No Known Problems Father     Anesth Problems Neg Hx        Medications/Allergies: Reviewed with patient  Current Outpatient Prescriptions on File Prior to Visit   Medication Sig Dispense Refill    ammonium lactate (LAC-HYDRIN) 12 % lotion       hydrocortisone (HYTONE) 2.5 % lotion       venlafaxine-SR (EFFEXOR-XR) 75 mg capsule Take 1 Cap by mouth daily. 30 Cap 5    lisinopril (PRINIVIL, ZESTRIL) 5 mg tablet Take 1 Tab by mouth daily. (Patient taking differently: Take 10 mg by mouth daily.) 30 Tab 0    metoprolol succinate (TOPROL-XL) 25 mg XL tablet Take 1 Tab by mouth daily. 30 Tab 6    furosemide (LASIX) 40 mg tablet Take 1 Tab by mouth daily. (Patient taking differently: Take 40 mg by mouth daily. 6 times per week) 30 Tab 3    traMADol (ULTRAM) 50 mg tablet Take 0.5 Tabs by mouth nightly. Max Daily Amount: 25 mg. 20 Tab 0    acetaminophen (TYLENOL) 500 mg tablet Take 500 mg by mouth every six (6) hours as needed for Pain.  pravastatin (PRAVACHOL) 20 mg tablet Take 1 Tab by mouth nightly. D/C pravastatin 40 mg tab 30 Tab 5    ELIQUIS 2.5 mg tablet TAKE 1 TABLET BY MOUTH 2 TIMES A DAY.  DX ANTICOAGULANT 60 Tab 5    VITAMIN B-12 500 mcg tablet TAKE 1 TABLET BY MOUTH EVERY DAY DX: SUPPLEMENT 30 Tab 5    DOC-Q-LACE 100 mg capsule TAKE 1 CAPSULE BY MOUTH TWICE DAILY DX:BOWEL SUPPORT 60 Cap 5    donepezil (ARICEPT) 5 mg tablet TAKE 1 TABLET BY MOUTH EVERY NIGHT AT BEDTIME DX: MEMORY SUPPORT 30 Tab 5    albuterol (PROVENTIL HFA) 90 mcg/actuation inhaler Take 1 Puff by inhalation every six (6) hours as needed for Wheezing. 1 Inhaler prn    albuterol (PROVENTIL VENTOLIN) 2.5 mg /3 mL (0.083 %) nebulizer solution INHALE THE CONTENTS OF 1 VIAL VIA NEBULIZER EVERY 6 HOURS AS NEEDED FOR WHEEZING 100 Package 6    calcium-cholecalciferol, D3, (CALTRATE 600+D) tablet TAKE 1 TABLET BY MOUTH TWICE DAILY DX: SUPPLEMENT 60 Tab 12    raNITIdine (ZANTAC) 150 mg tablet Take 150 mg by mouth nightly as needed for Indigestion. No current facility-administered medications on file prior to visit. Allergies   Allergen Reactions    Neurontin [Gabapentin] Unknown (comments)     Pt unknown reaction       Objective:  Visit Vitals    BP 94/53 (BP 1 Location: Right arm, BP Patient Position: Sitting)    Pulse 72    Temp 98.1 °F (36.7 °C)    Resp 24    Ht 5' 1\" (1.549 m)    Wt 167 lb 3.2 oz (75.8 kg)    SpO2 94%    BMI 31.59 kg/m2    Body mass index is 31.59 kg/(m^2). Assessment of cognitive impairment: Alert and oriented x 2    Depression Screen:   PHQ over the last two weeks 10/5/2018   Little interest or pleasure in doing things Not at all   Feeling down, depressed, irritable, or hopeless Several days   Total Score PHQ 2 1       Fall Risk Assessment:    Fall Risk Assessment, last 12 mths 10/5/2018   Able to walk? Yes   Fall in past 12 months? Yes   Fall with injury? Yes   Number of falls in past 12 months 1   Fall Risk Score 2       Functional Ability:   Does the patient exhibit a steady gait? Yes, with rollator   Is the patient self reliant?  (ie can do own laundry, meals, household chores)  No, resident of [employer] [facility]. Does the patient handle his/her own medications? No, resident of [facility]. Is the patients home safe (ie good lighting, handrails on stairs and bath, etc.)?    Yes, resident of [facility]. Did you notice or did patient express any vision difficulties? No       Advance Care Planning:   Patient was offered the opportunity to discuss advance care planning:  yes    Does patient have an Advance Directive:  ACP on file     HPI    Review of Systems   Constitutional: Positive for malaise/fatigue. Negative for chills and fever. HENT: Positive for congestion. Respiratory: Positive for cough and shortness of breath. Negative for sputum production. Cardiovascular: Positive for leg swelling. Negative for chest pain. Gastrointestinal: Negative for abdominal pain. Genitourinary: Negative. Musculoskeletal: Negative. Skin: Negative. Neurological: Negative. Endo/Heme/Allergies: Negative. Psychiatric/Behavioral: Negative. Physical Exam   Constitutional: She appears well-developed. No distress. HENT:   Head: Normocephalic and atraumatic. Nose: Nose normal.   Mouth/Throat: Oropharynx is clear and moist. No oropharyngeal exudate. Neck: Neck supple. Cardiovascular: Normal rate, regular rhythm and intact distal pulses. Pulmonary/Chest: Effort normal and breath sounds normal. No respiratory distress. She has no wheezes. She has no rales. Abdominal: Soft. She exhibits no distension. Musculoskeletal: She exhibits edema. 1+ bilateral lower extremity edema   Neurological: She is alert. Oriented to self, place  Answering simple questions appropriately   Skin: Skin is warm and dry. Psychiatric: She has a normal mood and affect. ASSESSMENT and PLAN  Encounter Diagnoses   Name Primary?     CHRISTOPHER (dyspnea on exertion) Yes    Shortness of breath     Cough     Congestive heart failure, unspecified HF chronicity, unspecified heart failure type ([address]     Medicare annual wellness visit, subsequent      Orders Placed This Encounter    XR CHEST SNGL V    albuterol (PROVENTIL HFA, VENTOLIN HFA, PROAIR HFA) 90 mcg/actuation inhaler     Sig: Take 2 Puffs by inhalation every four (4) hours as needed for Wheezing. Dispense:  1 Inhaler     Refill:  0    furosemide (LASIX) 40 mg tablet     Si tab po in the morning and 1/2 tab po at noon x 3 days, then return to Lasix 40 mg po daily (6 days out of the week)     Dispense:  6 Tab     Refill:  0       Lab results and schedule of future lab studies reviewed   Reviewed medications and side effects in detail     Patient encouraged to call or return to office if symptoms do not improve or worsen. Reviewed plan of care with patient who acknowledges understanding and agrees. If experiencing severe shortness of breath or chest pain, present to the ER. Follow-up in 3 days. Discussed above plan of care with Dr. Dary Mirza. *Patient verbalized understanding and agreement with the plan. A copy of the After Visit Summary with personalized health plan was given to the patient today.

## 2018-10-08 ENCOUNTER — HOSPITAL ENCOUNTER (OUTPATIENT)
Dept: LAB | Age: 83
Discharge: HOME | End: 2018-10-08
Payer: MEDICARE

## 2018-10-08 ENCOUNTER — OFFICE VISIT (OUTPATIENT)
Dept: INTERNAL MEDICINE CLINIC | Age: 83
End: 2018-10-08

## 2018-10-08 VITALS
RESPIRATION RATE: 26 BRPM | SYSTOLIC BLOOD PRESSURE: 104 MMHG | HEART RATE: 87 BPM | BODY MASS INDEX: 31.87 KG/M2 | WEIGHT: 168.8 LBS | DIASTOLIC BLOOD PRESSURE: 69 MMHG | TEMPERATURE: 98.6 F | OXYGEN SATURATION: 93 % | HEIGHT: 61 IN

## 2018-10-08 DIAGNOSIS — N28.9 RENAL INSUFFICIENCY: ICD-10-CM

## 2018-10-08 DIAGNOSIS — R63.5 WEIGHT GAIN: ICD-10-CM

## 2018-10-08 DIAGNOSIS — R06.09 DOE (DYSPNEA ON EXERTION): Primary | ICD-10-CM

## 2018-10-08 DIAGNOSIS — G89.29 CHRONIC MIDLINE LOW BACK PAIN WITHOUT SCIATICA: ICD-10-CM

## 2018-10-08 DIAGNOSIS — M54.50 CHRONIC MIDLINE LOW BACK PAIN WITHOUT SCIATICA: ICD-10-CM

## 2018-10-08 DIAGNOSIS — I50.9 CONGESTIVE HEART FAILURE, UNSPECIFIED HF CHRONICITY, UNSPECIFIED HEART FAILURE TYPE (HCC): ICD-10-CM

## 2018-10-08 PROCEDURE — 80048 BASIC METABOLIC PNL TOTAL CA: CPT

## 2018-10-08 RX ORDER — TRAMADOL HYDROCHLORIDE 50 MG/1
25 TABLET ORAL
Qty: 15 TAB | Refills: 0 | Status: SHIPPED | OUTPATIENT
Start: 2018-10-08 | End: 2018-11-02 | Stop reason: SDUPTHER

## 2018-10-08 NOTE — PROGRESS NOTES
Health Maintenance Due   Topic Date Due    DTaP/Tdap/Td series (1 - Tdap) 05/26/1947    Shingrix Vaccine Age 50> (1 of 2) 05/26/1976    Influenza Age 5 to Adult  08/01/2018       Chief Complaint   Patient presents with    Breathing Problem     SOB on exertion, unable to walk distances, F/U 10/5/18    CHF       1. Have you been to the ER, urgent care clinic since your last visit? Hospitalized since your last visit? No    2. Have you seen or consulted any other health care providers outside of the [address] [place] [address] since your last visit? Include any pap smears or colon screening. No    3) Do you have an Advance Directive on file? no    4) Are you interested in receiving information on Advance Directives? NO      Patient is accompanied by self I have received verbal consent from Guilherme Ashford to discuss any/all medical information while they are present in the room.

## 2018-10-08 NOTE — PROGRESS NOTES
HISTORY OF PRESENT ILLNESS  Anner Riedel is a 80 y.o. female. This is a patient of Dr. Henrry Moreland who presents today from [facility] for follow-up of shortness of breath. At time of last visit, patient's Lasix dosing was increased to 40 mg in the morning and 20 mg at noon x 3 days. Patient states she has continued with increased shortness of breath with activity. She states she has had difficulty walking down to the dining room due to this. She continues with mild non-productive cough. She denies chest pain. Per chart review, patient has had recent hospitalization in August 2018 for pneumonia and acute CHF. Visit Vitals    /69 (BP 1 Location: Right arm, BP Patient Position: Sitting)    Pulse 87    Temp 98.6 °F (37 °C) (Oral)    Resp 26    Ht 5' 1\" (1.549 m)    Wt 168 lb 12.8 oz (76.6 kg)    SpO2 93%    BMI 31.89 kg/m2     HPI    Review of Systems   Constitutional: Negative for chills and fever. HENT: Negative for congestion. Respiratory: Positive for shortness of breath. Negative for sputum production. Cardiovascular: Positive for leg swelling. Negative for chest pain. Gastrointestinal: Negative for abdominal pain. Genitourinary: Negative. Musculoskeletal: Negative. Skin: Negative. Neurological: Negative. Endo/Heme/Allergies: Negative. Psychiatric/Behavioral: Negative. Physical Exam   Constitutional: She appears well-developed. No distress. HENT:   Head: Normocephalic and atraumatic. Nose: Nose normal.   Mouth/Throat: Oropharynx is clear and moist. No oropharyngeal exudate. Neck: Neck supple. Cardiovascular: Normal rate, regular rhythm and intact distal pulses. Pulmonary/Chest: Effort normal and breath sounds normal. No respiratory distress. She has no wheezes. She has no rales. Abdominal: Soft. She exhibits no distension. Musculoskeletal: She exhibits edema. 1+ bilateral lower extremity edema   Neurological: She is alert. Oriented to self, place  Answering simple questions appropriately   Skin: Skin is warm and dry. Psychiatric: She has a normal mood and affect. ASSESSMENT and PLAN  Encounter Diagnoses   Name Primary?  CHRISTOPHER (dyspnea on exertion) Yes    Weight gain     Congestive heart failure, unspecified HF chronicity, unspecified heart failure type ([address]     Renal insufficiency     Chronic midline low back pain without sciatica      Orders Placed This Encounter    Collected in office today, METABOLIC PANEL, BASIC    Will refer, [facility],  Appt scheduled for 10/12/18. Nursing scheduled and notified patient's daughter of appt.  Per KAYLYN, patient requires refill:  traMADol (ULTRAM) 50 mg tablet, 1/2 tab po qhs #15, 0 refills. [facility]  reviewed. Lab results and schedule of future lab studies reviewed   Reviewed medications and side effects in detail    If experiencing severe shortness of breath or chest pain, present to the ER. Patient encouraged to call or return to office if symptoms do not improve or worsen. Reviewed plan of care with patient who acknowledges understanding and agrees. Nursing to continue to monitor closely and notify MD/NP of any change in condition. Discussed above plan of care with Dr. Erma Liu.

## 2018-10-09 ENCOUNTER — HOSPITAL ENCOUNTER (OUTPATIENT)
Dept: PREADMISSION TESTING | Age: 83
Discharge: HOME | End: 2018-10-09

## 2018-10-09 VITALS — DIASTOLIC BLOOD PRESSURE: 68 MMHG | HEART RATE: 73 BPM | SYSTOLIC BLOOD PRESSURE: 106 MMHG | TEMPERATURE: 97.7 F

## 2018-10-09 RX ORDER — METOPROLOL SUCCINATE 25 MG/1
25 TABLET, EXTENDED RELEASE ORAL
COMMUNITY
End: 2019-01-01 | Stop reason: SDUPTHER

## 2018-10-10 LAB
BUN SERPL-MCNC: 33 MG/DL (ref 10–36)
BUN/CREAT SERPL: 22 (ref 12–28)
CALCIUM SERPL-MCNC: ABNORMAL MG/DL
CHLORIDE SERPL-SCNC: ABNORMAL MMOL/L
CO2 SERPL-SCNC: ABNORMAL MMOL/L
CREAT SERPL-MCNC: 1.48 MG/DL (ref 0.57–1)
GLUCOSE SERPL-MCNC: 97 MG/DL (ref 65–99)
INTERPRETATION: NORMAL
POTASSIUM SERPL-SCNC: ABNORMAL MMOL/L
SODIUM SERPL-SCNC: ABNORMAL MMOL/L

## 2018-10-13 NOTE — PROGRESS NOTES
Called and left a VM message for patient's daughter with lab information and reminder of pt's upcoming appt with Dr. Opal Patterson on 10/16/18. Asked her to call with any questions or concerns.

## 2018-10-16 ENCOUNTER — OFFICE VISIT (OUTPATIENT)
Dept: INTERNAL MEDICINE CLINIC | Age: 83
End: 2018-10-16

## 2018-10-16 VITALS
DIASTOLIC BLOOD PRESSURE: 55 MMHG | HEIGHT: 61 IN | HEART RATE: 80 BPM | WEIGHT: 160.2 LBS | SYSTOLIC BLOOD PRESSURE: 108 MMHG | OXYGEN SATURATION: 93 % | BODY MASS INDEX: 30.25 KG/M2 | RESPIRATION RATE: 20 BRPM

## 2018-10-16 DIAGNOSIS — I10 ESSENTIAL HYPERTENSION: ICD-10-CM

## 2018-10-16 DIAGNOSIS — J30.1 ALLERGIC RHINITIS DUE TO POLLEN, UNSPECIFIED SEASONALITY: ICD-10-CM

## 2018-10-16 DIAGNOSIS — I50.22 CHRONIC SYSTOLIC CONGESTIVE HEART FAILURE (HCC): Primary | ICD-10-CM

## 2018-10-16 DIAGNOSIS — I25.10 CORONARY ARTERY DISEASE DUE TO LIPID RICH PLAQUE: ICD-10-CM

## 2018-10-16 DIAGNOSIS — I25.83 CORONARY ARTERY DISEASE DUE TO LIPID RICH PLAQUE: ICD-10-CM

## 2018-10-16 DIAGNOSIS — C44.310 BASAL CELL CARCINOMA (BCC) OF SKIN OF FACE, UNSPECIFIED PART OF FACE: ICD-10-CM

## 2018-10-16 RX ORDER — FUROSEMIDE 40 MG/1
40 TABLET ORAL DAILY
Qty: 24 TAB | Refills: 3 | Status: SHIPPED | OUTPATIENT
Start: 2018-10-16 | End: 2018-11-13 | Stop reason: SDUPTHER

## 2018-10-16 RX ORDER — FLUTICASONE PROPIONATE 50 MCG
2 SPRAY, SUSPENSION (ML) NASAL DAILY
Qty: 1 BOTTLE | Refills: 0 | Status: SHIPPED | OUTPATIENT
Start: 2018-10-16

## 2018-10-16 RX ORDER — MINERAL OIL
180 ENEMA (ML) RECTAL
Qty: 30 TAB | Refills: 0 | Status: SHIPPED | OUTPATIENT
Start: 2018-10-16

## 2018-10-16 NOTE — MR AVS SNAPSHOT
[address] [place] [address]. [place] [id number] [phone] Patient: Alessia Beauchamp MRN: [id number] Visit Information Date & Time Provider Department Dept. Phone Encounter #  
 10/16/2018 10:30 AM Natalie Szymanski MD [facility] Internal Medicine [facility] [phone] [id number] Upcoming Health Maintenance Date Due DTaP/Tdap/Td series (1 - Tdap) 1947 Shingrix Vaccine Age 50> (1 of 2) 1976 Influenza Age 5 to Adult 2018 GLAUCOMA SCREENING Q2Y 2019 MEDICARE YEARLY EXAM 10/6/2019 Pneumococcal 65+ Low/Medium Risk (2 of 2 - PPSV23) 2019 Allergies as of 10/16/2018  Review Complete On: 10/16/2018 By: Chela Salazar Severity Noted Reaction Type Reactions Neurontin [Gabapentin]  2015    Unknown (comments) Pt unknown reaction Current Immunizations  Reviewed on 10/16/2018 Name Date Influenza High Dose Vaccine PF 10/11/2018 Influenza Vaccine 12/10/2015, 11/15/2014, 2013 Pneumococcal Vaccine (Unspecified Type) 2014 Zoster Vaccine, Live 2017 Reviewed by Chela Salazar on 10/16/2018 at 10:44 AM  
You Were Diagnosed With   
  
 Codes Comments Chronic systolic congestive heart failure (HCC)    -  Primary ICD-10-CM: D05.13 ICD-9-CM: 428.22, 428.0 Basal cell carcinoma (BCC) of skin of face, unspecified part of face     ICD-10-CM: C44.310 ICD-9-CM: 173.31 Essential hypertension     ICD-10-CM: I10 
ICD-9-CM: 401.9 Coronary artery disease due to lipid rich plaque     ICD-10-CM: I25.10, I25.83 ICD-9-CM: 414.00, 414.3 Allergic rhinitis due to pollen, unspecified seasonality     ICD-10-CM: J30.1 ICD-9-CM: 477.0 Vitals BP Pulse Resp Height(growth percentile) Weight(growth percentile) SpO2  
 108/55 (BP 1 Location: Right arm, BP Patient Position: Sitting) 80 20 5' 1\" (1.549 m) 160 lb 3.2 oz (72.7 kg) 93% BMI OB Status Smoking Status 30.27 kg/m2 Postmenopausal Never Smoker Vitals History BMI and BSA Data Body Mass Index Body Surface Area  
 30.27 kg/m 2 1.77 m 2 Preferred Pharmacy Pharmacy Name Phone Pallavi Mena [facility] [phone] Your Updated Medication List  
  
   
This list is accurate as of 10/16/18 10:59 AM.  Always use your most recent med list.  
  
  
  
  
 acetaminophen 500 mg tablet Commonly known as:  TYLENOL Take 500 mg by mouth every six (6) hours as needed for Pain. * albuterol 2.5 mg /3 mL (0.083 %) nebulizer solution Commonly known as:  PROVENTIL VENTOLIN  
INHALE THE CONTENTS OF 1 VIAL VIA NEBULIZER EVERY 6 HOURS AS NEEDED FOR WHEEZING  
  
 * albuterol 90 mcg/actuation inhaler Commonly known as:  PROVENTIL HFA Take 1 Puff by inhalation every six (6) hours as needed for Wheezing. * albuterol 90 mcg/actuation inhaler Commonly known as:  PROVENTIL HFA, VENTOLIN HFA, PROAIR HFA Take 2 Puffs by inhalation every four (4) hours as needed for Wheezing. ammonium lactate 12 % lotion Commonly known as:  LAC-HYDRIN  
  
 calcium-cholecalciferol (D3) tablet Commonly known as:  CALTRATE 600+D TAKE 1 TABLET BY MOUTH TWICE DAILY DX: SUPPLEMENT  
  
 DOC-Q-LACE 100 mg capsule Generic drug:  docusate sodium TAKE 1 CAPSULE BY MOUTH TWICE DAILY DX:BOWEL SUPPORT  
  
 donepezil 5 mg tablet Commonly known as:  ARICEPT  
TAKE 1 TABLET BY MOUTH EVERY NIGHT AT BEDTIME DX: MEMORY SUPPORT  
  
 ELIQUIS 2.5 mg tablet Generic drug:  apixaban TAKE 1 TABLET BY MOUTH 2 TIMES A DAY. DX ANTICOAGULANT  
  
 fexofenadine 180 mg tablet Commonly known as:  Lonia Limerick Take 1 Tab by mouth daily as needed for Allergies. fluticasone 50 mcg/actuation nasal spray Commonly known as:  Eloise Renteria 2 Sprays by Both Nostrils route daily. furosemide 40 mg tablet Commonly known as:  LASIX Take 1 Tab by mouth daily. 6 times per week  
  
 hydrocortisone 2.5 % lotion Commonly known as:  HYTONE  
  
 lisinopril 5 mg tablet Commonly known as:  Bonnita Silk Take 1 Tab by mouth daily. metoprolol succinate 25 mg XL tablet Commonly known as:  TOPROL-XL Take 25 mg by mouth daily. 1/2 TAB FOR CHF MELLISSA-COLACE 8.6-50 mg per tablet Generic drug:  senna-docusate Take 1 Tab by mouth daily. pravastatin 20 mg tablet Commonly known as:  PRAVACHOL Take 1 Tab by mouth nightly. D/C pravastatin 40 mg tab  
  
 raNITIdine 150 mg tablet Commonly known as:  ZANTAC Take 150 mg by mouth nightly as needed for Indigestion. traMADol 50 mg tablet Commonly known as:  ULTRAM  
Take 0.5 Tabs by mouth nightly. Max Daily Amount: 25 mg.  
  
 venlafaxine-SR 75 mg capsule Commonly known as:  EFFEXOR-XR Take 1 Cap by mouth daily. VITAMIN B-12 500 mcg tablet Generic drug:  cyanocobalamin TAKE 1 TABLET BY MOUTH EVERY DAY DX: SUPPLEMENT * Notice: This list has 3 medication(s) that are the same as other medications prescribed for you. Read the directions carefully, and ask your doctor or other care provider to review them with you. Prescriptions Sent to Pharmacy Refills  
 fluticasone (FLONASE) 50 mcg/actuation nasal spray 0 Si Sprays by Both Nostrils route daily. Class: Normal  
 Pharmacy: [address], [address] Ph #: [phone] Route: Both Nostrils  
 fexofenadine (ALLEGRA) 180 mg tablet 0 Sig: Take 1 Tab by mouth daily as needed for Allergies. Class: Normal  
 Pharmacy: [address], [address] Ph #: [phone] Route: Oral  
 furosemide (LASIX) 40 mg tablet 3 Sig: Take 1 Tab by mouth daily. 6 times per week  Class: Normal  
 Pharmacy: [address], [address]  #: [phone] Route: Oral  
  
We Performed the Following REFERRAL TO DERMATOLOGY [REF19 Custom] Referral Information Referral ID Referred By Referred To  
  
 [id number] FERNANDO [facility] Dermatology specialist   
   [address] [facility] [address], [address] Visits Status Start Date End Date 1 New Request 10/16/18 10/16/19 If your referral has a status of pending review or denied, additional information will be sent to support the outcome of this decision. Introducing \[facility] [place] [facility]\"" & HEALTH SERVICES! [employer] introduces mechatronic system[employer]ik patient portal. Now you can access parts of your medical record, email your doctor's office, and request medication refills online. 1. In your internet browser, go to [email or web address] 2. Click on the First Time User? Click Here link in the Sign In box. You will see the New Member Sign Up page. 3. Enter your mechatronic system[employer]ik Access Code exactly as it appears below. You will not need to use this code after youve completed the sign-up process. If you do not sign up before the expiration date, you must request a new code. · mechatronic systemtechnik Access Code: [email or web address] Expires: 10/31/2018 11:07 AM 
 
4. Enter the last four digits of your Social Security Number (xxxx) and Date of Birth (mm/dd/yyyy) as indicated and click Submit. You will be taken to the next sign-up page. 5. Create a mechatronic system[employer]ik ID. This will be your mechatronic system[employer] login ID and cannot be changed, so think of one that is secure and easy to remember. 6. Create a mechatronic [employer] password. You can change your password at any time. 7. Enter your Password Reset Question and Answer. This can be used at a later time if you forget your password. 8. Enter your e-mail address. You will receive e-mail notification when new information is available in [address] 9. Click Sign Up. You can now view and download portions of your medical record. 10. Click the Download Summary menu link to download a portable copy of your medical information. If you have questions, please visit the Frequently Asked Questions section of the Mine website. Remember, Mine is NOT to be used for urgent needs. For medical emergencies, dial 911. Now available from your iPhone and Android! Please provide this summary of care documentation to your next provider. Your primary care clinician is listed as Torey Arias. If you have any questions after today's visit, please call [phone].

## 2018-10-16 NOTE — PROGRESS NOTES
Health Maintenance Due Topic Date Due  
 DTaP/Tdap/Td series (1 - Tdap) 05/26/1947  Shingrix Vaccine Age 50> (1 of 2) 05/26/1976  Influenza Age 5 to Adult  08/01/2018 Chief Complaint Patient presents with  Hypertension 4 month f/up  CHF  Dementia 1. Have you been to the ER, urgent care clinic since your last visit? Hospitalized since your last visit? No 
 
2. Have you seen or consulted any other health care providers outside of the [address] [place] [address] since your last visit? Include any pap smears or colon screening. No 
 
3) Do you have an Advance Directive on file? yes 4) Are you interested in receiving information on Advance Directives? NO Patient is accompanied by self I have received verbal consent from Rere Alexander to discuss any/all medical information while they are present in the room.

## 2018-10-16 NOTE — PROGRESS NOTES
HISTORY OF PRESENT ILLNESS Lazaro Liz is a 80 y.o. female here for follow-up. Has congestive heart failure. Taking Lasix for 6 days a week. No shortness of breath orthopnea. Has depression, under control. Has nasal congestion and postnasal drip. Need medicine. Noticed to have a mass on her left forehead. He did see dermatologist yet Her memory is getting worse. Already have dementia, taking medications. Has coronary artery disease and hyperlipidemia, on medications. All medications reconciled, all labs and imaging reviewed. Hypertension Pertinent negatives include no orthopnea and no PND. CHF Dementia Her past medical history is significant for hypertension. Review of Systems Constitutional: Negative. HENT: Negative. Eyes: Negative. Respiratory: Negative. Cardiovascular: Positive for leg swelling. Negative for orthopnea and PND. Gastrointestinal: Negative. Genitourinary: Negative. Skin: Negative. Neurological: Negative. Endo/Heme/Allergies: Negative. Psychiatric/Behavioral: Positive for depression. The patient is nervous/anxious. Physical Exam  
Constitutional: She appears well-developed and well-nourished. No distress. Neck: Normal range of motion. Neck supple. No JVD present. No thyromegaly present. Cardiovascular: Normal rate, regular rhythm, normal heart sounds and intact distal pulses. Pulmonary/Chest: Effort normal and breath sounds normal. No respiratory distress. She has no wheezes. She has no rales. Abdominal: Soft. Bowel sounds are normal. She exhibits no distension. There is no tenderness. Musculoskeletal: She exhibits no tenderness. Trace edema present bilaterally. Skin:  
Left-sided forehead, near eyebrow: Approximately 5 x 7 cm size circular skin lesion with smooth pearly base. Some necrotic tissue in the middle. Probably basal cell carcinoma. Psychiatric: She has a normal mood and affect. Her behavior is normal.  
 
 
ASSESSMENT and PLAN Diagnoses and all orders for this visit: 
 
1. Chronic systolic congestive heart failure ([address] Compensated. No shortness of breath. We will continue, 
 
-     furosemide (LASIX) 40 mg tablet; Take 1 Tab by mouth daily. 6 times per week Be on low-salt diet. 2. Basal cell carcinoma (BCC) of skin of face, unspecified part of face Looks like a large basal cell carcinoma on left forehead. In the past she has seen plastic surgeon Dr. BETHEA, cell count carcinoma. Will refer, 
-     REFERRAL TO DERMATOLOGY 3. Essential hypertension Stable on current regimen. Will continue same. 4. Coronary artery disease due to lipid rich plaque On Eliquis, Toprol-XL and lisinopril. Stable. 5. Allergic rhinitis due to pollen, unspecified seasonality Will give, 
-     fluticasone (FLONASE) 50 mcg/actuation nasal spray; 2 Sprays by Both Nostrils route daily. -     fexofenadine (ALLEGRA) 180 mg tablet; Take 1 Tab by mouth daily as needed for Allergies. Discussed expected course/resolution/complications of diagnosis in detail with patient. Medication risks/benefits/costs/interactions/alternatives discussed with patient. Pt was given an after visit summary which includes diagnoses, current medications & vitals. Pt expressed understanding with the diagnosis and plan.

## 2018-10-18 ENCOUNTER — TELEPHONE (OUTPATIENT)
Dept: INTERNAL MEDICINE CLINIC | Age: 83
End: 2018-10-18

## 2018-10-18 NOTE — TELEPHONE ENCOUNTER
Called Gladys Beal and discussed her mothers appt on Tuesday. Also discussed the area on her face. Per Gladys Beal, Dr. Alexus Ba is removing this area on 10/22. Advised that I would notify Dr. Dary Mirza. Gladys Beal voiced thanks and understanding.

## 2018-10-18 NOTE — TELEPHONE ENCOUNTER
Please call the daughter about her appointment from Tuesday she was unable to attend and wants an update

## 2018-10-22 ENCOUNTER — ANESTHESIA EVENT (OUTPATIENT)
Dept: SURGERY | Age: 83
End: 2018-10-22
Payer: MEDICARE

## 2018-10-22 ENCOUNTER — HOSPITAL ENCOUNTER (OUTPATIENT)
Age: 83
Setting detail: OUTPATIENT SURGERY
Discharge: HOME | End: 2018-10-22
Attending: SURGERY | Admitting: SURGERY
Payer: MEDICARE

## 2018-10-22 ENCOUNTER — ANESTHESIA (OUTPATIENT)
Dept: SURGERY | Age: 83
End: 2018-10-22
Payer: MEDICARE

## 2018-10-22 VITALS
BODY MASS INDEX: 31.28 KG/M2 | HEIGHT: 62 IN | SYSTOLIC BLOOD PRESSURE: 101 MMHG | RESPIRATION RATE: 26 BRPM | HEART RATE: 75 BPM | DIASTOLIC BLOOD PRESSURE: 53 MMHG | TEMPERATURE: 98 F | WEIGHT: 170 LBS | OXYGEN SATURATION: 92 %

## 2018-10-22 PROCEDURE — 74011250637 HC RX REV CODE- 250/637: Performed by: ANESTHESIOLOGY

## 2018-10-22 PROCEDURE — 76210000016 HC OR PH I REC 1 TO 1.5 HR: Performed by: SURGERY

## 2018-10-22 PROCEDURE — 76010000153 HC OR TIME 1.5 TO 2 HR: Performed by: SURGERY

## 2018-10-22 PROCEDURE — 74011250636 HC RX REV CODE- 250/636

## 2018-10-22 PROCEDURE — 88331 PATH CONSLTJ SURG 1 BLK 1SPC: CPT | Performed by: SURGERY

## 2018-10-22 PROCEDURE — 77030020782 HC GWN BAIR PAWS FLX 3M -B

## 2018-10-22 PROCEDURE — 77030002986 HC SUT PROL J&J -A: Performed by: SURGERY

## 2018-10-22 PROCEDURE — 77030002996 HC SUT SLK J&J -A: Performed by: SURGERY

## 2018-10-22 PROCEDURE — 77030011640 HC PAD GRND REM COVD -A: Performed by: SURGERY

## 2018-10-22 PROCEDURE — 77030031139 HC SUT VCRL2 J&J -A: Performed by: SURGERY

## 2018-10-22 PROCEDURE — 76060000034 HC ANESTHESIA 1.5 TO 2 HR: Performed by: SURGERY

## 2018-10-22 PROCEDURE — 88305 TISSUE EXAM BY PATHOLOGIST: CPT | Performed by: SURGERY

## 2018-10-22 PROCEDURE — 76210000021 HC REC RM PH II 0.5 TO 1 HR: Performed by: SURGERY

## 2018-10-22 PROCEDURE — 74011000250 HC RX REV CODE- 250: Performed by: SURGERY

## 2018-10-22 RX ORDER — ACETAMINOPHEN 325 MG/1
650 TABLET ORAL
Status: COMPLETED | OUTPATIENT
Start: 2018-10-22 | End: 2018-10-22

## 2018-10-22 RX ORDER — ALBUTEROL SULFATE 0.83 MG/ML
2.5 SOLUTION RESPIRATORY (INHALATION) AS NEEDED
Status: DISCONTINUED | OUTPATIENT
Start: 2018-10-22 | End: 2018-10-22 | Stop reason: HOSPADM

## 2018-10-22 RX ORDER — SODIUM CHLORIDE, SODIUM LACTATE, POTASSIUM CHLORIDE, CALCIUM CHLORIDE 600; 310; 30; 20 MG/100ML; MG/100ML; MG/100ML; MG/100ML
1000 INJECTION, SOLUTION INTRAVENOUS CONTINUOUS
Status: DISCONTINUED | OUTPATIENT
Start: 2018-10-22 | End: 2018-10-22 | Stop reason: HOSPADM

## 2018-10-22 RX ORDER — MIDAZOLAM HYDROCHLORIDE 1 MG/ML
1 INJECTION, SOLUTION INTRAMUSCULAR; INTRAVENOUS AS NEEDED
Status: DISCONTINUED | OUTPATIENT
Start: 2018-10-22 | End: 2018-10-22 | Stop reason: HOSPADM

## 2018-10-22 RX ORDER — DIPHENHYDRAMINE HYDROCHLORIDE 50 MG/ML
12.5 INJECTION, SOLUTION INTRAMUSCULAR; INTRAVENOUS AS NEEDED
Status: DISCONTINUED | OUTPATIENT
Start: 2018-10-22 | End: 2018-10-22 | Stop reason: HOSPADM

## 2018-10-22 RX ORDER — FENTANYL CITRATE 50 UG/ML
25 INJECTION, SOLUTION INTRAMUSCULAR; INTRAVENOUS
Status: DISCONTINUED | OUTPATIENT
Start: 2018-10-22 | End: 2018-10-22 | Stop reason: HOSPADM

## 2018-10-22 RX ORDER — SODIUM CHLORIDE 9 MG/ML
25 INJECTION, SOLUTION INTRAVENOUS CONTINUOUS
Status: DISCONTINUED | OUTPATIENT
Start: 2018-10-22 | End: 2018-10-22 | Stop reason: HOSPADM

## 2018-10-22 RX ORDER — PROPOFOL 10 MG/ML
INJECTION, EMULSION INTRAVENOUS AS NEEDED
Status: DISCONTINUED | OUTPATIENT
Start: 2018-10-22 | End: 2018-10-22 | Stop reason: HOSPADM

## 2018-10-22 RX ORDER — GLYCOPYRROLATE 0.2 MG/ML
0.2 INJECTION INTRAMUSCULAR; INTRAVENOUS
Status: DISCONTINUED | OUTPATIENT
Start: 2018-10-22 | End: 2018-10-22 | Stop reason: HOSPADM

## 2018-10-22 RX ORDER — PROPOFOL 10 MG/ML
INJECTION, EMULSION INTRAVENOUS
Status: DISCONTINUED | OUTPATIENT
Start: 2018-10-22 | End: 2018-10-22 | Stop reason: HOSPADM

## 2018-10-22 RX ORDER — ACETAMINOPHEN 325 MG/1
TABLET ORAL
Status: DISCONTINUED
Start: 2018-10-22 | End: 2018-10-22 | Stop reason: HOSPADM

## 2018-10-22 RX ORDER — EPHEDRINE SULFATE 50 MG/ML
5 INJECTION, SOLUTION INTRAVENOUS AS NEEDED
Status: DISCONTINUED | OUTPATIENT
Start: 2018-10-22 | End: 2018-10-22 | Stop reason: HOSPADM

## 2018-10-22 RX ORDER — HYDROCODONE BITARTRATE AND ACETAMINOPHEN 5; 325 MG/1; MG/1
1 TABLET ORAL AS NEEDED
Status: DISCONTINUED | OUTPATIENT
Start: 2018-10-22 | End: 2018-10-22 | Stop reason: HOSPADM

## 2018-10-22 RX ORDER — SODIUM CHLORIDE, SODIUM LACTATE, POTASSIUM CHLORIDE, CALCIUM CHLORIDE 600; 310; 30; 20 MG/100ML; MG/100ML; MG/100ML; MG/100ML
INJECTION, SOLUTION INTRAVENOUS
Status: DISCONTINUED | OUTPATIENT
Start: 2018-10-22 | End: 2018-10-22 | Stop reason: HOSPADM

## 2018-10-22 RX ORDER — CEFAZOLIN SODIUM 1 G/3ML
INJECTION, POWDER, FOR SOLUTION INTRAMUSCULAR; INTRAVENOUS AS NEEDED
Status: DISCONTINUED | OUTPATIENT
Start: 2018-10-22 | End: 2018-10-22 | Stop reason: HOSPADM

## 2018-10-22 RX ORDER — LIDOCAINE HYDROCHLORIDE 10 MG/ML
0.1 INJECTION, SOLUTION EPIDURAL; INFILTRATION; INTRACAUDAL; PERINEURAL AS NEEDED
Status: DISCONTINUED | OUTPATIENT
Start: 2018-10-22 | End: 2018-10-22 | Stop reason: HOSPADM

## 2018-10-22 RX ORDER — FENTANYL CITRATE 50 UG/ML
50 INJECTION, SOLUTION INTRAMUSCULAR; INTRAVENOUS AS NEEDED
Status: COMPLETED | OUTPATIENT
Start: 2018-10-22 | End: 2018-10-22

## 2018-10-22 RX ORDER — MORPHINE SULFATE 10 MG/ML
2 INJECTION, SOLUTION INTRAMUSCULAR; INTRAVENOUS
Status: DISCONTINUED | OUTPATIENT
Start: 2018-10-22 | End: 2018-10-22 | Stop reason: HOSPADM

## 2018-10-22 RX ORDER — ONDANSETRON 2 MG/ML
4 INJECTION INTRAMUSCULAR; INTRAVENOUS AS NEEDED
Status: DISCONTINUED | OUTPATIENT
Start: 2018-10-22 | End: 2018-10-22 | Stop reason: HOSPADM

## 2018-10-22 RX ORDER — LIDOCAINE HYDROCHLORIDE 20 MG/ML
INJECTION, SOLUTION EPIDURAL; INFILTRATION; INTRACAUDAL; PERINEURAL AS NEEDED
Status: DISCONTINUED | OUTPATIENT
Start: 2018-10-22 | End: 2018-10-22 | Stop reason: HOSPADM

## 2018-10-22 RX ORDER — MIDAZOLAM HYDROCHLORIDE 1 MG/ML
0.5 INJECTION, SOLUTION INTRAMUSCULAR; INTRAVENOUS
Status: DISCONTINUED | OUTPATIENT
Start: 2018-10-22 | End: 2018-10-22 | Stop reason: HOSPADM

## 2018-10-22 RX ORDER — ROPIVACAINE HYDROCHLORIDE 5 MG/ML
30 INJECTION, SOLUTION EPIDURAL; INFILTRATION; PERINEURAL AS NEEDED
Status: DISCONTINUED | OUTPATIENT
Start: 2018-10-22 | End: 2018-10-22 | Stop reason: HOSPADM

## 2018-10-22 RX ADMIN — SODIUM CHLORIDE, SODIUM LACTATE, POTASSIUM CHLORIDE, CALCIUM CHLORIDE: 600; 310; 30; 20 INJECTION, SOLUTION INTRAVENOUS at 09:38

## 2018-10-22 RX ADMIN — FENTANYL CITRATE 25 MCG: 50 INJECTION, SOLUTION INTRAMUSCULAR; INTRAVENOUS at 09:41

## 2018-10-22 RX ADMIN — LIDOCAINE HYDROCHLORIDE 40 MG: 20 INJECTION, SOLUTION EPIDURAL; INFILTRATION; INTRACAUDAL; PERINEURAL at 09:49

## 2018-10-22 RX ADMIN — FENTANYL CITRATE 25 MCG: 50 INJECTION, SOLUTION INTRAMUSCULAR; INTRAVENOUS at 09:38

## 2018-10-22 RX ADMIN — PROPOFOL 5 MCG/KG/MIN: 10 INJECTION, EMULSION INTRAVENOUS at 09:52

## 2018-10-22 RX ADMIN — CEFAZOLIN SODIUM 1 G: 1 INJECTION, POWDER, FOR SOLUTION INTRAMUSCULAR; INTRAVENOUS at 10:06

## 2018-10-22 RX ADMIN — PROPOFOL 30 MG: 10 INJECTION, EMULSION INTRAVENOUS at 09:49

## 2018-10-22 RX ADMIN — PROPOFOL 10 MG: 10 INJECTION, EMULSION INTRAVENOUS at 09:50

## 2018-10-22 RX ADMIN — FENTANYL CITRATE 25 MCG: 50 INJECTION, SOLUTION INTRAMUSCULAR; INTRAVENOUS at 09:46

## 2018-10-22 RX ADMIN — ACETAMINOPHEN 650 MG: 325 TABLET, FILM COATED ORAL at 13:12

## 2018-10-22 RX ADMIN — FENTANYL CITRATE 25 MCG: 50 INJECTION, SOLUTION INTRAMUSCULAR; INTRAVENOUS at 10:23

## 2018-10-22 NOTE — H&P
Pre-op History and Physical 
 
CC: BASAL CELL CARCINOMA LEFT TEMPLE AND NOSE  
HPI: 80y.o. year old female with BASAL CELL CARCINOMA LEFT TEMPLE AND NOSE for Procedure(s): EXCISION SQUAMOUS CELL CARCINOMA LEFT TEMPLE WITH FROZEN SECTION AND PEDICLE FLAP CLOSURE, EXCISION POSSIBLE BASAL CELL CARCINOMA RIGHT NOSE WITH FLAP CLOSURE. Past medical history:  
Past Medical History:  
Diagnosis Date  Altered mental state  Arrhythmia AFIB HISTORY  Arthritis BACK  Asthma  CAD (coronary artery disease)   
 by pass surgery in 2002,2008  Cancer ([facility] [address] SKIN CANCER ON FACE  Depression  Fibromyalgia   
 HTN (hypertension) 6/11/2013  Hypercholesterolemia  Ill-defined condition FOUR COMPRESSION FRACTURES IN BACK  Ill-defined condition DEMENTIA  PE (pulmonary embolism) DAUGHTER STATES NOT IN LUNG-IN LEG  
 Pneumonia 04/2014  Pneumonia, organism unspecified(486) 3/8/2014  Thyroid disease Past surgical history:  
Past Surgical History:  
Procedure Laterality Date  CARDIAC SURG PROCEDURE UNLIST    
 heart attack,bypass sx  HX HEENT  2016 SKIN CANCER REMOVED ON FACE Family history:  
Family History Problem Relation Age of Onset  No Known Problems Mother  No Known Problems Father  Anesth Problems Neg Hx Social history:  
Social History Socioeconomic History  Marital status:  Spouse name: Not on file  Number of children: Not on file  Years of education: Not on file  Highest education level: Not on file Social Needs  Financial resource strain: Not on file  Food insecurity - worry: Not on file  Food insecurity - inability: Not on file  Transportation needs - medical: Not on file  Transportation needs - non-medical: Not on file Occupational History  Not on file Tobacco Use  Smoking status: Never Smoker  Smokeless tobacco: Never Used Substance and Sexual Activity  Alcohol use: No  
 Drug use: No  
 Sexual activity: No  
  Birth control/protection: None Comment: ,4 children,reloacted from [place] to  independant living in  Other Topics Concern  Not on file Social History Narrative  Not on file Home Medications:  
Prior to Admission medications Medication Sig Start Date End Date Taking? Authorizing Provider  
fluticasone (FLONASE) 50 mcg/actuation nasal spray 2 Sprays by Both Nostrils route daily. 10/16/18   Opal Fay MD  
fexofenadine (ALLEGRA) 180 mg tablet Take 1 Tab by mouth daily as needed for Allergies. 10/16/18   Opal Fay MD  
furosemide (LASIX) 40 mg tablet Take 1 Tab by mouth daily. 6 times per week 10/16/18   Opal Fay MD  
metoprolol succinate (TOPROL-XL) 25 mg XL tablet Take 25 mg by mouth daily. 1/2 TAB FOR CHF    Provider, Historical  
traMADol (ULTRAM) 50 mg tablet Take 0.5 Tabs by mouth nightly. Max Daily Amount: 25 mg. 10/8/18   Alona Damon NP  
senna-docusate (MELLISSA-COLACE) 8.6-50 mg per tablet Take 1 Tab by mouth daily. Provider, Historical  
albuterol (PROVENTIL HFA, VENTOLIN HFA, PROAIR HFA) 90 mcg/actuation inhaler Take 2 Puffs by inhalation every four (4) hours as needed for Wheezing. 10/5/18   Alona Damon NP  
ammonium lactate (LAC-HYDRIN) 12 % lotion  9/13/18   Provider, Historical  
hydrocortisone (HYTONE) 2.5 % lotion  9/13/18   Provider, Historical  
venlafaxine-SR (EFFEXOR-XR) 75 mg capsule Take 1 Cap by mouth daily. 9/18/18   Opal Fay MD  
lisinopril (PRINIVIL, ZESTRIL) 5 mg tablet Take 1 Tab by mouth daily. Patient taking differently: Take 10 mg by mouth daily. 9/18/18   Opal Fay MD  
acetaminophen (TYLENOL) 500 mg tablet Take 500 mg by mouth every six (6) hours as needed for Pain. Provider, Historical  
pravastatin (PRAVACHOL) 20 mg tablet Take 1 Tab by mouth nightly.  D/C pravastatin 40 mg tab 4/10/18   Opal Fay MD  
 ELIQUIS 2.5 mg tablet TAKE 1 TABLET BY MOUTH 2 TIMES A DAY. DX ANTICOAGULANT 2/20/18   Radames Childs MD  
VITAMIN B-12 500 mcg tablet TAKE 1 TABLET BY MOUTH EVERY DAY DX: SUPPLEMENT 11/21/17   Radames Childs MD  
DOC-Q-LACE 100 mg capsule TAKE 1 CAPSULE BY MOUTH TWICE DAILY DX:BOWEL SUPPORT 11/21/17   Radames Childs MD  
donepezil (ARICEPT) 5 mg tablet TAKE 1 TABLET BY MOUTH EVERY NIGHT AT BEDTIME DX: MEMORY SUPPORT 11/21/17   Radames Childs MD  
albuterol (PROVENTIL HFA) 90 mcg/actuation inhaler Take 1 Puff by inhalation every six (6) hours as needed for Wheezing. 7/31/17   Radames Childs MD  
albuterol (PROVENTIL VENTOLIN) 2.5 mg /3 mL (0.083 %) nebulizer solution INHALE THE CONTENTS OF 1 VIAL VIA NEBULIZER EVERY 6 HOURS AS NEEDED FOR WHEEZING 7/25/17   Radames Childs MD  
calcium-cholecalciferol, D3, (CALTRATE 600+D) tablet TAKE 1 TABLET BY MOUTH TWICE DAILY DX: SUPPLEMENT 4/27/17   Radames Childs MD  
raNITIdine (ZANTAC) 150 mg tablet Take 150 mg by mouth nightly as needed for Indigestion. Provider, Historical  
  
Allergies: Allergies Allergen Reactions  Neurontin [Gabapentin] Unknown (comments) Pt unknown reaction Review of systems:  Denies headache, fever, chills, weight change, congestion, sore throat, chest pain, shortness of breath, nausea, vomiting, diarrhea, constipation, abdominal pain, generalized weakness, muscle or joint pain, and rash. Physical Exam: 
Vitals: Blood pressure 144/77, pulse 73, temperature 98.1 °F (36.7 °C), resp. rate 18, height 5' 2\" (1.575 m), weight 77.1 kg (170 lb), SpO2 96 %. General: awake and alert, NAD Neck: supple Breasts:  no known breast pathology Cor: RRR Lungs: clear Abdomen: soft, non-tender, non-distended Extremities: no edema Skin: squamous cell carcinoma, 3 x 3 cm mass on left temple that is almost certainly a SCCA, and 1.2 x .3 cm lesion right alar nasal dorsal junction that is probably a BCCA 
 
 Impression: probable SCCA left temporal forehead and probable BCCA right ala nasal dorsal junction. Plan:  Procedure(s): EXCISION SQUAMOUS CELL CARCINOMA LEFT TEMPLE WITH FROZEN SECTION AND PEDICLE FLAP CLOSURE, EXCISION POSSIBLE BASAL CELL CARCINOMA RIGHT NOSE WITH FLAP CLOSURE

## 2018-10-22 NOTE — DISCHARGE INSTRUCTIONS
Vandana Nicole M.D., F.A.C.S. Tahmina Cordero M.D., F.A.C.S.,  Payal Dillard III, M.D., F.A.C.S. Nikki Goncalves M.D.,  Cherelle Gonzalez M.D. Instructions after  Plastic Surgery Procedures      You have had a  surgical procedure. Please follow these simple instructions to help ensure a safe and comfortable recovery. Any questions can be directed to the office at [phone]. 1. If a bandage has been placed, it can be removed or changed at 24-48 hours after surgery. Wounds of the scalp are not usually bandaged, except in special situations. 2. Expect a modest amount of redness (inflammation) and possibly some bruising to appear in the days following your surgery. This is normal and will resolve as the wound heals, but may persist until the stitches have been removed. 3. The appearance of excessive wound redness, pus or fever is not normal and should be reported to the office. 4. Wounds may be gently washed with mild soap and water beginning 24 hours after surgery, then patted dry. Scalp wounds may be gently washed (mild shampoo) and gently dried as well. 5. Antibiotic ointment should be lightly applied 2-3 times per day to any wound. Replace Band-Aid or other dressing as instructed. 6. Suture removal will be arranged in 5-14 days, depending upon the site. The pathologists report will be discussed with you then. Your appointment is _______________________. 7. Mild to moderate pain is to be expected after minor surgery and will generally be relieved by the use of Tylenol or ibuprofen agents (Advil, Motrin, Nuprin, etc.) You have been given a prescription for ______________________. 8. Swelling or discomfort will be improved by elevating the surgical site above the level of the heart, especially the face, scalp or arms, which can be elevated in bed by extra pillows.     9. Do not be concerned if one or even several sutures come out prior to the time of suture removal. It is not unusual for this to occur as the wound swelling decreases. Support of the remaining sutures is sufficient to protect your wound(s). 10. If appropriate, copies of the surgery and pathology reports will be sent to your doctor. 11. Please call the office at [phone] if you have any questions. I acknowledge receipt of the above discharge instructions:    Patient/Guardian Signature _______________________________________ Date___________________    Reed Phy Signature_______________________________________________ Date___________________      ______________________________________________________________________    Anesthesia Discharge Instructions    After general anesthesia or intervenous sedation, for 24 hours or while taking prescription Narcotics:  · Limit your activities  · Do not drive or operate hazardous machinery  · If you have not urinated within 8 hours after discharge, please contact your surgeon on call. · Do not make important personal or business decisions  · Do not drink alcoholic beverages    Report the following to your surgeon:  · Excessive pain, swelling, redness or odor of or around the surgical area  · Temperature over 100.5 degrees  · Nausea and vomiting lasting longer than 4 hours or if unable to take medication  · Any signs of decreased circulation or nerve impairment to extremity:  Change in color, persistent numbness, tingling, coldness or increased pain.   · Any questions

## 2018-10-22 NOTE — ANESTHESIA PREPROCEDURE EVALUATION
Anesthetic History No history of anesthetic complications Review of Systems / Medical History Patient summary reviewed, nursing notes reviewed and pertinent labs reviewed Pulmonary Within defined limits Asthma Neuro/Psych Within defined limits Psychiatric history Cardiovascular Within defined limits Hypertension Dysrhythmias CAD Comments: EF 20% GI/Hepatic/Renal 
Within defined limits Endo/Other Within defined limits Hypothyroidism Arthritis Other Findings Physical Exam 
 
Airway Mallampati: II 
TM Distance: > 6 cm Neck ROM: normal range of motion Mouth opening: Normal 
 
 Cardiovascular Regular rate and rhythm,  S1 and S2 normal,  no murmur, click, rub, or gallop Dental 
No notable dental hx Pulmonary Breath sounds clear to auscultation Abdominal 
GI exam deferred Other Findings Anesthetic Plan ASA: 4 Anesthesia type: MAC Induction: Intravenous Anesthetic plan and risks discussed with: Patient

## 2018-10-22 NOTE — ANESTHESIA POSTPROCEDURE EVALUATION
Post-Anesthesia Evaluation and Assessment Patient: Coretta Mendez MRN: [id number]  SSN: [id number] YOB: 1926  Age: 80 y.o. Sex: female Cardiovascular Function/Vital Signs Visit Vitals /44 Pulse 72 Temp 37.1 °C (98.7 °F) Resp 26 Ht 5' 2\" (1.575 m) Wt 77.1 kg (170 lb) SpO2 97% BMI 31.09 kg/m² Patient is status post MAC anesthesia for Procedure(s): EXCISION SQUAMOUS CELL CARCINOMA LEFT TEMPLE WITH FROZEN SECTION AND PEDICLE FLAP CLOSURE, EXCISION POSSIBLE BASAL CELL CARCINOMA RIGHT NOSE WITH FLAP CLOSURE. Nausea/Vomiting: None Postoperative hydration reviewed and adequate. Pain: 
Pain Scale 1: Numeric (0 - 10) (10/22/18 1130) Pain Intensity 1: 0 (10/22/18 1130) Managed Neurological Status:  
Neuro (WDL): Exceptions to WDL (10/22/18 1130) Neuro Neurologic State: Eyes open spontaneously; Eyes open to voice; Confused(confusion same as preop per CRNA report) (10/22/18 1130) Orientation Level: Oriented to person;Disoriented to situation;Disoriented to time(same as preop) (10/22/18 1130) Cognition: Follows commands (10/22/18 1130) LUE Motor Response: Purposeful (10/22/18 1130) LLE Motor Response: Purposeful (10/22/18 1130) RUE Motor Response: Purposeful (10/22/18 1130) RLE Motor Response: Purposeful (10/22/18 1130) At baseline Mental Status, Level of Consciousness: Alert and  oriented to person, place, and time Pulmonary Status:  
O2 Device: Room air (10/22/18 1130) Adequate oxygenation and airway patent Complications related to anesthesia: None Post-anesthesia assessment completed. No concerns Signed By: Tosha Bethea MD   
 October 22, 2018 Procedure(s): EXCISION SQUAMOUS CELL CARCINOMA LEFT TEMPLE WITH FROZEN SECTION AND PEDICLE FLAP CLOSURE, EXCISION POSSIBLE BASAL CELL CARCINOMA RIGHT NOSE WITH FLAP CLOSURE. 
 
<BSHSIANPOST> Visit Vitals /44 Pulse 72 Temp 37.1 °C (98.7 °F) Resp 26 Ht 5' 2\" (1.575 m) Wt 77.1 kg (170 lb) SpO2 97% BMI 31.09 kg/m²

## 2018-10-23 NOTE — OP NOTES
[address] 
OPERATIVE REPORT Ubaldo Hodgkin 
MR#: [id number] : 1926 ACCOUNT #: [de-identified] DATE OF SERVICE: 10/22/2018 SURGEON:  Gracie Soto MD 
 
ASSISTANT:  none PREOPERATIVE DIAGNOSES:   
1.  A 3 x 3 cm squamous cell carcinoma of the left temple forehead. 2.  A 1.5 x 0.4 cm basal cell carcinoma of the right ala nasal tip junction. POSTOPERATIVE DIAGNOSES:   
1.  A 3 x 3 cm squamous cell carcinoma of the left temple forehead. 2.  A 1.5 x 0.4 cm basal cell carcinoma of the right ala nasal tip junction. PROCEDURES PERFORMED: 
1. Excision of squamous cell carcinoma, left temporal forehead, with 6 mm margins and pedicled flap closure of a temporal defect based on the superficial temporal artery and vein. 2.  Excision of basal cell carcinoma, right nasal ala, with 3 mm margins and flap closure of a 3.5 sq cm nasal defect. INDICATIONS:  This 80year-old patient was brought to the operating room today for surgical treatment of these 2 obvious skin cancers. Preoperatively, she was marked for surgery and the details of the planned procedure were discussed with her and her daughter again in detail, including the scars which would result and the risks involved. They appeared to understand all their considerations. FINDINGS AND PROCEDURE:  The patient was brought to the operating room and sedation anesthesia was induced. Local anesthesia was induced around both skin cancers and in the planned flaps with 1% lidocaine, 1:100,000 epinephrine. The face was then prepped and draped into a sterile field with care to protect the eyes. Both skin cancers were initially excised and sent for frozen section analysis. Around the squamous cell of the temple, a 6 mm margin was used and the deep plane of dissection was initially down to, but did not include the superficial temporal fascia.   Unfortunately, it appeared with this initial excision that the deep margin was positive. Therefore, the superficial temporal fascia was excised as another frozen section under the tumor and marked. There were 2 frozen sections sent on the left temporal lesion, one the initial excision and the other, the deep margin. On the basal cell of the nose, the excision appeared clean initially and it was marked and sent for frozen section. The nasal lesion returned basal cell carcinoma with clear margins. The temporal lesion returned squamous cell carcinoma with a positive margin on the initial excision, deep. The second frozen section on the temporal lesion did not have tumor in it and the deep margin was read to be clear. I first turned my attention to closure of the nasal defect. A rotation flap was designed, cut and raised in the plane over the nasalis muscle. This was laterally based and rotated nicely into position, closing the defect on the nose. After a careful check for hemostasis and careful skin trimming and rearrangement, closure of this defect was with interrupted 5-0 Prolene suture. I then turned my attention to the left temporal defect. This was a much larger defect. A flap based on the superficial temporal artery and vein was designed, cut and raised, inferiorly based. Undermining of the fascia and skin around the defect was done in the frontalis plane. Excess skin was removed down onto the eyebrow laterally and into the upper eyelid for contouring of the face. After a careful check for hemostasis, the pedicle flap was brought into position. Closure was with interrupted 3-0 Vicryl in the fascia, interrupted 3-0 Vicryl in the deep dermis with buried knots and running 4-0 Prolene in the skin. Sterile dressings were applied to all incisions. The patient awoke from the anesthetic and went to the recovery room in good condition. Final sponge and needle counts were reported to be correct. ESTIMATED BLOOD LOSS:  For this procedure was negligible. SPECIMENS REMOVED:  See op note COMPLICATIONS:  none IMPLANTS:  none MD BLAYNE Cm / DIANELYS 
D: 10/23/2018 10:37    
T: 10/23/2018 17:02 JOB #: [id number]

## 2018-11-02 DIAGNOSIS — G89.29 CHRONIC MIDLINE LOW BACK PAIN WITHOUT SCIATICA: ICD-10-CM

## 2018-11-02 DIAGNOSIS — M54.50 CHRONIC MIDLINE LOW BACK PAIN WITHOUT SCIATICA: ICD-10-CM

## 2018-11-05 RX ORDER — TRAMADOL HYDROCHLORIDE 50 MG/1
25 TABLET ORAL
Qty: 15 TAB | Refills: 0 | Status: SHIPPED | OUTPATIENT
Start: 2018-11-05 | End: 2019-01-01 | Stop reason: SDUPTHER

## 2018-11-13 ENCOUNTER — OFFICE VISIT (OUTPATIENT)
Dept: INTERNAL MEDICINE CLINIC | Age: 83
End: 2018-11-13

## 2018-11-13 VITALS
SYSTOLIC BLOOD PRESSURE: 126 MMHG | BODY MASS INDEX: 30 KG/M2 | RESPIRATION RATE: 16 BRPM | DIASTOLIC BLOOD PRESSURE: 74 MMHG | HEART RATE: 70 BPM | WEIGHT: 163 LBS | OXYGEN SATURATION: 94 % | HEIGHT: 62 IN

## 2018-11-13 DIAGNOSIS — R26.81 GAIT INSTABILITY: ICD-10-CM

## 2018-11-13 DIAGNOSIS — C44.92 CANCER, SKIN, SQUAMOUS CELL: ICD-10-CM

## 2018-11-13 DIAGNOSIS — I50.22 CHRONIC SYSTOLIC CONGESTIVE HEART FAILURE (HCC): Primary | ICD-10-CM

## 2018-11-13 DIAGNOSIS — N18.30 STAGE 3 CHRONIC KIDNEY DISEASE (HCC): ICD-10-CM

## 2018-11-13 RX ORDER — BUMETANIDE 1 MG/1
TABLET ORAL
COMMUNITY
Start: 2018-10-12 | End: 2018-11-13 | Stop reason: ALTCHOICE

## 2018-11-13 RX ORDER — FUROSEMIDE 40 MG/1
40 TABLET ORAL DAILY
Qty: 24 TAB | Refills: 3 | Status: ON HOLD | OUTPATIENT
Start: 2018-11-13 | End: 2019-01-01 | Stop reason: SDUPTHER

## 2018-11-13 RX ORDER — POLYETHYLENE GLYCOL 3350 17 G/17G
17 POWDER, FOR SOLUTION ORAL AS NEEDED
COMMUNITY

## 2018-11-13 NOTE — PROGRESS NOTES
HISTORY OF PRESENT ILLNESS Yas Olson is a 80 y.o. female here for follow-up. She underwent skin lesion resection by Dr. Sherrie Fonseca, plastic surgeon. Found to have squamous cell cancer of the skin. She is doing well right now. Noticed to have another lesion on the right forearm. She will follow-up with plastic surgeon next month. Has CHF, taking Lasix 6 days a week. No shortness of breath orthopnea. Leg edema is under control. Has CKD, stage III. She has been stable for now. Has coronary artery disease and hyperlipidemia, on medications. All medications reconciled, all labs and imaging reviewed. Hypertension Pertinent negatives include no orthopnea and no PND. CHF Dementia Her past medical history is significant for hypertension. Squamous Cell Carcinoma Mass Review of Systems Constitutional: Negative. HENT: Negative. Eyes: Negative. Respiratory: Negative. Cardiovascular: Positive for leg swelling. Negative for orthopnea and PND. Gastrointestinal: Negative. Genitourinary: Negative. Skin: Positive for skin cancer. Skin mass Neurological: Negative. Endo/Heme/Allergies: Negative. Psychiatric/Behavioral: The patient is nervous/anxious. Physical Exam  
Constitutional: She appears well-developed and well-nourished. No distress. Neck: Normal range of motion. Neck supple. No JVD present. No thyromegaly present. Cardiovascular: Normal rate, regular rhythm, normal heart sounds and intact distal pulses. Pulmonary/Chest: Effort normal and breath sounds normal. No respiratory distress. She has no wheezes. She has no rales. Abdominal: Soft. Bowel sounds are normal. She exhibits no distension. There is no tenderness. Musculoskeletal: She exhibits edema. She exhibits no tenderness. Trace edema present bilaterally. Skin:  
Left sided temple: Mass removed, surgery done, area healed up nicely. No sign of infection. Right forearm; approximately 1 cm sized irregular mass present with a lot of flaky dry skin and keratosis. Psychiatric: She has a normal mood and affect. Her behavior is normal.  
 
 
ASSESSMENT and PLAN Diagnoses and all orders for this visit: 
 
1. Chronic systolic congestive heart failure ([facility] [address] Compensated. She is on Lasix 40 mg tablets 6 days a week. I have also seen in order for Bumex in her med list.  I need to discontinue it. 2. Cancer, skin, squamous cell Status post  resection of the squamous cell carcinoma. She does not advised to see an oncologist or any diagnosis chemotherapeutic treatment or radiation therapy. She will be seen by Dr. Roselyn Mccann next month. Noticed to have another growth, I have advised daughter to talk about it to Dr. Suad Fair. 
 
3. Gait instability Mostly wheelchair-bound. Use walker to walk. 4. Stage 3 chronic kidney disease ([facility] [address] Creatinine 1.6, stable. We will see her 2 months to repeat CMP. Discussed expected course/resolution/complications of diagnosis in detail with patient. Medication risks/benefits/costs/interactions/alternatives discussed with patient. Pt was given an after visit summary which includes diagnoses, current medications & vitals. Pt expressed understanding with the diagnosis and plan.

## 2018-11-13 NOTE — PROGRESS NOTES
Health Maintenance Due Topic Date Due  
 DTaP/Tdap/Td series (1 - Tdap) 05/26/1947  Shingrix Vaccine Age 50> (1 of 2) 05/26/1976 Chief Complaint Patient presents with  Hypertension  Cholesterol Problem  Squamous Cell Carcinoma  
  recent excision with Dr. Irais Palacio  Mass  
  on right forearm 1. Have you been to the ER, urgent care clinic since your last visit? Hospitalized since your last visit? No 
 
2. Have you seen or consulted any other health care providers outside of the [address] [place] [address] since your last visit? Include any pap smears or colon screening. No 
 
3) Do you have an Advance Directive on file? yes 4) Are you interested in receiving information on Advance Directives? NO Patient is accompanied by daughter I have received verbal consent from Yas Olson to discuss any/all medical information while they are present in the room.

## 2019-01-01 ENCOUNTER — OFFICE VISIT (OUTPATIENT)
Dept: INTERNAL MEDICINE CLINIC | Age: 84
End: 2019-01-01

## 2019-01-01 ENCOUNTER — HOSPITAL ENCOUNTER (OUTPATIENT)
Dept: LAB | Age: 84
Discharge: HOME | End: 2019-07-16
Payer: MEDICARE

## 2019-01-01 ENCOUNTER — APPOINTMENT (OUTPATIENT)
Dept: GENERAL RADIOLOGY | Age: 84
End: 2019-01-01
Attending: EMERGENCY MEDICINE
Payer: MEDICARE

## 2019-01-01 ENCOUNTER — HOSPITAL ENCOUNTER (OUTPATIENT)
Dept: PREADMISSION TESTING | Age: 84
Discharge: HOME | End: 2019-04-03
Payer: MEDICARE

## 2019-01-01 ENCOUNTER — TELEPHONE (OUTPATIENT)
Dept: INTERNAL MEDICINE CLINIC | Age: 84
End: 2019-01-01

## 2019-01-01 ENCOUNTER — ANESTHESIA (OUTPATIENT)
Dept: MEDSURG UNIT | Age: 84
End: 2019-01-01
Payer: MEDICARE

## 2019-01-01 ENCOUNTER — APPOINTMENT (OUTPATIENT)
Dept: GENERAL RADIOLOGY | Age: 84
DRG: 291 | End: 2019-01-01
Attending: EMERGENCY MEDICINE
Payer: MEDICARE

## 2019-01-01 ENCOUNTER — PATIENT OUTREACH (OUTPATIENT)
Dept: CASE MANAGEMENT | Age: 84
End: 2019-01-01

## 2019-01-01 ENCOUNTER — HOSPITAL ENCOUNTER (OUTPATIENT)
Age: 84
Setting detail: OUTPATIENT SURGERY
Discharge: NURSING, REHAB OR LONG-TERM CARE | End: 2019-04-15
Attending: SURGERY | Admitting: SURGERY
Payer: MEDICARE

## 2019-01-01 ENCOUNTER — HOSPITAL ENCOUNTER (INPATIENT)
Age: 84
LOS: 2 days | Discharge: HOSPICE | DRG: 291 | End: 2019-10-03
Attending: EMERGENCY MEDICINE | Admitting: HOSPITALIST
Payer: MEDICARE

## 2019-01-01 ENCOUNTER — ANESTHESIA EVENT (OUTPATIENT)
Dept: MEDSURG UNIT | Age: 84
End: 2019-01-01
Payer: MEDICARE

## 2019-01-01 ENCOUNTER — APPOINTMENT (OUTPATIENT)
Dept: NON INVASIVE DIAGNOSTICS | Age: 84
DRG: 291 | End: 2019-01-01
Attending: HOSPITALIST
Payer: MEDICARE

## 2019-01-01 ENCOUNTER — DOCUMENTATION ONLY (OUTPATIENT)
Dept: CASE MANAGEMENT | Age: 84
End: 2019-01-01

## 2019-01-01 ENCOUNTER — HOSPITAL ENCOUNTER (EMERGENCY)
Age: 84
Discharge: HOME | End: 2019-07-10
Attending: EMERGENCY MEDICINE | Admitting: EMERGENCY MEDICINE
Payer: MEDICARE

## 2019-01-01 ENCOUNTER — PATIENT OUTREACH (OUTPATIENT)
Dept: INTERNAL MEDICINE CLINIC | Age: 84
End: 2019-01-01

## 2019-01-01 VITALS — HEART RATE: 67 BPM | TEMPERATURE: 97.7 F | SYSTOLIC BLOOD PRESSURE: 97 MMHG | DIASTOLIC BLOOD PRESSURE: 71 MMHG

## 2019-01-01 VITALS
TEMPERATURE: 96 F | HEART RATE: 66 BPM | RESPIRATION RATE: 20 BRPM | OXYGEN SATURATION: 94 % | SYSTOLIC BLOOD PRESSURE: 97 MMHG | HEIGHT: 62 IN | DIASTOLIC BLOOD PRESSURE: 54 MMHG | WEIGHT: 154.98 LBS | BODY MASS INDEX: 28.52 KG/M2

## 2019-01-01 VITALS
SYSTOLIC BLOOD PRESSURE: 102 MMHG | BODY MASS INDEX: 30 KG/M2 | HEIGHT: 62 IN | HEART RATE: 84 BPM | TEMPERATURE: 97.9 F | WEIGHT: 163 LBS | OXYGEN SATURATION: 96 % | RESPIRATION RATE: 15 BRPM | DIASTOLIC BLOOD PRESSURE: 64 MMHG

## 2019-01-01 VITALS
RESPIRATION RATE: 26 BRPM | DIASTOLIC BLOOD PRESSURE: 84 MMHG | SYSTOLIC BLOOD PRESSURE: 126 MMHG | HEART RATE: 106 BPM | HEIGHT: 62 IN | OXYGEN SATURATION: 92 % | BODY MASS INDEX: 30.45 KG/M2 | TEMPERATURE: 100.6 F

## 2019-01-01 VITALS
SYSTOLIC BLOOD PRESSURE: 111 MMHG | HEART RATE: 52 BPM | WEIGHT: 168.2 LBS | RESPIRATION RATE: 19 BRPM | TEMPERATURE: 97.4 F | HEIGHT: 62 IN | BODY MASS INDEX: 30.95 KG/M2 | OXYGEN SATURATION: 92 % | DIASTOLIC BLOOD PRESSURE: 83 MMHG

## 2019-01-01 VITALS
BODY MASS INDEX: 30.11 KG/M2 | OXYGEN SATURATION: 94 % | HEIGHT: 62 IN | WEIGHT: 163.6 LBS | TEMPERATURE: 97.9 F | HEART RATE: 88 BPM | DIASTOLIC BLOOD PRESSURE: 64 MMHG | RESPIRATION RATE: 15 BRPM | SYSTOLIC BLOOD PRESSURE: 118 MMHG

## 2019-01-01 VITALS
TEMPERATURE: 96.9 F | SYSTOLIC BLOOD PRESSURE: 126 MMHG | BODY MASS INDEX: 30.64 KG/M2 | WEIGHT: 166.5 LBS | OXYGEN SATURATION: 96 % | RESPIRATION RATE: 17 BRPM | HEART RATE: 60 BPM | HEIGHT: 62 IN | DIASTOLIC BLOOD PRESSURE: 84 MMHG

## 2019-01-01 VITALS
WEIGHT: 168 LBS | TEMPERATURE: 97.5 F | SYSTOLIC BLOOD PRESSURE: 135 MMHG | DIASTOLIC BLOOD PRESSURE: 75 MMHG | HEIGHT: 62 IN | HEART RATE: 90 BPM | BODY MASS INDEX: 30.91 KG/M2

## 2019-01-01 VITALS
RESPIRATION RATE: 24 BRPM | BODY MASS INDEX: 30.91 KG/M2 | TEMPERATURE: 98.1 F | OXYGEN SATURATION: 97 % | HEART RATE: 70 BPM | HEIGHT: 62 IN | SYSTOLIC BLOOD PRESSURE: 108 MMHG | WEIGHT: 168 LBS | DIASTOLIC BLOOD PRESSURE: 58 MMHG

## 2019-01-01 DIAGNOSIS — I10 ESSENTIAL HYPERTENSION: ICD-10-CM

## 2019-01-01 DIAGNOSIS — N18.30 STAGE 3 CHRONIC KIDNEY DISEASE (HCC): ICD-10-CM

## 2019-01-01 DIAGNOSIS — F03.90 DEMENTIA WITHOUT BEHAVIORAL DISTURBANCE, UNSPECIFIED DEMENTIA TYPE: ICD-10-CM

## 2019-01-01 DIAGNOSIS — E78.2 MIXED HYPERLIPIDEMIA: ICD-10-CM

## 2019-01-01 DIAGNOSIS — F33.9 RECURRENT DEPRESSION (HCC): ICD-10-CM

## 2019-01-01 DIAGNOSIS — F32.A DEPRESSION, UNSPECIFIED DEPRESSION TYPE: ICD-10-CM

## 2019-01-01 DIAGNOSIS — I48.91 ATRIAL FIBRILLATION, UNSPECIFIED TYPE (HCC): ICD-10-CM

## 2019-01-01 DIAGNOSIS — J15.9 BACTERIAL PNEUMONIA: ICD-10-CM

## 2019-01-01 DIAGNOSIS — I50.22 CHRONIC SYSTOLIC CONGESTIVE HEART FAILURE (HCC): ICD-10-CM

## 2019-01-01 DIAGNOSIS — J18.9 PNEUMONIA DUE TO INFECTIOUS ORGANISM, UNSPECIFIED LATERALITY, UNSPECIFIED PART OF LUNG: ICD-10-CM
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
## 2019-01-01 DIAGNOSIS — M85.80 OSTEOPENIA: ICD-10-CM

## 2019-01-01 DIAGNOSIS — I25.83 CORONARY ARTERY DISEASE DUE TO LIPID RICH PLAQUE: ICD-10-CM

## 2019-01-01 DIAGNOSIS — M54.50 ACUTE MIDLINE LOW BACK PAIN WITHOUT SCIATICA: ICD-10-CM

## 2019-01-01 DIAGNOSIS — G89.29 CHRONIC MIDLINE LOW BACK PAIN WITHOUT SCIATICA: ICD-10-CM

## 2019-01-01 DIAGNOSIS — I50.9 ACUTE ON CHRONIC CONGESTIVE HEART FAILURE, UNSPECIFIED HEART FAILURE TYPE (HCC): Primary | ICD-10-CM

## 2019-01-01 DIAGNOSIS — Z13.5 GLAUCOMA SCREENING: ICD-10-CM

## 2019-01-01 DIAGNOSIS — Z00.00 MEDICARE ANNUAL WELLNESS VISIT, SUBSEQUENT: ICD-10-CM

## 2019-01-01 DIAGNOSIS — M54.50 CHRONIC MIDLINE LOW BACK PAIN WITHOUT SCIATICA: ICD-10-CM

## 2019-01-01 DIAGNOSIS — R06.02 SOB (SHORTNESS OF BREATH): Primary | ICD-10-CM

## 2019-01-01 DIAGNOSIS — I25.10 CORONARY ARTERY DISEASE DUE TO LIPID RICH PLAQUE: ICD-10-CM

## 2019-01-01 DIAGNOSIS — Z23 ENCOUNTER FOR IMMUNIZATION: ICD-10-CM

## 2019-01-01 DIAGNOSIS — M85.80 OSTEOPENIA, UNSPECIFIED LOCATION: ICD-10-CM

## 2019-01-01 DIAGNOSIS — A41.9 SEPSIS, DUE TO UNSPECIFIED ORGANISM, UNSPECIFIED WHETHER ACUTE ORGAN DYSFUNCTION PRESENT (HCC): ICD-10-CM

## 2019-01-01 DIAGNOSIS — I50.22 CHRONIC SYSTOLIC CONGESTIVE HEART FAILURE (HCC): Primary | ICD-10-CM

## 2019-01-01 DIAGNOSIS — M54.9 INTRACTABLE BACK PAIN: Primary | ICD-10-CM
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
## 2019-01-01 DIAGNOSIS — F03.90 DEMENTIA WITHOUT BEHAVIORAL DISTURBANCE, UNSPECIFIED DEMENTIA TYPE: Primary | ICD-10-CM

## 2019-01-01 LAB
ALBUMIN SERPL-MCNC: 3.4 G/DL (ref 3.5–5)
ALBUMIN SERPL-MCNC: 4.2 G/DL (ref 3.2–4.6)
ALBUMIN/GLOB SERPL: 1.1 {RATIO} (ref 1.1–2.2)
ALBUMIN/GLOB SERPL: 2 {RATIO} (ref 1.2–2.2)
ALP SERPL-CCNC: 60 IU/L (ref 39–117)
ALP SERPL-CCNC: 61 U/L (ref 45–117)
ALT SERPL-CCNC: 18 U/L (ref 12–78)
ALT SERPL-CCNC: 7 IU/L (ref 0–32)
ANION GAP SERPL CALC-SCNC: 4 MMOL/L (ref 5–15)
ANION GAP SERPL CALC-SCNC: 5 MMOL/L (ref 5–15)
ANION GAP SERPL CALC-SCNC: 6 MMOL/L (ref 5–15)
ANION GAP SERPL CALC-SCNC: 7 MMOL/L (ref 5–15)
AST SERPL-CCNC: 14 U/L (ref 15–37)
AST SERPL-CCNC: 16 IU/L (ref 0–40)
ATRIAL RATE: 67 BPM
ATRIAL RATE: 69 BPM
B PERT DNA SPEC QL NAA+PROBE: NOT DETECTED
BACTERIA SPEC CULT: NORMAL
BASOPHILS # BLD: 0 K/UL (ref 0–0.1)
BASOPHILS # BLD: 0.1 K/UL (ref 0–0.1)
BASOPHILS # BLD: 0.1 K/UL (ref 0–0.1)
BASOPHILS NFR BLD: 1 % (ref 0–1)
BILIRUB SERPL-MCNC: 0.4 MG/DL (ref 0–1.2)
BILIRUB SERPL-MCNC: 0.5 MG/DL (ref 0.2–1)
BNP SERPL-MCNC: ABNORMAL PG/ML
BUN SERPL-MCNC: 20 MG/DL (ref 6–20)
BUN SERPL-MCNC: 22 MG/DL (ref 6–20)
BUN SERPL-MCNC: 23 MG/DL (ref 6–20)
BUN SERPL-MCNC: 24 MG/DL (ref 10–36)
BUN SERPL-MCNC: 33 MG/DL (ref 6–20)
BUN/CREAT SERPL: 17 (ref 12–20)
BUN/CREAT SERPL: 18 (ref 12–20)
BUN/CREAT SERPL: 18 (ref 12–28)
BUN/CREAT SERPL: 19 (ref 12–20)
BUN/CREAT SERPL: 22 (ref 12–20)
C PNEUM DNA SPEC QL NAA+PROBE: NOT DETECTED
CALCIUM SERPL-MCNC: 8.4 MG/DL (ref 8.5–10.1)
CALCIUM SERPL-MCNC: 9 MG/DL (ref 8.5–10.1)
CALCIUM SERPL-MCNC: 9.1 MG/DL (ref 8.5–10.1)
CALCIUM SERPL-MCNC: 9.2 MG/DL (ref 8.5–10.1)
CALCIUM SERPL-MCNC: 9.6 MG/DL (ref 8.7–10.3)
CALCULATED P AXIS, ECG09: -27 DEGREES
CALCULATED P AXIS, ECG09: 43 DEGREES
CALCULATED R AXIS, ECG10: -17 DEGREES
CALCULATED R AXIS, ECG10: -6 DEGREES
CALCULATED T AXIS, ECG11: 147 DEGREES
CALCULATED T AXIS, ECG11: 167 DEGREES
CHLORIDE SERPL-SCNC: 104 MMOL/L (ref 96–106)
CHLORIDE SERPL-SCNC: 104 MMOL/L (ref 97–108)
CHLORIDE SERPL-SCNC: 107 MMOL/L (ref 97–108)
CHLORIDE SERPL-SCNC: 107 MMOL/L (ref 97–108)
CHLORIDE SERPL-SCNC: 109 MMOL/L (ref 97–108)
CO2 SERPL-SCNC: 24 MMOL/L (ref 20–29)
CO2 SERPL-SCNC: 31 MMOL/L (ref 21–32)
CO2 SERPL-SCNC: 32 MMOL/L (ref 21–32)
CO2 SERPL-SCNC: 32 MMOL/L (ref 21–32)
CO2 SERPL-SCNC: 35 MMOL/L (ref 21–32)
COMMENT, HOLDF: NORMAL
CREAT SERPL-MCNC: 1.14 MG/DL (ref 0.55–1.02)
CREAT SERPL-MCNC: 1.15 MG/DL (ref 0.55–1.02)
CREAT SERPL-MCNC: 1.33 MG/DL (ref 0.55–1.02)
CREAT SERPL-MCNC: 1.37 MG/DL (ref 0.57–1)
CREAT SERPL-MCNC: 1.52 MG/DL (ref 0.55–1.02)
DIAGNOSIS, 93000: NORMAL
DIAGNOSIS, 93000: NORMAL
DIFFERENTIAL METHOD BLD: ABNORMAL
ECHO LA AREA 4C: 26.2 CM2
ECHO LA MAJOR AXIS: 3.67 CM
ECHO LA VOL 4C: 82.78 ML (ref 22–52)
ECHO LA VOLUME INDEX A4C: 47.06 ML/M2 (ref 16–28)
ECHO LV INTERNAL DIMENSION DIASTOLIC: 5.24 CM (ref 3.9–5.3)
ECHO LV INTERNAL DIMENSION SYSTOLIC: 4.34 CM
ECHO LV IVSD: 0.83 CM (ref 0.6–0.9)
ECHO LV MASS 2D: 237 G (ref 67–162)
ECHO LV MASS INDEX 2D: 134.7 G/M2 (ref 43–95)
ECHO LV POSTERIOR WALL DIASTOLIC: 1.21 CM (ref 0.6–0.9)
ECHO LVOT DIAM: 1.91 CM
ECHO MV A VELOCITY: 103.43 CM/S
ECHO MV E VELOCITY: 87.4 CM/S
ECHO MV E/A RATIO: 0.85
ECHO MV REGURGITANT PEAK GRADIENT: 95.6 MMHG
ECHO MV REGURGITANT PEAK VELOCITY: 488.9 CM/S
ECHO MV REGURGITANT VTIA: 169.19 CM
ECHO PULMONARY ARTERY SYSTOLIC PRESSURE (PASP): 42 MMHG
ECHO RV INTERNAL DIMENSION: 3.36 CM
ECHO RV TAPSE: 1.26 CM (ref 1.5–2)
ECHO TV REGURGITANT MAX VELOCITY: 322.14 CM/S
ECHO TV REGURGITANT PEAK GRADIENT: 41.5 MMHG
EOSINOPHIL # BLD: 0 K/UL (ref 0–0.4)
EOSINOPHIL # BLD: 0.3 K/UL (ref 0–0.4)
EOSINOPHIL # BLD: 0.3 K/UL (ref 0–0.4)
EOSINOPHIL NFR BLD: 0 % (ref 0–7)
EOSINOPHIL NFR BLD: 3 % (ref 0–7)
EOSINOPHIL NFR BLD: 4 % (ref 0–7)
ERYTHROCYTE [DISTWIDTH] IN BLOOD BY AUTOMATED COUNT: 13.9 % (ref 11.5–14.5)
ERYTHROCYTE [DISTWIDTH] IN BLOOD BY AUTOMATED COUNT: 14.4 % (ref 11.5–14.5)
ERYTHROCYTE [DISTWIDTH] IN BLOOD BY AUTOMATED COUNT: 14.4 % (ref 11.5–14.5)
FLUAV AG NPH QL IA: NEGATIVE
FLUAV H1 2009 PAND RNA SPEC QL NAA+PROBE: NOT DETECTED
FLUAV H1 RNA SPEC QL NAA+PROBE: NOT DETECTED
FLUAV H3 RNA SPEC QL NAA+PROBE: NOT DETECTED
FLUAV SUBTYP SPEC NAA+PROBE: NOT DETECTED
FLUBV AG NOSE QL IA: NEGATIVE
FLUBV RNA SPEC QL NAA+PROBE: NOT DETECTED
GLOBULIN SER CALC-MCNC: 2.1 G/DL (ref 1.5–4.5)
GLOBULIN SER CALC-MCNC: 3 G/DL (ref 2–4)
GLUCOSE BLD STRIP.AUTO-MCNC: 113 MG/DL (ref 65–100)
GLUCOSE SERPL-MCNC: 101 MG/DL (ref 65–100)
GLUCOSE SERPL-MCNC: 104 MG/DL (ref 65–99)
GLUCOSE SERPL-MCNC: 110 MG/DL (ref 65–100)
GLUCOSE SERPL-MCNC: 121 MG/DL (ref 65–100)
GLUCOSE SERPL-MCNC: 191 MG/DL (ref 65–100)
HADV DNA SPEC QL NAA+PROBE: NOT DETECTED
HCOV 229E RNA SPEC QL NAA+PROBE: NOT DETECTED
HCOV HKU1 RNA SPEC QL NAA+PROBE: NOT DETECTED
HCOV NL63 RNA SPEC QL NAA+PROBE: NOT DETECTED
HCOV OC43 RNA SPEC QL NAA+PROBE: NOT DETECTED
HCT VFR BLD AUTO: 43 % (ref 35–47)
HCT VFR BLD AUTO: 45.5 % (ref 35–47)
HCT VFR BLD AUTO: 45.6 % (ref 35–47)
HGB BLD-MCNC: 13 G/DL (ref 11.5–16)
HGB BLD-MCNC: 13.9 G/DL (ref 11.5–16)
HGB BLD-MCNC: 14.1 G/DL (ref 11.5–16)
HMPV RNA SPEC QL NAA+PROBE: NOT DETECTED
HPIV1 RNA SPEC QL NAA+PROBE: NOT DETECTED
HPIV2 RNA SPEC QL NAA+PROBE: NOT DETECTED
HPIV3 RNA SPEC QL NAA+PROBE: NOT DETECTED
HPIV4 RNA SPEC QL NAA+PROBE: NOT DETECTED
IMM GRANULOCYTES # BLD AUTO: 0 K/UL (ref 0–0.04)
IMM GRANULOCYTES NFR BLD AUTO: 0 % (ref 0–0.5)
IMM GRANULOCYTES NFR BLD AUTO: 0 % (ref 0–0.5)
IMM GRANULOCYTES NFR BLD AUTO: 1 % (ref 0–0.5)
INTERPRETATION: NORMAL
LACTATE SERPL-SCNC: 1.7 MMOL/L (ref 0.4–2)
LACTATE SERPL-SCNC: 2.1 MMOL/L (ref 0.4–2)
LYMPHOCYTES # BLD: 1 K/UL (ref 0.8–3.5)
LYMPHOCYTES # BLD: 1.4 K/UL (ref 0.8–3.5)
LYMPHOCYTES # BLD: 1.9 K/UL (ref 0.8–3.5)
LYMPHOCYTES NFR BLD: 11 % (ref 12–49)
LYMPHOCYTES NFR BLD: 18 % (ref 12–49)
LYMPHOCYTES NFR BLD: 26 % (ref 12–49)
M PNEUMO DNA SPEC QL NAA+PROBE: NOT DETECTED
MCH RBC QN AUTO: 30 PG (ref 26–34)
MCH RBC QN AUTO: 30.3 PG (ref 26–34)
MCH RBC QN AUTO: 30.9 PG (ref 26–34)
MCHC RBC AUTO-ENTMCNC: 30.2 G/DL (ref 30–36.5)
MCHC RBC AUTO-ENTMCNC: 30.5 G/DL (ref 30–36.5)
MCHC RBC AUTO-ENTMCNC: 31 G/DL (ref 30–36.5)
MCV RBC AUTO: 99.3 FL (ref 80–99)
MCV RBC AUTO: 99.6 FL (ref 80–99)
MCV RBC AUTO: 99.8 FL (ref 80–99)
MONOCYTES # BLD: 0.7 K/UL (ref 0–1)
MONOCYTES # BLD: 0.8 K/UL (ref 0–1)
MONOCYTES # BLD: 0.9 K/UL (ref 0–1)
MONOCYTES NFR BLD: 10 % (ref 5–13)
MONOCYTES NFR BLD: 11 % (ref 5–13)
MONOCYTES NFR BLD: 9 % (ref 5–13)
NEUTS SEG # BLD: 4.4 K/UL (ref 1.8–8)
NEUTS SEG # BLD: 5.1 K/UL (ref 1.8–8)
NEUTS SEG # BLD: 7.3 K/UL (ref 1.8–8)
NEUTS SEG NFR BLD: 60 % (ref 32–75)
NEUTS SEG NFR BLD: 66 % (ref 32–75)
NEUTS SEG NFR BLD: 78 % (ref 32–75)
NRBC # BLD: 0 K/UL (ref 0–0.01)
NRBC BLD-RTO: 0 PER 100 WBC
P-R INTERVAL, ECG05: 142 MS
P-R INTERVAL, ECG05: 144 MS
PLATELET # BLD AUTO: 147 K/UL (ref 150–400)
PLATELET # BLD AUTO: 163 K/UL (ref 150–400)
PLATELET # BLD AUTO: 163 K/UL (ref 150–400)
PMV BLD AUTO: 10.9 FL (ref 8.9–12.9)
PMV BLD AUTO: 11.3 FL (ref 8.9–12.9)
PMV BLD AUTO: 11.5 FL (ref 8.9–12.9)
POTASSIUM SERPL-SCNC: 3.6 MMOL/L (ref 3.5–5.1)
POTASSIUM SERPL-SCNC: 3.9 MMOL/L (ref 3.5–5.1)
POTASSIUM SERPL-SCNC: 3.9 MMOL/L (ref 3.5–5.1)
POTASSIUM SERPL-SCNC: 4.1 MMOL/L (ref 3.5–5.1)
POTASSIUM SERPL-SCNC: 4.9 MMOL/L (ref 3.5–5.2)
PROCALCITONIN SERPL-MCNC: 0.1 NG/ML
PROT SERPL-MCNC: 6.3 G/DL (ref 6–8.5)
PROT SERPL-MCNC: 6.4 G/DL (ref 6.4–8.2)
Q-T INTERVAL, ECG07: 444 MS
Q-T INTERVAL, ECG07: 470 MS
QRS DURATION, ECG06: 134 MS
QRS DURATION, ECG06: 134 MS
QTC CALCULATION (BEZET), ECG08: 475 MS
QTC CALCULATION (BEZET), ECG08: 496 MS
RBC # BLD AUTO: 4.33 M/UL (ref 3.8–5.2)
RBC # BLD AUTO: 4.56 M/UL (ref 3.8–5.2)
RBC # BLD AUTO: 4.58 M/UL (ref 3.8–5.2)
RSV RNA SPEC QL NAA+PROBE: NOT DETECTED
RV+EV RNA SPEC QL NAA+PROBE: NOT DETECTED
SAMPLES BEING HELD,HOLD: NORMAL
SERVICE CMNT-IMP: ABNORMAL
SERVICE CMNT-IMP: NORMAL
SODIUM SERPL-SCNC: 144 MMOL/L (ref 136–145)
SODIUM SERPL-SCNC: 145 MMOL/L (ref 134–144)
SODIUM SERPL-SCNC: 145 MMOL/L (ref 136–145)
TROPONIN I SERPL-MCNC: <0.05 NG/ML
VENTRICULAR RATE, ECG03: 67 BPM
VENTRICULAR RATE, ECG03: 69 BPM
WBC # BLD AUTO: 7.4 K/UL (ref 3.6–11)
WBC # BLD AUTO: 7.6 K/UL (ref 3.6–11)
WBC # BLD AUTO: 9.3 K/UL (ref 3.6–11)

## 2019-01-01 PROCEDURE — 80048 BASIC METABOLIC PNL TOTAL CA: CPT

## 2019-01-01 PROCEDURE — 77030002996 HC SUT SLK J&J -A: Performed by: SURGERY

## 2019-01-01 PROCEDURE — 97116 GAIT TRAINING THERAPY: CPT

## 2019-01-01 PROCEDURE — 74011250636 HC RX REV CODE- 250/636: Performed by: EMERGENCY MEDICINE

## 2019-01-01 PROCEDURE — 97165 OT EVAL LOW COMPLEX 30 MIN: CPT

## 2019-01-01 PROCEDURE — 84484 ASSAY OF TROPONIN QUANT: CPT

## 2019-01-01 PROCEDURE — 99284 EMERGENCY DEPT VISIT MOD MDM: CPT

## 2019-01-01 PROCEDURE — 83605 ASSAY OF LACTIC ACID: CPT

## 2019-01-01 PROCEDURE — 36415 COLL VENOUS BLD VENIPUNCTURE: CPT

## 2019-01-01 PROCEDURE — 76210000046 HC AMBSU PH II REC FIRST 0.5 HR: Performed by: SURGERY

## 2019-01-01 PROCEDURE — 99285 EMERGENCY DEPT VISIT HI MDM: CPT

## 2019-01-01 PROCEDURE — 77030020782 HC GWN BAIR PAWS FLX 3M -B

## 2019-01-01 PROCEDURE — 77030002986 HC SUT PROL J&J -A: Performed by: SURGERY

## 2019-01-01 PROCEDURE — 80053 COMPREHEN METABOLIC PANEL: CPT

## 2019-01-01 PROCEDURE — 85025 COMPLETE CBC W/AUTO DIFF WBC: CPT

## 2019-01-01 PROCEDURE — 82962 GLUCOSE BLOOD TEST: CPT

## 2019-01-01 PROCEDURE — 74011250637 HC RX REV CODE- 250/637: Performed by: HOSPITALIST

## 2019-01-01 PROCEDURE — 65660000000 HC RM CCU STEPDOWN

## 2019-01-01 PROCEDURE — 74011000258 HC RX REV CODE- 258: Performed by: HOSPITALIST

## 2019-01-01 PROCEDURE — 72100 X-RAY EXAM L-S SPINE 2/3 VWS: CPT

## 2019-01-01 PROCEDURE — 76060000062 HC AMB SURG ANES 1 TO 1.5 HR: Performed by: SURGERY

## 2019-01-01 PROCEDURE — 88331 PATH CONSLTJ SURG 1 BLK 1SPC: CPT

## 2019-01-01 PROCEDURE — 90471 IMMUNIZATION ADMIN: CPT

## 2019-01-01 PROCEDURE — 96375 TX/PRO/DX INJ NEW DRUG ADDON: CPT

## 2019-01-01 PROCEDURE — 74011250636 HC RX REV CODE- 250/636: Performed by: HOSPITALIST

## 2019-01-01 PROCEDURE — 87040 BLOOD CULTURE FOR BACTERIA: CPT

## 2019-01-01 PROCEDURE — 77030038269 HC DRN EXT URIN PURWCK BARD -A

## 2019-01-01 PROCEDURE — 84145 PROCALCITONIN (PCT): CPT

## 2019-01-01 PROCEDURE — 71046 X-RAY EXAM CHEST 2 VIEWS: CPT

## 2019-01-01 PROCEDURE — 77030031139 HC SUT VCRL2 J&J -A: Performed by: SURGERY

## 2019-01-01 PROCEDURE — 93005 ELECTROCARDIOGRAM TRACING: CPT

## 2019-01-01 PROCEDURE — 77030011640 HC PAD GRND REM COVD -A: Performed by: SURGERY

## 2019-01-01 PROCEDURE — 0099U RESPIRATORY PANEL,PCR,NASOPHARYNGEAL: CPT

## 2019-01-01 PROCEDURE — 77030018836 HC SOL IRR NACL ICUM -A: Performed by: SURGERY

## 2019-01-01 PROCEDURE — 90686 IIV4 VACC NO PRSV 0.5 ML IM: CPT | Performed by: HOSPITALIST

## 2019-01-01 PROCEDURE — 88305 TISSUE EXAM BY PATHOLOGIST: CPT

## 2019-01-01 PROCEDURE — 97161 PT EVAL LOW COMPLEX 20 MIN: CPT

## 2019-01-01 PROCEDURE — 76030000001 HC AMB SURG OR TIME 1 TO 1.5: Performed by: SURGERY

## 2019-01-01 PROCEDURE — 76210000034 HC AMBSU PH I REC 0.5 TO 1 HR: Performed by: SURGERY

## 2019-01-01 PROCEDURE — 87804 INFLUENZA ASSAY W/OPTIC: CPT

## 2019-01-01 PROCEDURE — 93306 TTE W/DOPPLER COMPLETE: CPT

## 2019-01-01 PROCEDURE — 97530 THERAPEUTIC ACTIVITIES: CPT

## 2019-01-01 PROCEDURE — 74011250636 HC RX REV CODE- 250/636

## 2019-01-01 PROCEDURE — 97535 SELF CARE MNGMENT TRAINING: CPT

## 2019-01-01 PROCEDURE — 74011000258 HC RX REV CODE- 258: Performed by: EMERGENCY MEDICINE

## 2019-01-01 PROCEDURE — 74011000250 HC RX REV CODE- 250: Performed by: SURGERY

## 2019-01-01 PROCEDURE — 74011250636 HC RX REV CODE- 250/636: Performed by: ANESTHESIOLOGY

## 2019-01-01 PROCEDURE — 96365 THER/PROPH/DIAG IV INF INIT: CPT

## 2019-01-01 PROCEDURE — 83880 ASSAY OF NATRIURETIC PEPTIDE: CPT

## 2019-01-01 RX ORDER — FUROSEMIDE 10 MG/ML
40 INJECTION INTRAMUSCULAR; INTRAVENOUS EVERY 12 HOURS
Status: DISCONTINUED | OUTPATIENT
Start: 2019-01-01 | End: 2019-01-01

## 2019-01-01 RX ORDER — MULTIVITAMIN
TABLET ORAL
Qty: 60 TAB | Refills: 12 | Status: SHIPPED | OUTPATIENT
Start: 2019-01-01

## 2019-01-01 RX ORDER — METOPROLOL SUCCINATE 25 MG/1
25 TABLET, EXTENDED RELEASE ORAL
Qty: 30 TAB | Refills: 5 | Status: SHIPPED | OUTPATIENT
Start: 2019-01-01 | End: 2019-01-01 | Stop reason: SDUPTHER

## 2019-01-01 RX ORDER — SENNOSIDES 8.6 MG/1
1 TABLET ORAL DAILY
COMMUNITY

## 2019-01-01 RX ORDER — LORAZEPAM 0.5 MG/1
0.5 TABLET ORAL
COMMUNITY

## 2019-01-01 RX ORDER — METOPROLOL TARTRATE 5 MG/5ML
INJECTION INTRAVENOUS AS NEEDED
Status: DISCONTINUED | OUTPATIENT
Start: 2019-01-01 | End: 2019-01-01 | Stop reason: HOSPADM

## 2019-01-01 RX ORDER — HYOSCYAMINE SULFATE 0.12 MG/ML
125 LIQUID ORAL
COMMUNITY

## 2019-01-01 RX ORDER — FUROSEMIDE 10 MG/ML
40 INJECTION INTRAMUSCULAR; INTRAVENOUS
Status: COMPLETED | OUTPATIENT
Start: 2019-01-01 | End: 2019-01-01

## 2019-01-01 RX ORDER — LIDOCAINE HYDROCHLORIDE 10 MG/ML
0.1 INJECTION, SOLUTION EPIDURAL; INFILTRATION; INTRACAUDAL; PERINEURAL AS NEEDED
Status: DISCONTINUED | OUTPATIENT
Start: 2019-01-01 | End: 2019-01-01 | Stop reason: HOSPADM

## 2019-01-01 RX ORDER — MORPHINE SULFATE 10 MG/ML
2 INJECTION, SOLUTION INTRAMUSCULAR; INTRAVENOUS
Status: DISCONTINUED | OUTPATIENT
Start: 2019-01-01 | End: 2019-01-01 | Stop reason: HOSPADM

## 2019-01-01 RX ORDER — TRAMADOL HYDROCHLORIDE 50 MG/1
50 TABLET ORAL
COMMUNITY
End: 2019-01-01 | Stop reason: SDUPTHER

## 2019-01-01 RX ORDER — VENLAFAXINE HYDROCHLORIDE 75 MG/1
75 CAPSULE, EXTENDED RELEASE ORAL DAILY
Qty: 30 CAP | Refills: 5 | Status: SHIPPED | OUTPATIENT
Start: 2019-01-01

## 2019-01-01 RX ORDER — FUROSEMIDE 10 MG/ML
40 INJECTION INTRAMUSCULAR; INTRAVENOUS ONCE
Status: DISCONTINUED | OUTPATIENT
Start: 2019-01-01 | End: 2019-01-01

## 2019-01-01 RX ORDER — PROPOFOL 10 MG/ML
INJECTION, EMULSION INTRAVENOUS
Status: DISCONTINUED | OUTPATIENT
Start: 2019-01-01 | End: 2019-01-01 | Stop reason: HOSPADM

## 2019-01-01 RX ORDER — ONDANSETRON 2 MG/ML
4 INJECTION INTRAMUSCULAR; INTRAVENOUS AS NEEDED
Status: DISCONTINUED | OUTPATIENT
Start: 2019-01-01 | End: 2019-01-01 | Stop reason: HOSPADM

## 2019-01-01 RX ORDER — METOPROLOL SUCCINATE 25 MG/1
25 TABLET, EXTENDED RELEASE ORAL
Status: DISCONTINUED | OUTPATIENT
Start: 2019-01-01 | End: 2019-01-01

## 2019-01-01 RX ORDER — METOPROLOL SUCCINATE 25 MG/1
12.5 TABLET, EXTENDED RELEASE ORAL
Status: DISCONTINUED | OUTPATIENT
Start: 2019-01-01 | End: 2019-01-01 | Stop reason: HOSPADM

## 2019-01-01 RX ORDER — TRAMADOL HYDROCHLORIDE 50 MG/1
50 TABLET ORAL
COMMUNITY

## 2019-01-01 RX ORDER — SODIUM CHLORIDE 9 MG/ML
25 INJECTION, SOLUTION INTRAVENOUS CONTINUOUS
Status: DISCONTINUED | OUTPATIENT
Start: 2019-01-01 | End: 2019-01-01 | Stop reason: HOSPADM

## 2019-01-01 RX ORDER — FUROSEMIDE 10 MG/ML
40 INJECTION INTRAMUSCULAR; INTRAVENOUS ONCE
Status: COMPLETED | OUTPATIENT
Start: 2019-01-01 | End: 2019-01-01

## 2019-01-01 RX ORDER — METOPROLOL SUCCINATE 25 MG/1
12.5 TABLET, EXTENDED RELEASE ORAL
Qty: 30 TAB | Refills: 5 | Status: SHIPPED
Start: 2019-01-01

## 2019-01-01 RX ORDER — FENTANYL CITRATE 50 UG/ML
50 INJECTION, SOLUTION INTRAMUSCULAR; INTRAVENOUS AS NEEDED
Status: DISCONTINUED | OUTPATIENT
Start: 2019-01-01 | End: 2019-01-01 | Stop reason: HOSPADM

## 2019-01-01 RX ORDER — TRAMADOL HYDROCHLORIDE 50 MG/1
25 TABLET ORAL
Qty: 15 TAB | Refills: 0 | Status: SHIPPED | OUTPATIENT
Start: 2019-01-01 | End: 2019-01-01 | Stop reason: SDUPTHER

## 2019-01-01 RX ORDER — TRAMADOL HYDROCHLORIDE 50 MG/1
25 TABLET ORAL
Qty: 15 TAB | Refills: 0 | Status: SHIPPED | OUTPATIENT
Start: 2019-01-01 | End: 2019-01-01

## 2019-01-01 RX ORDER — TRAMADOL HYDROCHLORIDE 50 MG/1
50 TABLET ORAL
COMMUNITY
End: 2019-01-01 | Stop reason: ALTCHOICE

## 2019-01-01 RX ORDER — PROPOFOL 10 MG/ML
INJECTION, EMULSION INTRAVENOUS AS NEEDED
Status: DISCONTINUED | OUTPATIENT
Start: 2019-01-01 | End: 2019-01-01 | Stop reason: HOSPADM

## 2019-01-01 RX ORDER — FUROSEMIDE 40 MG/1
40 TABLET ORAL DAILY
Qty: 24 TAB | Refills: 3 | Status: SHIPPED
Start: 2019-01-01

## 2019-01-01 RX ORDER — LIDOCAINE HYDROCHLORIDE AND EPINEPHRINE 10; 10 MG/ML; UG/ML
INJECTION, SOLUTION INFILTRATION; PERINEURAL AS NEEDED
Status: DISCONTINUED | OUTPATIENT
Start: 2019-01-01 | End: 2019-01-01 | Stop reason: HOSPADM

## 2019-01-01 RX ORDER — ERGOCALCIFEROL 1.25 MG/1
50000 CAPSULE ORAL
COMMUNITY
End: 2019-01-01

## 2019-01-01 RX ORDER — OXYCODONE AND ACETAMINOPHEN 5; 325 MG/1; MG/1
1 TABLET ORAL AS NEEDED
Status: DISCONTINUED | OUTPATIENT
Start: 2019-01-01 | End: 2019-01-01 | Stop reason: HOSPADM

## 2019-01-01 RX ORDER — FENTANYL CITRATE 50 UG/ML
25 INJECTION, SOLUTION INTRAMUSCULAR; INTRAVENOUS
Status: DISCONTINUED | OUTPATIENT
Start: 2019-01-01 | End: 2019-01-01 | Stop reason: HOSPADM

## 2019-01-01 RX ORDER — DIPHENHYDRAMINE HYDROCHLORIDE 50 MG/ML
12.5 INJECTION, SOLUTION INTRAMUSCULAR; INTRAVENOUS AS NEEDED
Status: DISCONTINUED | OUTPATIENT
Start: 2019-01-01 | End: 2019-01-01 | Stop reason: HOSPADM

## 2019-01-01 RX ORDER — MIDAZOLAM HYDROCHLORIDE 1 MG/ML
0.5 INJECTION, SOLUTION INTRAMUSCULAR; INTRAVENOUS
Status: DISCONTINUED | OUTPATIENT
Start: 2019-01-01 | End: 2019-01-01 | Stop reason: HOSPADM

## 2019-01-01 RX ORDER — BACITRACIN 500 [USP'U]/G
OINTMENT TOPICAL AS NEEDED
Status: DISCONTINUED | OUTPATIENT
Start: 2019-01-01 | End: 2019-01-01 | Stop reason: HOSPADM

## 2019-01-01 RX ORDER — TRAMADOL HYDROCHLORIDE 50 MG/1
50 TABLET ORAL
Qty: 120 TAB | Refills: 0 | Status: SHIPPED | OUTPATIENT
Start: 2019-01-01 | End: 2019-01-01 | Stop reason: SDUPTHER

## 2019-01-01 RX ORDER — SODIUM CHLORIDE 0.9 % (FLUSH) 0.9 %
5-40 SYRINGE (ML) INJECTION AS NEEDED
Status: DISCONTINUED | OUTPATIENT
Start: 2019-01-01 | End: 2019-01-01 | Stop reason: HOSPADM

## 2019-01-01 RX ORDER — AZITHROMYCIN 250 MG/1
500 TABLET, FILM COATED ORAL DAILY
Status: DISCONTINUED | OUTPATIENT
Start: 2019-01-01 | End: 2019-01-01

## 2019-01-01 RX ORDER — SODIUM CHLORIDE, SODIUM LACTATE, POTASSIUM CHLORIDE, CALCIUM CHLORIDE 600; 310; 30; 20 MG/100ML; MG/100ML; MG/100ML; MG/100ML
125 INJECTION, SOLUTION INTRAVENOUS CONTINUOUS
Status: DISCONTINUED | OUTPATIENT
Start: 2019-01-01 | End: 2019-01-01 | Stop reason: HOSPADM

## 2019-01-01 RX ORDER — TRAMADOL HYDROCHLORIDE 50 MG/1
50 TABLET ORAL
Qty: 30 TAB | Refills: 0 | Status: SHIPPED | OUTPATIENT
Start: 2019-01-01 | End: 2019-01-01

## 2019-01-01 RX ORDER — ACETAMINOPHEN 325 MG/1
650 TABLET ORAL ONCE
Status: DISCONTINUED | OUTPATIENT
Start: 2019-01-01 | End: 2019-01-01 | Stop reason: HOSPADM

## 2019-01-01 RX ORDER — DOCUSATE SODIUM 100 MG/1
CAPSULE, LIQUID FILLED ORAL
Qty: 60 CAP | Refills: 5 | Status: SHIPPED | OUTPATIENT
Start: 2019-01-01 | End: 2019-01-01 | Stop reason: SDUPTHER

## 2019-01-01 RX ORDER — GUAIFENESIN AND DEXTROMETHORPHAN HYDROBROMIDE 1200; 60 MG/1; MG/1
TABLET, EXTENDED RELEASE ORAL 2 TIMES DAILY
COMMUNITY
End: 2019-01-01 | Stop reason: SINTOL

## 2019-01-01 RX ORDER — HYDROMORPHONE HYDROCHLORIDE 1 MG/ML
0.2 INJECTION, SOLUTION INTRAMUSCULAR; INTRAVENOUS; SUBCUTANEOUS
Status: DISCONTINUED | OUTPATIENT
Start: 2019-01-01 | End: 2019-01-01 | Stop reason: HOSPADM

## 2019-01-01 RX ORDER — MIDAZOLAM HYDROCHLORIDE 1 MG/ML
1 INJECTION, SOLUTION INTRAMUSCULAR; INTRAVENOUS AS NEEDED
Status: DISCONTINUED | OUTPATIENT
Start: 2019-01-01 | End: 2019-01-01 | Stop reason: HOSPADM

## 2019-01-01 RX ORDER — HALOPERIDOL 2 MG/ML
2 SOLUTION ORAL
COMMUNITY

## 2019-01-01 RX ORDER — LIDOCAINE 50 MG/G
PATCH TOPICAL
Qty: 15 EACH | Refills: 0 | Status: SHIPPED | OUTPATIENT
Start: 2019-01-01

## 2019-01-01 RX ORDER — VENLAFAXINE HYDROCHLORIDE 37.5 MG/1
75 CAPSULE, EXTENDED RELEASE ORAL DAILY
Status: DISCONTINUED | OUTPATIENT
Start: 2019-01-01 | End: 2019-01-01 | Stop reason: HOSPADM

## 2019-01-01 RX ORDER — ONDANSETRON 2 MG/ML
INJECTION INTRAMUSCULAR; INTRAVENOUS AS NEEDED
Status: DISCONTINUED | OUTPATIENT
Start: 2019-01-01 | End: 2019-01-01 | Stop reason: HOSPADM

## 2019-01-01 RX ORDER — FENTANYL CITRATE 50 UG/ML
INJECTION, SOLUTION INTRAMUSCULAR; INTRAVENOUS AS NEEDED
Status: DISCONTINUED | OUTPATIENT
Start: 2019-01-01 | End: 2019-01-01 | Stop reason: HOSPADM

## 2019-01-01 RX ORDER — DOCUSATE SODIUM 100 MG/1
CAPSULE, LIQUID FILLED ORAL
Qty: 60 CAP | Refills: 5 | Status: SHIPPED | OUTPATIENT
Start: 2019-01-01

## 2019-01-01 RX ORDER — ACETAMINOPHEN 650 MG/1
SUPPOSITORY RECTAL
COMMUNITY

## 2019-01-01 RX ORDER — LANOLIN ALCOHOL/MO/W.PET/CERES
CREAM (GRAM) TOPICAL
Qty: 30 TAB | Refills: 5 | Status: SHIPPED | OUTPATIENT
Start: 2019-01-01

## 2019-01-01 RX ORDER — LISINOPRIL 5 MG/1
5 TABLET ORAL DAILY
Qty: 30 TAB | Refills: 5 | Status: SHIPPED | OUTPATIENT
Start: 2019-01-01 | End: 2019-01-01

## 2019-01-01 RX ORDER — MORPHINE SULFATE 20 MG/5ML
5 SOLUTION ORAL
COMMUNITY

## 2019-01-01 RX ORDER — SODIUM CHLORIDE 0.9 % (FLUSH) 0.9 %
5-40 SYRINGE (ML) INJECTION EVERY 8 HOURS
Status: DISCONTINUED | OUTPATIENT
Start: 2019-01-01 | End: 2019-01-01 | Stop reason: HOSPADM

## 2019-01-01 RX ORDER — LORAZEPAM 0.5 MG/1
0.25 TABLET ORAL
COMMUNITY

## 2019-01-01 RX ORDER — FUROSEMIDE 10 MG/ML
20 INJECTION INTRAMUSCULAR; INTRAVENOUS EVERY 12 HOURS
Status: DISCONTINUED | OUTPATIENT
Start: 2019-01-01 | End: 2019-01-01

## 2019-01-01 RX ORDER — METOPROLOL SUCCINATE 25 MG/1
25 TABLET, EXTENDED RELEASE ORAL
Qty: 30 TAB | Refills: 5 | Status: ON HOLD | OUTPATIENT
Start: 2019-01-01 | End: 2019-01-01 | Stop reason: SDUPTHER

## 2019-01-01 RX ORDER — FACIAL-BODY WIPES
10 EACH TOPICAL AS NEEDED
COMMUNITY

## 2019-01-01 RX ORDER — CEFAZOLIN SODIUM 1 G/3ML
INJECTION, POWDER, FOR SOLUTION INTRAMUSCULAR; INTRAVENOUS AS NEEDED
Status: DISCONTINUED | OUTPATIENT
Start: 2019-01-01 | End: 2019-01-01 | Stop reason: HOSPADM

## 2019-01-01 RX ADMIN — CEFTRIAXONE 1 G: 1 INJECTION, POWDER, FOR SOLUTION INTRAMUSCULAR; INTRAVENOUS at 14:01

## 2019-01-01 RX ADMIN — APIXABAN 2.5 MG: 2.5 TABLET, FILM COATED ORAL at 18:57

## 2019-01-01 RX ADMIN — SODIUM CHLORIDE, SODIUM LACTATE, POTASSIUM CHLORIDE, AND CALCIUM CHLORIDE 125 ML/HR: 600; 310; 30; 20 INJECTION, SOLUTION INTRAVENOUS at 07:58

## 2019-01-01 RX ADMIN — SACUBITRIL AND VALSARTAN 1 TABLET: 24; 26 TABLET, FILM COATED ORAL at 09:06

## 2019-01-01 RX ADMIN — AZITHROMYCIN 500 MG: 250 TABLET, FILM COATED ORAL at 17:47

## 2019-01-01 RX ADMIN — VENLAFAXINE HYDROCHLORIDE 75 MG: 37.5 CAPSULE, EXTENDED RELEASE ORAL at 09:04

## 2019-01-01 RX ADMIN — VENLAFAXINE HYDROCHLORIDE 75 MG: 37.5 CAPSULE, EXTENDED RELEASE ORAL at 17:13

## 2019-01-01 RX ADMIN — FUROSEMIDE 20 MG: 10 INJECTION, SOLUTION INTRAMUSCULAR; INTRAVENOUS at 08:03

## 2019-01-01 RX ADMIN — APIXABAN 2.5 MG: 2.5 TABLET, FILM COATED ORAL at 09:04

## 2019-01-01 RX ADMIN — METOPROLOL TARTRATE 2 MG: 5 INJECTION INTRAVENOUS at 08:06

## 2019-01-01 RX ADMIN — FUROSEMIDE 40 MG: 10 INJECTION, SOLUTION INTRAMUSCULAR; INTRAVENOUS at 17:47

## 2019-01-01 RX ADMIN — PROPOFOL 20 MCG/KG/MIN: 10 INJECTION, EMULSION INTRAVENOUS at 08:10

## 2019-01-01 RX ADMIN — METOPROLOL SUCCINATE 12.5 MG: 25 TABLET, EXTENDED RELEASE ORAL at 17:13

## 2019-01-01 RX ADMIN — AZITHROMYCIN 500 MG: 250 TABLET, FILM COATED ORAL at 08:03

## 2019-01-01 RX ADMIN — APIXABAN 2.5 MG: 2.5 TABLET, FILM COATED ORAL at 08:03

## 2019-01-01 RX ADMIN — FENTANYL CITRATE 25 MCG: 50 INJECTION, SOLUTION INTRAMUSCULAR; INTRAVENOUS at 08:09

## 2019-01-01 RX ADMIN — FENTANYL CITRATE 25 MCG: 50 INJECTION, SOLUTION INTRAMUSCULAR; INTRAVENOUS at 08:21

## 2019-01-01 RX ADMIN — INFLUENZA VIRUS VACCINE 0.5 ML: 15; 15; 15; 15 SUSPENSION INTRAMUSCULAR at 08:51

## 2019-01-01 RX ADMIN — CEFAZOLIN SODIUM 2 G: 1 INJECTION, POWDER, FOR SOLUTION INTRAMUSCULAR; INTRAVENOUS at 08:16

## 2019-01-01 RX ADMIN — METOPROLOL SUCCINATE 12.5 MG: 25 TABLET, EXTENDED RELEASE ORAL at 09:04

## 2019-01-01 RX ADMIN — FUROSEMIDE 40 MG: 10 INJECTION, SOLUTION INTRAMUSCULAR; INTRAVENOUS at 17:13

## 2019-01-01 RX ADMIN — ONDANSETRON 4 MG: 2 INJECTION INTRAMUSCULAR; INTRAVENOUS at 08:10

## 2019-01-01 RX ADMIN — APIXABAN 2.5 MG: 2.5 TABLET, FILM COATED ORAL at 17:13

## 2019-01-01 RX ADMIN — CEFTRIAXONE 1 G: 1 INJECTION, POWDER, FOR SOLUTION INTRAMUSCULAR; INTRAVENOUS at 14:25

## 2019-01-01 RX ADMIN — PROPOFOL 20 MG: 10 INJECTION, EMULSION INTRAVENOUS at 08:17

## 2019-01-15 ENCOUNTER — HOSPITAL ENCOUNTER (OUTPATIENT)
Dept: LAB | Age: 84
Discharge: HOME | End: 2019-01-15
Payer: MEDICARE

## 2019-01-15 ENCOUNTER — OFFICE VISIT (OUTPATIENT)
Dept: INTERNAL MEDICINE CLINIC | Age: 84
End: 2019-01-15

## 2019-01-15 VITALS
OXYGEN SATURATION: 95 % | WEIGHT: 165.5 LBS | BODY MASS INDEX: 30.46 KG/M2 | TEMPERATURE: 95.4 F | HEART RATE: 64 BPM | HEIGHT: 62 IN | RESPIRATION RATE: 21 BRPM | SYSTOLIC BLOOD PRESSURE: 136 MMHG | DIASTOLIC BLOOD PRESSURE: 74 MMHG

## 2019-01-15 DIAGNOSIS — I25.83 CORONARY ARTERY DISEASE DUE TO LIPID RICH PLAQUE: ICD-10-CM

## 2019-01-15 DIAGNOSIS — D04.10: ICD-10-CM

## 2019-01-15 DIAGNOSIS — F03.90 DEMENTIA WITHOUT BEHAVIORAL DISTURBANCE, UNSPECIFIED DEMENTIA TYPE: ICD-10-CM

## 2019-01-15 DIAGNOSIS — N18.30 STAGE 3 CHRONIC KIDNEY DISEASE (HCC): ICD-10-CM

## 2019-01-15 DIAGNOSIS — I25.10 CORONARY ARTERY DISEASE DUE TO LIPID RICH PLAQUE: ICD-10-CM

## 2019-01-15 DIAGNOSIS — I10 ESSENTIAL HYPERTENSION: ICD-10-CM

## 2019-01-15 DIAGNOSIS — L98.9 SKIN LESION: Primary | ICD-10-CM

## 2019-01-15 PROCEDURE — 80053 COMPREHEN METABOLIC PANEL: CPT

## 2019-01-15 NOTE — PROGRESS NOTES
HISTORY OF PRESENT ILLNESS Tabby Ann is a 80 y.o. female here for follow-up. Has a lesion on the right sided forearm which is very sensitive and painful. She did not see the skin specialist yet. Destiny Dlae Has congestive heart failure. Taking Lasix for 6 days a week. No shortness of breath orthopnea. Need lab work Has depression, under control. Has coronary artery disease and hyperlipidemia, on medications. All medications reconciled, all labs and imaging reviewed. HPI Review of Systems Constitutional: Negative. HENT: Negative. Eyes: Negative. Respiratory: Negative. Cardiovascular: Negative. Gastrointestinal: Negative. Genitourinary: Negative. Musculoskeletal: Negative. Skin:  
     Right forearm lesion. Neurological: Negative. Endo/Heme/Allergies: Negative. Psychiatric/Behavioral: Positive for depression and memory loss. Physical Exam  
Constitutional: She appears well-developed and well-nourished. She appears distressed. Neck: Normal range of motion. Neck supple. No JVD present. No thyromegaly present. Cardiovascular: Normal rate, regular rhythm, normal heart sounds and intact distal pulses. Pulmonary/Chest: Effort normal and breath sounds normal. No respiratory distress. She has no wheezes. She has no rales. Musculoskeletal: She exhibits no edema or tenderness. Skin:  
Right forearm: Approximately 2 x 6 cm size induration/mole present on forearm. Slightly tender. No sign of infection. Psychiatric: She has a normal mood and affect. Her behavior is normal.  
Slightly demented. Depression is stable. ASSESSMENT and PLAN Diagnoses and all orders for this visit: 1. Skin lesion Has right-sided forearm lesion. Will notify daughter. Will refer, 
-     REFERRAL TO DERMATOLOGY 2. Essential hypertension Stable on current regimen.   Will continue same. 
-     METABOLIC PANEL, COMPREHENSIVE 
 
 3. Coronary artery disease due to lipid rich plaque Stable. Will check, 
-     METABOLIC PANEL, COMPREHENSIVE 4. 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 
-     REFERRAL TO DERMATOLOGY 5. Dementia without behavioral disturbance, unspecified dementia type Stable. In assisted living. 6. Stage 3 chronic kidney disease ([facility] [address] Creatinine ranges 1.45-1. 48.   Will repeat, 
-     METABOLIC PANEL, COMPREHENSIVE

## 2019-01-15 NOTE — PROGRESS NOTES
Health Maintenance Due Topic Date Due  
 DTaP/Tdap/Td series (1 - Tdap) 05/26/1947  Shingrix Vaccine Age 50> (1 of 2) 05/26/1976 Chief Complaint Patient presents with  Hypertension  CHF  Dementia 1. Have you been to the ER, urgent care clinic since your last visit? Hospitalized since your last visit? No 
 
2. Have you seen or consulted any other health care providers outside of the [address] [place] [address] since your last visit? Include any pap smears or colon screening. No 
 
3) Do you have an Advance Directive on file? yes 4) Are you interested in receiving information on Advance Directives? NO Patient is accompanied by self I have received verbal consent from Zane Peter to discuss any/all medical information while they are present in the room.

## 2019-01-16 LAB
ALBUMIN SERPL-MCNC: 3.9 G/DL (ref 3.2–4.6)
ALBUMIN/GLOB SERPL: 1.7 {RATIO} (ref 1.2–2.2)
ALP SERPL-CCNC: 58 IU/L (ref 39–117)
ALT SERPL-CCNC: 9 IU/L (ref 0–32)
AST SERPL-CCNC: 16 IU/L (ref 0–40)
BILIRUB SERPL-MCNC: 0.4 MG/DL (ref 0–1.2)
BUN SERPL-MCNC: 21 MG/DL (ref 10–36)
BUN/CREAT SERPL: 14 (ref 12–28)
CALCIUM SERPL-MCNC: 9.7 MG/DL (ref 8.7–10.3)
CHLORIDE SERPL-SCNC: 100 MMOL/L (ref 96–106)
CO2 SERPL-SCNC: 32 MMOL/L (ref 20–29)
CREAT SERPL-MCNC: 1.47 MG/DL (ref 0.57–1)
GLOBULIN SER CALC-MCNC: 2.3 G/DL (ref 1.5–4.5)
GLUCOSE SERPL-MCNC: 100 MG/DL (ref 65–99)
INTERPRETATION: NORMAL
POTASSIUM SERPL-SCNC: 4.5 MMOL/L (ref 3.5–5.2)
PROT SERPL-MCNC: 6.2 G/DL (ref 6–8.5)
SODIUM SERPL-SCNC: 143 MMOL/L (ref 134–144)

## 2019-04-04 PROBLEM — I44.7 LBBB (LEFT BUNDLE BRANCH BLOCK): Status: ACTIVE | Noted: 2019-01-01

## 2019-04-04 NOTE — ADVANCED PRACTICE NURSE
Communicated with Dr Nicol Kinney, cardiologist, regarding patient's cardiac status per notes 2/2019 (last office visit). Dr Nicol Kinney states superficial surgery should be fine to proceed with MAC (as posted in [facility]). He does not need to see her prior to procedure 4/15/19. Cardiac notes on chart and sent for scanning.

## 2019-04-04 NOTE — PERIOP NOTES
DR. Scotty Kirk OFFICE CALLED AND VOICE MESSAGE LEFT WITH THE FOLLOWING ABNORMAL LAB VALUES:  BUN-33, CREATININE-1.52, Mardelle Barnhart, GFRAA-39, GFRNA-32. ALL LAB RESULTS AND EKG FAXED TO OFFICE.

## 2019-04-15 NOTE — PERIOP NOTES
Discharge instructions reviewed with patient and daughter. Opportunity for questions and clarification provided. Patient and dauther verbalized understanding of discharge instructions and had no additional questions or concerns. Patient's vital signs within normal limits. Patient denies surgical site pain. Patient tolerating PO intake, denies nausea. Peripheral IV removed with no signs of infiltration. Additional copy of discharge instructions given to patient's daughter to be taken to [facility]. Patient discharged by RN via wheelchair to daughter's care/car and prior home environment from Phase 2 area.

## 2019-04-15 NOTE — ROUTINE PROCESS
Patient: Amanda Will MRN: [id number]  SSN: [id number] YOB: 1926  Age: 80 y.o. Sex: female Patient is status post Procedure(s): EXCISION SQUAMOUS CELL CARCINOMA RIGHT FOREARM WITH FROZEN SECTION AND FLAP CLOSURE. Surgeon(s) and Role: * Mohan Fraga MD - Primary Local/Dose/Irrigation:  SEE MAR Peripheral IV 04/15/19 Left Hand (Active) Site Assessment Clean, dry, & intact 4/15/2019  7:57 AM  
Dressing Status Clean, dry, & intact 4/15/2019  7:57 AM  
Dressing Type Transparent 4/15/2019  7:57 AM  
                 
 
 
 
Dressing/Packing:  Wound Arm Right-Dressing Type: 4 x 4;Gauze wrap (karen) (04/15/19 0844) Splint/Cast:  ] Other:

## 2019-04-15 NOTE — BRIEF OP NOTE
BRIEF OPERATIVE NOTE Date of Procedure: 4/15/2019 Preoperative Diagnosis: SQUAMOUS CELL CARCINOMA RIGHT FOREARM Postoperative Diagnosis: SQUAMOUS CELL CARCINOMA RIGHT FOREARM Procedure(s): EXCISION SQUAMOUS CELL CARCINOMA RIGHT FOREARM WITH FROZEN SECTION AND FLAP CLOSURE Surgeon(s) and Role: * Miguelina Morales MD - Primary Surgical Assistant: none Surgical Staff: 
Circ-1: Joo Mendoza RN Scrub RN-1: Lee Carreno RN Surg Asst-1: Nicki Kussmaul Event Time In Time Out Incision Start 4637 Incision Close 9030 Anesthesia: MAC Estimated Blood Loss: 25 ccs Specimens:  
ID Type Source Tests Collected by Time Destination 1 : PROBABLE SQUAMOUS CELL CARCINOMA DORSUM RIGHT FOREARM Frozen Section Arm, Right  Miguelina Morales MD 4/15/2019 0825 Pathology Findings: squamous dysplasia. No definite SCCA on frozen section. Clear margins on all dysplasia Complications: none Implants: * No implants in log *

## 2019-04-15 NOTE — ANESTHESIA POSTPROCEDURE EVALUATION
Procedure(s): EXCISION SQUAMOUS CELL CARCINOMA RIGHT FOREARM WITH FROZEN SECTION AND FLAP CLOSURE. MAC Anesthesia Post Evaluation Patient location during evaluation: PACU Patient participation: complete - patient participated Level of consciousness: awake Pain management: adequate Airway patency: patent Anesthetic complications: no 
Cardiovascular status: hemodynamically stable Respiratory status: acceptable Hydration status: acceptable Comments: I have seen and evaluated the patient. The patient is ready for PACU discharge. [address], DO Vitals Value Taken Time /45 4/15/2019  9:30 AM  
Temp 36.7 °C (98.1 °F) 4/15/2019  9:07 AM  
Pulse 67 4/15/2019  9:34 AM  
Resp 30 4/15/2019  9:34 AM  
SpO2 93 % 4/15/2019  9:34 AM  
Vitals shown include unvalidated device data.

## 2019-04-15 NOTE — DISCHARGE INSTRUCTIONS
Eleni Marcus M.D., F.A.C.S. Alejandra Li M.D., F.A.C.S.,  Gerard Feliciano III, M.D., F.A.C.S. Parveen Walters M.D.,  Jasmin Blanton M.D. Instructions after  Plastic Surgery Procedures      You have had a  surgical procedure. Please follow these simple instructions to help ensure a safe and comfortable recovery. Any questions can be directed to the office at [phone]. 1. If a bandage has been placed, it can be removed or changed at 48 hours after surgery. Wounds of the scalp are not usually bandaged, except in special situations. 2. Expect a modest amount of redness (inflammation) and possibly some bruising to appear in the days following your surgery. This is normal and will resolve as the wound heals, but may persist until the stitches have been removed. 3. The appearance of excessive wound redness, pus or fever is not normal and should be reported to the office. 4. Wounds may be gently washed with mild soap and water beginning 48 hours after surgery, then patted dry. Scalp wounds may be gently washed (mild shampoo) and gently dried as well. 5. Antibiotic ointment should be lightly applied daily to the sutures. Replace light gauze dressing over the sutures to protect them. Please use a light wrap or paper tape so you skin does not get irritated. 6. Suture removal will be arranged in 5-14 days, depending upon the site. The pathologists report will be discussed with you then. Your appointment is _______________________. 7. Mild to moderate pain is to be expected after minor surgery and will generally be relieved by the use of Tylenol or ibuprofen agents (Advil, Motrin, Nuprin, etc.) You have been given a prescription for ______________________.     8. Swelling or discomfort will be improved by elevating the surgical site above the level of the heart, especially the face, scalp or arms, which can be elevated in bed by extra pillows. 9. Do not be concerned if one or even several sutures come out prior to the time of suture removal. It is not unusual for this to occur as the wound swelling decreases. Support of the remaining sutures is sufficient to protect your wound(s). 10. If appropriate, copies of the surgery and pathology reports will be sent to your doctor. 11. Please call the office at [phone] if you have any questions. I acknowledge receipt of the above discharge instructions:    Patient/Guardian Signature _______________________________________ Date___________________    Sinan Nicks Signature_______________________________________________ Date___________________    DISCHARGE SUMMARY from Nurse    PATIENT INSTRUCTIONS:    After general anesthesia or intravenous sedation, for 24 hours or while taking prescription Narcotics:  · Limit your activities  · Do not drive and operate hazardous machinery  · Do not make important personal or business decisions  · Do  not drink alcoholic beverages  · If you have not urinated within 8 hours after discharge, please contact your surgeon on call. Report the following to your surgeon:  · Excessive pain, swelling, redness or odor of or around the surgical area  · Temperature over 101  · Nausea and vomiting lasting longer than 4 hours or if unable to take medications  · Any signs of decreased circulation or nerve impairment to extremity: change in color, persistent  numbness, tingling, coldness or increase pain  · Any questions    What to do at Home:  Recommended activity: See surgical instructions above from Dr. Kaia Mcmahon. Recommended Diet: Resume regular diet as tolerated. Nothing heavy, greasy, fatty, or fried. Please make sure you have food on your stomach prior to taking narcotic pain medication. If you experience any of the following symptoms as noted above, please follow up with Dr. Kaia Mcmahon. *  Please give a list of your current medications to your Primary Care Provider.   * Please update this list whenever your medications are discontinued, doses are  changed, or new medications (including over-the-counter products) are added. *  Please carry medication information at all times in case of emergency situations. Community Education:    These are general instructions for a healthy lifestyle:    No smoking/ No tobacco products/ Avoid exposure to second hand smoke  Surgeon General's Warning:  Quitting smoking now greatly reduces serious risk to your health. Obesity, smoking, and sedentary lifestyle greatly increases your risk for illness    A healthy diet, regular physical exercise & weight monitoring are important for maintaining a healthy lifestyle    You may be retaining fluid if you have a history of heart failure or if you experience any of the following symptoms:  Weight gain of 3 pounds or more overnight or 5 pounds in a week, increased swelling in our hands or feet or shortness of breath while lying flat in bed. Please call your doctor as soon as you notice any of these symptoms; do not wait until your next office visit. Recognize signs and symptoms of STROKE:    F-face looks uneven  A-arms unable to move or move unevenly  S-speech slurred or non-existent  T-time-call 911 as soon as signs and symptoms begin-DO NOT go       Back to bed or wait to see if you get better-TIME IS BRAIN. Warning Signs of HEART ATTACK     Call 911 if you have these symptoms:   Chest discomfort. Most heart attacks involve discomfort in the center of the chest that lasts more than a few minutes, or that goes away and comes back. It can feel like uncomfortable pressure, squeezing, fullness, or pain.  Discomfort in other areas of the upper body. Symptoms can include pain or discomfort in one or both arms, the back, neck, jaw, or stomach.  Shortness of breath with or without chest discomfort.  Other signs may include breaking out in a cold sweat, nausea, or lightheadedness.   Don't wait more than five minutes to call 911 - MINUTES MATTER! Fast action can save your life. Calling 911 is almost always the fastest way to get lifesaving treatment. Emergency Medical Services staff can begin treatment when they arrive -- up to an hour sooner than if someone gets to the hospital by car. The discharge information has been reviewed with the patient and caregiver. The patient and caregiver verbalized understanding. Discharge medications reviewed with the {Dishcar meds reviewed [id number]} and appropriate educational materials and side effects teaching were provided.   ___________________________________________________________________________________________________________________________________

## 2019-04-16 NOTE — PROGRESS NOTES
Health Maintenance Due Topic Date Due  
 DTaP/Tdap/Td series (1 - Tdap) 05/26/1947  Shingrix Vaccine Age 50> (1 of 2) 05/26/1976  Pneumococcal 65+ years (2 of 2 - PCV13) 11/04/2015  GLAUCOMA SCREENING Q2Y  06/09/2019 Chief Complaint Patient presents with  Hypertension  CHF  Coronary Artery Disease 1. Have you been to the ER, urgent care clinic since your last visit? Hospitalized since your last visit? No 
 
2. Have you seen or consulted any other health care providers outside of the [address] [place] [address] since your last visit? Include any pap smears or colon screening. No 
 
3) Do you have an Advance Directive on file? yes 4) Are you interested in receiving information on Advance Directives? NO Patient is accompanied by self I have received verbal consent from Caryl Portillo to discuss any/all medical information while they are present in the room.

## 2019-04-16 NOTE — OP NOTES
[address] [facility] 
OPERATIVE REPORT Name:  Hamida Culp 
MR#:  [id number] :  1926 ACCOUNT #:  [de-identified] DATE OF SERVICE:  04/15/2019 PREOPERATIVE DIAGNOSIS:  3 x 3 cm probable squamous cell carcinoma of the dorsum of the right forearm. POSTOPERATIVE DIAGNOSIS:  3 x 3 cm probable squamous cell carcinoma of the dorsum of the right forearm with frozen section showing squamous dysplasia. No definite carcinoma and clear margins. PROCEDURE PERFORMED:  Excision of probable squamous cell carcinoma dorsum right forearm with 1 cm margins and flap closure of 25 cm2 forearm defect. SURGEON:  Radha Alan MD 
 
ASSISTANT:  none ANESTHESIA:  MAC 
 
COMPLICATIONS:  none SPECIMENS REMOVED:  See op note IMPLANTS:  none ESTIMATED BLOOD LOSS:  Negligible. INDICATIONS:  This 78-year-old the patient was brought to the operating room today for surgical treatment of a probable squamous cell carcinoma of the forearm. Preoperatively, she was marked for surgery and the details of the planned procedure were discussed with her including the scars which would result and the risk involved. She as well as her guardian  appeared to understand all these considerations. FINDINGS AND PROCEDURE:  The patient was brought to the operating room and sedation anesthesia was induced. Local anesthesia was induced around the skin cancer and in the planned flap with 1% lidocaine 1:100,000 epinephrine. The forearm and hand were then prepped and draped circumferentially into a sterile field. A circular excision of the cancer was done taking some subcutaneous fat under it and it was sent for frozen section. This would turn squamous dysplasia with no definite carcinoma seen and clear margins. A proximally based flap of forearm skin was designed, cut, raised, and brought into position.   After careful skin trimming and tissue rearrangement, hemostasis was secured and the wounds were closed with running and interrupted 3-0 Prolene suture. The patient's skin was quite fragile but I was able to get the skin together without a skin graft. A sterile dressing was applied. The patient awoke from the sedation and went to the recovery room in good condition. Final sponge and needle counts were reported to be correct. Blood loss for this operation was negligible. Doug Mcardle, MD 
 
 
[id number] 
D:  04/15/2019 12:00 
T:  04/15/2019 12:04 JOB #:  [id number]

## 2019-04-16 NOTE — H&P
Pre-op History and Physical 
 
CC: SQUAMOUS CELL CARCINOMA RIGHT FOREARM  
HPI: 80y.o. year old female with [address] for Procedure(s): EXCISION SQUAMOUS CELL CARCINOMA RIGHT FOREARM WITH FROZEN SECTION AND FLAP CLOSURE. Past medical history:  
Past Medical History:  
Diagnosis Date  Altered mental state  Arrhythmia AFIB HISTORY  Arthritis BACK  Asthma  CAD (coronary artery disease)   
 by pass surgery in 2002,2008  Cancer ([facility] [address] 2018 SKIN CANCER ON FACE  Dementia  Depression  Fibromyalgia  Heart failure ([facility] [address]  HTN (hypertension) 6/11/2013  Hypercholesterolemia  Ill-defined condition FOUR COMPRESSION FRACTURES IN BACK  Ill-defined condition DEMENTIA  LBBB (left bundle branch block) 4/4/2019  PE (pulmonary embolism) 2015 DAUGHTER STATES NOT IN LUNG-IN LEG  
 Pneumonia 04/2014  Pneumonia, organism unspecified(486) 3/8/2014  Squamous cell cancer of skin of forearm, right 2019  Thyroid disease Past surgical history:  
Past Surgical History:  
Procedure Laterality Date  CARDIAC SURG PROCEDURE UNLIST  2003  
 heart attack,bypass sx CABG . STENTS   
 HX DILATION AND CURETTAGE    
 HX GI    
 COLONOSCOPY   
 HX HEENT  2016 SKIN CANCER REMOVED ON FACE  
 HX HEENT Left 10/2018 FOREHEAD Family history:  
Family History Problem Relation Age of Onset  No Known Problems Mother  No Known Problems Father  Anesth Problems Neg Hx Social history:  
Social History Socioeconomic History  Marital status:  Spouse name: Not on file  Number of children: Not on file  Years of education: Not on file  Highest education level: Not on file Occupational History  Not on file Social Needs  Financial resource strain: Not on file  Food insecurity:  
  Worry: Not on file Inability: Not on file  Transportation needs:  
  Medical: Not on file Non-medical: Not on file Tobacco Use  Smoking status: Never Smoker  Smokeless tobacco: Never Used Substance and Sexual Activity  Alcohol use: No  
 Drug use: No  
 Sexual activity: Never Birth control/protection: None Comment: ,4 children,[facility] [place] [facility]  Lifestyle  Physical activity:  
  Days per week: Not on file Minutes per session: Not on file  Stress: Not on file Relationships  Social connections:  
  Talks on phone: Not on file Gets together: Not on file Attends Denominational service: Not on file Active member of club or organization: Not on file Attends meetings of clubs or organizations: Not on file Relationship status: Not on file  Intimate partner violence:  
  Fear of current or ex partner: Not on file Emotionally abused: Not on file Physically abused: Not on file Forced sexual activity: Not on file Other Topics Concern  Not on file Social History Narrative  Not on file Home Medications:  
Prior to Admission medications Medication Sig Start Date End Date Taking? Authorizing Provider  
furosemide (LASIX) 40 mg tablet Take 1 Tab by mouth daily. 6 times per week. DC Bumex Patient taking differently: Take 40 mg by mouth daily. 6 times per week. DC Bumex  Indications: NOT ON SUNDAYS 11/13/18  Yes Jorge Estrada MD  
metoprolol succinate (TOPROL-XL) 25 mg XL tablet Take 25 mg by mouth daily (after breakfast). 1/2 TAB FOR CHF    Yes Provider, Historical  
venlafaxine-SR (EFFEXOR-XR) 75 mg capsule Take 1 Cap by mouth daily. 9/18/18  Yes Jorge Estrada MD  
lisinopril (PRINIVIL, ZESTRIL) 5 mg tablet Take 1 Tab by mouth daily. Patient taking differently: Take 5 mg by mouth daily (after breakfast). 9/18/18  Yes Jorge Estrada MD  
pravastatin (PRAVACHOL) 20 mg tablet Take 1 Tab by mouth nightly.  D/C pravastatin 40 mg tab 4/10/18  Yes Jorge Estrada MD  
 donepezil (ARICEPT) 5 mg tablet TAKE 1 TABLET BY MOUTH EVERY NIGHT AT BEDTIME DX: MEMORY SUPPORT 11/21/17  Yes Toribio Campbell MD  
traMADol Musa Coral) 50 mg tablet Take 1 Tab by mouth nightly for 30 days. Max Daily Amount: 50 mg. 4/15/19 5/15/19  Toribio Campbell MD  
guaiFENesin (MUCINEX) 1,200 mg Ta12 ER tablet Take 1,200 mg by mouth two (2) times a day. Provider, Historical  
polyethylene glycol (MIRALAX) 17 gram packet Take 17 g by mouth as needed. Provider, Historical  
fluticasone (FLONASE) 50 mcg/actuation nasal spray 2 Sprays by Both Nostrils route daily. 10/16/18   Toribio Campbell MD  
fexofenadine (ALLEGRA) 180 mg tablet Take 1 Tab by mouth daily as needed for Allergies. 10/16/18   Toribio Campbell MD  
senna-docusate (MELLISSA-COLACE) 8.6-50 mg per tablet Take 1 Tab by mouth as needed. Provider, Historical  
albuterol (PROVENTIL HFA, VENTOLIN HFA, PROAIR HFA) 90 mcg/actuation inhaler Take 2 Puffs by inhalation every four (4) hours as needed for Wheezing. 10/5/18   Verneice Orfawad, NP  
ammonium lactate (LAC-HYDRIN) 12 % lotion  9/13/18   Provider, Historical  
hydrocortisone (HYTONE) 2.5 % lotion as needed. 9/13/18   Provider, Historical  
acetaminophen (TYLENOL) 500 mg tablet Take 500 mg by mouth every six (6) hours as needed for Pain. Provider, Historical  
ELIQUIS 2.5 mg tablet TAKE 1 TABLET BY MOUTH 2 TIMES A DAY.  DX ANTICOAGULANT 2/20/18   Toribio Campbell MD  
VITAMIN B-12 500 mcg tablet TAKE 1 TABLET BY MOUTH EVERY DAY DX: SUPPLEMENT 11/21/17   Toribio Campbell MD  
DOC-Q-LACE 100 mg capsule TAKE 1 CAPSULE BY MOUTH TWICE DAILY DX:BOWEL SUPPORT 11/21/17   Toribio Campbell MD  
albuterol (PROVENTIL VENTOLIN) 2.5 mg /3 mL (0.083 %) nebulizer solution INHALE THE CONTENTS OF 1 VIAL VIA NEBULIZER EVERY 6 HOURS AS NEEDED FOR WHEEZING 7/25/17   Toribio Campbell MD  
calcium-cholecalciferol, D3, (CALTRATE 600+D) tablet TAKE 1 TABLET BY MOUTH TWICE DAILY DX: SUPPLEMENT 4/27/17   Toribio Campbell MD  
 raNITIdine (ZANTAC) 150 mg tablet Take 150 mg by mouth nightly as needed for Indigestion. Provider, Historical  
  
Allergies: Allergies Allergen Reactions  Neurontin [Gabapentin] Unknown (comments) Pt unknown reaction Review of systems:  Denies headache, fever, chills, weight change, congestion, sore throat, chest pain, shortness of breath, nausea, vomiting, diarrhea, constipation, abdominal pain, generalized weakness, muscle or joint pain, and rash. Physical Exam: 
Vitals: Blood pressure 108/58, pulse 70, temperature 98.1 °F (36.7 °C), resp. rate 24, height 5' 2\" (1.575 m), weight 76.2 kg (168 lb), SpO2 97 %. General: awake and alert, NAD Neck: supple Breasts:  no known breast pathology Cor: RRR Lungs: clear Abdomen: soft, non-tender, non-distended Extremities: no edema Skin: squamous cell carcinoma on right forearm. Almost certain based on exam 
 
Impression: SQUAMOUS CELL CARCINOMA RIGHT FOREARM Plan:  Procedure(s): EXCISION SQUAMOUS CELL CARCINOMA RIGHT FOREARM WITH FROZEN SECTION AND FLAP CLOSURE

## 2019-04-16 NOTE — PROGRESS NOTES
HISTORY OF PRESENT ILLNESS Fred Mtz is a 80 y.o. female here for follow-up. She underwent another resection of skin tumor on the right forearm recently, surgery was done by Dr. Alayna Snell. Wait for biopsy. Need to get her bandage removed in 48 hours. Has CHF, taking Lasix 6 days a week. No shortness of breath orthopnea. Leg edema is under control. Has CKD, stage III. She has been stable for now. Has coronary artery disease and hyperlipidemia, on medications. Depression seems stable. On Effexor XR. All medications reconciled, all labs and imaging reviewed. Hypertension Pertinent negatives include no orthopnea and no PND. Squamous Cell Carcinoma CHF Dementia Her past medical history is significant for hypertension. Review of Systems Constitutional: Negative. HENT: Negative. Eyes: Negative. Respiratory: Negative. Cardiovascular: Negative. Negative for orthopnea and PND. Gastrointestinal: Negative. Genitourinary: Negative. Skin: Positive for skin cancer. Skin mass Neurological: Negative. Endo/Heme/Allergies: Negative. Psychiatric/Behavioral: Positive for memory loss. Physical Exam  
Constitutional: She appears well-developed and well-nourished. No distress. Neck: Normal range of motion. Neck supple. No JVD present. No thyromegaly present. Cardiovascular: Normal rate, regular rhythm, normal heart sounds and intact distal pulses. Pulmonary/Chest: Effort normal and breath sounds normal. No respiratory distress. She has no wheezes. She has no rales. Abdominal: Soft. Bowel sounds are normal. She exhibits no distension. There is no tenderness. Musculoskeletal: Normal range of motion. She exhibits no tenderness. Skin:  
Right forearm:under bandage. Psychiatric: She has a normal mood and affect. Her behavior is normal.  
 
 
ASSESSMENT and PLAN Diagnoses and all orders for this visit: 
 
 1. Dementia without behavioral disturbance, unspecified dementia type Mild dementia, she is not on medication. Doing well. 2. Chronic systolic congestive heart failure ([facility] [address] Compensated. On Lasix every day. CMP stable. 3. Osteopenia, unspecified location On calcium with vitamin D. 
4. Squamous cell carcinoma Just has recent resection of skin tumor from right forearm by Dr. Yamini Lorenzo, await for biopsy. Need to remove bandage after 48 hours. 5. Stage 3 chronic kidney disease ([facility] [address] CKD, stable continue. 6. Depression, unspecified depression type On Effexor XR, doing well. Discussed expected course/resolution/complications of diagnosis in detail with patient. Medication risks/benefits/costs/interactions/alternatives discussed with patient. Pt was given an after visit summary which includes diagnoses, current medications & vitals. Pt expressed understanding with the diagnosis and plan.

## 2019-05-14 NOTE — TELEPHONE ENCOUNTER
PCP: Luis Fernando Callaway MD    Last appt: Visit date not found  Future Appointments   Date Time Provider Theodora Goldberg   7/16/2019 11:00 AM Luis Fernando Callaway  [facility] [place]       Requested Prescriptions     Pending Prescriptions Disp Refills    calcium-cholecalciferol, D3, (CALTRATE 600+D) tablet 60 Tab 12

## 2019-07-10 NOTE — DISCHARGE INSTRUCTIONS
Ms. Johnson Handler should ice her back several times a day. She should follow-up with her doctor if her pain persists. She should return to the ER with new or worsening symptoms.

## 2019-07-10 NOTE — ED PROVIDER NOTES
80 y.o. female with past medical history significant for hypercholesterolemia, asthma, thyroid disease, HTN, PNA, AMS, fibromyalgia, arthritis, CAD, compression fractures, dementia, HF, A-fib, skin cancer, PE, and LBBB who presents from [facility] via EMS with chief complaint of a fall. EMS reports Pt tripped on her rollator PTA and she now c/o lower back pain, worsened by movement. EMS states Pt has been able to stand since the fall. Per EMS, Pt has prior hx of back fractures, and her pain usually worsens w/ falls. EMS notes Pt is currently at her baseline mental status. Per EMS, Pt's initial HR was 70, BP was 120/70, and oxygen saturation was 97% on room air. EMS denies head injury. There are no other acute medical concerns at this time. PCP: Yenny Chan MD 
 
Full history, physical exam, and ROS unable to be obtained due to:  dementia. Note written by Braden Fregoso, as dictated by Dameon Jane MD 9:14 PM 
 
 
  
 
Past Medical History:  
Diagnosis Date  Altered mental state  Arrhythmia AFIB HISTORY  Arthritis BACK  Asthma  CAD (coronary artery disease)   
 by pass surgery in 2002,2008  Cancer ([facility] [address] 2018 SKIN CANCER ON FACE  Dementia  Depression  Fibromyalgia  Heart failure ([facility] [address]  HTN (hypertension) 6/11/2013  Hypercholesterolemia  Ill-defined condition FOUR COMPRESSION FRACTURES IN BACK  Ill-defined condition DEMENTIA  LBBB (left bundle branch block) 4/4/2019  PE (pulmonary embolism) 2015 DAUGHTER STATES NOT IN LUNG-IN LEG  
 Pneumonia 04/2014  Pneumonia, organism unspecified(486) 3/8/2014  Squamous cell cancer of skin of forearm, right 2019  Thyroid disease Past Surgical History:  
Procedure Laterality Date  CARDIAC SURG PROCEDURE UNLIST  2003  
 heart attack,bypass sx CABG . STENTS   
 HX DILATION AND CURETTAGE    
 HX GI    
 COLONOSCOPY   
 HX HEENT  2016 SKIN CANCER REMOVED ON FACE  
 HX HEENT Left 10/2018 FOREHEAD Family History:  
Problem Relation Age of Onset  No Known Problems Mother  No Known Problems Father  Anesth Problems Neg Hx Social History Socioeconomic History  Marital status:  Spouse name: Not on file  Number of children: Not on file  Years of education: Not on file  Highest education level: Not on file Occupational History  Not on file Social Needs  Financial resource strain: Not on file  Food insecurity:  
  Worry: Not on file Inability: Not on file  Transportation needs:  
  Medical: Not on file Non-medical: Not on file Tobacco Use  Smoking status: Never Smoker  Smokeless tobacco: Never Used Substance and Sexual Activity  Alcohol use: No  
 Drug use: No  
 Sexual activity: Never Birth control/protection: None Comment: ,4 children,reloacted from [place] to  [facility] living in [facility]  Physical activity:  
  Days per week: Not on file Minutes per session: Not on file  Stress: Not on file Relationships  Social connections:  
  Talks on phone: Not on file Gets together: Not on file Attends Uatsdin service: Not on file Active member of club or organization: Not on file Attends meetings of clubs or organizations: Not on file Relationship status: Not on file  Intimate partner violence:  
  Fear of current or ex partner: Not on file Emotionally abused: Not on file Physically abused: Not on file Forced sexual activity: Not on file Other Topics Concern  Not on file Social History Narrative  Not on file ALLERGIES: Neurontin [gabapentin] Review of Systems Unable to perform ROS: Dementia Vitals:  
 07/09/19 2117 BP: 97/71 Pulse: 67 Temp: 97.7 °F (36.5 °C) Physical Exam  
Vital signs reviewed. Nursing notes reviewed. Const:  No acute distress, well developed, well nourished Head:  Atraumatic, normocephalic Eyes:  PERRL, conjunctiva normal, no scleral icterus Neck:  Supple, trachea midline Cardiovascular:  RRR, no murmurs, no gallops, no rubs Resp:  No resp distress, no increased work of breathing, no wheezes, no rhonchi, no rales, Abd:  Soft, non-tender, non-distended, no rebound, no guarding, no CVA tenderness MSK:  No pedal edema, normal ROM. Diffuse lower back tenderness. Neuro:  Alert and oriented x3, no cranial nerve defect Skin:  Warm, dry, intact Psych: normal mood and affect, behavior is normal, judgement and thought content is normal 
Note written by Braden Hamlin, as dictated by Tammi Castelan MD 9:14 PM 
 
MDM Number of Diagnoses or Management Options Acute midline low back pain without sciatica:  
Fall, initial encounter:  
  
Amount and/or Complexity of Data Reviewed Clinical lab tests: ordered and reviewed Tests in the radiology section of CPT®: ordered and reviewed Review and summarize past medical records: yes Patient Progress Patient progress: stable Pt. Presents to the ER with complaints of back pain after a fall. No acute fx on xray. Pt. Reports multiple similar sx in the past.  No fx on ankle xray. No signs/sx of cord compression. Pt. To f/u with her doctor or return to the ER with worsening sx. Pt. Refused xray of her ankle. Procedures

## 2019-07-16 NOTE — PROGRESS NOTES
HISTORY OF PRESENT ILLNESS Kellee Estevez is a 80 y.o. female here for follow-up. Has bilateral leg edema. No shortness of breath or orthopnea. On Lasix for 6 days a week. Has CKD. Need to monitor blood work. She went to emergency room after a fall. She tripped on her walker and fell down. Had some pain in the chest wall. But no fractures noticed. Her dementia and memory loss getting worse. Daughter is a caregiver. Taking low-dose of exercise at. Sleeping well. Depression seems under control. Has coronary artery disease and hyperlipidemia, on medications. All medications reconciled, all labs and imaging reviewed. HPI Review of Systems Constitutional: Negative. HENT: Negative. Eyes: Negative. Respiratory: Negative. Cardiovascular: Negative. Gastrointestinal: Negative. Genitourinary: Negative. Musculoskeletal: Positive for back pain, falls and joint pain. Skin:  
     Right forearm lesion. Neurological: Negative. Endo/Heme/Allergies: Negative. Psychiatric/Behavioral: Positive for depression and memory loss. Physical Exam  
Constitutional: She appears well-developed and well-nourished. No distress. Neck: Normal range of motion. Neck supple. No JVD present. No thyromegaly present. Cardiovascular: Normal rate, regular rhythm, normal heart sounds and intact distal pulses. Pulmonary/Chest: Effort normal and breath sounds normal. No respiratory distress. She has no wheezes. She has no rales. Musculoskeletal: She exhibits no edema or tenderness. Skin:  
Right forearm: Approximately 2 x 6 cm size induration/mole present on forearm. Slightly tender. No sign of infection. Psychiatric: She has a normal mood and affect. Her behavior is normal.  
Slightly demented. Depression is stable. ASSESSMENT and PLAN Diagnoses and all orders for this visit: 
 
1. Chronic systolic congestive heart failure ([facility] [address] Compensated. On Lasix 6 days a week. Will repeat, 
-     METABOLIC PANEL, COMPREHENSIVE 2. Dementia without behavioral disturbance, unspecified dementia type On low-dose of Aricept. Memory is getting worse. Will think about placing her on Namenda next time. 3. Mixed hyperlipidemia On pravastatin. Stable. 4. Coronary artery disease due to lipid rich plaque Stable. Taking Eliquis and statin. -     METABOLIC PANEL, COMPREHENSIVE 5. Glaucoma screening 
-     REFERRAL TO OPHTHALMOLOGY 6. Encounter for immunization Will give, 
-     PNEUMOCOCCAL CONJ VACCINE 13 VALENT IM 
 
7. Fall Advised patient and the family to clear her floor and including rales. Patient is not willing to moved all her craft that she has all over her house. Daughter is going to work on that. Discussed expected course/resolution/complications of diagnosis in detail with patient. Medication risks/benefits/costs/interactions/alternatives discussed with patient. Pt was given an after visit summary which includes diagnoses, current medications & vitals. Pt expressed understanding with the diagnosis and plan.

## 2019-07-16 NOTE — PROGRESS NOTES
Health Maintenance Due Topic Date Due  
 DTaP/Tdap/Td series (1 - Tdap) 05[birth year]26/1947  Shingrix Vaccine Age 50> (1 of 2) 05/26/1976  Pneumococcal 65+ years (2 of 2 - PCV13) 11/04/2015  GLAUCOMA SCREENING Q2Y  06/09/2019 Chief Complaint Patient presents with  Hypertension  Cholesterol Problem  Dementia 1. Have you been to the ER, urgent care clinic since your last visit? Hospitalized since your last visit? Yes When: 7/9/19 [facility] ED s/p fall 2. Have you seen or consulted any other health care providers outside of the [address] [place] [address] since your last visit? Include any pap smears or colon screening. No 
 
3) Do you have an Advance Directive on file? yes 4) Are you interested in receiving information on Advance Directives? NO Patient is accompanied by daughter I have received verbal consent from Quentin Obrien to discuss any/all medical information while they are present in the room. Quentin Obrien is a 80 y.o. female  who presents for routine immunization(s) Prevnar-13. Patient denies any symptoms , reactions or allergies that would exclude them from being immunized today. Risks and adverse reactions were discussed and the VIS was given to them. Patient voiced full understanding and signed Adult Immunization Consent form. All questions were addressed. Patient was observed for 10 min post injection. There were no reactions observed.  
 
[place]ana Alcaraz LPN

## 2019-08-21 NOTE — TELEPHONE ENCOUNTER
Received call from Deniz Ledesma med tech at [facility], stating that the patient has had an increase in SOB, no wheezing, cough or congestion. Also states that she has used her nebulizer with minimal effect. BP- 101/62 HR- 102.  Deniz Ledesma attempting to schedule an appointment, no provider at [facility] location today, advised to call dispatch health or if emergent SOB to send pt to ED, Deniz Ledesma voiced understanding and writer provided Deniz Ledesma with contact information to [facility]

## 2019-10-01 NOTE — PROGRESS NOTES
0653- new admission from ED, patient A&O x 3 with episodes of confusion,  Lungs sounds Dim, on 2L NC, VSS, NSR on tele, + bowel sounds, + pulses, skin warm and intact, POC reviewed, call intervention In place, call light in reach

## 2019-10-01 NOTE — H&P
History and Physical 
Primary Care Provider: Lalla Felty, MD 
 
Subjective:  
 
Maddie Granda is a 80 y.o. female with PMH of CHFrEF, dementia,asthma,HTN and other co morbidities was sent to the hospital from PCP for further eval of fever,SOB and tachycardia. CC: shortness of breath Patient states that she had cold last week and since then has progressively worsening shortness of breath with productive cough. She was SOB at rest. Denied any chest pain except when she has severe cough. Denied any fever,headacahe,nasuea/vomiting,abdominal pain. She was seen in the PCP office today,where she was found to have tachypnea,tachycardia,fever -was sent to ER for further eval. 
 
Review of Systems: A comprehensive review of systems was negative except for that written in the History of Present Illness. Past Medical History:  
Diagnosis Date  Altered mental state  Arrhythmia AFIB HISTORY  Arthritis BACK  Asthma  CAD (coronary artery disease)   
 by pass surgery in 2002,2008  Cancer ([facility] [address] 2018 SKIN CANCER ON FACE  Dementia ([facility] [address]  Depression  Fibromyalgia  Heart failure ([address]  HTN (hypertension) 6/11/2013  Hypercholesterolemia  Ill-defined condition FOUR COMPRESSION FRACTURES IN BACK  Ill-defined condition DEMENTIA  LBBB (left bundle branch block) 4/4/2019  PE (pulmonary embolism) 2015 DAUGHTER STATES NOT IN LUNG-IN LEG  
 Pneumonia 04/2014  Pneumonia, organism unspecified(486) 3/8/2014  Squamous cell cancer of skin of forearm, right 2019  Thyroid disease Past Surgical History:  
Procedure Laterality Date  CARDIAC SURG PROCEDURE UNLIST  2003  
 heart attack,bypass sx CABG . STENTS   
 HX DILATION AND CURETTAGE    
 HX GI    
 COLONOSCOPY   
 HX HEENT  2016 SKIN CANCER REMOVED ON FACE  
 HX HEENT Left 10/2018 FOREHEAD Prior to Admission medications Medication Sig Start Date End Date Taking? Authorizing Provider  
sacubitril-valsartan (ENTRESTO) 24 mg/26 mg tablet Take 1 Tab by mouth two (2) times a day. Provider, Historical  
docusate sodium (DOC-Q-LACE) 100 mg capsule TAKE 1 CAPSULE BY MOUTH TWICE DAILY DX:BOWEL SUPPORT 9/23/19   Michele Tomas MD  
cyanocobalamin (VITAMIN B-12) 500 mcg tablet TAKE 1 TABLET BY MOUTH EVERY DAY DX: SUPPLEMENT 9/11/19   Michele Tomas MD  
guaiFENesin (MUCINEX) 1,200 mg Ta12 ER tablet Take 1 Tab by mouth two (2) times a day. 9/6/19   Lexis Horn NP  
apixaban (ELIQUIS) 2.5 mg tablet TAKE 1 TABLET BY MOUTH 2 TIMES A DAY. DX ANTICOAGULANT 8/22/19   Damaris Liz DO  
lisinopril (PRINIVIL, ZESTRIL) 5 mg tablet Take 1 Tab by mouth daily. 8/20/19   Lexis Horn NP  
venlafaxine-SR [facility] 75 mg capsule Take 1 Cap by mouth daily. 8/20/19   Lexis Horn NP  
metoprolol succinate (TOPROL-XL) 25 mg XL tablet Take 1 Tab by mouth daily (after breakfast). 8/14/19   Lexis Horn NP  
lidocaine (LIDODERM) 5 % Apply patch to the affected area for 12 hours a day and remove for 12 hours a day. 7/9/19   Magen Tierney MD  
calcium-cholecalciferol, D3, (CALTRATE 600+D) tablet TAKE 1 TABLET BY MOUTH TWICE DAILY DX: SUPPLEMENT 5/14/19   Michele Tomas MD  
polyethylene glycol [facility]) 17 gram packet Take 17 g by mouth as needed. Provider, Historical  
furosemide (LASIX) 40 mg tablet Take 1 Tab by mouth daily. 6 times per week. DC Bumex Patient taking differently: Take 40 mg by mouth daily. 6 times per week. DC Bumex  Indications: NOT ON SUNDAYS 11/13/18   Michele Tomas MD  
fluticasone [facility]) 50 mcg/actuation nasal spray 2 Sprays by Both Nostrils route daily. 10/16/18   Michele Tomas MD  
fexofenadine (ALLEGRA) 180 mg tablet Take 1 Tab by mouth daily as needed for Allergies.  10/16/18   Michele Tomas MD  
albuterol (PROVENTIL HFA, VENTOLIN HFA, PROAIR HFA) 90 mcg/actuation inhaler Take 2 Puffs by inhalation every four (4) hours as needed for Wheezing. 10/5/18   Piedad Kurt, NP  
ammonium lactate (LAC-HYDRIN) 12 % lotion  9/13/18   Provider, Historical  
hydrocortisone (HYTONE) 2.5 % lotion as needed. 9/13/18   Provider, Historical  
acetaminophen (TYLENOL) 500 mg tablet Take 500 mg by mouth every six (6) hours as needed for Pain. Provider, Historical  
pravastatin (PRAVACHOL) 20 mg tablet Take 1 Tab by mouth nightly. D/C pravastatin 40 mg tab 4/10/18   Evin Longoria MD  
donepezil (ARICEPT) 5 mg tablet TAKE 1 TABLET BY MOUTH EVERY NIGHT AT BEDTIME DX: MEMORY SUPPORT 11/21/17   Evin Longoria MD  
albuterol (PROVENTIL VENTOLIN) 2.5 mg /3 mL (0.083 %) nebulizer solution INHALE THE CONTENTS OF 1 VIAL VIA NEBULIZER EVERY 6 HOURS AS NEEDED FOR WHEEZING 7/25/17   Evin Longoria MD  
raNITIdine (ZANTAC) 150 mg tablet Take 150 mg by mouth nightly as needed for Indigestion. Provider, Historical  
 
Allergies Allergen Reactions  Neurontin [Gabapentin] Unknown (comments) Pt unknown reaction Family History Problem Relation Age of Onset  No Known Problems Mother  No Known Problems Father  Anesth Problems Neg Hx SOCIAL HISTORY: 
Patient resides at assisted living facility Patient ambulates independently Smoking history: does not smoke Alcohol history: does not drink alcohol Objective:  
 
 
Physical Exam:  
Gen:  Well-developed, well-nourished, in no acute distress HEENT:   PERRL, EOMI,anicteric, no pallor,hearing intact to voice, moist mucous membranes,sinus non tender Neck:  Supple, without masses, thyroid non-tender Resp:  No accessory muscle use, clear breath sounds without wheezes rales or rhonchi 
Card:  No murmurs, normal S1, S2 without thrills, bruits or peripheral edema Abd:  Soft, non-tender, non-distended, normoactive bowel sounds are present Lymph:  No cervical adenopathy Musc:  No cyanosis or clubbing Skin:  No rashes or ulcers, skin turgor is good Neuro:  Cranial nerves 3-12 are grossly intact,  strength is 5/5 bilaterally, dorsi / plantarflexion strength is 5/5 bilaterally, follows commands appropriately Psych:  Alert with good insight. Oriented to person, place, and time 
  
 
 
ECG:  Normal sinus rhythym,left bundle branch block Data Review: All diagnostic labs and studies have been reviewed. Xr Chest Pa Lat Result Date: 10/1/2019 IMPRESSION: Small bilateral pleural effusions Assessment:  
 
Active Problems: 
  CHF (congestive heart failure) ([facility] [address] (5/1/2014) #Acute on chronic CHF exacerbation: 
-based on her symptoms -SOB,elevated BNP -36447 and some pleural effusions 
-will give IV lasix BID 40 mg 
-daily weights,I and O 
-echo ordered 
-Follows with , consult cardiology 
-Resume entresto and beta blocker once BP stable. Med list has entresto and lisinopril, unclear why she is on both. Pharmacy to obtain med list from facility. 
-trop negative,recheck one more time # Suspected pneumonia: 
-Per PCP note,patient was febrile in the office. No clear infiltrate on CXR  
-will give empiric abx -ceftriaxone and azithromycin 
-check procal, sputum cultures. 
-Blood cultures pending 
-check viral PCR 
-recheck lactic acid # HTN: resume home meds #Atrial fib history: resume anticoagulants and beta blocker. #Dementia: resume home meds Discussed code status -per daughter patient is DNR/DNI. FUNCTIONAL STATUS PRIOR TO HOSPITALIZATION -walks with walker. Signed By: Caitlin Leach MD   
 October 1, 2019

## 2019-10-01 NOTE — ED TRIAGE NOTES
Pt arrives via ems from [facility]. Temperature in office 101.6. Pt had SOB x1 week. Pt oxygen was reported 89%-92% on RA. EMS reports 100% on 2L NC. Pt denies pain. Hx afib and dementia. Pt has chronic leg and back pain. Pt resides at [facility].

## 2019-10-01 NOTE — PROGRESS NOTES
Health Maintenance Due Topic Date Due  
 DTaP/Tdap/Td series (1 - Tdap) 05/26/1947  Shingrix Vaccine Age 50> (1 of 2) 05/26/1976  GLAUCOMA SCREENING Q2Y  06/09/2019  Influenza Age 5 to Adult  08/01/2019  MEDICARE YEARLY EXAM  10/06/2019 Chief Complaint Patient presents with  Shortness of Breath Albuterol/Ventolin not effective  Nasal Discharge  Foot Swelling 1. Have you been to the ER, urgent care clinic since your last visit? Hospitalized since your last visit? No 
 
2. Have you seen or consulted any other health care providers outside of the [address] [place] [address] since your last visit? Include any pap smears or colon screening. No 
 
3) Do you have an Advance Directive on file? yes 4) Are you interested in receiving information on Advance Directives? NO Patient is accompanied by self I have received verbal consent from Yoshi Dominguez to discuss any/all medical information while they are present in the room.

## 2019-10-01 NOTE — PROGRESS NOTES
HISTORY OF PRESENT ILLNESS Mike Ovalles is a 80 y.o. female here with the complaining of cough and wheezing and shortness of breath for last 1 week. Cough is getting worse. She is having more short of breath, she is not able to finish sentences. Noticed to have elevated temperature and heart rate. Oxygen level really low. She has congestive heart failure. On Lasix and Entresto. Seeing cardiologist. 
Her dementia and memory loss getting worse. Daughter is a caregiver. Taking low-dose of exercise at. Sleeping well. Depression seems under control. Has coronary artery disease and hyperlipidemia, on medications. HPI Review of Systems Constitutional: Negative. HENT: Negative. Eyes: Negative. Respiratory: Negative. Cardiovascular: Negative. Gastrointestinal: Negative. Genitourinary: Negative. Musculoskeletal: Positive for back pain, falls and joint pain. Neurological: Negative. Endo/Heme/Allergies: Negative. Psychiatric/Behavioral: Positive for depression and memory loss. Physical Exam  
Constitutional: She appears well-developed and well-nourished. No distress. HEENT: 
Nasal turbinates red and inflamed. Cobblestoning present. Throat normal no redness present. Neck: Normal range of motion. Neck supple. No JVD present. No thyromegaly present. Cardiovascular: Normal rate, regular rhythm, normal heart sounds and intact distal pulses. Pulmonary/Chest: Effort normal and breath sounds normal.  Bibasilar crackles present. Musculoskeletal: She exhibits no edema or tenderness. Skin:  
Psychiatric: She has a normal mood and affect. Her behavior is normal.  
Slightly demented. Depression is stable. ASSESSMENT and PLAN Diagnoses and all orders for this visit: 
 
1. SOB (shortness of breath) Probably having bacterial pneumonia. Her pulse ox is 92%. Her respirations are very high. Heart rate high. She is not safe to stay home for pneumonia treatment. She should be hospitalized. Need to rule out sepsis also. 911 called, may take came, she has been taken to send [facility] .  ER for further evaluations and admission. 2. Chronic systolic congestive heart failure ([facility] [address] compensated. On Entresto and Lasix 3. Coronary artery disease due to lipid rich plaque Stable on current regimen, continue same. 4. Dementia without behavioral disturbance, unspecified dementia type ([facility] [address] On Aricept. Stable. 6. Bacterial pneumonia Possible. Need to  get admitted to hospital. 
 
 
 
 
Discussed expected course/resolution/complications of diagnosis in detail with patient. Medication risks/benefits/costs/interactions/alternatives discussed with patient. Pt was given an after visit summary which includes diagnoses, current medications & vitals. Pt expressed understanding with the diagnosis and plan.

## 2019-10-01 NOTE — PROGRESS NOTES
Problem: Heart Failure: Day 1 Goal: Activity/Safety Outcome: Progressing Towards Goal 
Goal: Diagnostic Test/Procedures Outcome: Progressing Towards Goal 
Goal: Nutrition/Diet Outcome: Progressing Towards Goal 
Goal: Medications Outcome: Progressing Towards Goal 
Goal: *Oxygen saturation within defined limits Outcome: Progressing Towards Goal

## 2019-10-01 NOTE — PATIENT INSTRUCTIONS
Medicare Wellness Visit, Female The best way to live healthy is to have a lifestyle where you eat a well-balanced diet, exercise regularly, limit alcohol use, and quit all forms of tobacco/nicotine, if applicable. Regular preventive services are another way to keep healthy. Preventive services (vaccines, screening tests, monitoring & exams) can help personalize your care plan, which helps you manage your own care. Screening tests can find health problems at the earliest stages, when they are easiest to treat. [facility] follows the current, evidence-based guidelines published by the [facility] Consuelo (RUSTSTF) when recommending preventive services for our patients. Because we follow these guidelines, sometimes recommendations change over time as research supports it. (For example, mammograms used to be recommended annually. Even though Medicare will still pay for an annual mammogram, the newer guidelines recommend a mammogram every two years for women of average risk.) Of course, you and your doctor may decide to screen more often for some diseases, based on your risk and your health status. Preventive services for you include: - Medicare offers their members a free annual wellness visit, which is time for you and your primary care provider to discuss and plan for your preventive service needs. Take advantage of this benefit every year! 
-All adults over the age of 72 should receive the recommended pneumonia vaccines. Current USPSTF guidelines recommend a series of two vaccines for the best pneumonia protection.  
-All adults should have a flu vaccine yearly and a tetanus vaccine every 10 years. All adults age 61 and older should receive a shingles vaccine once in their lifetime.   
-A bone mass density test is recommended when a woman turns 65 to screen for osteoporosis. This test is only recommended one time, as a screening. Some providers will use this same test as a disease monitoring tool if you already have osteoporosis. -All adults age 38-68 who are overweight should have a diabetes screening test once every three years.  
-Other screening tests and preventive services for persons with diabetes include: an eye exam to screen for diabetic retinopathy, a kidney function test, a foot exam, and stricter control over your cholesterol.  
-Cardiovascular screening for adults with routine risk involves an electrocardiogram (ECG) at intervals determined by your doctor.  
-Colorectal cancer screenings should be done for adults age 54-65 with no increased risk factors for colorectal cancer. There are a number of acceptable methods of screening for this type of cancer. Each test has its own benefits and drawbacks. Discuss with your doctor what is most appropriate for you during your annual wellness visit. The different tests include: colonoscopy (considered the best screening method), a fecal occult blood test, a fecal DNA test, and sigmoidoscopy. -Breast cancer screenings are recommended every other year for women of normal risk, age 54-69. 
-Cervical cancer screenings for women over age 72 are only recommended with certain risk factors.  
-All adults born between [facility] should be screened once for Hepatitis C. Here is a list of your current Health Maintenance items (your personalized list of preventive services) with a due date: 
Health Maintenance Due Topic Date Due  
 DTaP/Tdap/Td  (1 - Tdap) 05/26/1947  Shingles Vaccine (1 of 2) 05/26/1976  Glaucoma Screening   06/09/2019  Flu Vaccine  08/01/2019

## 2019-10-01 NOTE — PROGRESS NOTES
Per Dr. Fernando Kumar the patient was never prescribed Mucinex-DM by any provider with [facility] Internal Medicine. The AL supervisor at [facility] looked into the pt's medication history and found that the pharmacy sent Mucinex DM in error and the medication has been provided to the patient. This prescription was discontinued by Dr. Fernando Kumar.

## 2019-10-01 NOTE — ROUTINE PROCESS
TRANSFER - OUT REPORT: 
 
Verbal report given to Robin MARCUS (name) on Debra Lorenzo  being transferred to [facility] (unit) for routine progression of care Report consisted of patients Situation, Background, Assessment and  
Recommendations(SBAR). Information from the following report(s) SBAR, ED Summary, [facility] and Recent Results was reviewed with the receiving nurse. Lines:  
Peripheral IV 10/01/19 Left Antecubital (Active) Site Assessment Clean, dry, & intact 10/1/2019  1:10 PM  
Phlebitis Assessment 0 10/1/2019  1:10 PM  
Dressing Type Transparent 10/1/2019  1:10 PM  
Hub Color/Line Status Green;Flushed 10/1/2019  1:10 PM  
Action Taken Blood drawn 10/1/2019  1:10 PM  
  
 
Opportunity for questions and clarification was provided. Patient transported with: 
 [employer]

## 2019-10-01 NOTE — PROGRESS NOTES
Problem: Heart Failure: Day 1 Goal: Activity/Safety Outcome: Progressing Towards Goal 
Goal: Diagnostic Test/Procedures Outcome: Progressing Towards Goal 
Goal: Nutrition/Diet Outcome: Progressing Towards Goal 
Goal: Medications Outcome: Progressing Towards Goal

## 2019-10-01 NOTE — ED PROVIDER NOTES
80 y.o. female with past medical history significant for hypercholesterolemia, asthma, HTN, pneumonia, CAD, A fib, LBBB, PE, dementia, thyroid disease who presents via EMS with chief complaint of shortness of breath. Pt was seen by Dr. Ellen Bowling PTA for worsening SOB and productive cough. Pt was found to be febrile and tachycardic and was sent to ED 2/2 to concern for PNA and was sent to the ED for further evaluation and possible admission. There are no other acute medical concerns at this time. Social hx: denies tobacco use, denies EtOH consumption PCP: Brad Donaldson MD 
 
 
Note written by Braden Leon, as dictated by Dilia Hart MD 1:50 PM 
 
 
The history is provided by the patient. No  was used. Past Medical History:  
Diagnosis Date  Altered mental state  Arrhythmia AFIB HISTORY  Arthritis BACK  Asthma  CAD (coronary artery disease)   
 by pass surgery in 2002,2008  Cancer ([address] 2018 SKIN CANCER ON FACE  Dementia ([address]  Depression  Fibromyalgia  Heart failure ([address]  HTN (hypertension) 6/11/2013  Hypercholesterolemia  Ill-defined condition FOUR COMPRESSION FRACTURES IN BACK  Ill-defined condition DEMENTIA  LBBB (left bundle branch block) 4/4/2019  PE (pulmonary embolism) 2015 DAUGHTER STATES NOT IN LUNG-IN LEG  
 Pneumonia 04/2014  Pneumonia, organism unspecified(486) 3/8/2014  Squamous cell cancer of skin of forearm, right 2019  Thyroid disease Past Surgical History:  
Procedure Laterality Date  CARDIAC SURG PROCEDURE UNLIST  2003  
 heart attack,bypass sx CABG . STENTS   
 HX DILATION AND CURETTAGE    
 HX GI    
 COLONOSCOPY   
 HX HEENT  2016 SKIN CANCER REMOVED ON FACE  
 HX HEENT Left 10/2018 FOREHEAD Family History:  
Problem Relation Age of Onset  No Known Problems Mother  No Known Problems Father  Anesth Problems Neg Hx Social History Socioeconomic History  Marital status:  Spouse name: Not on file  Number of children: Not on file  Years of education: Not on file  Highest education level: Not on file Occupational History  Not on file Social Needs  Financial resource strain: Not on file  Food insecurity:  
  Worry: Not on file Inability: Not on file  Transportation needs:  
  Medical: Not on file Non-medical: Not on file Tobacco Use  Smoking status: Never Smoker  Smokeless tobacco: Never Used Substance and Sexual Activity  Alcohol use: No  
 Drug use: No  
 Sexual activity: Never Birth control/protection: None Comment: ,4 children,reloacted from [place] to  [facility] living in [facility]  Physical activity:  
  Days per week: Not on file Minutes per session: Not on file  Stress: Not on file Relationships  Social connections:  
  Talks on phone: Not on file Gets together: Not on file Attends [employer] service: Not on file Active member of club or organization: Not on file Attends meetings of clubs or organizations: Not on file Relationship status: Not on file  Intimate partner violence:  
  Fear of current or ex partner: Not on file Emotionally abused: Not on file Physically abused: Not on file Forced sexual activity: Not on file Other Topics Concern  Not on file Social History Narrative  Not on file ALLERGIES: Neurontin [gabapentin] Review of Systems Unable to perform ROS: Dementia Vitals:  
 10/01/19 1304 10/01/19 1337 BP: 123/54 Pulse: 70 Resp: (!) 31 Temp: 97.6 °F (36.4 °C) 98.9 °F (37.2 °C) SpO2: 95% Weight: 79.9 kg (176 lb 2.4 oz) Height: 5' 2\" (1.575 m) Physical Exam  
Constitutional: She appears well-developed and well-nourished. No distress. Unable to complete full sentence without needing a breath HENT:  
Head: Normocephalic. Mouth/Throat: Oropharynx is clear and moist.  
Eyes: Pupils are equal, round, and reactive to light. Conjunctivae and EOM are normal.  
Neck: Normal range of motion. Neck supple. No JVD present. Cardiovascular: Normal rate, regular rhythm, normal heart sounds and intact distal pulses. Pulmonary/Chest: Effort normal. Tachypnea noted. She has wheezes. Pt has increased work of breathing with speaking. Abdominal: Soft. Bowel sounds are normal. She exhibits no distension. There is tenderness in the left upper quadrant. Musculoskeletal: Normal range of motion. She exhibits no tenderness or deformity. Right lower leg: She exhibits edema. Left lower leg: She exhibits edema. Lymphadenopathy:  
  She has no cervical adenopathy. Neurological: She is alert. No cranial nerve deficit or sensory deficit. Skin: Skin is warm and dry. Capillary refill takes less than 2 seconds. No rash noted. She is not diaphoretic. No erythema. Psychiatric: She has a normal mood and affect. Nursing note and vitals reviewed. Note written by Braden Doan, as dictated by Jared Negron MD 1:59 PM 
 
 
 
MDM Procedures 3:25 PM 
Notified of pt's elevated lactate. I have not given 30 cc/kg bolus of IVF despite sepsis (lactate here with fever and tachy at OSH and tachypneic here) 2/2 to BNP over 12,000 and pt short of breath already with edema in lower legs b/l. Antibiotics given. Requiring oxygen or respirations increase and work of breathing worsens. Hospitalist Marixa for Admission 3:51 PM 
 
ED Room Number: YJ02/64 Patient Name and age:  Marlon Dodson 80 y.o.  female Working Diagnosis: 1. Acute on chronic congestive heart failure, unspecified heart failure type ([address] 2. Sepsis, due to unspecified organism, unspecified whether acute organ dysfunction present ([address] 3. Pneumonia due to infectious organism, unspecified laterality, unspecified part of lung Readmission: no 
 Isolation Requirements:  no 
Recommended Level of Care:  telemetry Code Status:  Partial Code (no intubation but ok with CPR. Pt demented no document with her to say otherwise) Department:[facility] Adult ED - [phone] Other:  Other:  Fluid overloaded but also has signs of sepsis with mild bump of lactate, had fever and tachycardia in Dr. Christi Espitia office suspicious for PNA.    
 
Ravindra oRberts MD

## 2019-10-02 NOTE — PROGRESS NOTES
Bedside and Verbal shift change report given to Robin (oncoming nurse) by Shawna Ramesh (offgoing nurse). Report included the following information SBAR, Accordion and Med Rec Status.

## 2019-10-02 NOTE — PROGRESS NOTES
Hospitalist Progress Note Nichole Kennedy MD 
Answering service: [phone] OR [phone] from in house phone Date of Service:  10/2/2019 NAME:  Anibal Shahid :  1926 MRN:  [id number] Admission Summary:  
80 y.o. female with PMH of CHFrEF, dementia,asthma,HTN and other co morbidities was sent to the hospital from PCP for further eval of fever,SOB and tachycardia. Found to have CHF exacerbation.  
 
Interval history / Subjective:  
 
Patient states that she feels very tired ,states that she feels sleepy all day. Still SOB Assessment & Plan: #Acute on chronic  Systolic CHF exacerbation: NYHA class 4 at admission 
-based on her symptoms -SOB,elevated BNP -03468 and some pleural effusions 
-will continue IV diuretics -lasix 
-daily weights,I and O 
-echo showed 15-20% EF,moderate to severe MR 
-Discussed with  -agreed with diuretics -given overall picture with severely reduced EF -recommended hospice as she has end stage heart failure. 
-Discussed with patient's daughter. 
-will start low dose entresto tomorrow,on beta blocker. 
 
  
# Pneumonia ruled out: 
-Per PCP note,patient was febrile in the office. She is afebrile since admission No clear infiltrate on CXR  
- procal wnl, resp viral panel and BC negative 
-D/c antibiotics. 
  
# HTN: resume home meds 
  
#Atrial fib history: resume anticoagulants and beta blocker. 
  
#Dementia: resume home meds Code status: dnr 
DVT prophylaxis:anticoagulated Care Plan discussed with: patient,daughter over phone. Disposition: TBD Hospital Problems  Date Reviewed: 4/15/2019 Codes Class Noted POA  
 CHF (congestive heart failure) ([facility] [address] ICD-10-CM: I50.9 ICD-9-CM: 428.0  2014 Unknown Review of Systems: As per HPI Vital Signs:  
 Last 24hrs VS reviewed since prior progress note. Most recent are: 
Visit Vitals /68 (BP 1 Location: Right arm, BP Patient Position: At rest) Pulse 76 Temp 97.6 °F (36.4 °C) Resp 18 Ht 5' 2\" (1.575 m) Wt 74.6 kg (164 lb 7.4 oz) SpO2 96% BMI 30.08 kg/m² Intake/Output Summary (Last 24 hours) at 10/2/2019 1957 Last data filed at 10/2/2019 3753 Gross per 24 hour Intake 740 ml Output 200 ml Net 540 ml Physical Examination:  
 
 
     
Constitutional:  No acute distress, cooperative, pleasant ENT:  Oral mucous moist, oropharynx benign. Resp:  decreased breath sounds b/l,tachypnic CV:  Regular rhythm, normal rate GI:  Soft, non distended, non tender. normoactive bowel sounds Musculoskeletal:  1+ b/l LE edema Neurologic:  Moves all extremities. AAO x 2 Data Review:  
 Review and/or order of clinical lab test 
Review and/or order of tests in the medicine section of [facility] Labs:  
 
Recent Labs 10/02/19 
0355 10/01/19 
1316 WBC 7.6 9.3 HGB 13.0 13.9 HCT 43.0 45.6 * 163 Recent Labs 10/02/19 
0355 10/01/19 
1316  145  
K 3.9 3.6  109* CO2 32 32 BUN 20 23* CREA 1.14* 1.33* * 191* CA 9.0 9.2 Recent Labs 10/01/19 
1316 SGOT 14* ALT 18 AP 61 TBILI 0.5 TP 6.4 ALB 3.4*  
GLOB 3.0 No results for input(s): INR, PTP, APTT, INREXT in the last 72 hours. No results for input(s): FE, TIBC, PSAT, FERR in the last 72 hours. Lab Results Component Value Date/Time Folate 6.3 03/11/2014 04:07 AM  
  
No results for input(s): PH, PCO2, PO2 in the last 72 hours. Recent Labs 10/02/19 
0355 10/01/19 
1711 10/01/19 
1316 TROIQ <0.05 <0.05 <0.05 Lab Results Component Value Date/Time Cholesterol, total 135 01/24/2017 04:27 AM  
 HDL Cholesterol 59 01/24/2017 04:27 AM  
 LDL, calculated 57.2 01/24/2017 04:27 AM  
 Triglyceride 94 01/24/2017 04:27 AM  
 CHOL/HDL Ratio 2.3 01/24/2017 04:27 AM  
 
Lab Results Component Value Date/Time Glucose (POC) 113 (H) 10/02/2019 11:34 AM  
 Glucose (POC) 133 (H) 08/18/2015 01:50 AM  
 
Lab Results Component Value Date/Time Color YELLOW/STRAW 01/24/2017 12:30 AM  
 Appearance CLOUDY (A) 01/24/2017 12:30 AM  
 Specific gravity 1.024 01/24/2017 12:30 AM  
 pH (UA) 5.0 01/24/2017 12:30 AM  
 Protein NEGATIVE  01/24/2017 12:30 AM  
 Glucose NEGATIVE  01/24/2017 12:30 AM  
 Ketone TRACE (A) 01/24/2017 12:30 AM  
 Bilirubin NEGATIVE  08/14/2015 04:42 PM  
 Urobilinogen 0.2 01/24/2017 12:30 AM  
 Nitrites NEGATIVE  01/24/2017 12:30 AM  
 Leukocyte Esterase SMALL (A) 01/24/2017 12:30 AM  
 Epithelial cells FEW 01/24/2017 12:30 AM  
 Bacteria NEGATIVE  01/24/2017 12:30 AM  
 WBC 0-4 01/24/2017 12:30 AM  
 RBC 0-5 01/24/2017 12:30 AM  
 
 
 
Medications Reviewed:  
 
Current Facility-Administered Medications Medication Dose Route Frequency  venlafaxine-SR (EFFEXOR-XR) capsule 75 mg  75 mg Oral DAILY  metoprolol succinate (TOPROL-XL) XL tablet 12.5 mg  12.5 mg Oral DAILY AFTER BREAKFAST  cefTRIAXone (ROCEPHIN) 1 g in 0.9% sodium chloride (MBP/ADV) 50 mL  1 g IntraVENous Q24H  
 azithromycin (ZITHROMAX) tablet 500 mg  500 mg Oral DAILY  apixaban (ELIQUIS) tablet 2.5 mg  2.5 mg Oral BID  
 
______________________________________________________________________ EXPECTED LENGTH OF STAY: 4d 2h 
ACTUAL LENGTH OF STAY:          1 Crow Chacon MD

## 2019-10-02 NOTE — CDMP QUERY
Pt admitted with acute on chronic CHF exacerbation and has a history of HFrEF with most recent echocardiogram of 8/18 showing EF of 20-25% with akinesis and hypertrophy of walls. Please further specify as to the type of heart failure 
 
=> Acute on chronic systolic heart failure 
=> Other heart failure (please specify) 
=> Clinically unable to determine Risk factors: history of hypertension and heart failure, elderly (81 y/o). Clinical Indicators: H and P states history of CHFrEF, diagnosis rendered in H and P is acute on chronic CHF exacerbation. BNP this admission is 12,259. Troponin negative x 3. Echocardiogram of 8/18 with EF 20-25 with akinesis and hypertrophy of walls. Cardiology consult pending. Treatment: Lasix 40 mg IV bid, daily weights , I and O, echocardiogram, cardiology consult, plans to resume Entresto and beta blockers when BP will tolerate. Please clarify and document your clinical opinion in the progress notes and discharge summary including the definitive and/or presumptive  diagnosis, (suspected or probable), related to the above clinical findings. Please include clinical findings supporting your diagnosis. Thank you for your time. Danis Gates RN, BSN 
[phone]

## 2019-10-02 NOTE — NURSE NAVIGATOR
Chart reviewed by Heart Failure Nurse Navigator. Heart Failure database completed. EF:  Prior 20/25% (8/2018); repeat echo pending ACEi/ARB/ARNi: will resume when BP stable BB: will resume when BP stable Aldosterone Antagonist: Please consider a guideline directed Aldosterone Receptor Antagonist (Spironolactone/Eplerenone) for patients with an EF of 35% or less and a NYHA functional class of II-IV unless contraindicated. Contraindications include allergy due to aldosterone receptor antagonist, hyperkalemia,(potassium above 5.0 mEq/L ) and renal dysfunction. (creatinine >2.5 mg/dL in men or >2.0 mg/dL in women (or estimated glomerular filtration rate < 30 ml/min/1.732m2) Obstructive Sleep Apnea Screening: STOP-BANG score: 
 Referred to Sleep Medicine: CRT: pt is DNR  
 
NYHA Functional Class documentation requested. Heart Failure Teach Back in Patient Education. Heart Failure Avoiding Triggers on Discharge Instructions. Cardiologist: Dr. Pat Wright ([facility]) Post discharge follow up phone call to be made within 48-72 hours of discharge.    
 
MEDICARE HF BUNDLE ACTIVE

## 2019-10-02 NOTE — CDMP QUERY
Pt admitted with CHF exacerbation and note to have labs showing serum creatinine and GFR of 1 to 1.5 and 30's-40's respectively dating several years prior to this admission. Please further specify if this patient has a diagnosis of: 
 
=> CKD 3 
=> Other explanation 
=> Clinically unable to determine. Risk factors: hypertension, elderly (79 y/o) Clinical Indicators: review of lab values with creatinine and GFR of 1 to 1.5 and 30's 40's respectively dating several years prior to this admission. Treatment: serial monitoring of lab values. CKD STAGES Stage 1: > 90 Stage 2:  60-89 Stage 3: 30-59 Stage 4: 15-29 Stage 5: < 15 Please clarify and document your clinical opinion in the progress notes and discharge summary including the definitive and/or presumptive  diagnosis, (suspected or probable), related to the above clinical findings. Please include clinical findings supporting your diagnosis. Thank you for your time. Kasandra Rodriguez RN, BSN 
[phone]

## 2019-10-02 NOTE — PROGRESS NOTES
Problem: Mobility Impaired (Adult and Pediatric) Goal: *Acute Goals and Plan of Care (Insert Text) Description FUNCTIONAL STATUS PRIOR TO ADMISSION: Patient was modified independent using a Rolling walker for functional mobility. HOME SUPPORT PRIOR TO ADMISSION: The patient lived at [facility]. She was mod I with mobility and walking to the dining room for meals. Facility staff assisted with ADLs and medication administration. Physical Therapy Goals Initiated 10/2/2019 1. Patient will move from supine to sit and sit to supine  in bed with modified independence within 7 day(s). 2.  Patient will transfer from bed to chair and chair to bed with modified independence using the least restrictive device within 7 day(s). 3.  Patient will perform sit to stand with modified independence within 7 day(s). 4.  Patient will ambulate with modified independence for 150 feet with the least restrictive device within 7 day(s). 5. Patient will complete 6 MWT on RA keeping O2 >90% within 7 days. Outcome: Progressing Towards Goal 
  
PHYSICAL THERAPY EVALUATION Patient: Sophia Quiroz (30 y.o. female) Date: 10/2/2019 Primary Diagnosis: CHF (congestive heart failure) ([facility] [I50.9] Precautions: Fall risk ASSESSMENT Based on the objective data described below, the patient presents with significant decline in activity tolerance, on 2L O2, and increased fall risk. Patient demonstrating SOB at rest when answering PLOF questions. At first, patient declined PT, however she is agreeable to going into the bathroom. Patient needing min assist for bed mobility. Significant cueing needed for deep breathing and pacing. Patient needing approx 6 minutes to slow breathing on return from the bathroom. Patient will need 6 MWT within 48 hours of discharge.   
 
Current Level of Function Impacting Discharge (mobility/balance): min assist with bed mobility, significant decline in activity tolerance, on 2L and does not wear O2 at baseline Functional Outcome Measure: The patient scored 12/28 on the Tinetti balance outcome measure which is indicative of high fall risk. Other factors to consider for discharge: needs to independently walk to the dining skinner for safe discharge back to [facility], will need 6 MWT within 48 hours of discharge Patient will benefit from skilled therapy intervention to address the above noted impairments. PLAN : 
Recommendations and Planned Interventions: bed mobility training, transfer training, gait training, therapeutic exercises, patient and family training/education and therapeutic activities Frequency/Duration: Patient will be followed by physical therapy:  5 times a week to address goals. Recommendation for discharge: (in order for the patient to meet his/her long term goals) Physical therapy at least 2 days/week in the home This discharge recommendation: 
Has not yet been discussed the attending provider and/or case management Equipment recommendations for successful discharge (if) home: patient owns DME required for discharge SUBJECTIVE:  
Patient stated It takes me 15 minutes to catch my breath every time.  OBJECTIVE DATA SUMMARY:  
HISTORY:   
Past Medical History:  
Diagnosis Date Altered mental state Arrhythmia AFIB HISTORY Arthritis BACK Asthma CAD (coronary artery disease)   
 by pass surgery in 2002,2008 Cancer ([address] 2018 SKIN CANCER ON FACE Dementia ([facility] [address] Depression Fibromyalgia Heart failure ([facility] [address] HTN (hypertension) 6/11/2013 Hypercholesterolemia Ill-defined condition FOUR COMPRESSION FRACTURES IN BACK Ill-defined condition DEMENTIA  
 LBBB (left bundle branch block) 4/4/2019 PE (pulmonary embolism) 2015 DAUGHTER STATES NOT IN LUNG-IN LEG Pneumonia 04/2014 Pneumonia, organism unspecified(486) 3/8/2014 Squamous cell cancer of skin of forearm, right 2019 Thyroid disease Past Surgical History:  
Procedure Laterality Date CARDIAC SURG PROCEDURE UNLIST  2003  
 heart attack,bypass sx CABG . STENTS   
 HX DILATION AND CURETTAGE    
 HX GI    
 COLONOSCOPY   
 HX HEENT  2016 SKIN CANCER REMOVED ON FACE  
 HX HEENT Left 10/2018 FOREHEAD Personal factors and/or comorbidities impacting plan of care: dementia, PNA, CHF Home Situation Home Environment: Assisted living Care Facility Name: [facility] One/Two Story Residence: One story Living Alone: Yes Support Systems: Child(rosanna) Patient Expects to be Discharged to[de-identified] Assisted living Current DME Used/Available at Home: Walker, rolling EXAMINATION/PRESENTATION/DECISION MAKING:  
Critical Behavior: 
Neurologic State: Alert Orientation Level: Oriented to time, Oriented to situation, Oriented to person Cognition: Follows commands Hearing: Auditory Auditory Impairment: Hard of hearing, bilateral 
 
Strength:   
Strength: Generally decreased, functional 
 
Tone & Sensation:  
Tone: Normal 
Sensation: Intact Coordination: 
Coordination: Within functional limits Functional Mobility: 
Bed Mobility: 
Supine to Sit: Minimum assistance;Assist x1 Sit to Supine: Minimum assistance;Assist x1 Transfers: 
Sit to Stand: Contact guard assistance Stand to Sit: Contact guard assistance Bed to Chair: Contact guard assistance; Adaptive equipment Balance:  
Sitting: Intact Standing: Impaired Standing - Static: Good;Constant support Standing - Dynamic : Fair Ambulation/Gait Training: 
Distance (ft): 20 Feet (ft) Assistive Device: Gait belt;Walker, rolling Ambulation - Level of Assistance: Contact guard assistance; Adaptive equipment Gait Abnormalities: Decreased step clearance;Shuffling gait Base of Support: Narrowed; Center of gravity altered Speed/Yaneli: Slow;Shuffled Step Length: Right shortened;Left shortened Functional Measure: 
Tinetti test: 
 
Sitting Balance: 1 Arises: 0 Attempts to Rise: 0 Immediate Standing Balance: 1 Standing Balance: 1 Nudged: 0 Eyes Closed: 0 Turn 360 Degrees - Continuous/Discontinuous: 0 Turn 360 Degrees - Steady/Unsteady: 1 Sitting Down: 1 Balance Score: 5 Balance total score Indication of Gait: 1 
R Step Length/Height: 0 
L Step Length/Height: 0 
R Foot Clearance: 1 L Foot Clearance: 1 Step Symmetry: 1 Step Continuity: 1 Path: 1 Trunk: 0 Walking Time: 1 Gait Score: 7 Gait total score Total Score: 12/28 Overall total score Tinetti Tool Score Risk of Falls 
<19 = High Fall Risk 19-24 = Moderate Fall Risk 25-28 = Low Fall Risk Tinetti ME. Performance-Oriented Assessment of Mobility Problems in Elderly Patients. [facility] 66; [id number]. (Scoring Description: PT Bulletin Feb. 10, 1993) Older adults: Zee Bauer et al, 2009; n = [address] [employer] elderly evaluated with ABC, DIANE, ADL, and IADL) · Mean DIANE score for males aged 69-68 years = 26.21(3.40) · Mean DIANE score for females age 69-68 years = 25.16(4.30) · Mean DIANE score for males over 80 years = 23.29(6.02) · Mean DIANE score for females over 80 years = 17.20(8.32) Physical Therapy Evaluation Charge Determination History Examination Presentation Decision-Making MEDIUM  Complexity : 1-2 comorbidities / personal factors will impact the outcome/ POC  LOW Complexity : 1-2 Standardized tests and measures addressing body structure, function, activity limitation and / or participation in recreation  LOW Complexity : Stable, uncomplicated  LOW Complexity : FOTO score of  Based on the above components, the patient evaluation is determined to be of the following complexity level: LOW Pain Rating: 
Bilat LE \"soreness\". Particularly sore with assisting LEs back to bed. Activity Tolerance:  
Fair, SpO2 stable on RA, requires frequent rest breaks and observed SOB with activity Please refer to the flowsheet for vital signs taken during this treatment. After treatment patient left in no apparent distress:  
Supine in bed, Call bell within reach, Bed / chair alarm activated and Side rails x 3 
 
COMMUNICATION/EDUCATION:  
The patients plan of care was discussed with: Registered Nurse. Fall prevention education was provided and the patient/caregiver indicated understanding., Patient/family have participated as able in goal setting and plan of care. and Patient/family agree to work toward stated goals and plan of care. Thank you for this referral. 
Ulises Lopez, PT, DPT Geriatric Clinical Specialist   
 Time Calculation: 29 mins

## 2019-10-02 NOTE — PROGRESS NOTES
BRIAN: Discharge Planning Reason for Admission:   CHF, h/o Dementia, CAD, RRAT Score: 26/ high Resources/supports as identified by patient/family:   Pt has Medicare A & B and BC Supplemental MCR. Pt lives at [address] [place] [address] Top Challenges facing patient (as identified by patient/family and CM): Finances/Medication cost? No problem with medications Transportation? Pt's daughter transports as needed but works during day. Support system or lack thereof? Daughter, Pam Rubin, [phone] is caregiver. Living arrangements? [facility] Self-care/ADLs/Cognition? Pt uses a walker at the [facility]. She needs assistance with ADLs. H/o dementia but able to express self and understood questions with appropriate responses. Current Advanced Directive/Advance Care Plan:  Pt has an AMD 
                       
Plan for utilizing home health: none at this time. Transition of Care Plan:   CM left VM on phone of Daryle Quan, RN at [facility], [phone], requested return call. Pt is [employer] . Care Management Interventions PCP Verified by CM: Yes 
Palliative Care Criteria Met (RRAT>21 & CHF Dx)?: Yes 
Palliative Consult Recommended?: No 
Reason Palliative Care Not Recommended?: (no order rec'd) Mode of Transport at Discharge: Other (see comment)(pt's daughter if available or will need BLS) Current Support Network: Assisted Living([facility]) Confirm Follow Up Transport: Other (see comment)(pt will see MD at [facility]) Plan discussed with Pt/Family/Caregiver: Yes Discharge Location Discharge Placement: (return to [facility]) Suzanne Pedro, [facility] ACM CRM 
 
CM received consult for Hospice. Talked to pt about this, but she stated that she did not understand.  CM asked for permission to talk to her daughter, Paige Mark, [phone]. Pt agreeable to have CM contact her. CM called and talked to Paige Mark. She was unaware about the Hospice. She will call Dr Clara Mandujano at his office to get more information before making a decision.

## 2019-10-02 NOTE — PROGRESS NOTES
A Spiritual Care Partner Volunteer visited patient in Rm 351 on 10/02/2019. Documented by: 
Chaplain Jacobo MDiv, MS, [employer] 
[address] [place] [address] [phone]

## 2019-10-02 NOTE — CONSULTS
S Cardiology Consult Note Date of consult:  10/02/19 Date of admission: 10/1/2019 Primary Cardiologist: Syd Bethea Physician Requesting consult: Dr Opal Smith CC / Reason for consult:  CHF History of the presenting illness: 
Adela Pina is a 80 y.o. with long-standing systolic heart failure, who was admitted yesterday with worsening dyspnea and generalized weakness. I have been seeing her fairly regularly for management of her CHF. She is based in a nursing home. I last saw her towards the end of August, at which time she was describing worsening dyspnea occurring at fairly minimal degrees of exertion. We had obtained a PET stress test after this visit to assess for an ischemic component, but no ischemia was noted. Her EF was 15% on resting images. She is now very weak and short of breath even slightly at rest.  
 
The only change we made at her last visit was to try a low dose of Entresto instead of Lisinopril (which had been at a dose of 5mg daily). Her med list from the nursing home is slightly confusing and it is not clear whether she was getting both Entresto and Lisinopril. She has a h/o CAD with CABG in 2003 Her EF is in the 15-20% range She has a chronic LBBB. Past Medical History:  
Diagnosis Date  Altered mental state  Arrhythmia AFIB HISTORY  Arthritis BACK  Asthma  CAD (coronary artery disease)   
 by pass surgery in 2002,2008  Cancer ([address] 2018 SKIN CANCER ON FACE  Dementia ([address]  Depression  Fibromyalgia  Heart failure ([address]  HTN (hypertension) 6/11/2013  Hypercholesterolemia  Ill-defined condition FOUR COMPRESSION FRACTURES IN BACK  Ill-defined condition DEMENTIA  LBBB (left bundle branch block) 4/4/2019  PE (pulmonary embolism) 2015 DAUGHTER STATES NOT IN LUNG-IN LEG  
 Pneumonia 04/2014  Pneumonia, organism unspecified(486) 3/8/2014  Squamous cell cancer of skin of forearm, right 2019  Thyroid disease Prior to Admission medications Medication Sig Start Date End Date Taking? Authorizing Provider  
sacubitril-valsartan (ENTRESTO) 24 mg/26 mg tablet Take 1 Tab by mouth two (2) times a day. Provider, Historical  
docusate sodium (DOC-Q-LACE) 100 mg capsule TAKE 1 CAPSULE BY MOUTH TWICE DAILY DX:BOWEL SUPPORT 9/23/19   Srinivasa Gunderson MD  
cyanocobalamin (VITAMIN B-12) 500 mcg tablet TAKE 1 TABLET BY MOUTH EVERY DAY DX: SUPPLEMENT 9/11/19   Srinivasa Gunderson MD  
guaiFENesin (MUCINEX) 1,200 mg Ta12 ER tablet Take 1 Tab by mouth two (2) times a day. 9/6/19   Oswaldo Dave NP  
apixaban (ELIQUIS) 2.5 mg tablet TAKE 1 TABLET BY MOUTH 2 TIMES A DAY. DX ANTICOAGULANT 8/22/19   Shahid Liz Foot, DO  
lisinopril (PRINIVIL, ZESTRIL) 5 mg tablet Take 1 Tab by mouth daily. 8/20/19   Oswaldo Dave NP  
venlafaxine-SR [facility] 75 mg capsule Take 1 Cap by mouth daily. 8/20/19   Oswaldo Dave NP  
metoprolol succinate (TOPROL-XL) 25 mg XL tablet Take 1 Tab by mouth daily (after breakfast). 8/14/19   Oswaldo Dave NP  
lidocaine (LIDODERM) 5 % Apply patch to the affected area for 12 hours a day and remove for 12 hours a day. 7/9/19   Chari Walters MD  
calcium-cholecalciferol, D3, (CALTRATE 600+D) tablet TAKE 1 TABLET BY MOUTH TWICE DAILY DX: SUPPLEMENT 5/14/19   Srinivasa Gunderson MD  
polyethylene glycol [facility]) 17 gram packet Take 17 g by mouth as needed. Provider, Historical  
furosemide (LASIX) 40 mg tablet Take 1 Tab by mouth daily. 6 times per week. DC Bumex Patient taking differently: Take 40 mg by mouth daily. 6 times per week. DC Bumex  Indications: NOT ON SUNDAYS 11/13/18   Srinivasa Gunderson MD  
fluticasone [facility]) 50 mcg/actuation nasal spray 2 Sprays by Both Nostrils route daily. 10/16/18   Srinivasa Gunderson MD  
fexofenadine (ALLEGRA) 180 mg tablet Take 1 Tab by mouth daily as needed for Allergies.  10/16/18   Srinivasa Gunderson MD  
 albuterol (PROVENTIL HFA, VENTOLIN HFA, PROAIR HFA) 90 mcg/actuation inhaler Take 2 Puffs by inhalation every four (4) hours as needed for Wheezing. 10/5/18   Cheryle Quan, NP  
ammonium lactate (LAC-HYDRIN) 12 % lotion  9/13/18   Provider, Historical  
hydrocortisone (HYTONE) 2.5 % lotion as needed. 9/13/18   Provider, Historical  
acetaminophen (TYLENOL) 500 mg tablet Take 500 mg by mouth every six (6) hours as needed for Pain. Provider, Historical  
pravastatin (PRAVACHOL) 20 mg tablet Take 1 Tab by mouth nightly. D/C pravastatin 40 mg tab 4/10/18   Quita Ward MD  
donepezil (ARICEPT) 5 mg tablet TAKE 1 TABLET BY MOUTH EVERY NIGHT AT BEDTIME DX: MEMORY SUPPORT 11/21/17   Quita Ward MD  
albuterol (PROVENTIL VENTOLIN) 2.5 mg /3 mL (0.083 %) nebulizer solution INHALE THE CONTENTS OF 1 VIAL VIA NEBULIZER EVERY 6 HOURS AS NEEDED FOR WHEEZING 7/25/17   Quita Ward MD  
raNITIdine (ZANTAC) 150 mg tablet Take 150 mg by mouth nightly as needed for Indigestion. Provider, Historical  
 
 
Family History Problem Relation Age of Onset  No Known Problems Mother  No Known Problems Father  Anesth Problems Neg Hx Social History Socioeconomic History  Marital status:  Spouse name: Not on file  Number of children: Not on file  Years of education: Not on file  Highest education level: Not on file Occupational History  Not on file Social Needs  Financial resource strain: Not on file  Food insecurity:  
  Worry: Not on file Inability: Not on file  Transportation needs:  
  Medical: Not on file Non-medical: Not on file Tobacco Use  Smoking status: Never Smoker  Smokeless tobacco: Never Used Substance and Sexual Activity  Alcohol use: No  
 Drug use: No  
 Sexual activity: Never Birth control/protection: None Comment: ,4 children,reloacted from [place] to  [facility] living in  Lifestyle  Physical activity: Days per week: Not on file Minutes per session: Not on file  Stress: Not on file Relationships  Social connections:  
  Talks on phone: Not on file Gets together: Not on file Attends Islam service: Not on file Active member of club or organization: Not on file Attends meetings of clubs or organizations: Not on file Relationship status: Not on file  Intimate partner violence:  
  Fear of current or ex partner: Not on file Emotionally abused: Not on file Physically abused: Not on file Forced sexual activity: Not on file Other Topics Concern  Not on file Social History Narrative  Not on file Review of Systems Constitutional: Positive for fever and malaise/fatigue. Negative for chills. HENT: Negative for hearing loss and nosebleeds. Eyes: Negative for blurred vision and double vision. Respiratory: Positive for shortness of breath. Negative for cough and sputum production. Cardiovascular: Negative for chest pain. Gastrointestinal: Negative for abdominal pain, nausea and vomiting. Genitourinary: Negative for frequency and urgency. Musculoskeletal: Negative for back pain and joint pain. Skin: Negative for itching and rash. Neurological: Positive for weakness. Negative for dizziness and headaches. Endo/Heme/Allergies: Negative for environmental allergies. Does not bruise/bleed easily. Visit Vitals /61 (BP 1 Location: Left arm, BP Patient Position: At rest) Pulse 73 Temp 97.7 °F (36.5 °C) Resp 23 Ht 5' 2\" (1.575 m) Wt 74.6 kg (164 lb 7.4 oz) SpO2 93% BMI 30.08 kg/m² Physical Exam  
Constitutional: She is oriented to person, place, and time and well-developed, well-nourished, and in no distress. HENT:  
Head: Normocephalic and atraumatic. Eyes: Conjunctivae are normal. No scleral icterus. Neck: No JVD present. Cardiovascular: Normal rate, regular rhythm and normal heart sounds.  Exam reveals no gallop. No murmur heard. Pulmonary/Chest: Effort normal and breath sounds normal. No stridor. No respiratory distress. She has no wheezes. Abdominal: Soft. She exhibits no distension. Musculoskeletal: Normal range of motion. She exhibits no deformity. Neurological: She is alert and oriented to person, place, and time. Skin: Skin is warm and dry. Psychiatric: Mood and affect normal.  
 
 
Lab review: 
BMP:  
Lab Results Component Value Date/Time  10/02/2019 03:55 AM  
 K 3.9 10/02/2019 03:55 AM  
  10/02/2019 03:55 AM  
 CO2 32 10/02/2019 03:55 AM  
 AGAP 6 10/02/2019 03:55 AM  
  (H) 10/02/2019 03:55 AM  
 BUN 20 10/02/2019 03:55 AM  
 CREA 1.14 (H) 10/02/2019 03:55 AM  
 GFRAA 54 (L) 10/02/2019 03:55 AM  
 GFRNA 44 (L) 10/02/2019 03:55 AM  
  
 
CBC: 
Lab Results Component Value Date/Time WBC 7.6 10/02/2019 03:55 AM  
 HGB 13.0 10/02/2019 03:55 AM  
 HCT 43.0 10/02/2019 03:55 AM  
 PLATELET 530 (L) 54/03/2148 03:55 AM  
 MCV 99.3 (H) 10/02/2019 03:55 AM  
 
 
All Cardiac Markers in the last 24 hours:   
Lab Results Component Value Date/Time Seabron Mckeon <0.05 10/02/2019 03:55 AM  
 TROIQ <0.05 10/01/2019 05:11 PM  
 
 
Data review: 
EKG tracing personally reviewed: NSR with PACs, LBBB Echocardiogram: 
10/01/19 ECHO ADULT COMPLETE 10/02/2019 10/2/2019 Narrative · Left Ventricle: Normal wall thickness. Moderately dilated left  
ventricle. Severe systolic dysfunction. Estimated left ventricular  
ejection fraction is 16 - 20%. Visually measured ejection fraction. Abnormal left ventricular wall motion. · Left Atrium: Mildly dilated left atrium. · Aortic Valve: Mild aortic valve sclerosis. Mild aortic valve  
regurgitation is present. · Mitral Valve: Mitral valve non-specific thickening. Moderate to severe  
mitral valve regurgitation is present. · Tricuspid Valve: Mild to moderate tricuspid valve regurgitation is  
present. · Pulmonary Artery: Mild pulmonary hypertension. Signed by: Thad Tillman MD  
 
CXR - film and report reviewed Small bilateral pleural effusions Assessment: 1. Acute on chronic systolic failure Volume status doesn't look too bad, but she has had gradually progressive dyspnea on exertion and has NYHA class IIIB, borderline on class IV. Apparently she was febrile at some point so there is some concern for PNA, but there is nothing focal on CXR and I suspect this is more likely to be progressive end stage heart failure 2. Dilated cardiomyopathy: 
Long-standing, with EF 15-20% 3. Mitral regurgitation: mod-severe MR 
 
4. LBBB 5. Paroxysmal atrial fibrillation: maintaining NSR 
 
6. CAD s/p CABG Recent non-ischemic stress test 
 
7. CKD stage III Recommendations / Plan: 
 
Reasonable to try some IV lasix and IV Abx to see if things turn around Would have her on Entresto (low dose) starting tomorrow and continue metoprolol I discussed the situation with her and recommended that we should involve Hospice at this point. Her prognosis over the next 6 months is very poor, given EF 15% at age 80 and being admitted for heart failure. Her quality of life is poor and getting worse. She is amenable to Hospice at this time and I think it well help to have them come by and discuss what they can offer at this point. She is DNR. Signed: 
Jim Otoole [facility] Interventional Cardiology 10/02/19

## 2019-10-02 NOTE — PROGRESS NOTES
Transitional Care Team: initial HUG Note      Arrived on unit and spoke with 3N CM who has just noted Dr. Major Hernandez documentation ordering hospice consult. Per Dr. Major Hernandez note, Mrs. Francia Miller has hx of long-standing systolic heart failure, and was admitted yesterday with worsening dyspnea and generalized weakness. He documents having seen her towards the end of August, and at that time she reported worsening dyspnea with fairly minimal degrees of exertion. PET stress test was performed after that visit to assess for an ischemic component, but PET was negative for ischemia. Her EF was 15% on resting images. She is now very weak and short of breath even slightly at rest.     In to see Mrs. Francia Miller who is resting quietly but awake. She has nasal cannual oxygen at present but even at rest she is tachypneic with somewhat shallow respirations. Kept the visit short, basically telling her my name and then the CM and I assured her we would call her daughter Alyson Barrios. We were able to reach Alyson Barrios, and we each spoke with her. I introduced myself and my role in her mother's care through our HUG program.  We discussed Dr. Major Hernandez recommendation for hospice and she states she will call him to discuss. I advised that she may want to at least have a hospice informational session with a hospice agency and to discuss with staff at [address] [place] [address] to see which agencies they have contracts with. Gave her my phone # and advised her to call me with any quesitons and I can let her know at that time which CM is working on her mother's unit. Asked Alyson Barrios if there is a DDNR in existence. She says there is and she can bring a copy in. Asked her to leave with her mother's nurse for scanning into our EMR, at her convenience, and she agrees to do so. G NP will continue to follow for final decisions on disposition. If other than hospice, we will provide our usual [facility] services to Mrs. Francia Miller.

## 2019-10-03 NOTE — PROGRESS NOTES
10/3/19 No transition of care outreach indicated due to patient discharge to hospice care. Call to [facility] to confirm that Ms. Tuyet Dickens has been admitted - Reached admissions to confirm transition. [facility] in [place], [place] Address: [address] [place] [address] Melissa Walter, [address] Phone: [phone] Carmen Luis RN , CHFN, CCM Transition of care - [facility] [phone]

## 2019-10-03 NOTE — PROGRESS NOTES
Problem: Mobility Impaired (Adult and Pediatric) Goal: *Acute Goals and Plan of Care (Insert Text) Description FUNCTIONAL STATUS PRIOR TO ADMISSION: Patient was modified independent using a Rolling walker for functional mobility. HOME SUPPORT PRIOR TO ADMISSION: The patient lived at [facility]. She was mod I with mobility and walking to the dining room for meals. Facility staff assisted with ADLs and medication administration. Physical Therapy Goals Initiated 10/2/2019 1. Patient will move from supine to sit and sit to supine  in bed with modified independence within 7 day(s). 2.  Patient will transfer from bed to chair and chair to bed with modified independence using the least restrictive device within 7 day(s). 3.  Patient will perform sit to stand with modified independence within 7 day(s). 4.  Patient will ambulate with modified independence for 150 feet with the least restrictive device within 7 day(s). 5. Patient will complete 6 MWT on RA keeping O2 >90% within 7 days. Outcome: Progressing Towards Goal 
 PHYSICAL THERAPY TREATMENT INCLUDING 6 MINUTE WALK TEST RESULTS Patient: Ishmael Haynes (21 y.o. female) Date: 10/3/2019 Diagnosis: CHF (congestive heart failure) [facility] [I50.9] <principal problem not specified> Precautions:  fall, bed alarm Chart, physical therapy assessment, plan of care and goals were reviewed. ASSESSMENT Patient continues with skilled PT services and is progressing towards goals. She was able to participate in a six minute walk test, see results below. She desat while amb on room air, see pulse ox assessment below and will require home 02 based on that assessment. She does get CHRISTOPHER and fatigues easily. Previous recommendation had been for HHPT and I now learned that updated plan is for likely discharge on hospice. Magan Clarke Current Level of Function Impacting Discharge (mobility/balance): stand by assist to contact guard. Other factors to consider for discharge: now qualifies home 02, this is new. Pulse Oximetry Assessment 92% at rest on room air 85% while ambulating on room air 
92% while ambulating on 2LPM 
 
    
 
PLAN : 
Patient continues to benefit from skilled intervention to address the above impairments. Continue treatment per established plan of care. to address goals. Recommendation for discharge: (in order for the patient to meet his/her long term goals) Physical therapy at least 2 days/week in the home unless she is discharged on hospice, in which case no PT follow up. This discharge recommendation: 
Has been made in collaboration with the attending provider and/or case management Equipment recommendations for successful discharge (if) home: none from PT  
 
 
SUBJECTIVE:  
Patient stated I'd really like to rest in bed.  Pt agreeable to mobility with encouragement OBJECTIVE DATA SUMMARY:  
Chart checked, pt cleared by nursing Critical Behavior: 
Neurologic State: Alert Orientation Level: Oriented to person Cognition: Follows commands Functional Mobility Training: 
Bed Mobility: 
  
Supine to Sit: Stand-by assistance Transfers: 
Sit to Stand: Stand-by assistance Stand to Sit: Stand-by assistance Balance: 
Sitting: Intact; Without support Standing: Impaired Standing - Static: Constant support;Good Standing - Dynamic : Constant support; Amanuel [place] Ambulation/Gait Training: 
Distance (ft): 84 Feet (ft) Assistive Device: Gait belt;Walker, rolling Ambulation - Level of Assistance: Contact guard assistance Gait Abnormalities: Decreased step clearance Base of Support: Narrowed Speed/Yaneli: Slow;Pace decreased (<100 feet/min) Step Length: Left shortened;Right shortened Stairs: 
  
  
   
 
 
6 MWT results: started on room air and finished on 2 liters of 02 
 Distance Walked in Feet (ft): 84 ft.(amb for 4 minutes and 56 seconds of attemtped 6 minutes) Pre Heart Rate: 68   
Pre O2 Saturation: 92   
   
Post Heart Rate: 96   
Post O2 Saturation: 95   
Assistive device used: Assistive Device: Gait belt;Walker, rolling Normative data: 30-57 years old = 0 feet; Men 39-80 years old = 1889 feet; Women 3680 years wqj=0255 feet Modified 10 point Christopher RPE scale utilized where 0 = no breathlessness at all; 10 = maximum exertion Please refer to the flowsheet for any additional vital signs taken during this treatment. Therapeutic Exercises:  
Pursed lip breathing Pain Rating: 
None mentioned Activity Tolerance:  
Fair and desaturates with exertion and requires oxygen Please refer to the flowsheet for vital signs taken during this treatment. After treatment patient left in no apparent distress:  
Sitting in chair, Call bell within reach and Bed / chair alarm activated COMMUNICATION/COLLABORATION:  
The patients plan of care was discussed with: Occupational Therapist and Registered Nurse Jayla Bautista Time Calculation: 27 mins

## 2019-10-03 NOTE — PROGRESS NOTES
1933- shift report received, patient A&O x 3- with episode of confusion, reoriented, in  Bed, SOB on exertions, 2 L NC, lung sounds dim, NSR on tele, skin warm and dry, call light in reach  
 
2100- patient resting comfortably, New order acknowledged for [facility] 0000- patient resting, no distress noted,  
 
0226-am labs drawn, patient resting in bed, continues 02 at 2 L NC, no signs of SOB, call light in reach Continues to have dyspnea at rest, VSS, o2 93% HOB elevated 0400- patient sleeping 
 
0600- alert with confusion, ellen care performed, pure wick changed  , 02 93%, fall interventions remains  in  Place - bed alarm, bed in lowest position,  call light in reach

## 2019-10-03 NOTE — PROGRESS NOTES
Problem: Self Care Deficits Care Plan (Adult) Goal: *Acute Goals and Plan of Care (Insert Text) Description FUNCTIONAL STATUS PRIOR TO ADMISSION: Pt reportedly completed ADL modified independent in [place] [facility]. HOME SUPPORT: The patient lived in [address] [place] [address] with only intermittent ADL support (pt reportedly ambulated to dining room for each meal). Occupational Therapy Goals Initiated 10/3/2019 1. Patient will perform toileting with modified independence within 7 day(s). 2.  Patient will perform toilet transfer with modified independence within 7 day(s). 3.  Patient will perform grooming with modified independence standing sink level within 7 day(s). Outcome: Not Met OCCUPATIONAL THERAPY EVALUATION Patient: Ana Lan (24 y.o. female) Date: 10/3/2019 Primary Diagnosis: CHF (congestive heart failure) ([facility] [address] [I50.9] Precautions: 02 dependent ASSESSMENT Pt received in chair and agreeable to short OT session. Pt presents with impaired activity tolerance, dyspnea on exertion, generalized weakness, and new supplemental oxygen requirement. Pt endorses decline from baseline independence with ADL and related mobility. Pt was notably fatigued with short mobility in room (pt had recently also participated with physical therapist for ambulation in [facility]). OT spoke with care manager regarding pt may have hospice services on board upon discharge from hospital. Pt will benefit from increased ADL support in long term upon hospital discharge. Current Level of Function Impacting Discharge (ADLs/self-care): Up to moderate A Functional Outcome Measure: The patient scored 50/100 on the Barthel Index outcome measure which is indicative of moderate functional impairment/caregiver dependence. Other factors to consider for discharge: Anticipate increased ADL support needed in [place] should pt return there at discharge.  
  
Patient will benefit from skilled therapy intervention to address the above noted impairments. PLAN : 
Recommendations and Planned Interventions: self care training, functional mobility training, balance training, therapeutic activities, endurance activities, patient education, home safety training and family training/education Frequency/Duration: Patient will be followed by occupational therapy 3 times a week to address goals. Recommendation for discharge: (in order for the patient to meet his/her long term goals) To be determined: Pending medical plan/ Hospice decision (Recommend ADL and mobility assistance at [facility]) This discharge recommendation: 
Has been made in collaboration with the attending provider and/or case management Equipment recommendations for successful discharge (if) home: W/C for distance mobility SUBJECTIVE:  
Patient stated I am tired.  OBJECTIVE DATA SUMMARY:  
HISTORY:  
Past Medical History:  
Diagnosis Date Altered mental state Arrhythmia AFIB HISTORY Arthritis BACK Asthma CAD (coronary artery disease)   
 by pass surgery in 2002,2008 Cancer ([facility] [address] 2018 SKIN CANCER ON FACE Dementia ([facility] [address] Depression Fibromyalgia Heart failure ([facility] [address] HTN (hypertension) 6/11/2013 Hypercholesterolemia Ill-defined condition FOUR COMPRESSION FRACTURES IN BACK Ill-defined condition DEMENTIA  
 LBBB (left bundle branch block) 4/4/2019 PE (pulmonary embolism) 2015 DAUGHTER STATES NOT IN LUNG-IN LEG Pneumonia 04/2014 Pneumonia, organism unspecified(486) 3/8/2014 Squamous cell cancer of skin of forearm, right 2019 Thyroid disease Past Surgical History:  
Procedure Laterality Date CARDIAC SURG PROCEDURE UNLIST  2003  
 heart attack,bypass sx CABG . STENTS   
 HX DILATION AND CURETTAGE    
 HX GI    
 COLONOSCOPY   
 HX HEENT  2016 SKIN CANCER REMOVED ON FACE  
 HX HEENT Left 10/2018 FOREHEAD Expanded or extensive additional review of patient history: Home Situation Home Environment: Assisted living Care Facility Name: [facility] group home One/Two Story Residence: One story Living Alone: Yes Support Systems: Child(rosanna), Assisted living Patient Expects to be Discharged to[de-identified] Assisted living Current DME Used/Available at Home: [address], rollator, Grab bars, Shower chair Tub or Shower Type: Tub/Shower combination Hand dominance: Right EXAMINATION OF PERFORMANCE DEFICITS: 
Cognitive/Behavioral Status: 
Neurologic State: Alert Orientation Level: Oriented to person Cognition: Follows commands Perception: Appears intact Perseveration: Perseverates during conversation Edema: No edema noted BUE Hearing: Auditory Auditory Impairment: Hard of hearing, bilateral 
 
Vision/Perceptual:   
Tracking: Able to track stimulus in all quadrants w/o difficulty Acuity: Able to read clock/calendar on wall without difficulty Range of Motion: 
 
AROM: Generally decreased, functional 
  
  
  
  
  
  
  
 
Strength: 
 
Strength: Generally decreased, functional 
  
  
  
  
 
Coordination: 
Coordination: Generally decreased, functional 
Fine Motor Skills-Upper: (Diminished bilaterally) Gross Motor Skills-Upper: (Diminished bilaterally) Tone & Sensation: 
 
Tone: Normal 
  
  
  
  
  
  
  
 
Balance: 
Sitting: Intact; Without support Standing: Impaired Standing - Static: Constant support;Good Standing - Dynamic : Constant support; Lukasz Mercury Functional Mobility and Transfers for ADLs: 
Bed Mobility: 
Supine to Sit: Stand-by assistance Transfers: 
Sit to Stand: Stand-by assistance Stand to Sit: Stand-by assistance Bed to Chair: Stand-by assistance Bathroom Mobility: (Pt stated too fatigued for bathroom abulation) ADL Assessment: 
Feeding: Setup Oral Facial Hygiene/Grooming: Setup(Seated; Pt did not tolerate standing grooming) Bathing: Moderate assistance; Additional time(Fatigue/ dyspnea primary limitations) Upper Body Dressing: Setup Lower Body Dressing: Additional time;Minimum assistance Toileting: Minimum assistance ADL Intervention and task modifications: 
  
 
  
 
  
 
  
 
  
 
  
 
  
 
  
Functional Measure: 
Barthel Index: 
 
Bathin Bladder: 5 Bowels: 10 
Groomin Dressin Feeding: 10 Mobility: 0 Stairs: 0 Toilet Use: 5 Transfer (Bed to Chair and Back): 10 Total: 50/100 Pain Rating: 
None reported Activity Tolerance:  
 
Please refer to the flowsheet for vital signs taken during this treatment. After treatment patient left in no apparent distress:   
Sitting in chair and Bed / chair alarm activated COMMUNICATION/EDUCATION:  
The patients plan of care was discussed with: Physical Therapist and  . Home safety education was provided and the patient/caregiver indicated understanding. This patients plan of care is appropriate for delegation to [facility]. Thank you for this referral. 
Uli Bhat Time Calculation: 15 mins

## 2019-10-03 NOTE — PROGRESS NOTES
Problem: Heart Failure: Day 2 Goal: Activity/Safety Outcome: Progressing Towards Goal 
Goal: Medications Outcome: Progressing Towards Goal 
Goal: Respiratory Outcome: Progressing Towards Goal

## 2019-10-03 NOTE — PROGRESS NOTES
Pulse Oximetry Assessment 92% at rest on room air 85% while ambulating on room air 
92% while ambulating on 2LPM 
 
Gaynel Nageotte, PT

## 2019-10-03 NOTE — PROGRESS NOTES
BRIAN: 
Likely return to [facility] with hospice support, once set up. Requested [facility] (preferred agency) to have a meeting with pt/daughter Sandrea Dandy this afternoon to coordinate discharge plans. Confirmed receipt of referral with rafal Salvador who will meet with pt/daughter this afternoon . EPRICO Carreon CM spoke with physician and order for hospice received. Discussed by phone with daughter Sandrea Dandy as pt had deferred CM to daughter yesterday, Sandrea Dandy phone [phone]. Discussed hospice and offered freedom of choice. Daughter is in agreement but defers selection of agency to [address] [place] [address] Dr [place]  to ensure smooth transition back to pt's [facility]. CM was able to speak with Annie Evangelista,  for [address] care and reviewed hospice order. She advised CM that [facility], contact Weston Read, [phone] or [phone] is preferred agency for their facility. Referral sent to [facility] hospice via [facility] [employer] with order attached. Requested meeting with pt/daughter at bedside this afternoon PERICO Carreon

## 2019-10-03 NOTE — PROGRESS NOTES
TRANSFER - OUT REPORT: 
 
Verbal report given to Jayda RN supervisor at [facility] (name) on Rebecca Rivera  being transferred to [facility]. Report consisted of patients Situation, Background, Assessment and  
Recommendations(SBAR). Information from the following report(s) SBAR, Kardex, Procedure Summary, Intake/Output, MAR, Accordion, Recent Results, Med Rec Status and Cardiac Rhythm SR/ST. was reviewed with the receiving nurse. Opportunity for questions and clarification was provided. Patient transported with:Medical Transport.

## 2019-10-03 NOTE — PROGRESS NOTES
[facility] Progress Note Date of service: 10/3/2019 Subjective: Ms Jennifer Rea says she feels slightly improved in general but her breathing is the same. Objective: 
 
Visit Vitals /54 (BP 1 Location: Right arm, BP Patient Position: Lying left side) Pulse 83 Temp 97.7 °F (36.5 °C) Resp 24 Ht 5' 2\" (1.575 m) Wt 70.3 kg (154 lb 15.7 oz) SpO2 92% BMI 28.35 kg/m² Physical Exam  
Constitutional: She is oriented to person, place, and time. She appears well-developed and well-nourished. HENT:  
Head: Normocephalic and atraumatic. Eyes: Conjunctivae are normal. No scleral icterus. Neck: No JVD present. Cardiovascular: Normal rate, regular rhythm and normal heart sounds. Exam reveals no gallop. No murmur heard. Pulmonary/Chest: Effort normal and breath sounds normal. No stridor. No respiratory distress. She has no wheezes. She has no rales. Abdominal: Soft. She exhibits no distension. Musculoskeletal: She exhibits no edema or deformity. Neurological: She is alert and oriented to person, place, and time. Skin: Skin is warm and dry. Psychiatric: She has a normal mood and affect. Her behavior is normal.  
 
 
Data reviewed: 
Recent Results (from the past 12 hour(s)) METABOLIC PANEL, BASIC Collection Time: 10/02/19 11:32 PM  
Result Value Ref Range Sodium 144 136 - 145 mmol/L Potassium 3.9 3.5 - 5.1 mmol/L Chloride 104 97 - 108 mmol/L  
 CO2 35 (H) 21 - 32 mmol/L Anion gap 5 5 - 15 mmol/L Glucose 101 (H) 65 - 100 mg/dL BUN 22 (H) 6 - 20 MG/DL Creatinine 1.15 (H) 0.55 - 1.02 MG/DL  
 BUN/Creatinine ratio 19 12 - 20 GFR est AA 53 (L) >60 ml/min/1.73m2 GFR est non-AA 44 (L) >60 ml/min/1.73m2 Calcium 8.4 (L) 8.5 - 10.1 MG/DL Assessment: 1. Acute on chronic systolic failure Volume status doesn't look too bad, but she has had gradually progressive dyspnea on exertion and has NYHA class IIIB, borderline on class IV. Apparently she was febrile at some point so there is some concern for PNA, but there is nothing focal on CXR and I suspect this is more likely to be progressive end stage heart failure 
  
2. Dilated cardiomyopathy: 
Long-standing, with EF 15-20%  
  
3. Mitral regurgitation: mod-severe MR 
  
4. LBBB 
  
5. Paroxysmal atrial fibrillation: maintaining NSR 
  
6. CAD s/p CABG Recent non-ischemic stress test 
  
7. CKD stage III Plan: 
 
Continue current medical therapies Hospice consult pending. Discussed with daughter Ramirez Fay by phone yesterday and she agrees that is reasonable to begin the evaluation process. DNR. Signed: 
Cami Pace MD 
Interventional Cardiology 10/3/2019

## 2019-10-03 NOTE — PROGRESS NOTES
BRIAN: 
 
Plan is for discharge today for pt to return to assisted living with hospice support with [facility] hospice.   Gail Pandey confirmed that [facility] will do admission at 5:30pm.  Confirmed plan with daughter by phone, Sharron Mckee who is in agreement and daughter also spoke with MD. 
 
PCS completed, requested transport for  between 4:30-5pm. 
 
PERICO Alvares

## 2019-10-03 NOTE — PROGRESS NOTES
Problem: Heart Failure: Day 1 Goal: Off Pathway (Use only if patient is Off Pathway) Outcome: Progressing Towards Goal 
Goal: Activity/Safety Outcome: Progressing Towards Goal 
Goal: Consults, if ordered Outcome: Progressing Towards Goal 
Goal: Diagnostic Test/Procedures Outcome: Progressing Towards Goal 
Goal: Nutrition/Diet Outcome: Progressing Towards Goal 
Goal: Discharge Planning Outcome: Progressing Towards Goal 
Goal: Medications Outcome: Progressing Towards Goal 
Goal: Respiratory Outcome: Progressing Towards Goal 
Goal: Treatments/Interventions/Procedures Outcome: Progressing Towards Goal 
Goal: Psychosocial 
Outcome: Progressing Towards Goal 
Goal: *Oxygen saturation within defined limits Outcome: Progressing Towards Goal 
Goal: *Hemodynamically stable Outcome: Progressing Towards Goal 
Goal: *Optimal pain control at patient's stated goal 
Outcome: Progressing Towards Goal 
Goal: *Anxiety reduced or absent Outcome: Progressing Towards Goal 
  
Problem: Pressure Injury - Risk of 
Goal: *Prevention of pressure injury Description Document Osmin Scale and appropriate interventions in the flowsheet. Outcome: Progressing Towards Goal 
Note:  
Pressure Injury Interventions: 
  
 
Moisture Interventions: Minimize layers Activity Interventions: Increase time out of bed, Pressure redistribution bed/mattress(bed type) Mobility Interventions: Pressure redistribution bed/mattress (bed type), HOB 30 degrees or less Nutrition Interventions: Document food/fluid/supplement intake Friction and Shear Interventions: Minimize layers Problem: Patient Education: Go to Patient Education Activity Goal: Patient/Family Education Outcome: Progressing Towards Goal 
  
Problem: Heart Failure: Day 2 Goal: Off Pathway (Use only if patient is Off Pathway) Outcome: Progressing Towards Goal 
Goal: Activity/Safety Outcome: Progressing Towards Goal 
Goal: Consults, if ordered Outcome: Progressing Towards Goal 
Goal: Diagnostic Test/Procedures Outcome: Progressing Towards Goal 
Goal: Nutrition/Diet Outcome: Progressing Towards Goal 
Goal: Discharge Planning Outcome: Progressing Towards Goal 
Goal: Medications Outcome: Progressing Towards Goal 
Goal: Respiratory Outcome: Progressing Towards Goal 
Goal: Treatments/Interventions/Procedures Outcome: Progressing Towards Goal 
Goal: Psychosocial 
Outcome: Progressing Towards Goal 
Goal: *Oxygen saturation within defined limits Outcome: Progressing Towards Goal 
Goal: *Hemodynamically stable Outcome: Progressing Towards Goal 
Goal: *Optimal pain control at patient's stated goal 
Outcome: Progressing Towards Goal 
Goal: *Anxiety reduced or absent Outcome: Progressing Towards Goal 
Goal: *Demonstrates progressive activity Outcome: Progressing Towards Goal 
  
Problem: Falls - Risk of 
Goal: *Absence of Falls Description Document Natali Winston Fall Risk and appropriate interventions in the flowsheet. Outcome: Progressing Towards Goal 
Note:  
Fall Risk Interventions: 
Mobility Interventions: Patient to call before getting OOB, Bed/chair exit alarm Mentation Interventions: Bed/chair exit alarm Medication Interventions: Patient to call before getting OOB Elimination Interventions: Call light in reach, Bed/chair exit alarm Problem: Patient Education: Go to Patient Education Activity Goal: Patient/Family Education Outcome: Progressing Towards Goal 
  
Problem: Heart Failure: Day 3 Goal: Off Pathway (Use only if patient is Off Pathway) Outcome: Progressing Towards Goal 
Goal: Activity/Safety Outcome: Progressing Towards Goal 
Goal: Diagnostic Test/Procedures Outcome: Progressing Towards Goal 
Goal: Nutrition/Diet Outcome: Progressing Towards Goal 
Goal: Discharge Planning Outcome: Progressing Towards Goal 
Goal: Medications Outcome: Progressing Towards Goal 
Goal: Respiratory Outcome: Progressing Towards Goal 
 Goal: Treatments/Interventions/Procedures Outcome: Progressing Towards Goal 
Goal: Psychosocial 
Outcome: Progressing Towards Goal 
Goal: *Oxygen saturation within defined limits Outcome: Progressing Towards Goal 
Goal: *Hemodynamically stable Outcome: Progressing Towards Goal 
Goal: *Optimal pain control at patient's stated goal 
Outcome: Progressing Towards Goal 
Goal: *Anxiety reduced or absent Outcome: Progressing Towards Goal 
Goal: *Demonstrates progressive activity Outcome: Progressing Towards Goal 
  
Problem: Patient Education: Go to Patient Education Activity Goal: Patient/Family Education Outcome: Progressing Towards Goal

## 2019-10-03 NOTE — PROGRESS NOTES
Pharmacist Discharge Medication Reconciliation Discharging Provider: Taylor Lino Significant PMH:  
Past Medical History:  
Diagnosis Date Altered mental state Arrhythmia AFIB HISTORY Arthritis BACK Asthma CAD (coronary artery disease)   
 by pass surgery in 2002,2008 Cancer ([facility] [address] 2018 SKIN CANCER ON FACE Dementia ([facility] [address] Depression Fibromyalgia Heart failure ([facility] [address] HTN (hypertension) 6/11/2013 Hypercholesterolemia Ill-defined condition FOUR COMPRESSION FRACTURES IN BACK Ill-defined condition DEMENTIA  
 LBBB (left bundle branch block) 4/4/2019 PE (pulmonary embolism) 2015 DAUGHTER STATES NOT IN LUNG-IN LEG Pneumonia 04/2014 Pneumonia, organism unspecified(486) 3/8/2014 Squamous cell cancer of skin of forearm, right 2019 Thyroid disease Chief Complaint for this Admission: Chief Complaint Patient presents with Shortness of Breath Allergies: Neurontin [gabapentin] Discharge Medications:  
Current Discharge Medication List  
  
 
CONTINUE these medications which have CHANGED Details  
furosemide (LASIX) 40 mg tablet Take 1 Tab by mouth daily. Qty: 24 Tab, Refills: 3 Associated Diagnoses: Chronic systolic congestive heart failure ([facility] [address] metoprolol succinate (TOPROL-XL) 25 mg XL tablet Take 0.5 Tabs by mouth daily (after breakfast). Qty: 30 Tab, Refills: 5 CONTINUE these medications which have NOT CHANGED Details  
sacubitril-valsartan (ENTRESTO) 24 mg/26 mg tablet Take 1 Tab by mouth two (2) times a day. docusate sodium (DOC-Q-LACE) 100 mg capsule TAKE 1 CAPSULE BY MOUTH TWICE DAILY DX:BOWEL SUPPORT Qty: 60 Cap, Refills: 5  
  
cyanocobalamin (VITAMIN B-12) 500 mcg tablet TAKE 1 TABLET BY MOUTH EVERY DAY DX: SUPPLEMENT Qty: 30 Tab, Refills: 5  
  
guaiFENesin (MUCINEX) 1,200 mg Ta12 ER tablet Take 1 Tab by mouth two (2) times a day. Qty: 60 Tab, Refills: 0 apixaban (ELIQUIS) 2.5 mg tablet TAKE 1 TABLET BY MOUTH 2 TIMES A DAY. DX ANTICOAGULANT Qty: 60 Tab, Refills: 5 Associated Diagnoses: Chronic systolic congestive heart failure (HCC)  
  
venlafaxine-SR (EFFEXOR-XR) 75 mg capsule Take 1 Cap by mouth daily. Qty: 30 Cap, Refills: 5 Associated Diagnoses: Recurrent depression ([address] lidocaine (LIDODERM) 5 % Apply patch to the affected area for 12 hours a day and remove for 12 hours a day. Qty: 15 Each, Refills: 0  
  
calcium-cholecalciferol, D3, (CALTRATE 600+D) tablet TAKE 1 TABLET BY MOUTH TWICE DAILY DX: SUPPLEMENT Qty: 60 Tab, Refills: 12  
 Associated Diagnoses: Osteopenia  
  
polyethylene glycol (MIRALAX) 17 gram packet Take 17 g by mouth as needed. fluticasone (FLONASE) 50 mcg/actuation nasal spray 2 Sprays by Both Nostrils route daily. Qty: 1 Bottle, Refills: 0 Associated Diagnoses: Allergic rhinitis due to pollen, unspecified seasonality  
  
fexofenadine (ALLEGRA) 180 mg tablet Take 1 Tab by mouth daily as needed for Allergies. Qty: 30 Tab, Refills: 0 Associated Diagnoses: Allergic rhinitis due to pollen, unspecified seasonality  
  
albuterol (PROVENTIL HFA, VENTOLIN HFA, PROAIR HFA) 90 mcg/actuation inhaler Take 2 Puffs by inhalation every four (4) hours as needed for Wheezing. Qty: 1 Inhaler, Refills: 0 Associated Diagnoses: Shortness of breath  
  
ammonium lactate (LAC-HYDRIN) 12 % lotion   
  
hydrocortisone (HYTONE) 2.5 % lotion as needed. acetaminophen (TYLENOL) 500 mg tablet Take 500 mg by mouth every six (6) hours as needed for Pain.  
  
pravastatin (PRAVACHOL) 20 mg tablet Take 1 Tab by mouth nightly. D/C pravastatin 40 mg tab Qty: 30 Tab, Refills: 5 Associated Diagnoses: Coronary artery disease due to lipid rich plaque; Essential hypertension  
  
donepezil (ARICEPT) 5 mg tablet TAKE 1 TABLET BY MOUTH EVERY NIGHT AT BEDTIME DX: MEMORY SUPPORT Qty: 30 Tab, Refills: 5 albuterol (PROVENTIL VENTOLIN) 2.5 mg /3 mL (0.083 %) nebulizer solution INHALE THE CONTENTS OF 1 VIAL VIA NEBULIZER EVERY 6 HOURS AS NEEDED FOR WHEEZING Qty: 100 Package, Refills: 6  
  
raNITIdine (ZANTAC) 150 mg tablet Take 150 mg by mouth nightly as needed for Indigestion.   
  
  
 
STOP taking these medications  
  
 lisinopril (PRINIVIL, ZESTRIL) 5 mg tablet Comments:  
Reason for Stopping:   
   
  
 
 
The patient's chart, MAR and AVS were reviewed by Yolanda Chowdhury, PHARMD.

## 2019-10-05 NOTE — DISCHARGE INSTRUCTIONS
Discharge Instructions       PATIENT ID: Taylor Alanis  MRN: [id number]   YOB: 1926    DATE OF ADMISSION: 10/1/2019 12:44 PM    DATE OF DISCHARGE: 10/3/2019    PRIMARY CARE PROVIDER: Kirsten Parks MD     ATTENDING PHYSICIAN: Maryjo Guardado MD  DISCHARGING PROVIDER: Alvarado Antonio MD    To contact this individual call [phone] and ask the  to page. If unavailable ask to be transferred the Adult Hospitalist Department. DISCHARGE DIAGNOSES Acute on chronic systolic CHF    CONSULTATIONS: IP CONSULT TO HOSPITALIST  IP CONSULT TO CARDIOLOGY    PROCEDURES/SURGERIES: * No surgery found *    PENDING TEST RESULTS:   At the time of discharge the following test results are still pending: none      FOLLOW UP APPOINTMENTS:   Follow-up Information     Follow up With Specialties Details Why Contact Zenaida    [address] On 10/3/2019 5:30pm admission at [address]    Kirsten Parks MD Internal Medicine   [address] [facility]  [address] [facility]  [phone]             ADDITIONAL CARE RECOMMENDATIONS:   -FUrther care per [facility]    DIET: Regular Diet      ACTIVITY: Activity as tolerated    WOUND CARE: na    EQUIPMENT needed: na      DISCHARGE MEDICATIONS:   See Medication Reconciliation Form    · It is important that you take the medication exactly as they are prescribed. · Keep your medication in the bottles provided by the pharmacist and keep a list of the medication names, dosages, and times to be taken in your wallet. · Do not take other medications without consulting your doctor. NOTIFY YOUR PHYSICIAN FOR ANY OF THE FOLLOWING:   Fever over 101 degrees for 24 hours. Chest pain, shortness of breath, fever, chills, nausea, vomiting, diarrhea, change in mentation, falling, weakness, bleeding. Severe pain or pain not relieved by medications.   Or, any other signs or symptoms that you may have questions about.       DISPOSITION:    Home With:   OT  PT  HH  RN       SNF/Inpatient Rehab/LTAC    Independent/assisted living   X Hospice    Other:         Signed:   Ronaldo Wilder MD  10/3/2019  12:48 PM

## 2019-10-05 NOTE — DISCHARGE SUMMARY
Discharge Summary PATIENT ID: Nori Lopez MRN: [id number] YOB: 1926 DATE OF ADMISSION: 10/1/2019 12:44 PM   
DATE OF DISCHARGE: 10/3/2019 PRIMARY CARE PROVIDER: Maria A Matt MD  
 
ATTENDING PHYSICIAN: Andrea Ledezma MD 
 
DISCHARGING PROVIDER: Andrea Ledezma MD   
To contact this individual call [phone] and ask the  to page. If unavailable ask to be transferred the Adult Hospitalist Department. CONSULTATIONS: IP CONSULT TO CARDIOLOGY PROCEDURES/SURGERIES: * No surgery found * [address] COURSE:  
 
DISCHARGE DIAGNOSES / PLAN:   
 
80 y. o. female with PMH of CHFrEF, dementia,asthma,HTN and other co morbidities was sent to the hospital from PCP for further eval of fever,SOB and tachycardia. Found to have CHF exacerbation.  
 
#Acute on chronic  Systolic CHF exacerbation: NYHA class 4 at admission, NYHA class 3 at discharge 
-based on her symptoms -SOB,elevated BNP -65875 and some pleural effusions 
-received  IV diuretics -lasix 
-daily weights,I and O 
-echo showed 15-20% EF,moderate to severe MR 
-Discussed with  -agreed with diuretics -given overall picture with severely reduced EF -recommended hospice as she has end stage heart failure. 
-Daughter Jitendra agreed with hospice,patient s/c with home hospice. 
  
# Pneumonia ruled out: 
-Per PCP note,patient was febrile in the office. She is afebrile since admission No clear infiltrate on CXR  
- procal wnl, resp viral panel and BC negative 
-D/c antibiotics. 
  
# HTN: resume home meds 
  
#Atrial fib history: resume anticoagulants and beta blocker. 
  
#Dementia: resume home meds PENDING TEST RESULTS:  
At the time of discharge the following test results are still pending: none Xr Chest Pa Lat Result Date: 10/1/2019 EXAM: XR CHEST PA LAT INDICATION: Shortness of breath; fever; cough COMPARISON: 8/17/2018.  FINDINGS: PA and lateral radiographs of the chest demonstrate small bilateral pleural effusions. The cardiac and mediastinal contours and pulmonary vascularity are remarkable for intact sternotomy wires. A moderate biconcave upper lumbar compression fractures seen. IMPRESSION: Small bilateral pleural effusions Xr Spine Lumb 2 Or 3 V Result Date: 7/9/2019 INDICATION:   Back Pain COMPARISON: CT January 23, 2017 FINDINGS: AP, lateral, and coned down lateral views of the lumbar spine demonstrate multiple compression fractures, moderate to severe L2, moderate L1, L3, L4 all similar to prior examination. No definite new fracture. Significant disc space narrowing L5-S1 with endplate osteophytes and facet sclerosis. The sacroiliac joints are intact. Slightly irregular appearance of the right sacrum similar to prior examination. IMPRESSION: No definite change in multiple chronic compression fractures. FOLLOW UP APPOINTMENTS:   
Follow-up Information Follow up With Specialties Details Why Contact Info Apolinartelma Suresh On 10/3/2019 5:30pm admission at [place] [address] [address] Rashmi Benavides MD Internal Medicine   [address] 
[phone] ADDITIONAL CARE RECOMMENDATIONS:  
-FUrther care per [facility] hospice DIET: Regular Diet ACTIVITY: Activity as tolerated WOUND CARE: na 
 
EQUIPMENT needed: na 
 
 
 
DISCHARGE MEDICATIONS: 
Discharge Medication List as of 10/3/2019  4:32 PM  
  
CONTINUE these medications which have CHANGED Details  
furosemide (LASIX) 40 mg tablet Take 1 Tab by mouth daily. , No Print, Disp-24 Tab, R-3  
  
metoprolol succinate (TOPROL-XL) 25 mg XL tablet Take 0.5 Tabs by mouth daily (after breakfast). , No Print, Disp-30 Tab, R-5  
  
  
CONTINUE these medications which have NOT CHANGED  Details  
sacubitril-valsartan (ENTRESTO) 24 mg/26 mg tablet Take 1 Tab by mouth two (2) times a day., Historical Med  
  
docusate sodium (DOC-Q-LACE) 100 mg capsule TAKE 1 CAPSULE BY MOUTH TWICE DAILY DX:BOWEL SUPPORT, Normal, Disp-60 Cap, R-5  
  
cyanocobalamin (VITAMIN B-12) 500 mcg tablet TAKE 1 TABLET BY MOUTH EVERY DAY DX: SUPPLEMENT, Normal, Disp-30 Tab, R-5  
  
guaiFENesin (MUCINEX) 1,200 mg Ta12 ER tablet Take 1 Tab by mouth two (2) times a day., Normal, Disp-60 Tab, R-0  
  
apixaban (ELIQUIS) 2.5 mg tablet TAKE 1 TABLET BY MOUTH 2 TIMES A DAY. DX ANTICOAGULANT, Normal, Disp-60 Tab, R-5  
  
venlafaxine-SR (EFFEXOR-XR) 75 mg capsule Take 1 Cap by mouth daily. , Normal, Disp-30 Cap, R-5  
  
lidocaine (LIDODERM) 5 % Apply patch to the affected area for 12 hours a day and remove for 12 hours a day., Print, Disp-15 Each, R-0  
  
calcium-cholecalciferol, D3, (CALTRATE 600+D) tablet TAKE 1 TABLET BY MOUTH TWICE DAILY DX: SUPPLEMENT, Normal, Disp-60 Tab, R-12  
  
polyethylene glycol (MIRALAX) 17 gram packet Take 17 g by mouth as needed., Historical Med  
  
fluticasone (FLONASE) 50 mcg/actuation nasal spray 2 Sprays by Both Nostrils route daily. , Normal, Disp-1 Bottle, R-0  
  
fexofenadine (ALLEGRA) 180 mg tablet Take 1 Tab by mouth daily as needed for Allergies. , Normal, Disp-30 Tab, R-0  
  
albuterol (PROVENTIL HFA, VENTOLIN HFA, PROAIR HFA) 90 mcg/actuation inhaler Take 2 Puffs by inhalation every four (4) hours as needed for Wheezing., Normal, Disp-1 Inhaler, R-0  
  
ammonium lactate (LAC-HYDRIN) 12 % lotion Historical Med  
  
hydrocortisone (HYTONE) 2.5 % lotion as needed., Historical Med  
  
acetaminophen (TYLENOL) 500 mg tablet Take 500 mg by mouth every six (6) hours as needed for Pain., Historical Med  
  
pravastatin (PRAVACHOL) 20 mg tablet Take 1 Tab by mouth nightly.  D/C pravastatin 40 mg tab, Normal, Disp-30 Tab, R-5  
  
donepezil (ARICEPT) 5 mg tablet TAKE 1 TABLET BY MOUTH EVERY NIGHT AT BEDTIME DX: MEMORY SUPPORT, Normal, Disp-30 Tab, R-5  
  
 albuterol (PROVENTIL VENTOLIN) 2.5 mg /3 mL (0.083 %) nebulizer solution INHALE THE CONTENTS OF 1 VIAL VIA NEBULIZER EVERY 6 HOURS AS NEEDED FOR WHEEZING, Normal, Disp-100 Package, R-6  
  
raNITIdine (ZANTAC) 150 mg tablet Take 150 mg by mouth nightly as needed for Indigestion. , Historical Med  
  
  
STOP taking these medications  
  
 lisinopril (PRINIVIL, ZESTRIL) 5 mg tablet Comments:  
Reason for Stopping:   
   
  
 
 
 
NOTIFY YOUR PHYSICIAN FOR ANY OF THE FOLLOWING:  
Fever over 101 degrees for 24 hours. Chest pain, shortness of breath, fever, chills, nausea, vomiting, diarrhea, change in mentation, falling, weakness, bleeding. Severe pain or pain not relieved by medications. Or, any other signs or symptoms that you may have questions about. DISPOSITION: 
X  Home With: 
 OT  PT  HH  RN  
  
 Long term SNF/Inpatient Rehab Independent/assisted living Hospice Other:  
 
 
PATIENT CONDITION AT DISCHARGE:  
 
Functional status Poor X Deconditioned Independent Cognition [place] Forgetful X Dementia Catheters/lines (plus indication) Hicks PICC   
 PEG   
X None Code status Full code X DNR   
 
PHYSICAL EXAMINATION AT DISCHARGE: 
Constitutional:  No acute distress, cooperative, pleasant   
ENT:  Oral mucous moist, oropharynx benign. Resp:  decreased breath sounds b/l,tachypnic CV:  Regular rhythm, normal rate GI:  Soft, non distended, non tender. normoactive bowel sounds Musculoskeletal:  1+ b/l LE edema Neurologic:  Moves all extremities. AAO x 2 CHRONIC MEDICAL DIAGNOSES: 
Problem List as of 10/3/2019 Date Reviewed: 4/15/2019 Codes Class Noted - Resolved LBBB (left bundle branch block) ICD-10-CM: I44.7 ICD-9-CM: 426.3  4/4/2019 - Present Skin lesion of face ICD-10-CM: L98.9 ICD-9-CM: 709.9  8/2/2018 - Present Cancer, skin, squamous cell ICD-10-CM: C44.92 
ICD-9-CM: 173.92  8/2/2018 - Present Gait instability ICD-10-CM: R26.81 
ICD-9-CM: 781.2  8/2/2018 - Present Recurrent depression ([address] ICD-10-CM: F33.9 ICD-9-CM: 296.30  1/2/2018 - Present ACP (advance care planning) ICD-10-CM: Z71.89 ICD-9-CM: V65.49  5/30/2017 - Present Squamous cell carcinoma of forehead ICD-10-CM: C44.329 ICD-9-CM: 173.32  8/29/2016 - Present Overview Signed 8/29/2016  8:43 AM by Victorino Conner MD  
  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 
ICD-9-CM: 232.3  8/29/2016 - Present Cardiac LV ejection fraction 21-40% ICD-10-CM: R94.30 ICD-9-CM: 785.9  7/21/2016 - Present Osteopenia ICD-10-CM: M85.80 ICD-9-CM: 733.90  4/12/2016 - Present Advance care planning ICD-10-CM: Z71.89 ICD-9-CM: V65.49  2/9/2016 - Present Intractable back pain ICD-10-CM: M54.9 ICD-9-CM: 724.5  8/8/2015 - Present Rotator cuff tear arthropathy of right shoulder ICD-10-CM: M75.101, M12.811 ICD-9-CM: 716.81  5/15/2015 - Present Acute chest pain ICD-10-CM: R07.9 ICD-9-CM: 786.50  5/14/2015 - Present Dementia without behavioral disturbance (HCC) ICD-10-CM: F03.90 ICD-9-CM: 294.20  4/27/2015 - Present Depressive disorder, not elsewhere classified ICD-10-CM: F32.9 ICD-9-CM: 510  4/27/2015 - Present Anxiety state, unspecified ICD-10-CM: F41.1 ICD-9-CM: 300.00  4/27/2015 - Present Pain Disorder Associated w/ Both Psychological & Medical Factors ICD-10-CM: F45.42 
ICD-9-CM: 307.89  4/27/2015 - Present Acute on chronic systolic heart failure (HCC) ICD-10-CM: Y88.64 ICD-9-CM: 428.23  3/14/2014 - Present Memory impairment ICD-10-CM: R41.3 ICD-9-CM: 780.93  8/15/2013 - Present Depression ICD-10-CM: F32.9 ICD-9-CM: 850  8/15/2013 - Present S/P CABG (coronary artery bypass graft) ICD-10-CM: Z95.1 ICD-9-CM: V45.81  8/15/2013 - Present MI, old ICD-10-CM: I25.2 ICD-9-CM: 695  8/15/2013 - Present Mixed hyperlipidemia ICD-10-CM: E78.2 ICD-9-CM: 272.2  6/11/2013 - Present CAD (coronary artery disease) ICD-10-CM: I25.10 ICD-9-CM: 414.00  6/11/2013 - Present HTN (hypertension) ICD-10-CM: I10 
ICD-9-CM: 401.9  6/11/2013 - Present RESOLVED: Shortness of breath ICD-10-CM: R06.02 
ICD-9-CM: 786.05  8/17/2018 - 8/22/2018 RESOLVED: Diarrhea ICD-10-CM: R19.7 ICD-9-CM: 787.91  1/26/2017 - 1/26/2017 RESOLVED: Acute respiratory failure with hypoxia [facility] ICD-10-CM: J96.01 
ICD-9-CM: 518.81  1/24/2017 - 1/26/2017 RESOLVED: Acute kidney injury ([facility] ICD-10-CM: N17.9 ICD-9-CM: 584.9  8/14/2015 - 8/20/2015 RESOLVED: Encephalopathy ICD-10-CM: G93.40 ICD-9-CM: 348.30  8/14/2015 - 8/20/2015 RESOLVED: Pneumonia, organism unspecified(486) ICD-10-CM: J18.9 ICD-9-CM: 854  3/8/2014 - 11/4/2014 CHF (congestive heart failure) (HCC) ICD-10-CM: I50.9 ICD-9-CM: 428.0  5/1/2014 - Present 40 minutes were spent with the patient on counseling and coordination of care Signed: Moriah Ralph MD 
10/5/2019 12:45 PM

## 2019-10-15 NOTE — PROGRESS NOTES
Health Maintenance Due Topic Date Due  
 DTaP/Tdap/Td series (1 - Tdap) 05/26/1947  Shingrix Vaccine Age 50> (1 of 2) 05/26/1976  GLAUCOMA SCREENING Q2Y  06/09/2019  MEDICARE YEARLY EXAM  10/06/2019 No chief complaint on file. 1. Have you been to the ER, urgent care clinic since your last visit? Hospitalized since your last visit? Yes When: 10/01/2019-10/05/2019 
 
2. Have you seen or consulted any other health care providers outside of the [address] [place] [address] since your last visit? Include any pap smears or colon screening. No 
 
3) Do you have an Advance Directive on file? yes 4) Are you interested in receiving information on Advance Directives? NO Patient is accompanied by self I have received verbal consent from Khushi Stone to discuss any/all medical information while they are present in the room.

## 2019-10-15 NOTE — PROGRESS NOTES
This is the Subsequent Medicare Annual Wellness Exam, performed 12 months or more after the Initial AWV or the last Subsequent AWV I have reviewed the patient's medical history in detail and updated the computerized patient record. History Past Medical History:  
Diagnosis Date  Altered mental state  Arrhythmia AFIB HISTORY  Arthritis BACK  Asthma  CAD (coronary artery disease)   
 by pass surgery in 2002,2008  Cancer ([facility] 2018 SKIN CANCER ON FACE  Dementia ([facility]  Depression  Fibromyalgia  Heart failure ([facility]  HTN (hypertension) 6/11/2013  Hypercholesterolemia  Ill-defined condition FOUR COMPRESSION FRACTURES IN BACK  Ill-defined condition DEMENTIA  LBBB (left bundle branch block) 4/4/2019  PE (pulmonary embolism) 2015 DAUGHTER STATES NOT IN LUNG-IN LEG  
 Pneumonia 04/2014  Pneumonia, organism unspecified(486) 3/8/2014  Squamous cell cancer of skin of forearm, right 2019  Thyroid disease Past Surgical History:  
Procedure Laterality Date  CARDIAC SURG PROCEDURE UNLIST  2003  
 heart attack,bypass sx CABG . STENTS   
 HX DILATION AND CURETTAGE    
 HX GI    
 COLONOSCOPY   
 HX HEENT  2016 SKIN CANCER REMOVED ON FACE  
 HX HEENT Left 10/2018 FOREHEAD Current Outpatient Medications Medication Sig Dispense Refill  traMADol (ULTRAM) 50 mg tablet Take 50 mg by mouth every six (6) hours as needed for Pain.  LORazepam (ATIVAN) 0.5 mg tablet Take 0.25 mg by mouth every twelve (12) hours as needed for Anxiety.  LORazepam (ATIVAN) 0.5 mg tablet Take 0.5 mg by mouth every four (4) hours as needed for Anxiety.  morphine 20 mg/5 mL (4 mg/mL) solution Take 5 mg by mouth every two (2) hours as needed for Pain. Indications: 0.25mL po SL Q12h PRN    
 bisacodyl (DULCOLAX) 10 mg suppository Insert 10 mg into rectum as needed.  acetaminophen (TYLENOL) 650 mg suppository Insert  into rectum every six (6) hours as needed for Fever.  senna (SENEXON) 8.6 mg tablet Take 1 Tab by mouth daily.  hyoscyamine (LEVSIN) 0.125 mg/mL solution Take 125 mcg by mouth every two (2) hours as needed.  haloperidol (HALDOL) 2 mg/mL oral concentrate Take 2 mg by mouth every six (6) hours as needed. Indications: 0.25mL SL Q2h PRN    
 furosemide (LASIX) 40 mg tablet Take 1 Tab by mouth daily. 24 Tab 3  
 sacubitril-valsartan (ENTRESTO) 24 mg/26 mg tablet Take 1 Tab by mouth two (2) times a day.  guaiFENesin (MUCINEX) 1,200 mg Ta12 ER tablet Take 1 Tab by mouth two (2) times a day. 60 Tab 0  
 apixaban (ELIQUIS) 2.5 mg tablet TAKE 1 TABLET BY MOUTH 2 TIMES A DAY. DX ANTICOAGULANT 60 Tab 5  
 venlafaxine-SR (EFFEXOR-XR) 75 mg capsule Take 1 Cap by mouth daily. 30 Cap 5  polyethylene glycol (MIRALAX) 17 gram packet Take 17 g by mouth as needed.  fexofenadine (ALLEGRA) 180 mg tablet Take 1 Tab by mouth daily as needed for Allergies. 30 Tab 0  
 albuterol (PROVENTIL HFA, VENTOLIN HFA, PROAIR HFA) 90 mcg/actuation inhaler Take 2 Puffs by inhalation every four (4) hours as needed for Wheezing. 1 Inhaler 0  
 hydrocortisone (HYTONE) 2.5 % lotion as needed.  acetaminophen (TYLENOL) 500 mg tablet Take 500 mg by mouth every six (6) hours as needed for Pain.  albuterol (PROVENTIL VENTOLIN) 2.5 mg /3 mL (0.083 %) nebulizer solution INHALE THE CONTENTS OF 1 VIAL VIA NEBULIZER EVERY 6 HOURS AS NEEDED FOR WHEEZING 100 Package 6  
 raNITIdine (ZANTAC) 150 mg tablet Take 150 mg by mouth nightly as needed for Indigestion.  metoprolol succinate (TOPROL-XL) 25 mg XL tablet Take 0.5 Tabs by mouth daily (after breakfast). 30 Tab 5  
 docusate sodium (DOC-Q-LACE) 100 mg capsule TAKE 1 CAPSULE BY MOUTH TWICE DAILY DX:BOWEL SUPPORT 60 Cap 5  cyanocobalamin (VITAMIN B-12) 500 mcg tablet TAKE 1 TABLET BY MOUTH EVERY DAY DX: SUPPLEMENT 30 Tab 5  lidocaine (LIDODERM) 5 % Apply patch to the affected area for 12 hours a day and remove for 12 hours a day. 15 Each 0  
 calcium-cholecalciferol, D3, (CALTRATE 600+D) tablet TAKE 1 TABLET BY MOUTH TWICE DAILY DX: SUPPLEMENT 60 Tab 12  
 fluticasone (FLONASE) 50 mcg/actuation nasal spray 2 Sprays by Both Nostrils route daily. 1 Bottle 0  
 pravastatin (PRAVACHOL) 20 mg tablet Take 1 Tab by mouth nightly. D/C pravastatin 40 mg tab 30 Tab 5  
 donepezil (ARICEPT) 5 mg tablet TAKE 1 TABLET BY MOUTH EVERY NIGHT AT BEDTIME DX: MEMORY SUPPORT 30 Tab 5 Allergies Allergen Reactions  Neurontin [Gabapentin] Unknown (comments) Pt unknown reaction Family History Problem Relation Age of Onset  No Known Problems Mother  No Known Problems Father  Anesth Problems Neg Hx Social History Tobacco Use  Smoking status: Never Smoker  Smokeless tobacco: Never Used Substance Use Topics  Alcohol use: No  
 
Patient Active Problem List  
Diagnosis Code  Mixed hyperlipidemia E78.2  
 CAD (coronary artery disease) I25.10  
 HTN (hypertension) I10  
 Memory impairment R41.3  Depression F32.9  S/P CABG (coronary artery bypass graft) Z95.1  MI, old I25.2  Acute on chronic systolic heart failure (HCC) I50.23  
 CHF (congestive heart failure) (HCC) I50.9  Dementia without behavioral disturbance (HCC) F03.90  Depressive disorder, not elsewhere classified F32.9  Anxiety state, unspecified F41.1  Pain Disorder Associated w/ Both Psychological & Medical Factors F45.42  
 Acute chest pain R07.9  Rotator cuff tear arthropathy of right shoulder M75.101, M12.811  
 Intractable back pain M54.9  Advance care planning Z71.89  
 Osteopenia M85.80  Cardiac LV ejection fraction 21-40% R94.30  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  
 ACP (advance care planning) Z71.89  
 Recurrent depression ([facility] F33.9  Skin lesion of face L98.9  Cancer, skin, squamous cell C44.92  
 Gait instability R26.81  
 LBBB (left bundle branch block) I44.7 Depression Risk Factor Screening:  
 
3 most recent PHQ Screens 10/15/2019 Little interest or pleasure in doing things Not at all Feeling down, depressed, irritable, or hopeless Not at all Total Score PHQ 2 0 Alcohol Risk Factor Screening: You do not drink alcohol or very rarely. Functional Ability and Level of Safety:  
Hearing Loss The patient needs further evaluation. Activities of Daily Living The home contains: walker Patient needs help with:  transportation, shopping, preparing meals, laundry, housework, managing medications, managing money, dressing, bathing and hygiene Fall Risk Fall Risk Assessment, last 12 mths 10/15/2019 Able to walk? Yes Fall in past 12 months? No  
Fall with injury? -  
Number of falls in past 12 months - Fall Risk Score -  
 
 
Abuse Screen Patient is not abused Cognitive Screening Evaluation of Cognitive Function: 
Has your family/caregiver stated any concerns about your memory: yes Abnormal 
 
Patient Care Team  
Patient Care Team: 
Rashmi Benavides MD as PCP - General (Internal Medicine) Sara Maldonado MD (Dermatology) Misha Villafana NP (Surgery) Assessment/Plan Education and counseling provided: 
Are appropriate based on today's review and evaluation End-of-Life planning (with patient's consent) Pneumococcal Vaccine Influenza Vaccine Bone mass measurement (DEXA) Screening for glaucoma Diagnoses and all orders for this visit: 
 
1. Acute on chronic congestive heart failure, unspecified heart failure type ([facility] 2. Coronary artery disease due to lipid rich plaque 3. Essential hypertension 4. Stage 3 chronic kidney disease ([facility] 5. Atrial fibrillation, unspecified type ([facility] 6. Depression, unspecified depression type Health Maintenance Due Topic Date Due  
 DTaP/Tdap/Td series (1 - Tdap) 05/26/1947  Shingrix Vaccine Age 50> (1 of 2) 05/26/1976  GLAUCOMA SCREENING Q2Y  06/09/2019  MEDICARE YEARLY EXAM  10/06/2019

## 2019-10-15 NOTE — PROGRESS NOTES
HISTORY OF PRESENT ILLNESS Yoshi Dominguez is a 80 y.o. female here for hospital follow-up. She was admitted to hospital for acute decompensated congestive heart failure. Had echocardiogram done, showed dilated left ventricle with ejection fraction 15%. Her proBNP level was very high. She was seen by cardiologist.  Med adjustment done. Has lost significant amount of weight from diuresis. Pneumonia was ruled out, throat culture and respiratory culture came back negative. Chest x-ray did not show any pneumonia. Initially she was on antibiotic which were discontinued. All labs done, seems stable. All medications reconciled. Has history of atrial fibrillation and coronary disease. On Eliquis and metoprolol. Has depression and dementia. On medications. She is getting frail and going downhill. she was placed on palliative care and [facility] hospice service. She is doing well right now. No shortness of breath cough, feels little tired and fatigued. Taking low-dose of lorazepam. 
Here for Medicare wellness visit. Has living will. HPI Review of Systems Constitutional: Negative. HENT: Negative. Respiratory: Negative. Cardiovascular: Positive for leg swelling. Gastrointestinal: Negative. Genitourinary: Negative. Musculoskeletal: Negative. Skin: Negative. Neurological: Negative. Endo/Heme/Allergies: Negative. Psychiatric/Behavioral: Negative. Physical Exam  
Constitutional: She appears well-developed and well-nourished. No distress. Neck: Normal range of motion. Neck supple. No JVD present. No thyromegaly present. Cardiovascular: Normal rate, regular rhythm, normal heart sounds and intact distal pulses. Pulmonary/Chest: Effort normal and breath sounds normal. No respiratory distress. She has no wheezes. No basilar rales present. Abdominal: Soft. Bowel sounds are normal. She exhibits no distension. There is no tenderness. Musculoskeletal: She exhibits edema. Bilateral 1+ leg edema. Lymphadenopathy:  
  She has no cervical adenopathy. Psychiatric: She has a normal mood and affect. Her behavior is normal.  
 
 
ASSESSMENT and PLAN Diagnoses and all orders for this visit: 
 
1. Acute on chronic congestive heart failure, unspecified heart failure type ([facility] Moderate dilated left ventricle with ejection fraction 15%. BNP level was very high. She was started on Entresto and continued with the Lasix 40 mg once a day. She is also on low-dose of metoprolol XL. Already on Eliquis for atrial fibrillation. Right now she is compensated. Will continue same medications. She will follow-up with cardiologist. 
 
2. Coronary artery disease due to lipid rich plaque Stable. On Eliquis, statin and beta-blocker. Doing well. 3. Essential hypertension Stable on current regimen, will continue same. 4. Stage 3 chronic kidney disease ([facility] Stable kidney function test.  On Lasix. 5. [facility] fibrillation, unspecified type [facility] Rate and rhythm control. On metoprolol and Eliquis. 6. Depression, unspecified depression type Stable. Taking Effexor XR. Also on Aricept for memory loss. 7. Medicare annual wellness visit, subsequent Discussed expected course/resolution/complications of diagnosis in detail with patient. Medication risks/benefits/costs/interactions/alternatives discussed with patient. Pt was given an after visit summary which includes diagnoses, current medications & vitals. Pt expressed understanding with the diagnosis and plan.

## 2019-10-15 NOTE — PATIENT INSTRUCTIONS
Medicare Wellness Visit, Female The best way to live healthy is to have a lifestyle where you eat a well-balanced diet, exercise regularly, limit alcohol use, and quit all forms of tobacco/nicotine, if applicable. Regular preventive services are another way to keep healthy. Preventive services (vaccines, screening tests, monitoring & exams) can help personalize your care plan, which helps you manage your own care. Screening tests can find health problems at the earliest stages, when they are easiest to treat. [facility] follows the current, evidence-based guidelines published by the [facility] ([facility]) when recommending preventive services for our patients. Because we follow these guidelines, sometimes recommendations change over time as research supports it. (For example, mammograms used to be recommended annually. Even though Medicare will still pay for an annual mammogram, the newer guidelines recommend a mammogram every two years for women of average risk.) Of course, you and your doctor may decide to screen more often for some diseases, based on your risk and your health status. Preventive services for you include: - Medicare offers their members a free annual wellness visit, which is time for you and your primary care provider to discuss and plan for your preventive service needs. Take advantage of this benefit every year! 
-All adults over the age of 72 should receive the recommended pneumonia vaccines. Current USPSTF guidelines recommend a series of two vaccines for the best pneumonia protection.  
-All adults should have a flu vaccine yearly and a tetanus vaccine every 10 years. All adults age 61 and older should receive a shingles vaccine once in their lifetime.   
-A bone mass density test is recommended when a woman turns 65 to screen for osteoporosis. This test is only recommended one time, as a screening. Some providers will use this same test as a disease monitoring tool if you already have osteoporosis. -All adults age 38-68 who are overweight should have a diabetes screening test once every three years.  
-Other screening tests and preventive services for persons with diabetes include: an eye exam to screen for diabetic retinopathy, a kidney function test, a foot exam, and stricter control over your cholesterol.  
-Cardiovascular screening for adults with routine risk involves an electrocardiogram (ECG) at intervals determined by your doctor.  
-Colorectal cancer screenings should be done for adults age 54-65 with no increased risk factors for colorectal cancer. There are a number of acceptable methods of screening for this type of cancer. Each test has its own benefits and drawbacks. Discuss with your doctor what is most appropriate for you during your annual wellness visit. The different tests include: colonoscopy (considered the best screening method), a fecal occult blood test, a fecal DNA test, and sigmoidoscopy. -Breast cancer screenings are recommended every other year for women of normal risk, age 54-69. 
-Cervical cancer screenings for women over age 72 are only recommended with certain risk factors.  
-All adults born between [facility] should be screened once for Hepatitis C. Here is a list of your current Health Maintenance items (your personalized list of preventive services) with a due date: 
Health Maintenance Due Topic Date Due  
 DTaP/Tdap/Td  (1 - Tdap) 05/26/1947  Shingles Vaccine (1 of 2) 05/26/1976  Glaucoma Screening   06/09/2019 Rene Moore Annual Well Visit  10/06/2019

## 2019-11-12 NOTE — PROGRESS NOTES
Health Maintenance Due Topic Date Due  
 DTaP/Tdap/Td series (1 - Tdap) 05/26/1947  Shingrix Vaccine Age 50> (1 of 2) 05/26/1976  GLAUCOMA SCREENING Q2Y  06/09/2019 Chief Complaint Patient presents with  Dementia  Hypertension  Cholesterol Problem 1. Have you been to the ER, urgent care clinic since your last visit? Hospitalized since your last visit? No 
 
2. Have you seen or consulted any other health care providers outside of the [address] [place] [address] since your last visit? Include any pap smears or colon screening. No 
 
3) Do you have an Advance Directive on file? yes 4) Are you interested in receiving information on Advance Directives? NO Patient is accompanied by daughter I have received verbal consent from Bob Badillo to discuss any/all medical information while they are present in the room.

## 2019-11-12 NOTE — PROGRESS NOTES
HISTORY OF PRESENT ILLNESS Marlon Dodson is a 80 y.o. female here for follow-up. Has congestive heart failure. She was placed on Entresto and Lasix. No shortness of breath orthopnea. Leg edema seems stable. She is under hospice care. Has history of atrial fibrillation and coronary disease. On Eliquis and metoprolol. Will see cardiologist soon. Has depression and dementia. On medications. Doing well. Labs reviewed, flu shot up-to-date. HPI Review of Systems Constitutional: Negative. HENT: Negative. Respiratory: Negative. Cardiovascular: Positive for leg swelling. Gastrointestinal: Negative. Genitourinary: Negative. Musculoskeletal: Negative. Skin: Negative. Neurological: Negative. Endo/Heme/Allergies: Negative. Psychiatric/Behavioral: Negative. Physical Exam  
Constitutional: She appears well-developed and well-nourished. No distress. Neck: Normal range of motion. Neck supple. No JVD present. No thyromegaly present. Cardiovascular: Normal rate, regular rhythm, normal heart sounds and intact distal pulses. Pulmonary/Chest: Effort normal and breath sounds normal. No respiratory distress. She has no wheezes. No basilar rales present. Abdominal: Soft. Bowel sounds are normal. She exhibits no distension. There is no tenderness. Musculoskeletal: She exhibits edema. Trace bilateral leg edema present. Lymphadenopathy:  
  She has no cervical adenopathy. Psychiatric: She has a normal mood and affect. Her behavior is normal.  
Not depressed. ASSESSMENT and PLAN Diagnoses and all orders for this visit: 
 
1. Acute on chronic congestive heart failure, unspecified heart failure type ([address] Compensated. On Entresto and Lasix. Weight seems stable. Advised to be on low salt diet . She will see Dr. Elliott Treviño soon. She is under hospice care. 2. Coronary artery disease due to lipid rich plaque Stable. On Eliquis, pravastatin and Toprol-XL. Seeing cardiologist. 
 
3. Essential hypertension Stable on current regimen, continue same. 4. Atrial fibrillation, unspecified type ([address] Rate and rhythm control. On Eliquis and Toprol-XL. Discussed expected course/resolution/complications of diagnosis in detail with patient. Medication risks/benefits/costs/interactions/alternatives discussed with patient. Pt was given an after visit summary which includes diagnoses, current medications & vitals. Pt expressed understanding with the diagnosis and plan.

## 2022-08-23 NOTE — LETTER
08/23/22                            Trina Lizarraga  [address] [place] [id number]    To Whom It May Concern:    This is to certify Trina Lizarraga was evaluated with Pb Villanueva MD on 08/23/22 and can return to light work on 8/24/22.     RESTRICTIONS: Sit down duties only    Follow up 3 weeks    Pb Villanueva MD  [facility] Larimer  [address] [place] [address] [address] [facility] [facility] [place] [place] [address]  Phone: [phone]  Fax: [phone]     4/13/2018 10:33 AM 
 
Ms. Janiya Aguila [id number] Dear Janiya Reyes: 
 
Please find your most recent results below. Resulted Orders CBC W/O DIFF Result Value Ref Range WBC 7.5 3.4 - 10.8 x10E3/uL  
 RBC 4.26 3.77 - 5.28 x10E6/uL HGB 13.2 11.1 - 15.9 g/dL HCT 40.2 34.0 - 46.6 % MCV 94 79 - 97 fL  
 MCH 31.0 26.6 - 33.0 pg  
 MCHC 32.8 31.5 - 35.7 g/dL  
 RDW 13.9 12.3 - 15.4 % PLATELET 235 376 - 784 x10E3/uL Narrative Performed at:  [address]  [id number] : Huyen Evangelista MD, Phone:  [phone] METABOLIC PANEL, COMPREHENSIVE Result Value Ref Range Glucose 101 (H) 65 - 99 mg/dL BUN 28 10 - 36 mg/dL Creatinine 1.41 (H) 0.57 - 1.00 mg/dL GFR est non-AA 33 (L) >59 mL/min/1.73 GFR est AA 38 (L) >59 mL/min/1.73  
 BUN/Creatinine ratio 20 12 - 28 Sodium 145 (H) 134 - 144 mmol/L Potassium 4.8 3.5 - 5.2 mmol/L Chloride 100 96 - 106 mmol/L  
 CO2 31 (H) 18 - 29 mmol/L Calcium 9.4 8.7 - 10.3 mg/dL Protein, total 6.4 6.0 - 8.5 g/dL Albumin 4.0 3.2 - 4.6 g/dL GLOBULIN, TOTAL 2.4 1.5 - 4.5 g/dL A-G Ratio 1.7 1.2 - 2.2 Bilirubin, total 0.4 0.0 - 1.2 mg/dL Alk. phosphatase 50 39 - 117 IU/L  
 AST (SGOT) 16 0 - 40 IU/L  
 ALT (SGPT) 9 0 - 32 IU/L Narrative Performed at:  [address]  [id number] : Huyen Evangelista MD, Phone:  [phone] TSH 3RD GENERATION Result Value Ref Range TSH 1.260 0.450 - 4.500 uIU/mL Narrative Performed at:  [address]  [id number] : Huyen Evangelista MD, Phone:  [phone] CKD REPORT Result Value Ref Range Interpretation Note Comment:  
   Supplemental report is available. Narrative Performed at:  [address] [place] [address] [address] [id number] [place], [place]  [id number] : Kulwant Lisa PhD, Phone:  [phone] RECOMMENDATIONS: 
None. Keep up the good work! Please call me if you have any questions: [phone] Sincerely, Sharifa Giraldo MD

## (undated) DEVICE — GAUZE SPONGES,12 PLY: Brand: CURITY

## (undated) DEVICE — TRAY PREP DRY W/ PREM GLV 2 APPL 6 SPNG 2 UNDPD 1 OVERWRAP

## (undated) DEVICE — SUTURE PERMAHAND SZ 2-0 L18IN NONABSORBABLE BLK L26MM PS 1588H

## (undated) DEVICE — PACK,EENT,TURBAN DRAPE,PK II: Brand: MEDLINE

## (undated) DEVICE — STERILE POLYISOPRENE POWDER-FREE SURGICAL GLOVES: Brand: PROTEXIS

## (undated) DEVICE — MASTISOL ADHESIVE LIQ 2/3ML

## (undated) DEVICE — STRETCH BANDAGE ROLL: Brand: DERMACEA

## (undated) DEVICE — SYR 10ML LUER LOK 1/5ML GRAD --

## (undated) DEVICE — INFECTION CONTROL KIT SYS

## (undated) DEVICE — SUTURE VCRL SZ 3-0 L27IN ABSRB UD L26MM SH 1/2 CIR J416H

## (undated) DEVICE — X-RAY SPONGES,16 PLY: Brand: DERMACEA

## (undated) DEVICE — SUT PROL 3-0 18IN PS2 BLU --

## (undated) DEVICE — SUT PROL 4-0 18IN P3 BLU --

## (undated) DEVICE — SUT SLK 2-0SH 30IN BLK --

## (undated) DEVICE — SUT PROL 3-0 36IN SH DA BLU --

## (undated) DEVICE — SOLUTION IV 1000ML 0.9% SOD CHL

## (undated) DEVICE — Device

## (undated) DEVICE — DRAPE,HAND,STERILE: Brand: MEDLINE

## (undated) DEVICE — SUT PROL 5-0 18IN P3 BLU --

## (undated) DEVICE — NEEDLE HYPO 25GA L1.5IN BVL ORIENTED ECLIPSE

## (undated) DEVICE — REM POLYHESIVE ADULT PATIENT RETURN ELECTRODE: Brand: VALLEYLAB

## (undated) DEVICE — SUTURE COAT VCRL SZ 4-0 L18IN ABSRB UD L19MM PS-2 1/2 CIR J496G

## (undated) DEVICE — HANDLE LT SNAP ON ULT DURABLE LENS FOR TRUMPF ALC DISPOSABLE